# Patient Record
Sex: FEMALE | Race: BLACK OR AFRICAN AMERICAN | NOT HISPANIC OR LATINO | Employment: OTHER | URBAN - METROPOLITAN AREA
[De-identification: names, ages, dates, MRNs, and addresses within clinical notes are randomized per-mention and may not be internally consistent; named-entity substitution may affect disease eponyms.]

---

## 2017-01-03 ENCOUNTER — OFFICE VISIT (OUTPATIENT)
Dept: INTERNAL MEDICINE | Facility: CLINIC | Age: 82
End: 2017-01-03
Payer: MEDICARE

## 2017-01-03 VITALS
WEIGHT: 147.94 LBS | BODY MASS INDEX: 27.22 KG/M2 | TEMPERATURE: 98 F | HEIGHT: 62 IN | HEART RATE: 69 BPM | DIASTOLIC BLOOD PRESSURE: 78 MMHG | RESPIRATION RATE: 15 BRPM | SYSTOLIC BLOOD PRESSURE: 140 MMHG

## 2017-01-03 DIAGNOSIS — I50.43 ACUTE ON CHRONIC COMBINED SYSTOLIC AND DIASTOLIC HF (HEART FAILURE): Primary | ICD-10-CM

## 2017-01-03 DIAGNOSIS — Z95.0 PACEMAKER: ICD-10-CM

## 2017-01-03 DIAGNOSIS — Z79.01 LONG TERM (CURRENT) USE OF ANTICOAGULANTS: ICD-10-CM

## 2017-01-03 DIAGNOSIS — R80.9 PROTEINURIA: ICD-10-CM

## 2017-01-03 DIAGNOSIS — N18.30 BENIGN HYPERTENSIVE HEART AND CKD, STAGE 3 (GFR 30-59), W CHF: ICD-10-CM

## 2017-01-03 DIAGNOSIS — N18.30 CKD (CHRONIC KIDNEY DISEASE) STAGE 3, GFR 30-59 ML/MIN: ICD-10-CM

## 2017-01-03 DIAGNOSIS — Z90.49 S/P PARTIAL COLECTOMY: ICD-10-CM

## 2017-01-03 DIAGNOSIS — R53.1 WEAKNESS: ICD-10-CM

## 2017-01-03 DIAGNOSIS — I13.0 BENIGN HYPERTENSIVE HEART AND CKD, STAGE 3 (GFR 30-59), W CHF: ICD-10-CM

## 2017-01-03 DIAGNOSIS — E11.9 CONTROLLED TYPE 2 DIABETES MELLITUS WITHOUT COMPLICATION, WITHOUT LONG-TERM CURRENT USE OF INSULIN: ICD-10-CM

## 2017-01-03 DIAGNOSIS — E03.4 HYPOTHYROIDISM DUE TO ACQUIRED ATROPHY OF THYROID: ICD-10-CM

## 2017-01-03 DIAGNOSIS — F41.9 ANXIETY: ICD-10-CM

## 2017-01-03 DIAGNOSIS — I10 ESSENTIAL HYPERTENSION: ICD-10-CM

## 2017-01-03 DIAGNOSIS — R53.1 GENERALIZED WEAKNESS: ICD-10-CM

## 2017-01-03 DIAGNOSIS — E55.9 VITAMIN D DEFICIENCY DISEASE: ICD-10-CM

## 2017-01-03 DIAGNOSIS — H26.9 CORTICAL CATARACT OF LEFT EYE: ICD-10-CM

## 2017-01-03 DIAGNOSIS — Z23 NEED FOR 23-POLYVALENT PNEUMOCOCCAL POLYSACCHARIDE VACCINE: ICD-10-CM

## 2017-01-03 DIAGNOSIS — E11.9 TYPE 2 DIABETES MELLITUS WITHOUT RETINOPATHY: ICD-10-CM

## 2017-01-03 DIAGNOSIS — I48.19 PERSISTENT ATRIAL FIBRILLATION: ICD-10-CM

## 2017-01-03 PROCEDURE — 99213 OFFICE O/P EST LOW 20 MIN: CPT | Mod: PBBFAC,PO | Performed by: FAMILY MEDICINE

## 2017-01-03 PROCEDURE — 99215 OFFICE O/P EST HI 40 MIN: CPT | Mod: S$PBB,,, | Performed by: FAMILY MEDICINE

## 2017-01-03 PROCEDURE — 99999 PR PBB SHADOW E&M-EST. PATIENT-LVL III: CPT | Mod: PBBFAC,,, | Performed by: FAMILY MEDICINE

## 2017-01-03 PROCEDURE — 90732 PPSV23 VACC 2 YRS+ SUBQ/IM: CPT | Mod: PBBFAC,PO | Performed by: FAMILY MEDICINE

## 2017-01-03 NOTE — PROGRESS NOTES
Subjective:       Patient ID: Deepali Stuart is a 84 y.o. female.    Chief Complaint: Annual Exam    HPI 84-year-old -American female recessed to clinic today accompanied by her son for annual physical exam.  She continues [facility].  She continues to be treated for persistent atrial fibrillation, congestive heart failure, and hypertension which is now stable under cardiology with treatment with amlodipine, carvedilol, lisinopril, and Lasix.  She also continues to be under Coumadin clinic for anticoagulation secondary to her atrial fibrillation.  She walks with assistance of a walker.  When she presents to clinic she is wheelchair-bound.  She continues to have well-controlled type 2 diabetes on metformin 500 mg twice a day.  She also continues to have stable stage III chronic kidney disease which is mildly improved since her last visit.  Kidney function is probably depressed secondary to Lasix and secondary to being on Lasix the patient complains of persistent dizziness.  I have strongly encouraged increased hydration.  Hypothyroidism continues to be stable on levothyroxine 50 µg daily.  She has a past surgical history of pacemaker placement, laser ablation secondary to atrial fibrillation, cataract extraction, hysterectomy, and partial colectomy secondary to a suspicious colon polyp.  She reports no significant family medical history.  She declines flu vaccine.  Pneumovax has been discussed and will be given.  Review of Systems   Constitutional: Negative for appetite change, chills, fatigue and fever.   HENT: Negative for congestion, ear pain, hearing loss, postnasal drip, rhinorrhea, sinus pressure, sore throat and tinnitus.    Eyes: Positive for visual disturbance (blurry vision). Negative for redness and itching.   Respiratory: Negative for cough, chest tightness and shortness of breath.    Cardiovascular: Negative for chest pain and palpitations.    Gastrointestinal: Negative for abdominal pain, constipation, diarrhea, nausea and vomiting.   Genitourinary: Negative for decreased urine volume, difficulty urinating, dysuria, frequency, hematuria and urgency.   Musculoskeletal: Negative for back pain, myalgias, neck pain and neck stiffness.   Skin: Negative for rash.   Neurological: Positive for dizziness. Negative for light-headedness and headaches.   Psychiatric/Behavioral: Negative.        Objective:      Physical Exam   Constitutional: She is oriented to person, place, and time. She appears well-developed and well-nourished. No distress.   HENT:   Head: Normocephalic and atraumatic.   Right Ear: External ear normal.   Left Ear: External ear normal.   Nose: Nose normal.   Mouth/Throat: Oropharynx is clear and moist. Mucous membranes are dry. No oropharyngeal exudate.   Eyes: Conjunctivae and EOM are normal. Pupils are equal, round, and reactive to light. Right eye exhibits no discharge. Left eye exhibits no discharge. No scleral icterus.   Neck: Normal range of motion. Neck supple. No JVD present. No tracheal deviation present. No thyromegaly present.   Cardiovascular: Normal rate, regular rhythm, normal heart sounds and intact distal pulses.  Exam reveals no gallop and no friction rub.    No murmur heard.  Pulses:       Dorsalis pedis pulses are 2+ on the right side, and 2+ on the left side.        Posterior tibial pulses are 2+ on the right side, and 2+ on the left side.   Pulmonary/Chest: Effort normal and breath sounds normal. No stridor. No respiratory distress. She has no wheezes. She has no rales.   Abdominal: Soft. Bowel sounds are normal. She exhibits no distension and no mass. There is no tenderness. There is no rebound and no guarding.   Musculoskeletal: Normal range of motion. She exhibits no edema or tenderness.        Right foot: There is normal range of motion and no deformity.        Left foot: There is normal range of motion and no deformity.    Wheelchair-bound     Feet:   Right Foot:   Protective Sensation: 10 sites tested. 10 sites sensed.   Skin Integrity: Negative for ulcer, blister, skin breakdown, erythema, warmth, callus or dry skin.   Left Foot:   Protective Sensation: 10 sites tested. 10 sites sensed.   Skin Integrity: Negative for ulcer, blister, skin breakdown, erythema, warmth, callus or dry skin.   Lymphadenopathy:     She has no cervical adenopathy.   Neurological: She is alert and oriented to person, place, and time.   Skin: Skin is warm and dry. No rash noted. She is not diaphoretic. No erythema. No pallor.   Psychiatric: She has a normal mood and affect. Her behavior is normal. Judgment and thought content normal.   Nursing note and vitals reviewed.      Assessment:       1. Acute on chronic combined systolic and diastolic HF (heart failure)    2. Persistent atrial fibrillation    3. Pacemaker    4. 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 CKD (chronic kidney disease) stage 3, GFR 30-59 ml/min    6. Controlled type 2 diabetes mellitus without complication, without long-term current use of insulin    7. Hypothyroidism due to acquired atrophy of thyroid    8. Proteinuria    9. Anxiety    10. Generalized weakness    11. Long term (current) use of anticoagulants    12. Vitamin D deficiency disease    13. Weakness    14. Type 2 diabetes mellitus without retinopathy - Both Eyes    15. Cortical cataract of left eye    16. Essential hypertension    17. S/P partial colectomy    18. Need for 23-polyvalent pneumococcal polysaccharide vaccine        Plan:       1.  Labs have been reviewed and are overall within normal limits except for mildly decreased kidney function.  I have strongly encouraged increased hydration.  2.  Continue amlodipine 5 mg daily, carvedilol 25 mg twice a day, lisinopril 40 mg daily, and Lasix 20 mg daily.  Atrial fibrillation and congestive heart failure are stable.  3.  Continue follow-up with  cardiology as scheduled.  4.  Encourage increased hydration.  5.  Continue metformin 500 mg twice a day.  Type 2 diabetes is well-controlled.  6.  Continue levothyroxine 50 µg daily.  Hypothyroidism is well controlled.  7.  Continue trazodone as needed.  8.  Continue follow-up with ophthalmology as scheduled.  9.  Continue Coumadin as prescribed and continue follow-up with Coumadin clinic.  10.  Pneumococcal vaccine given.  11.  Return to clinic as needed or in 6 months for general exam.    40 minutes has been spent with the patient with more than 50% of the clinic visit spent reviewing medical diagnoses, medications, medical treatment, and counseling the patient on her medical conditions.

## 2017-01-03 NOTE — MR AVS SNAPSHOT
[facility]   [facility]  [place] [address]  Phone: [phone]  Fax: [phone]                  Deepali Stuart   1/3/2017 9:00 AM   Office Visit    Description:  Female : 1932   Provider:  Vishal Salazar MD   Department:  [facility]           Reason for Visit     Annual Exam           Diagnoses this Visit        Comments    Acute on chronic combined systolic and diastolic HF (heart failure)    -  Primary     Persistent atrial fibrillation         Pacemaker         Benign hypertensive heart and CKD, 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), w CHF         CKD (chronic kidney disease) stage 3, GFR 30-59 ml/min         Controlled type 2 diabetes mellitus without complication, without long-term current use of insulin         Hypothyroidism due to acquired atrophy of thyroid         Proteinuria         Anxiety         Generalized weakness         Long term (current) use of anticoagulants         Vitamin D deficiency disease         Weakness         Type 2 diabetes mellitus without retinopathy         Need for 23-polyvalent pneumococcal polysaccharide vaccine                To Do List           Future Appointments        Provider Department Dept Phone    1/3/2017 9:00 AM Vishal Salazar MD [facility] Internal Medicine [phone]    2017 10:00 AM TELEPHONE CHECK, [facility] [address] - Arrhythmia [phone]      Goals (5 Years of Data)     None      Follow-Up and Disposition     Return in about 6 months (around 7/3/2017), or if symptoms worsen or fail to improve.      [facility] On [facility]     [facility] On Call Nurse Care Line -  Assistance  Registered nurses in the [facility] On Call Center provide clinical advisement, health education, appointment booking, and other advisory services.  Call for this free service at [phone].             Medications           Message regarding Medications     Verify the changes and/or additions to your medication regime  "listed below are the same as discussed with your clinician today.  If any of these changes or additions are incorrect, please notify your healthcare provider.             Verify that the below list of medications is an accurate representation of the medications you are currently taking.  If none reported, the list may be blank. If incorrect, please contact your healthcare provider. Carry this list with you in case of emergency.           Current Medications     amlodipine (NORVASC) 5 MG tablet Take 1 tablet (5 mg total) by mouth once daily.    atorvastatin (LIPITOR) 10 MG tablet TAKE 1 TABLET BY MOUTH EVERY DAY    blood sugar diagnostic (TRUETRACK TEST) Strp FOLLOW PACKAGE DIRECTIONS    carvedilol (COREG) 25 MG tablet TAKE 1 TABLET BY MOUTH TWICE DAILY    furosemide (LASIX) 20 MG tablet Take 1 tablet (20 mg total) by mouth daily as needed.    lancets Misc 1 Device by Misc.(Non-Drug; Combo Route) route once daily.    levothyroxine (SYNTHROID) 50 MCG tablet TAKE 1 TABLET BY MOUTH EVERY DAY    lisinopril (PRINIVIL,ZESTRIL) 40 MG tablet TAKE 1 TABLET BY MOUTH EVERY DAY    metformin (GLUCOPHAGE) 500 MG tablet TAKE 1 TABLET BY MOUTH TWICE DAILY WITH MEALS    ondansetron (ZOFRAN-ODT) 4 MG TbDL DISSOLVE ONE TABLET BY MOUTH EVERY 6 HOURS AS NEEDED FOR NAUSEA  AND VOMITING    trazodone (DESYREL) 50 MG tablet TAKE 1/2 TO 1 TABLET BY MOUTH EVERY EVENING AS NEEDED FOR INSOMNIA    warfarin (COUMADIN) 5 MG tablet TAKE 1 TABLET BY MOUTH EVERY DAY OR AS DIRECTED BY COUMADIN CLINIC           Clinical Reference Information           Vital Signs - Last Recorded  Most recent update: 1/3/2017  8:41 AM by Melanie Kenney LPN    BP Pulse Temp Resp Ht Wt    (!) 140/78 (BP Location: Right arm, Patient Position: Sitting, BP Method: Manual) 69 98.1 °F (36.7 °C) (Oral) 15 5' 2" (1.575 m) 67.1 kg (147 lb 14.9 oz)    LMP BMI             (LMP Unknown) 27.06 kg/m2         Blood Pressure          Most Recent Value    BP  (!)  140/78    "   Allergies as of 1/3/2017     Plavix [Clopidogrel]      Immunizations Administered on Date of Encounter - 1/3/2017     Name Date Dose VIS Date Route    Pneumococcal Polysaccharide - 23 Valent  Incomplete 0.5 mL 4/24/2015 Intramuscular      Orders Placed During Today's Visit      Normal Orders This Visit    Pneumococcal Polysaccharide Vaccine (23 Valent) (SQ/IM)       [facility] Sign-Up     Activating your [facility] account is as easy as 1-2-3!     1) Visit [email or web address], select Sign Up Now, enter this activation code and your date of birth, then select Next.  [email or web address]  Expires: 1/14/2017  9:42 AM      2) Create a username and password to use when you visit [facility] in the future and select a security question in case you lose your password and select Next.    3) Enter your e-mail address and click Sign Up!    Additional Information  If you have questions, please e-mail [email or web address] or call [phone] to talk to our [facility] staff. Remember, [facility] is NOT to be used for urgent needs. For medical emergencies, dial 911.

## 2017-01-04 ENCOUNTER — ANTI-COAG VISIT (OUTPATIENT)
Dept: CARDIOLOGY | Facility: CLINIC | Age: 82
End: 2017-01-04

## 2017-01-04 ENCOUNTER — TELEPHONE (OUTPATIENT)
Dept: OPTOMETRY | Facility: CLINIC | Age: 82
End: 2017-01-04

## 2017-01-04 DIAGNOSIS — Z79.01 LONG TERM (CURRENT) USE OF ANTICOAGULANTS: ICD-10-CM

## 2017-01-04 LAB — INR PPP: 2.5

## 2017-01-04 NOTE — TELEPHONE ENCOUNTER
----- Message from Lissett Main sent at 1/4/2017 11:13 AM CST -----  Contact: Deepali Stuart   Pt returned the called back ,can will call the pt back please pt can be reached at [phone] please thanks.

## 2017-01-09 ENCOUNTER — OFFICE VISIT (OUTPATIENT)
Dept: OPTOMETRY | Facility: CLINIC | Age: 82
End: 2017-01-09
Payer: MEDICARE

## 2017-01-09 DIAGNOSIS — E11.9 TYPE 2 DIABETES MELLITUS WITHOUT RETINOPATHY: ICD-10-CM

## 2017-01-09 DIAGNOSIS — H04.123 DRY EYES, BILATERAL: Primary | ICD-10-CM

## 2017-01-09 PROCEDURE — 99212 OFFICE O/P EST SF 10 MIN: CPT | Mod: PBBFAC,PO | Performed by: OPTOMETRIST

## 2017-01-09 PROCEDURE — 92014 COMPRE OPH EXAM EST PT 1/>: CPT | Mod: S$PBB,,, | Performed by: OPTOMETRIST

## 2017-01-09 PROCEDURE — 99999 PR PBB SHADOW E&M-EST. PATIENT-LVL II: CPT | Mod: PBBFAC,,, | Performed by: OPTOMETRIST

## 2017-01-09 NOTE — PROGRESS NOTES
HPI     DLS:   Pt states harder to see anything with glasses. Pt states blurry va has   been gradual and states glasses are not helping to improve vision. +Mucous   discharge  Denies f/f    Systane ou qhs     Check BS once a week. Does not remember last BS reading   Hemoglobin A1C       Date                     Value               Ref Range             Status                12/28/2016               5.6                 4.5 - 6.2 %           Final                  11/17/2016               5.7                 4.5 - 6.2 %           Final                  08/27/2015               6.3 (H)             4.5 - 6.2 %           Final            ----------         Last edited by Duyen Pina on 1/9/2017  8:46 AM.     ROS     Positive for: Cardiovascular, Eyes    Negative for: Constitutional, Gastrointestinal, Neurological, Skin,   Genitourinary, Musculoskeletal, HENT, Endocrine, Respiratory, Psychiatric,   Allergic/Imm, Heme/Lymph    Last edited by Heriberto Brooks, OD on 1/9/2017 10:03 AM. (History)        Assessment /Plan     For exam results, see Encounter Report.    Dry eyes, bilateral    Type 2 diabetes mellitus without retinopathy      See previous notes from 3 months ago:  1. Mild pco sp pciol OU (hx iritis/iop spike OS--see Dr zepeda notes).  iop high normal OU today  2. Possible hx vasc occlusion OD per prev notes from Dr zepeda/naldo  3. DM- WITHOUT RETINOPATHY.  Advised yearly DFE  4. LUNA--advised SYSTANE ATs TID  5. Pt reports difficulty reading w PAL, but in the office w correct light can easily read a magazine.  Discussed w son pt should get a good lamp for her reading chair.  If still problems may wish to switch to lined bifocals    TODAY pt reports transient blur and mucus in eyes. Dilated eyes again and no change noted.  Is NOT using ATs as directed!!  Pt reports she thought because she had slight blur after instillation that the drops were making her eyes worse.  Discussed w pt and  that she  needs those ATs QID DAILY to stop blur/mucus.  ALSO rewrote last spex Rx and discussed lined bifocals might make things better for reading    PLAN:    rrtc as sched for full exam

## 2017-01-10 ENCOUNTER — CLINICAL SUPPORT (OUTPATIENT)
Dept: ELECTROPHYSIOLOGY | Facility: CLINIC | Age: 82
End: 2017-01-10
Payer: MEDICARE

## 2017-01-10 DIAGNOSIS — Z95.0 CARDIAC PACEMAKER IN SITU: ICD-10-CM

## 2017-01-10 DIAGNOSIS — I48.91 ATRIAL FIBRILLATION: ICD-10-CM

## 2017-01-10 PROCEDURE — 93293 PM PHONE R-STRIP DEVICE EVAL: CPT | Mod: PBBFAC | Performed by: INTERNAL MEDICINE

## 2017-01-18 ENCOUNTER — ANTI-COAG VISIT (OUTPATIENT)
Dept: CARDIOLOGY | Facility: CLINIC | Age: 82
End: 2017-01-18

## 2017-01-18 DIAGNOSIS — Z79.01 LONG TERM (CURRENT) USE OF ANTICOAGULANTS: ICD-10-CM

## 2017-01-18 LAB — INR PPP: 2.4

## 2017-01-18 NOTE — PROGRESS NOTES
Verbal result taken from Kristine/[facility]_________. PT/INR _2.4______ Date drawn_1/18/17_______ Hardcopy to be faxed.

## 2017-02-01 ENCOUNTER — ANTI-COAG VISIT (OUTPATIENT)
Dept: CARDIOLOGY | Facility: CLINIC | Age: 82
End: 2017-02-01

## 2017-02-01 DIAGNOSIS — Z79.01 LONG TERM (CURRENT) USE OF ANTICOAGULANTS: ICD-10-CM

## 2017-02-01 LAB — INR PPP: 2.4

## 2017-02-01 NOTE — PROGRESS NOTES
Verbal result taken from Christie/[facility]_________. PT/INR _2.4______ Date drawn_2/1/17_______ Hardcopy to be faxed.

## 2017-02-06 RX ORDER — ONDANSETRON 4 MG/1
TABLET, ORALLY DISINTEGRATING ORAL
Qty: 21 TABLET | Refills: 0 | Status: SHIPPED | OUTPATIENT
Start: 2017-02-06 | End: 2017-04-07 | Stop reason: SDUPTHER

## 2017-02-15 ENCOUNTER — ANTI-COAG VISIT (OUTPATIENT)
Dept: CARDIOLOGY | Facility: CLINIC | Age: 82
End: 2017-02-15

## 2017-02-15 DIAGNOSIS — Z79.01 LONG TERM (CURRENT) USE OF ANTICOAGULANTS: ICD-10-CM

## 2017-02-15 LAB — INR PPP: 3.4

## 2017-02-15 NOTE — PROGRESS NOTES
Mahesh gonzales/[facility] called in a verbal result dated 2/15/17 as: INR -3.4 / PT -[id number], hard copy to be faxed

## 2017-02-17 NOTE — PROGRESS NOTES
Dose 2/17.  INR likely high due to diet change of no protein shakes.  Patient does not intend to resume protein shakes.  Hold then decrease warfarin dose.

## 2017-02-20 ENCOUNTER — HOSPITAL ENCOUNTER (EMERGENCY)
Facility: OTHER | Age: 82
Discharge: HOME | End: 2017-02-20
Attending: EMERGENCY MEDICINE
Payer: MEDICARE

## 2017-02-20 VITALS
TEMPERATURE: 99 F | BODY MASS INDEX: 26.68 KG/M2 | SYSTOLIC BLOOD PRESSURE: 170 MMHG | HEART RATE: 92 BPM | OXYGEN SATURATION: 98 % | WEIGHT: 145 LBS | RESPIRATION RATE: 16 BRPM | DIASTOLIC BLOOD PRESSURE: 94 MMHG | HEIGHT: 62 IN

## 2017-02-20 DIAGNOSIS — R31.9 HEMATURIA: ICD-10-CM

## 2017-02-20 DIAGNOSIS — R53.81 PHYSICAL DECONDITIONING: ICD-10-CM

## 2017-02-20 DIAGNOSIS — R10.13 EPIGASTRIC ABDOMINAL PAIN: Primary | ICD-10-CM

## 2017-02-20 LAB
ALBUMIN SERPL BCP-MCNC: 2.5 G/DL
ALP SERPL-CCNC: 50 U/L
ALT SERPL W/O P-5'-P-CCNC: 27 U/L
AMORPH CRY URNS QL MICRO: ABNORMAL
ANION GAP SERPL CALC-SCNC: 9 MMOL/L
AST SERPL-CCNC: 51 U/L
BACTERIA #/AREA URNS HPF: ABNORMAL /HPF
BASOPHILS # BLD AUTO: 0.04 K/UL
BASOPHILS NFR BLD: 0.7 %
BILIRUB SERPL-MCNC: 0.6 MG/DL
BILIRUB UR QL STRIP: NEGATIVE
BUN SERPL-MCNC: 18 MG/DL
CALCIUM SERPL-MCNC: 8.2 MG/DL
CHLORIDE SERPL-SCNC: 106 MMOL/L
CLARITY UR: CLEAR
CO2 SERPL-SCNC: 21 MMOL/L
COLOR UR: YELLOW
CREAT SERPL-MCNC: 1.3 MG/DL
DIFFERENTIAL METHOD: ABNORMAL
EOSINOPHIL # BLD AUTO: 0.1 K/UL
EOSINOPHIL NFR BLD: 1.9 %
ERYTHROCYTE [DISTWIDTH] IN BLOOD BY AUTOMATED COUNT: 13.8 %
EST. GFR  (AFRICAN AMERICAN): 44 ML/MIN/1.73 M^2
EST. GFR  (NON AFRICAN AMERICAN): 38 ML/MIN/1.73 M^2
GLUCOSE SERPL-MCNC: 160 MG/DL
GLUCOSE UR QL STRIP: NEGATIVE
GRAN CASTS #/AREA URNS LPF: 1 /LPF
HCT VFR BLD AUTO: 38.2 %
HGB BLD-MCNC: 12.8 G/DL
HGB UR QL STRIP: ABNORMAL
HYALINE CASTS #/AREA URNS LPF: 4 /LPF
KETONES UR QL STRIP: NEGATIVE
LEUKOCYTE ESTERASE UR QL STRIP: NEGATIVE
LIPASE SERPL-CCNC: 32 U/L
LYMPHOCYTES # BLD AUTO: 1.4 K/UL
LYMPHOCYTES NFR BLD: 24.5 %
MCH RBC QN AUTO: 31.1 PG
MCHC RBC AUTO-ENTMCNC: 33.5 %
MCV RBC AUTO: 93 FL
MICROSCOPIC COMMENT: ABNORMAL
MONOCYTES # BLD AUTO: 0.4 K/UL
MONOCYTES NFR BLD: 7.4 %
NEUTROPHILS # BLD AUTO: 3.8 K/UL
NEUTROPHILS NFR BLD: 65.3 %
NITRITE UR QL STRIP: NEGATIVE
PH UR STRIP: 6 [PH] (ref 5–8)
PLATELET # BLD AUTO: 205 K/UL
PMV BLD AUTO: 10.2 FL
POCT GLUCOSE: 191 MG/DL (ref 70–110)
POTASSIUM SERPL-SCNC: 4.6 MMOL/L
PROT SERPL-MCNC: 5.9 G/DL
PROT UR QL STRIP: ABNORMAL
RBC # BLD AUTO: 4.11 M/UL
RBC #/AREA URNS HPF: >100 /HPF (ref 0–4)
SODIUM SERPL-SCNC: 136 MMOL/L
SP GR UR STRIP: 1.01 (ref 1–1.03)
SQUAMOUS #/AREA URNS HPF: 2 /HPF
TROPONIN I SERPL DL<=0.01 NG/ML-MCNC: 0.03 NG/ML
TSH SERPL DL<=0.005 MIU/L-ACNC: 2.75 UIU/ML
URN SPEC COLLECT METH UR: ABNORMAL
UROBILINOGEN UR STRIP-ACNC: NEGATIVE EU/DL
WBC # BLD AUTO: 5.83 K/UL
WBC #/AREA URNS HPF: 3 /HPF (ref 0–5)
YEAST URNS QL MICRO: ABNORMAL

## 2017-02-20 PROCEDURE — 80053 COMPREHEN METABOLIC PANEL: CPT

## 2017-02-20 PROCEDURE — 84443 ASSAY THYROID STIM HORMONE: CPT

## 2017-02-20 PROCEDURE — 85025 COMPLETE CBC W/AUTO DIFF WBC: CPT

## 2017-02-20 PROCEDURE — 84484 ASSAY OF TROPONIN QUANT: CPT

## 2017-02-20 PROCEDURE — 81000 URINALYSIS NONAUTO W/SCOPE: CPT

## 2017-02-20 PROCEDURE — 96375 TX/PRO/DX INJ NEW DRUG ADDON: CPT

## 2017-02-20 PROCEDURE — 63600175 PHARM REV CODE 636 W HCPCS: Performed by: EMERGENCY MEDICINE

## 2017-02-20 PROCEDURE — 93010 ELECTROCARDIOGRAM REPORT: CPT | Mod: ,,, | Performed by: INTERNAL MEDICINE

## 2017-02-20 PROCEDURE — 93005 ELECTROCARDIOGRAM TRACING: CPT

## 2017-02-20 PROCEDURE — 83690 ASSAY OF LIPASE: CPT

## 2017-02-20 PROCEDURE — 96374 THER/PROPH/DIAG INJ IV PUSH: CPT

## 2017-02-20 PROCEDURE — 25000003 PHARM REV CODE 250: Performed by: EMERGENCY MEDICINE

## 2017-02-20 PROCEDURE — 82962 GLUCOSE BLOOD TEST: CPT

## 2017-02-20 PROCEDURE — 99284 EMERGENCY DEPT VISIT MOD MDM: CPT | Mod: 25

## 2017-02-20 RX ORDER — PANTOPRAZOLE SODIUM 20 MG/1
20 TABLET, DELAYED RELEASE ORAL DAILY
Qty: 30 TABLET | Refills: 0 | Status: SHIPPED | OUTPATIENT
Start: 2017-02-20 | End: 2017-04-13 | Stop reason: SDUPTHER

## 2017-02-20 RX ORDER — ONDANSETRON 2 MG/ML
4 INJECTION INTRAMUSCULAR; INTRAVENOUS
Status: COMPLETED | OUTPATIENT
Start: 2017-02-20 | End: 2017-02-20

## 2017-02-20 RX ORDER — FAMOTIDINE 10 MG/ML
20 INJECTION INTRAVENOUS
Status: COMPLETED | OUTPATIENT
Start: 2017-02-20 | End: 2017-02-20

## 2017-02-20 RX ORDER — BISMUTH SUBSALICYLATE 525 MG/30ML
15 LIQUID ORAL EVERY 6 HOURS PRN
COMMUNITY
End: 2017-04-18

## 2017-02-20 RX ADMIN — FAMOTIDINE 20 MG: 10 INJECTION INTRAVENOUS at 11:02

## 2017-02-20 RX ADMIN — ONDANSETRON 4 MG: 2 INJECTION, SOLUTION INTRAMUSCULAR; INTRAVENOUS at 11:02

## 2017-02-20 NOTE — ED PROVIDER NOTES
Encounter Date: 2/20/2017    SCRIBE #1 NOTE: I, Maryam Sifuentes, am scribing for, and in the presence of, Dr. Naranjo.       History     Chief Complaint   Patient presents with    Fatigue     Pt reports generalized weakness with upper abdominal pain and nausea x 1 week.      Review of patient's allergies indicates:   Allergen Reactions    Plavix [clopidogrel]      HPI Comments: Time seen by provider: 10:59 AM    This is a 84 y.o. female who presents with complaint of upper abdominal pain. The pain began one week ago and has been intermittent since onset. She describes the pain as burning.  Exhibits that the pain is not present currently.  She reports taking Mylanta with little relief.  She complains of associated nausea.     She also reports bilateral thigh pain that began last week. The pain is worse with walking across her house and is better with rest. She walks with a walker, and she has been walking more lately because her therapists told her to do so. She denies shortness of breath and chest pain.     The history is provided by the patient and a relative (son).     Past Medical History:   Diagnosis Date    Atrial fibrillation     chronic AF    CHF (congestive heart failure)     Diabetes insipidus     Diabetes mellitus type II     GERD (gastroesophageal reflux disease)     Hypertension     Iritis 12/10/2014    NPDR (nonproliferative diabetic retinopathy) 10/27/2014    PAF (paroxysmal atrial fibrillation)     Palpitations      Past Medical History Pertinent Negatives:   Diagnosis Date Noted    Amblyopia 08/06/2014    Arthritis 08/06/2014    Glaucoma 08/06/2014    Macular degeneration 08/06/2014    Retinal detachment 08/06/2014    Sickle cell anemia 06/05/2015    Sickle cell trait 06/05/2015    Strabismus 08/06/2014     Past Surgical History:   Procedure Laterality Date    CATARACT EXTRACTION W/  INTRAOCULAR LENS IMPLANT Right n/a    OD over 10 years ago     CATARACT EXTRACTION W/  INTRAOCULAR  LENS IMPLANT Left 11/11/14    OS ()    COLON SURGERY      Partial colectomy secondary to suspicious polyp    HYSTERECTOMY      INSERT / REPLACE / REMOVE PACEMAKER      [email or web address]    LASER ABLATION      3 years ago OS     Family History   Problem Relation Age of Onset    No Known Problems Mother     No Known Problems Father     No Known Problems Sister     No Known Problems Brother     No Known Problems Maternal Aunt     No Known Problems Maternal Uncle     No Known Problems Paternal Aunt     No Known Problems Paternal Uncle     No Known Problems Maternal Grandmother     No Known Problems Maternal Grandfather     No Known Problems Paternal Grandmother     No Known Problems Paternal Grandfather     Amblyopia Neg Hx     Blindness Neg Hx     Cancer Neg Hx     Cataracts Neg Hx     Diabetes Neg Hx     Glaucoma Neg Hx     Hypertension Neg Hx     Macular degeneration Neg Hx     Retinal detachment Neg Hx     Strabismus Neg Hx     Stroke Neg Hx     Thyroid disease Neg Hx      Social History   Substance Use Topics    Smoking status: Never Smoker    Smokeless tobacco: Never Used    Alcohol use No     Review of Systems   Constitutional: Negative for chills and fever.   HENT: Negative for facial swelling.    Eyes: Negative for visual disturbance.   Respiratory: Negative for cough and shortness of breath.    Cardiovascular: Negative for chest pain and palpitations.   Gastrointestinal: Positive for abdominal pain and nausea. Negative for diarrhea and vomiting.   Endocrine: Negative for polyuria.   Genitourinary: Negative for dysuria and vaginal discharge.   Musculoskeletal: Negative for joint swelling, myalgias, neck pain and neck stiffness.        Positive for bilateral thigh pain.    Skin: Negative for rash.   Neurological: Negative for weakness, numbness and headaches.   Psychiatric/Behavioral: Negative for confusion.       Physical Exam   Initial Vitals   BP Pulse Resp Temp  SpO2   02/20/17 0928 02/20/17 0928 02/20/17 0928 02/20/17 0928 02/20/17 0928   165/86 86 18 98.8 °F (37.1 °C) 100 %     Physical Exam    Nursing note and vitals reviewed.  Constitutional: She appears well-developed and well-nourished. She is not diaphoretic. No distress.   HENT:   Head: Normocephalic and atraumatic.   Right Ear: External ear normal.   Left Ear: External ear normal.   Eyes: EOM are normal. Right eye exhibits no discharge. Left eye exhibits no discharge.   Neck: Normal range of motion.   Cardiovascular: Normal rate and normal heart sounds. An irregularly irregular rhythm present. Exam reveals no gallop and no friction rub.    No murmur heard.  Pulmonary/Chest: Breath sounds normal. No respiratory distress. She has no wheezes. She has no rhonchi. She has no rales.   Abdominal: Soft. There is no tenderness. There is no rebound and no guarding.   Musculoskeletal: Normal range of motion. She exhibits no edema or tenderness.   Neurological: She is alert and oriented to person, place, and time. She has normal strength. No cranial nerve deficit.   Skin: Skin is warm and dry. No rash and no abscess noted. No erythema. No pallor.   Psychiatric: She has a normal mood and affect. Her behavior is normal. Judgment and thought content normal.         ED Course   Procedures  Labs Reviewed   CBC W/ AUTO DIFFERENTIAL - Abnormal; Notable for the following:        Result Value    MCH 31.1 (*)     All other components within normal limits   COMPREHENSIVE METABOLIC PANEL - Abnormal; Notable for the following:     CO2 21 (*)     Glucose 160 (*)     Calcium 8.2 (*)     Total Protein 5.9 (*)     Albumin 2.5 (*)     Alkaline Phosphatase 50 (*)     AST 51 (*)     eGFR if  44 (*)     eGFR if non  38 (*)     All other components within normal limits   URINALYSIS - Abnormal; Notable for the following:     Protein, UA 3+ (*)     Occult Blood UA 2+ (*)     All other components within normal limits    TROPONIN I - Abnormal; Notable for the following:     Troponin I 0.030 (*)     All other components within normal limits   URINALYSIS MICROSCOPIC - Abnormal; Notable for the following:     RBC, UA >100 (*)     Bacteria, UA Few (*)     Yeast, UA Rare (*)     Hyaline Casts, UA 4 (*)     Granular Casts, UA 1 (*)     All other components within normal limits   POCT GLUCOSE - Abnormal; Notable for the following:     POCT Glucose 191 (*)     All other components within normal limits   TSH   LIPASE       Imaging Results         X-Ray Chest PA And Lateral (Final result) Result time:  02/20/17 12:01:58    Final result by Hua Oseguera MD (02/20/17 12:01:58)    Narrative:    History: Chest pain.    Procedure: Chest 2 views    Findings:    Examination is compared to study of 11/16/16.    Enlargement of the cardiac silhouette remains without significant change.  Unipolar transvenous pacemaker also remains in unchanged position.  There is atherosclerosis of the aortic arch.  There is minimal blunting of the right costophrenic angle suspicious for small right pleural effusion.  Pulmonary vasculature within normal limits.  No pulmonary consolidations or pneumothorax.    Impression    1.  Question small right pleural effusion.  2.  Cardiomegaly.        Electronically signed by: HUA OSEGUERA MD  Date:     02/20/17  Time:    12:01              EKG Readings: (Independently Interpreted)   Initial Reading: No STEMI.   Atrial fibrillation at rate of 86 with no STEMI.      ECG Results                X-Rays:   Independently Interpreted Readings:   Chest X-Ray: Probable bilateral small pleural effusions. No obvious infiltrate or pneumothorax.      Medical Decision Making:   Clinical Tests:   Lab Tests: Ordered and Reviewed  Radiological Study: Ordered and Reviewed  Medical Tests: Ordered and Reviewed  ED Management:  Emergent evaluation of 84-year-old female with complaint of epigastric abdominal pain.  Pain is intermittent and is not  currently present.  I suspect GI etiology.  She was treated with Zofran and Pepcid and did not have any pain during ER course.  Lab workup shows baseline values, I do not suspect acute MI.  She also reports thigh pain which I suspect is due to deconditioning, I do not think this is a DVT.  She was monitored in the ER for several hours and was discharged in good condition.    Additional MDM:   EKG: I have independently interpreted EKG(s) - see notes.   X-Rays: I have independently interpreted X-Ray(s) - see notes.          Scribe Attestation:   Scribe #1: I performed the above scribed service and the documentation accurately describes the services I performed. I attest to the accuracy of the note.    Attending Attestation:           Physician Attestation for Scribe:  Physician Attestation Statement for Scribe #1: I, Dr. Naranjo, reviewed documentation, as scribed by Maryam Sifuentes in my presence, and it is both accurate and complete.                 ED Course     Clinical Impression:     1. Epigastric abdominal pain    2. Physical deconditioning    3. Hematuria             Fabiana Naranjo MD  03/02/17 [id number]

## 2017-02-20 NOTE — ED AVS SNAPSHOT
[facility]  [address] [facility] [address]               Deepali Stuart   2017  9:30 AM   ED    Description:  Female : 1932   Department:  [facility]           Your Care was Coordinated By:     Provider Role From To    Fabiana Naranjo MD Attending Provider 17 0930 --      Reason for Visit     Fatigue           Diagnoses this Visit        Comments    Epigastric abdominal pain    -  Primary     Physical deconditioning         Hematuria           ED Disposition     None           To Do List           Follow-up Information     Schedule an appointment as soon as possible for a visit with Vishal Salazar MD.    Specialty:  Family Medicine    Contact information:     [facility] [address]  [phone]          Follow up with [facility].    Specialty:  Emergency Medicine    Why:  As needed, If symptoms worsen    Contact information:    [address] [facility] [address] [phone]       These Medications        Disp Refills Start End    pantoprazole (PROTONIX) 20 MG tablet 30 tablet 0 2017    Take 1 tablet (20 mg total) by mouth once daily. - Oral    Pharmacy: [facility] [address] [place] [address] AT [facility] [address] Ph #: [phone]         [facility] On [facility]     [facility] -  Assistance  Registered nurses in the [facility] provide clinical advisement, health education, appointment booking, and other advisory services.  Call for this free service at [phone].             Medications           Message regarding Medications     Verify the changes and/or additions to your medication regime listed below are the same as discussed with your clinician today.  If any of these changes or additions are incorrect, please notify your healthcare provider.        START taking these NEW  medications        Refills    pantoprazole (PROTONIX) 20 MG tablet 0    Sig: Take 1 tablet (20 mg total) by mouth once daily.    Class: Print    Route: Oral      These medications were administered today        Dose Freq    famotidine (PF) 20 mg/2 mL injection 20 mg 20 mg ED 1 Time    Sig: Inject 2 mLs (20 mg total) into the vein ED 1 Time.    Class: Normal    Route: Intravenous    ondansetron injection 4 mg 4 mg ED 1 Time    Sig: Inject 4 mg into the vein ED 1 Time.    Class: Normal    Route: Intravenous           Verify that the below list of medications is an accurate representation of the medications you are currently taking.  If none reported, the list may be blank. If incorrect, please contact your healthcare provider. Carry this list with you in case of emergency.           Current Medications     amlodipine (NORVASC) 5 MG tablet Take 1 tablet (5 mg total) by mouth once daily.    atorvastatin (LIPITOR) 10 MG tablet TAKE 1 TABLET BY MOUTH EVERY DAY    bismuth subsalicylate (PEPTO BISMOL) 262 mg/15 mL suspension Take 15 mLs by mouth every 6 (six) hours as needed.    blood sugar diagnostic (TRUETRACK TEST) Strp FOLLOW PACKAGE DIRECTIONS    carvedilol (COREG) 25 MG tablet TAKE 1 TABLET BY MOUTH TWICE DAILY    furosemide (LASIX) 20 MG tablet Take 1 tablet (20 mg total) by mouth daily as needed.    lancets Misc 1 Device by Misc.(Non-Drug; Combo Route) route once daily.    levothyroxine (SYNTHROID) 50 MCG tablet TAKE 1 TABLET BY MOUTH EVERY DAY    lisinopril (PRINIVIL,ZESTRIL) 40 MG tablet TAKE 1 TABLET BY MOUTH EVERY DAY    metformin (GLUCOPHAGE) 500 MG tablet TAKE 1 TABLET BY MOUTH TWICE DAILY WITH MEALS    ondansetron (ZOFRAN-ODT) 4 MG TbDL DISSOLVE ONE TABLET BY MOUTH EVERY 6 HOURS AS NEEDED FOR NAUSEA AND VOMITING    trazodone (DESYREL) 50 MG tablet TAKE 1/2 TO 1 TABLET BY MOUTH EVERY EVENING AS NEEDED FOR INSOMNIA    warfarin (COUMADIN) 5 MG tablet TAKE 1 TABLET BY MOUTH EVERY DAY OR AS DIRECTED BY COUMADIN  "CLINIC    pantoprazole (PROTONIX) 20 MG tablet Take 1 tablet (20 mg total) by mouth once daily.           Clinical Reference Information           Your Vitals Were     BP Pulse Temp Resp Height Weight    172/104 92 98.8 °F (37.1 °C) (Oral) 16 5' 2" (1.575 m) 65.8 kg (145 lb)    Last Period SpO2 BMI          (LMP Unknown) 98% 26.52 kg/m2        Allergies as of 2/20/2017        Reactions    Plavix [Clopidogrel]       Immunizations Administered on Date of Encounter - 2/20/2017     None      ED Micro, Lab, POCT     Start Ordered       Status Ordering Provider    02/20/17 1126 02/20/17 1125  Lipase  STAT      Final result     02/20/17 1125 02/20/17 1125  CBC auto differential  STAT      Final result     02/20/17 1125 02/20/17 1125  Comprehensive metabolic panel  STAT      Final result     02/20/17 1125 02/20/17 1125  Urinalysis - Clean Catch  STAT      Final result     02/20/17 1125 02/20/17 1125  Troponin I  STAT      Final result     02/20/17 1125 02/20/17 1125  TSH  STAT      Final result     02/20/17 1125 02/20/17 1125  Urinalysis Microscopic  Once      Final result     02/20/17 1006 02/20/17 1006  POCT glucose  Once      Final result       ED Imaging Orders     Start Ordered       Status Ordering Provider    02/20/17 1125 02/20/17 1125  X-Ray Chest PA And Lateral  1 time imaging      Final result       Discharge References/Attachments     GASTRITIS (ADULT) (ENGLISH)    HEMATURIA (ENGLISH)      Your Scheduled Appointments     Mar 06, 2017 10:15 AM CST   Non-Fasting Lab with LAB, METAPEDRO   [place] [facility] [place]    [address] [facility]  [place] [address]   [phone]            Mar 06, 2017 10:30 AM CST   Urine with SPECIMEN, METALINDAE   [place] [facility] ([place])    [address] [facility]  [place] [address]   [phone]            Mar 14, 2017  2:00 PM CDT   Established Patient Visit with DO [facility] Nephrology ([facility] )    [address]  [facility] " [address]   [phone]            Apr 11, 2017 10:40 AM CDT   Telephonic Pacemaker Check with TELEPHONE CHECK, PACEMAKER   [facility] Arrhythmia ([facility] )    [address]  [facility] [address]   [phone]              [facility] Sign-Up     Activating your [facility] account is as easy as 1-2-3!     1) Visit [email or web address], select Sign Up Now, enter this activation code and your date of birth, then select Next.  [email or web address]  Expires: 4/6/2017  2:23 PM      2) Create a username and password to use when you visit [facility] in the future and select a security question in case you lose your password and select Next.    3) Enter your e-mail address and click Sign Up!    Additional Information  If you have questions, please e-mail [email or web address] or call [phone] to talk to our [email or web address] staff. Remember, [facility] is NOT to be used for urgent needs. For medical emergencies, dial 911.          [facility] complies with applicable Federal civil rights laws and does not discriminate on the basis of race, color, national origin, age, disability, or sex.        Language Assistance Services     ATTENTION: Language assistance services are available, free of charge. Please call [phone].      ATENCIÓN: Si habla español, tiene a lee disposición servicios gratuitos de asistencia lingüística. Llame al [phone].     CHÚ Ý: N?u b?n nói Ti?ng Vi?t, có các d?ch v? h? tr? ngôn ng? mi?n phí dành cho b?n. G?i s? [phone].

## 2017-02-21 ENCOUNTER — HOSPITAL ENCOUNTER (EMERGENCY)
Facility: OTHER | Age: 82
Discharge: HOME | End: 2017-02-21
Attending: EMERGENCY MEDICINE
Payer: MEDICARE

## 2017-02-21 VITALS
HEART RATE: 74 BPM | RESPIRATION RATE: 14 BRPM | BODY MASS INDEX: 26.68 KG/M2 | WEIGHT: 145 LBS | HEIGHT: 62 IN | SYSTOLIC BLOOD PRESSURE: 159 MMHG | TEMPERATURE: 98 F | DIASTOLIC BLOOD PRESSURE: 81 MMHG | OXYGEN SATURATION: 100 %

## 2017-02-21 DIAGNOSIS — R10.13 EPIGASTRIC ABDOMINAL PAIN: Primary | ICD-10-CM

## 2017-02-21 DIAGNOSIS — I95.9 TRANSIENT HYPOTENSION: ICD-10-CM

## 2017-02-21 LAB
ALBUMIN SERPL BCP-MCNC: 2.2 G/DL
ALP SERPL-CCNC: 50 U/L
ALT SERPL W/O P-5'-P-CCNC: 22 U/L
ANION GAP SERPL CALC-SCNC: 9 MMOL/L
AST SERPL-CCNC: 34 U/L
BASOPHILS # BLD AUTO: 0.04 K/UL
BASOPHILS NFR BLD: 0.5 %
BILIRUB SERPL-MCNC: 0.5 MG/DL
BUN SERPL-MCNC: 16 MG/DL
CALCIUM SERPL-MCNC: 8 MG/DL
CHLORIDE SERPL-SCNC: 106 MMOL/L
CO2 SERPL-SCNC: 20 MMOL/L
CREAT SERPL-MCNC: 1.7 MG/DL
DIFFERENTIAL METHOD: ABNORMAL
EOSINOPHIL # BLD AUTO: 0.1 K/UL
EOSINOPHIL NFR BLD: 1.1 %
ERYTHROCYTE [DISTWIDTH] IN BLOOD BY AUTOMATED COUNT: 13.7 %
EST. GFR  (AFRICAN AMERICAN): 31 ML/MIN/1.73 M^2
EST. GFR  (NON AFRICAN AMERICAN): 27 ML/MIN/1.73 M^2
GLUCOSE SERPL-MCNC: 169 MG/DL
HCT VFR BLD AUTO: 34.2 %
HGB BLD-MCNC: 11.6 G/DL
LIPASE SERPL-CCNC: 32 U/L
LYMPHOCYTES # BLD AUTO: 2.1 K/UL
LYMPHOCYTES NFR BLD: 28.1 %
MCH RBC QN AUTO: 31.6 PG
MCHC RBC AUTO-ENTMCNC: 33.9 %
MCV RBC AUTO: 93 FL
MONOCYTES # BLD AUTO: 0.4 K/UL
MONOCYTES NFR BLD: 6 %
NEUTROPHILS # BLD AUTO: 4.7 K/UL
NEUTROPHILS NFR BLD: 63.9 %
PLATELET # BLD AUTO: 174 K/UL
PMV BLD AUTO: 9.1 FL
POTASSIUM SERPL-SCNC: 4 MMOL/L
PROT SERPL-MCNC: 4.9 G/DL
RBC # BLD AUTO: 3.67 M/UL
SODIUM SERPL-SCNC: 135 MMOL/L
WBC # BLD AUTO: 7.3 K/UL

## 2017-02-21 PROCEDURE — 25000003 PHARM REV CODE 250: Performed by: EMERGENCY MEDICINE

## 2017-02-21 PROCEDURE — 96374 THER/PROPH/DIAG INJ IV PUSH: CPT

## 2017-02-21 PROCEDURE — 93010 ELECTROCARDIOGRAM REPORT: CPT | Mod: ,,, | Performed by: INTERNAL MEDICINE

## 2017-02-21 PROCEDURE — 85025 COMPLETE CBC W/AUTO DIFF WBC: CPT

## 2017-02-21 PROCEDURE — 99284 EMERGENCY DEPT VISIT MOD MDM: CPT | Mod: 25

## 2017-02-21 PROCEDURE — 63600175 PHARM REV CODE 636 W HCPCS: Performed by: EMERGENCY MEDICINE

## 2017-02-21 PROCEDURE — 83690 ASSAY OF LIPASE: CPT

## 2017-02-21 PROCEDURE — 93005 ELECTROCARDIOGRAM TRACING: CPT

## 2017-02-21 PROCEDURE — 96361 HYDRATE IV INFUSION ADD-ON: CPT

## 2017-02-21 PROCEDURE — 80053 COMPREHEN METABOLIC PANEL: CPT

## 2017-02-21 RX ORDER — ONDANSETRON 2 MG/ML
4 INJECTION INTRAMUSCULAR; INTRAVENOUS
Status: COMPLETED | OUTPATIENT
Start: 2017-02-21 | End: 2017-02-21

## 2017-02-21 RX ADMIN — ONDANSETRON 4 MG: 2 INJECTION, SOLUTION INTRAMUSCULAR; INTRAVENOUS at 06:02

## 2017-02-21 RX ADMIN — SODIUM CHLORIDE 1000 ML: 0.9 INJECTION, SOLUTION INTRAVENOUS at 06:02

## 2017-02-21 RX ADMIN — LIDOCAINE HYDROCHLORIDE: 20 SOLUTION ORAL; TOPICAL at 04:02

## 2017-02-21 NOTE — ED PROVIDER NOTES
"Encounter Date: 2/21/2017    SCRIBE #1 NOTE: I, Sanjuanita Duggan, am scribing for, and in the presence of,  Dr. Leone. I have scribed the entire note.       History     Chief Complaint   Patient presents with    Abdominal Pain     + intermittnet abdominal cramping 10/10 on pain scale. Pt states,"I was just here yesterday for the saem thing adn it is back". Pt denie N/V/D, fever or chills.      Review of patient's allergies indicates:   Allergen Reactions    Plavix [clopidogrel]      HPI Comments: Time seen by provider: 4:05 PM    This is a 84 y.o. female who presents with complaint of upper abdominal pain. She repots onset of symptoms was about 1 week ago. The patient states the pain is intermittent and she is unable to describe the pain. She notes associated excessive burping and nausea but denies any vomiting, urinary symptoms, fever or chills. The patient reports there is no change in pain with movement or rest. She states she has been using Pepto Bismol and Tylenol with no change in pain. Of note the patient was evaluated for the same pain yesterday. She had blood work performed that was negative. The patient was also seen for similar symptoms on 3 separate days in November 2016. With one previous evaluation the patient was admitted to the hospital. She had imaging studies and labs which were unremarkable. It was recommended the patient follow up with a GI specialist. She reports she has not seen the specialist in years.     The history is provided by the patient.     Past Medical History   Diagnosis Date    Atrial fibrillation      chronic AF    CHF (congestive heart failure)     Diabetes insipidus     Diabetes mellitus type II     GERD (gastroesophageal reflux disease)     Hypertension     Iritis 12/10/2014    NPDR (nonproliferative diabetic retinopathy) 10/27/2014    PAF (paroxysmal atrial fibrillation)     Palpitations      Past Medical History Pertinent Negatives   Diagnosis Date Noted    " Amblyopia 8/6/2014    Arthritis 8/6/2014    Glaucoma 8/6/2014    Macular degeneration 8/6/2014    Retinal detachment 8/6/2014    Sickle cell anemia 6/5/2015    Sickle cell trait 6/5/2015    Strabismus 8/6/2014     Past Surgical History   Procedure Laterality Date    Hysterectomy      Insert / replace / remove pacemaker       [facility] PM 2012 [email or web address]    Laser ablation       3 years ago OS    Colon surgery       Partial colectomy secondary to suspicious polyp    Cataract extraction w/  intraocular lens implant Right n/a     OD over 10 years ago     Cataract extraction w/  intraocular lens implant Left 11/11/14     OS ()     Family History   Problem Relation Age of Onset    No Known Problems Mother     No Known Problems Father     No Known Problems Sister     No Known Problems Brother     No Known Problems Maternal Aunt     No Known Problems Maternal Uncle     No Known Problems Paternal Aunt     No Known Problems Paternal Uncle     No Known Problems Maternal Grandmother     No Known Problems Maternal Grandfather     No Known Problems Paternal Grandmother     No Known Problems Paternal Grandfather     Amblyopia Neg Hx     Blindness Neg Hx     Cancer Neg Hx     Cataracts Neg Hx     Diabetes Neg Hx     Glaucoma Neg Hx     Hypertension Neg Hx     Macular degeneration Neg Hx     Retinal detachment Neg Hx     Strabismus Neg Hx     Stroke Neg Hx     Thyroid disease Neg Hx      Social History   Substance Use Topics    Smoking status: Never Smoker    Smokeless tobacco: Never Used    Alcohol use No     Review of Systems   Constitutional: Negative for chills and fever.   HENT: Negative for congestion and sore throat.    Eyes: Negative for redness and visual disturbance.   Respiratory: Negative for cough and shortness of breath.    Cardiovascular: Negative for chest pain and palpitations.   Gastrointestinal: Positive for abdominal pain and nausea. Negative for diarrhea and vomiting.    Genitourinary: Negative for dysuria.   Musculoskeletal: Negative for back pain.   Skin: Negative for rash.   Neurological: Negative for weakness and headaches.   Psychiatric/Behavioral: Negative for confusion.       Physical Exam   Initial Vitals   BP Pulse Resp Temp SpO2   02/21/17 1506 02/21/17 1506 02/21/17 1506 02/21/17 1506 02/21/17 1506   146/69 74 18 98 °F (36.7 °C) 100 %     Physical Exam    Nursing note and vitals reviewed.  Constitutional: She appears well-developed and well-nourished. She is not diaphoretic. She appears distressed.   Uncomfortable appearing, mild distress   HENT:   Head: Normocephalic and atraumatic.   Right Ear: External ear normal.   Left Ear: External ear normal.   Oropharynx is clear and intact. Moist mucus membranes   Eyes: EOM are normal.   No pallor or icterus   Neck: Normal range of motion. Neck supple.   Cardiovascular: Normal rate, regular rhythm and normal heart sounds. Exam reveals no gallop and no friction rub.    No murmur heard.  Pulmonary/Chest: Breath sounds normal. She has no wheezes. She has no rhonchi. She has no rales.   Abdominal: Soft. Bowel sounds are normal. There is tenderness. There is no rebound and no guarding.   Epigastric tenderness   Musculoskeletal: Normal range of motion. She exhibits no edema or tenderness.   Lymphadenopathy:     She has no cervical adenopathy.   Neurological: She is alert and oriented to person, place, and time. She has normal strength.   Skin: Skin is warm and dry. No rash noted.         ED Course   Procedures  Labs Reviewed   CBC W/ AUTO DIFFERENTIAL - Abnormal; Notable for the following:        Result Value    RBC 3.67 (*)     Hemoglobin 11.6 (*)     Hematocrit 34.2 (*)     MCH 31.6 (*)     MPV 9.1 (*)     All other components within normal limits   COMPREHENSIVE METABOLIC PANEL - Abnormal; Notable for the following:     Sodium 135 (*)     CO2 20 (*)     Glucose 169 (*)     Creatinine 1.7 (*)     Calcium 8.0 (*)     Total Protein  4.9 (*)     Albumin 2.2 (*)     Alkaline Phosphatase 50 (*)     eGFR if  31 (*)     eGFR if non  27 (*)     All other components within normal limits   LIPASE     EKG Readings: (Independently Interpreted)   EKG Reading (6:25 PM): A.Fib with ventricular rate of 65. Occasional PVC. No acute changes. Similar in appearance to tracing 2/20/17. No STEMI          Medical Decision Making:   Independently Interpreted Test(s):   I have ordered and independently interpreted EKG Reading(s) - see prior notes  Clinical Tests:   Lab Tests: Ordered and Reviewed  Medical Tests: Reviewed and Ordered  ED Management:  5:29 PM Patient now complaining of nausea and vomiting. Repeat vital signs showed hypotension, patient will be transferred to ED stretcher and IV fluid bolus will be started.     5:43 PM is just spitting up a bit. The patient reports great relieve of pain with GI cocktail. Currently asymptomatic and disappointed that she has not been discharged yet.     Additional MDM:   EKG: I have independently interpreted EKG(s) - see notes.          Scribe Attestation:   Scribe #1: I performed the above scribed service and the documentation accurately describes the services I performed. I attest to the accuracy of the note.    Attending Attestation:           Physician Attestation for Scribe:  Physician Attestation Statement for Scribe #1: I, Dr. Leone, reviewed documentation, as scribed by Sanjuanita Duggan in my presence, and it is both accurate and complete.           Elderly female returns emergency department because of recurrence of epigastric pain.  She has frequent belching during exam and complains of nausea but is not having any emesis.  No relation to eating.  No change in her stool.  Tenderness is localized to the epigastric region.  No rebound or guarding.  Given a GI cocktail and during observation an episode of nausea trivial emesis that appeared uncomfortable during this and was noted to  have hypotension.  Therefore IV access was obtained fluids were started over the hypotension was transient and returned to a mildly hypertensive statement for the remainder of her stay.  Other than that episode she is now asymptomatic, her symptoms have completely resolved with GI cocktail.  Suspect gastritis versus peptic ulcer disease versus similar.  Laboratory studies yesterday and today were unremarkable.  Encouraged to continue the Protonix and when necessary Maalox.  Encouraged follow-up with primary care, especially if symptoms persist.          ED Course     Clinical Impression:     1. Epigastric abdominal pain    2. Transient hypotension                Roderick Leone II, MD  02/21/17 2050

## 2017-02-21 NOTE — ED AVS SNAPSHOT
[facility]  [address]  [facility] [address]               Deepali Stuart   2017  3:28 PM   ED    Description:  Female : 1932   Department:  [facility]           Your Care was Coordinated By:     Provider Role From To    Roderick Leone II, MD Attending Provider 17 1537 17 [id number]      Reason for Visit     Abdominal Pain           Diagnoses this Visit        Comments    Epigastric abdominal pain    -  Primary     Transient hypotension           ED Disposition     None           To Do List           Follow-up Information     Follow up with Vishal Salazar MD.    Specialty:  Family Medicine    Contact information:     [facility] [address]  [phone]        [facility]     [facility] Care [facility] -  Assistance  Registered nurses in the [facility] provide clinical advisement, health education, appointment booking, and other advisory services.  Call for this free service at [phone].             Medications           Message regarding Medications     Verify the changes and/or additions to your medication regime listed below are the same as discussed with your clinician today.  If any of these changes or additions are incorrect, please notify your healthcare provider.        These medications were administered today        Dose Freq    (pyxis) gi cocktail (mylanta 30 mL, lidocaine 2 % viscous 10 mL, dicyclomine 10 mL) 50 mL  ED 1 Time    Sig: Take by mouth ED 1 Time.    Class: Normal    Route: Oral    sodium chloride 0.9% bolus 1,000 mL 1,000 mL Once    Sig: Inject 1,000 mLs into the vein once.    Class: Normal    Route: Intravenous    ondansetron injection 4 mg 4 mg ED 1 Time    Sig: Inject 4 mg into the vein ED 1 Time.    Class: Normal    Route: Intravenous           Verify that the below list of medications is an accurate representation of the medications you are currently  "taking.  If none reported, the list may be blank. If incorrect, please contact your healthcare provider. Carry this list with you in case of emergency.           Current Medications     amlodipine (NORVASC) 5 MG tablet Take 1 tablet (5 mg total) by mouth once daily.    atorvastatin (LIPITOR) 10 MG tablet TAKE 1 TABLET BY MOUTH EVERY DAY    bismuth subsalicylate (PEPTO BISMOL) 262 mg/15 mL suspension Take 15 mLs by mouth every 6 (six) hours as needed.    carvedilol (COREG) 25 MG tablet TAKE 1 TABLET BY MOUTH TWICE DAILY    furosemide (LASIX) 20 MG tablet Take 1 tablet (20 mg total) by mouth daily as needed.    levothyroxine (SYNTHROID) 50 MCG tablet TAKE 1 TABLET BY MOUTH EVERY DAY    lisinopril (PRINIVIL,ZESTRIL) 40 MG tablet TAKE 1 TABLET BY MOUTH EVERY DAY    metformin (GLUCOPHAGE) 500 MG tablet TAKE 1 TABLET BY MOUTH TWICE DAILY WITH MEALS    ondansetron (ZOFRAN-ODT) 4 MG TbDL DISSOLVE ONE TABLET BY MOUTH EVERY 6 HOURS AS NEEDED FOR NAUSEA AND VOMITING    pantoprazole (PROTONIX) 20 MG tablet Take 1 tablet (20 mg total) by mouth once daily.    trazodone (DESYREL) 50 MG tablet TAKE 1/2 TO 1 TABLET BY MOUTH EVERY EVENING AS NEEDED FOR INSOMNIA    warfarin (COUMADIN) 5 MG tablet TAKE 1 TABLET BY MOUTH EVERY DAY OR AS DIRECTED BY COUMADIN CLINIC    blood sugar diagnostic (TRUETRACK TEST) Strp FOLLOW PACKAGE DIRECTIONS    lancets Misc 1 Device by Misc.(Non-Drug; Combo Route) route once daily.           Clinical Reference Information           Your Vitals Were     BP Pulse Temp Resp Height Weight    151/80 74 97.5 °F (36.4 °C) (Oral) 20 5' 2" (1.575 m) 65.8 kg (145 lb)    Last Period SpO2 BMI          (LMP Unknown) 100% 26.52 kg/m2        Allergies as of 2/21/2017        Reactions    Plavix [Clopidogrel]       Immunizations Administered on Date of Encounter - 2/21/2017     None      ED Micro, Lab, POCT     Start Ordered       Status Ordering Provider    02/21/17 1729 02/21/17 1728    STAT,   Status:  Canceled      " Canceled     02/21/17 1729 02/21/17 1728  Lipase  STAT      Final result     02/21/17 1728 02/21/17 1728  CBC auto differential  STAT      Final result     02/21/17 1728 02/21/17 1728  Comprehensive metabolic panel  STAT      Final result       ED Imaging Orders     None        Discharge Instructions         Epigastric Pain (Uncertain Cause)    Epigastric pain can be a sign of disease in the upper abdomen. Common causes include:  · Acid reflux (stomach acid flowing up into the esophagus)  · Gastritis (irritation of the stomach lining)  · Peptic Ulcer Disease  · Inflammation of the pancreas  · Gallstone  · Infection in the gallbladder  Pain may be dull or burning. It may spread upward to the chest or to the back. There may be other symptoms such as belching, bloating, cramps or hunger pains. There may be weight loss or poor appetite, nausea or vomiting.  Since the diagnosis of your pain is not certain yet, further tests may sometimes be needed. Sometimes the doctor will treat you for the most likely condition to see if there is improvement before doing further tests.  Home care  Medicines  · Antacids help neutralize the normal acids in your stomach. Examples are Maalox, Mylanta, Rolaids, and Tums. If you dont like the liquid, you can also try a chewable one. You may find one works better than another for you. Overuse can cause diarrhea or constipation.  · Acid blockers (H2 blockers) decrease acid production. Examples are cimetidine (Tagamet), famotidine (Pepcid) and ranitidine (Zantac).  · Acid inhibitors (PPIs) decrease acid production in a different way than the blockers. You may find they work better, but can take a little longer to take effect.  Examples are omeprazole (Prilosec), lansoprazole (Prevacid), pantoprazole (Protonix), rabeprazole (Aciphex), and esomeprazole (Nexium).  · Take an antacid 30-60 minutes after eating and at bedtime, but not at the same time as an acid blocker.  · Try not to take NSAIDs.  Aspirin may also cause problems, but if taking it for your heart or other medical reasons, talk to your doctor before stopping it; you do not want to cause a worse problem, like a heart attack or stroke.  Diet  · If certain foods seem to cause your spasm, try to avoid them.   · Eat slowly and chew food well before swallowing. Symptoms of gastritis can be worsened by certain foods. Limit or avoid fatty, fried, and spicy foods, as well as coffee, chocolate, mint, and foods with high acid content such as tomatoes and citrus fruit and juices (orange, grapefruit, lemon).  · Avoid alcohol, caffeine, and tobacco, which can delay healing and worsen your problem.  · Try eating smaller meals with snacks in between  Follow-up care  Follow up with your healthcare provider or as advised.  When to seek medical advice  Call your healthcare provider right away if any of the following occur:  · Stomach pain worsens or moves to the right lower part of the abdomen  · Chest pain appears, or if it worsens or spreads to the chest, back, neck, shoulder, or arm  · Frequent vomiting (cant keep down liquids)  · Blood in the stool or vomit (red or black color)  · Feeling weak or dizzy, fainting, or having trouble breathing  · Fever of 100.4ºF (38ºC) or higher, or as directed by your healthcare provider  · Abdominal swelling  Date Last Reviewed: 9/25/2015  © 0156-8360 [employer]. [address] [place] [address]. All rights reserved. This information is not intended as a substitute for professional medical care. Always follow your healthcare professional's instructions.          Discharge References/Attachments     GASTRITIS (ADULT) (ENGLISH)      Your Scheduled Appointments     Feb 23, 2017 10:40 AM CST   Established Patient Visit with Vishal Salazar MD   [place] - Internal Medicine ([place])    [address] [facility]  [place] [address]   [phone]            Mar 06, 2017 10:15 AM CST   Non-Fasting  Lab with LAB, RASHMI   [place]    [address] [facility]  [place] [id number] [phone]            Mar 06, 2017 10:30 AM CST   Urine with SPECIMEN, RASHMI [place] - Specimen Lab ([place])    [address] [facility]  [place] [address]   [phone]            Mar 14, 2017  2:00 PM CDT   Established Patient Visit with DO [facility] - Nephrology ([facility] )    [address]  [facility] [address]   [phone]            Apr 11, 2017 10:40 AM CDT   Telephonic Pacemaker Check with TELEPHONE CHECK, PACEMAKER   [facility] - Arrhythmia ([place] )    [address]  [facility] [address]   [phone]              [facility] Sign-Up     Activating your [facility] account is as easy as 1-2-3!     1) Visit [email or web address], select Sign Up Now, enter this activation code and your date of birth, then select Next.  [email or web address]  Expires: 4/6/2017  2:23 PM      2) Create a username and password to use when you visit [facility] in the future and select a security question in case you lose your password and select Next.    3) Enter your e-mail address and click Sign Up!    Additional Information  If you have questions, please e-mail [email or web address] or call [phone] to talk to our [facility] staff. Remember, [facility] is NOT to be used for urgent needs. For medical emergencies, dial 911.          [facility] complies with applicable Federal civil rights laws and does not discriminate on the basis of race, color, national origin, age, disability, or sex.        Language Assistance Services     ATTENTION: Language assistance services are available, free of charge. Please call [phone].      ATENCIÓN: Si habla español, tiene a lee disposición servicios gratuitos de asistencia lingüística. Llame al [phone].     CHÚ Ý: N?u b?n nói Ti?ng Vi?t, có các d?ch v? h? tr? ngôn ng? mi?n phí dành cho b?n. G?i s?  [phone].

## 2017-02-21 NOTE — ED TRIAGE NOTES
Pt was seen at ED for abd pain yesterday, was rx'd protonix.  Pt states she took Protonix with no relief.  Denies N/V/D.  Denies fever.

## 2017-02-22 NOTE — ED NOTES
Pt unable to obtain urine specimen at this time, pt removed from bed pain and will monitor further

## 2017-02-22 NOTE — DISCHARGE INSTRUCTIONS
Epigastric Pain (Uncertain Cause)    Epigastric pain can be a sign of disease in the upper abdomen. Common causes include:  · Acid reflux (stomach acid flowing up into the esophagus)  · Gastritis (irritation of the stomach lining)  · Peptic Ulcer Disease  · Inflammation of the pancreas  · Gallstone  · Infection in the gallbladder  Pain may be dull or burning. It may spread upward to the chest or to the back. There may be other symptoms such as belching, bloating, cramps or hunger pains. There may be weight loss or poor appetite, nausea or vomiting.  Since the diagnosis of your pain is not certain yet, further tests may sometimes be needed. Sometimes the doctor will treat you for the most likely condition to see if there is improvement before doing further tests.  Home care  Medicines  · Antacids help neutralize the normal acids in your stomach. Examples are Maalox, Mylanta, Rolaids, and Tums. If you dont like the liquid, you can also try a chewable one. You may find one works better than another for you. Overuse can cause diarrhea or constipation.  · Acid blockers (H2 blockers) decrease acid production. Examples are cimetidine (Tagamet), famotidine (Pepcid) and ranitidine (Zantac).  · Acid inhibitors (PPIs) decrease acid production in a different way than the blockers. You may find they work better, but can take a little longer to take effect.  Examples are omeprazole (Prilosec), lansoprazole (Prevacid), pantoprazole (Protonix), rabeprazole (Aciphex), and esomeprazole (Nexium).  · Take an antacid 30-60 minutes after eating and at bedtime, but not at the same time as an acid blocker.  · Try not to take NSAIDs. Aspirin may also cause problems, but if taking it for your heart or other medical reasons, talk to your doctor before stopping it; you do not want to cause a worse problem, like a heart attack or stroke.  Diet  · If certain foods seem to cause your spasm, try to avoid them.   · Eat slowly and chew food well  before swallowing. Symptoms of gastritis can be worsened by certain foods. Limit or avoid fatty, fried, and spicy foods, as well as coffee, chocolate, mint, and foods with high acid content such as tomatoes and citrus fruit and juices (orange, grapefruit, lemon).  · Avoid alcohol, caffeine, and tobacco, which can delay healing and worsen your problem.  · Try eating smaller meals with snacks in between  Follow-up care  Follow up with your healthcare provider or as advised.  When to seek medical advice  Call your healthcare provider right away if any of the following occur:  · Stomach pain worsens or moves to the right lower part of the abdomen  · Chest pain appears, or if it worsens or spreads to the chest, back, neck, shoulder, or arm  · Frequent vomiting (cant keep down liquids)  · Blood in the stool or vomit (red or black color)  · Feeling weak or dizzy, fainting, or having trouble breathing  · Fever of 100.4ºF (38ºC) or higher, or as directed by your healthcare provider  · Abdominal swelling  Date Last Reviewed: 9/25/2015  © 4906-3010 [employer]. [address] [place] [address]. All rights reserved. This information is not intended as a substitute for professional medical care. Always follow your healthcare professional's instructions.

## 2017-03-02 ENCOUNTER — ANTI-COAG VISIT (OUTPATIENT)
Dept: CARDIOLOGY | Facility: CLINIC | Age: 82
End: 2017-03-02

## 2017-03-02 DIAGNOSIS — Z79.01 LONG TERM (CURRENT) USE OF ANTICOAGULANTS: ICD-10-CM

## 2017-03-02 LAB — INR PPP: 4.8

## 2017-03-02 NOTE — PROGRESS NOTES
Verbal result taken from ____Juan_____. PT/INR __57.3 / 4.8_____ Date drawn____3/2/2017____ Hardcopy to be faxed.

## 2017-03-07 ENCOUNTER — HOSPITAL ENCOUNTER (INPATIENT)
Facility: OTHER | Age: 82
LOS: 2 days | Discharge: HOME HEALTH | DRG: 291 | End: 2017-03-09
Attending: EMERGENCY MEDICINE | Admitting: EMERGENCY MEDICINE
Payer: MEDICARE

## 2017-03-07 ENCOUNTER — TELEPHONE (OUTPATIENT)
Dept: INTERNAL MEDICINE | Facility: CLINIC | Age: 82
End: 2017-03-07

## 2017-03-07 DIAGNOSIS — I50.9 HEART FAILURE: ICD-10-CM

## 2017-03-07 DIAGNOSIS — R06.02 SHORTNESS OF BREATH: Primary | ICD-10-CM

## 2017-03-07 DIAGNOSIS — I48.19 PERSISTENT ATRIAL FIBRILLATION: ICD-10-CM

## 2017-03-07 PROBLEM — I50.33 ACUTE ON CHRONIC DIASTOLIC HEART FAILURE: Status: ACTIVE | Noted: 2017-03-07

## 2017-03-07 PROBLEM — N17.0 ACUTE RENAL FAILURE WITH TUBULAR NECROSIS: Status: ACTIVE | Noted: 2017-03-07

## 2017-03-07 PROBLEM — E83.42 HYPOMAGNESEMIA: Status: ACTIVE | Noted: 2017-03-07

## 2017-03-07 LAB
ALBUMIN SERPL BCP-MCNC: 2.3 G/DL
ALP SERPL-CCNC: 61 U/L
ALT SERPL W/O P-5'-P-CCNC: 22 U/L
ANION GAP SERPL CALC-SCNC: 9 MMOL/L
APTT BLDCRRT: 34.7 SEC
AST SERPL-CCNC: 34 U/L
BACTERIA #/AREA URNS HPF: ABNORMAL /HPF
BASOPHILS # BLD AUTO: 0.03 K/UL
BASOPHILS NFR BLD: 0.4 %
BILIRUB SERPL-MCNC: 0.5 MG/DL
BILIRUB UR QL STRIP: NEGATIVE
BNP SERPL-MCNC: 376 PG/ML
BUN SERPL-MCNC: 11 MG/DL
CALCIUM SERPL-MCNC: 7.9 MG/DL
CHLORIDE SERPL-SCNC: 102 MMOL/L
CLARITY UR: CLEAR
CO2 SERPL-SCNC: 23 MMOL/L
COLOR UR: YELLOW
CREAT SERPL-MCNC: 1.5 MG/DL
DIFFERENTIAL METHOD: ABNORMAL
EOSINOPHIL # BLD AUTO: 0.1 K/UL
EOSINOPHIL NFR BLD: 1.8 %
ERYTHROCYTE [DISTWIDTH] IN BLOOD BY AUTOMATED COUNT: 13.6 %
EST. GFR  (AFRICAN AMERICAN): 36 ML/MIN/1.73 M^2
EST. GFR  (NON AFRICAN AMERICAN): 32 ML/MIN/1.73 M^2
GLUCOSE SERPL-MCNC: 137 MG/DL
GLUCOSE UR QL STRIP: NEGATIVE
HCT VFR BLD AUTO: 35.9 %
HGB BLD-MCNC: 12.2 G/DL
HGB UR QL STRIP: ABNORMAL
HYALINE CASTS #/AREA URNS LPF: 30 /LPF
INR PPP: 1.9
KETONES UR QL STRIP: NEGATIVE
LEUKOCYTE ESTERASE UR QL STRIP: NEGATIVE
LYMPHOCYTES # BLD AUTO: 1.7 K/UL
LYMPHOCYTES NFR BLD: 23.9 %
MAGNESIUM SERPL-MCNC: 1 MG/DL
MCH RBC QN AUTO: 31.6 PG
MCHC RBC AUTO-ENTMCNC: 34 %
MCV RBC AUTO: 93 FL
MICROSCOPIC COMMENT: ABNORMAL
MONOCYTES # BLD AUTO: 0.6 K/UL
MONOCYTES NFR BLD: 7.8 %
NEUTROPHILS # BLD AUTO: 4.7 K/UL
NEUTROPHILS NFR BLD: 66 %
NITRITE UR QL STRIP: NEGATIVE
PH UR STRIP: 6 [PH] (ref 5–8)
PLATELET # BLD AUTO: 186 K/UL
PMV BLD AUTO: 9.7 FL
POCT GLUCOSE: 110 MG/DL (ref 70–110)
POTASSIUM SERPL-SCNC: 3.5 MMOL/L
PROT SERPL-MCNC: 5.6 G/DL
PROT UR QL STRIP: ABNORMAL
PROTHROMBIN TIME: 21 SEC
RBC # BLD AUTO: 3.86 M/UL
RBC #/AREA URNS HPF: 2 /HPF (ref 0–4)
SODIUM SERPL-SCNC: 134 MMOL/L
SP GR UR STRIP: 1.01 (ref 1–1.03)
TROPONIN I SERPL DL<=0.01 NG/ML-MCNC: 0.03 NG/ML
URN SPEC COLLECT METH UR: ABNORMAL
UROBILINOGEN UR STRIP-ACNC: NEGATIVE EU/DL
WBC # BLD AUTO: 7.07 K/UL
WBC #/AREA URNS HPF: 4 /HPF (ref 0–5)

## 2017-03-07 PROCEDURE — 93010 ELECTROCARDIOGRAM REPORT: CPT | Mod: ,,, | Performed by: INTERNAL MEDICINE

## 2017-03-07 PROCEDURE — 11000001 HC ACUTE MED/SURG PRIVATE ROOM

## 2017-03-07 PROCEDURE — 96365 THER/PROPH/DIAG IV INF INIT: CPT

## 2017-03-07 PROCEDURE — 99223 1ST HOSP IP/OBS HIGH 75: CPT | Mod: ,,, | Performed by: INTERNAL MEDICINE

## 2017-03-07 PROCEDURE — 99284 EMERGENCY DEPT VISIT MOD MDM: CPT | Mod: 25

## 2017-03-07 PROCEDURE — 85025 COMPLETE CBC W/AUTO DIFF WBC: CPT

## 2017-03-07 PROCEDURE — 93005 ELECTROCARDIOGRAM TRACING: CPT

## 2017-03-07 PROCEDURE — 85610 PROTHROMBIN TIME: CPT

## 2017-03-07 PROCEDURE — 83735 ASSAY OF MAGNESIUM: CPT

## 2017-03-07 PROCEDURE — 25000003 PHARM REV CODE 250: Performed by: PHYSICIAN ASSISTANT

## 2017-03-07 PROCEDURE — 85730 THROMBOPLASTIN TIME PARTIAL: CPT

## 2017-03-07 PROCEDURE — 83880 ASSAY OF NATRIURETIC PEPTIDE: CPT

## 2017-03-07 PROCEDURE — 63600175 PHARM REV CODE 636 W HCPCS: Performed by: EMERGENCY MEDICINE

## 2017-03-07 PROCEDURE — 96375 TX/PRO/DX INJ NEW DRUG ADDON: CPT

## 2017-03-07 PROCEDURE — 96361 HYDRATE IV INFUSION ADD-ON: CPT

## 2017-03-07 PROCEDURE — 25000003 PHARM REV CODE 250: Performed by: EMERGENCY MEDICINE

## 2017-03-07 PROCEDURE — 36415 COLL VENOUS BLD VENIPUNCTURE: CPT

## 2017-03-07 PROCEDURE — 84484 ASSAY OF TROPONIN QUANT: CPT

## 2017-03-07 PROCEDURE — 81000 URINALYSIS NONAUTO W/SCOPE: CPT

## 2017-03-07 PROCEDURE — 80053 COMPREHEN METABOLIC PANEL: CPT

## 2017-03-07 RX ORDER — GLUCAGON 1 MG
1 KIT INJECTION
Status: DISCONTINUED | OUTPATIENT
Start: 2017-03-07 | End: 2017-03-09 | Stop reason: HOSPADM

## 2017-03-07 RX ORDER — ACETAMINOPHEN 325 MG/1
650 TABLET ORAL EVERY 8 HOURS PRN
Status: DISCONTINUED | OUTPATIENT
Start: 2017-03-07 | End: 2017-03-09 | Stop reason: HOSPADM

## 2017-03-07 RX ORDER — POTASSIUM CHLORIDE 20 MEQ/1
20 TABLET, EXTENDED RELEASE ORAL ONCE
Status: COMPLETED | OUTPATIENT
Start: 2017-03-07 | End: 2017-03-07

## 2017-03-07 RX ORDER — FUROSEMIDE 10 MG/ML
20 INJECTION INTRAMUSCULAR; INTRAVENOUS 2 TIMES DAILY
Status: DISCONTINUED | OUTPATIENT
Start: 2017-03-08 | End: 2017-03-08

## 2017-03-07 RX ORDER — LISINOPRIL 20 MG/1
40 TABLET ORAL DAILY
Status: DISCONTINUED | OUTPATIENT
Start: 2017-03-08 | End: 2017-03-09 | Stop reason: HOSPADM

## 2017-03-07 RX ORDER — INSULIN ASPART 100 [IU]/ML
1-10 INJECTION, SOLUTION INTRAVENOUS; SUBCUTANEOUS
Status: DISCONTINUED | OUTPATIENT
Start: 2017-03-07 | End: 2017-03-09 | Stop reason: HOSPADM

## 2017-03-07 RX ORDER — CARVEDILOL 12.5 MG/1
25 TABLET ORAL 2 TIMES DAILY
Status: DISCONTINUED | OUTPATIENT
Start: 2017-03-07 | End: 2017-03-09 | Stop reason: HOSPADM

## 2017-03-07 RX ORDER — IBUPROFEN 200 MG
24 TABLET ORAL
Status: DISCONTINUED | OUTPATIENT
Start: 2017-03-07 | End: 2017-03-09 | Stop reason: HOSPADM

## 2017-03-07 RX ORDER — SODIUM CHLORIDE 9 MG/ML
500 INJECTION, SOLUTION INTRAVENOUS
Status: COMPLETED | OUTPATIENT
Start: 2017-03-07 | End: 2017-03-07

## 2017-03-07 RX ORDER — IBUPROFEN 200 MG
16 TABLET ORAL
Status: DISCONTINUED | OUTPATIENT
Start: 2017-03-07 | End: 2017-03-09 | Stop reason: HOSPADM

## 2017-03-07 RX ORDER — FUROSEMIDE 10 MG/ML
20 INJECTION INTRAMUSCULAR; INTRAVENOUS
Status: COMPLETED | OUTPATIENT
Start: 2017-03-07 | End: 2017-03-07

## 2017-03-07 RX ORDER — LEVOTHYROXINE SODIUM 50 UG/1
50 TABLET ORAL DAILY
Status: DISCONTINUED | OUTPATIENT
Start: 2017-03-08 | End: 2017-03-09 | Stop reason: HOSPADM

## 2017-03-07 RX ORDER — ATORVASTATIN CALCIUM 10 MG/1
10 TABLET, FILM COATED ORAL DAILY
Status: DISCONTINUED | OUTPATIENT
Start: 2017-03-08 | End: 2017-03-08

## 2017-03-07 RX ORDER — PANTOPRAZOLE SODIUM 40 MG/1
40 TABLET, DELAYED RELEASE ORAL DAILY
Status: DISCONTINUED | OUTPATIENT
Start: 2017-03-08 | End: 2017-03-09 | Stop reason: HOSPADM

## 2017-03-07 RX ORDER — AMLODIPINE BESYLATE 5 MG/1
5 TABLET ORAL DAILY
Status: DISCONTINUED | OUTPATIENT
Start: 2017-03-07 | End: 2017-03-09 | Stop reason: HOSPADM

## 2017-03-07 RX ORDER — WARFARIN 2.5 MG/1
2.5 TABLET ORAL DAILY
Status: DISCONTINUED | OUTPATIENT
Start: 2017-03-08 | End: 2017-03-09

## 2017-03-07 RX ADMIN — CARVEDILOL 25 MG: 12.5 TABLET, FILM COATED ORAL at 08:03

## 2017-03-07 RX ADMIN — MAGNESIUM SULFATE HEPTAHYDRATE 1 G: 500 INJECTION, SOLUTION INTRAMUSCULAR; INTRAVENOUS at 05:03

## 2017-03-07 RX ADMIN — FUROSEMIDE 20 MG: 10 INJECTION, SOLUTION INTRAMUSCULAR; INTRAVENOUS at 05:03

## 2017-03-07 RX ADMIN — POTASSIUM CHLORIDE 20 MEQ: 1500 TABLET, EXTENDED RELEASE ORAL at 08:03

## 2017-03-07 RX ADMIN — AMLODIPINE BESYLATE 5 MG: 5 TABLET ORAL at 08:03

## 2017-03-07 RX ADMIN — SODIUM CHLORIDE 500 ML: 0.9 INJECTION, SOLUTION INTRAVENOUS at 03:03

## 2017-03-07 NOTE — TELEPHONE ENCOUNTER
Home Health nurse stated that the patient was complaining of weakness and shortness of breath. Nurse stated that patient vital signs were normal. Nurse stated that the patient wasn't in distress. Nurse stated that the patient often complains about being weak. Nurse wanted to inform the office.

## 2017-03-07 NOTE — ED PROVIDER NOTES
Encounter Date: 3/7/2017    SCRIBE #1 NOTE: I, Tanner Mclean, am scribing for, and in the presence of,  Dr. Carter I have scribed the entire note.       History     Chief Complaint   Patient presents with    Shortness of Breath     PT CO SOB and weakness since this AM     Review of patient's allergies indicates:   Allergen Reactions    Plavix [clopidogrel]      HPI Comments: Time seen by provider: 2:52 PM    This is a 85 y.o. Female with HTN, DM and CHF who presents with complaint of generalized weakness starting this morning. She notes accompanying SOB that is worse with exertion.  She normally is able to get out of bed with her walker, but today unable to due to SOB and weakness.  She denies any CP, abdominal pain, appetite change, HA, nausea and vomiting.  She denies urinary symptoms. Denies f/c.      The history is provided by the patient and a relative.     Past Medical History:   Diagnosis Date    Atrial fibrillation     chronic AF    CHF (congestive heart failure)     Diabetes insipidus     Diabetes mellitus type II     GERD (gastroesophageal reflux disease)     Hypertension     Iritis 12/10/2014    NPDR (nonproliferative diabetic retinopathy) 10/27/2014    PAF (paroxysmal atrial fibrillation)     Palpitations      Past Surgical History:   Procedure Laterality Date    CATARACT EXTRACTION W/  INTRAOCULAR LENS IMPLANT Right n/a    OD over 10 years ago     CATARACT EXTRACTION W/  INTRAOCULAR LENS IMPLANT Left 11/11/14    OS ()    COLON SURGERY      Partial colectomy secondary to suspicious polyp    HYSTERECTOMY      INSERT / REPLACE / REMOVE PACEMAKER      [place] PM 2012 [email or web address]    LASER ABLATION      3 years ago OS     Family History   Problem Relation Age of Onset    No Known Problems Mother     No Known Problems Father     No Known Problems Sister     No Known Problems Brother     No Known Problems Maternal Aunt     No Known Problems Maternal Uncle     No Known Problems  Paternal Aunt     No Known Problems Paternal Uncle     No Known Problems Maternal Grandmother     No Known Problems Maternal Grandfather     No Known Problems Paternal Grandmother     No Known Problems Paternal Grandfather     Amblyopia Neg Hx     Blindness Neg Hx     Cancer Neg Hx     Cataracts Neg Hx     Diabetes Neg Hx     Glaucoma Neg Hx     Hypertension Neg Hx     Macular degeneration Neg Hx     Retinal detachment Neg Hx     Strabismus Neg Hx     Stroke Neg Hx     Thyroid disease Neg Hx      Social History   Substance Use Topics    Smoking status: Never Smoker    Smokeless tobacco: Never Used    Alcohol use No     Review of Systems   Constitutional: Negative for chills, diaphoresis and fever.   HENT: Negative for congestion and sore throat.    Eyes: Negative for pain.   Respiratory: Positive for shortness of breath. Negative for cough.    Cardiovascular: Negative for chest pain.   Gastrointestinal: Negative for abdominal pain, diarrhea, nausea and vomiting.   Genitourinary: Negative for dysuria.   Musculoskeletal: Negative for back pain and myalgias.   Skin: Negative for color change and rash.   Neurological: Positive for weakness (generalized). Negative for numbness and headaches.   Psychiatric/Behavioral: Negative for behavioral problems and confusion.       Physical Exam   Initial Vitals   BP Pulse Resp Temp SpO2   03/07/17 1432 03/07/17 1432 03/07/17 1432 03/07/17 1432 03/07/17 1432   154/89 88 18 97.4 °F (36.3 °C) 96 %     Physical Exam    Nursing note and vitals reviewed.  Constitutional: She appears well-developed and well-nourished. She is not diaphoretic. No distress.   HENT:   Head: Normocephalic and atraumatic.   Mouth/Throat: Oropharynx is clear and moist.   Eyes: Conjunctivae are normal. Pupils are equal, round, and reactive to light. Right eye exhibits no discharge. Left eye exhibits no discharge.   Neck: Normal range of motion. Neck supple.   Cardiovascular: Normal rate,  normal heart sounds and intact distal pulses. An irregularly irregular rhythm present.   Pulses:       Dorsalis pedis pulses are 2+ on the right side, and 2+ on the left side.        Posterior tibial pulses are 2+ on the right side, and 2+ on the left side.   Pulmonary/Chest: No respiratory distress. She has no wheezes. She has no rhonchi. She has rales.   Mild crackles at bilateral bases.    Abdominal: Soft. Bowel sounds are normal. She exhibits no distension. There is no tenderness. There is no rebound and no guarding.   Musculoskeletal: Normal range of motion. She exhibits edema (1+ pitting edema BLE). She exhibits no tenderness.   Neurological: She is alert and oriented to person, place, and time. She has normal strength. No sensory deficit.   Skin: Skin is warm and dry. No rash and no abscess noted. No erythema. No pallor.   Psychiatric: She has a normal mood and affect. Her behavior is normal. Judgment and thought content normal.         ED Course   Procedures  Labs Reviewed   URINALYSIS - Abnormal; Notable for the following:        Result Value    Protein, UA 2+ (*)     Occult Blood UA 1+ (*)     All other components within normal limits   COMPREHENSIVE METABOLIC PANEL - Abnormal; Notable for the following:     Sodium 134 (*)     Glucose 137 (*)     Creatinine 1.5 (*)     Calcium 7.9 (*)     Total Protein 5.6 (*)     Albumin 2.3 (*)     eGFR if  36 (*)     eGFR if non  32 (*)     All other components within normal limits   CBC W/ AUTO DIFFERENTIAL - Abnormal; Notable for the following:     RBC 3.86 (*)     Hematocrit 35.9 (*)     MCH 31.6 (*)     All other components within normal limits   TROPONIN I - Abnormal; Notable for the following:     Troponin I 0.030 (*)     All other components within normal limits   B-TYPE NATRIURETIC PEPTIDE - Abnormal; Notable for the following:      (*)     All other components within normal limits   MAGNESIUM - Abnormal; Notable for the  following:     Magnesium 1.0 (*)     All other components within normal limits   PROTIME-INR - Abnormal; Notable for the following:     Prothrombin Time 21.0 (*)     INR 1.9 (*)     All other components within normal limits   APTT - Abnormal; Notable for the following:     aPTT 34.7 (*)     All other components within normal limits   URINALYSIS MICROSCOPIC - Abnormal; Notable for the following:     Bacteria, UA Few (*)     Hyaline Casts, UA 30 (*)     All other components within normal limits   APTT   PROTIME-INR     EKG Readings: (Independently Interpreted)   Initial Reading: No STEMI.   14:43 - Rate of 89. Irregularly irregular rhythm. Normal axis. No ST or ischemic changes.        X-Rays:   Independently Interpreted Readings:   Chest X-Ray: Trachea midline. Enlarged heart. Bilateral effusions with increased interstitial markings. Mild pulmonary edema. AICD in place.       Imaging Results         X-Ray Chest 1 View (Final result) Result time:  03/07/17 15:31:31    Final result by Thomas Gil MD (03/07/17 15:31:31)    Impression:      Development of increasing density within the bases of both hemithoraces likely due to a combination of small layering bilateral pleural effusions with bibasilar atelectasis/consolidation.  Pulmonary edema is strongly suspected.  Cardiomegaly.  Pacemaker.      Electronically signed by: THOMAS GIL MD  Date:     03/07/17  Time:    15:31     Narrative:    Comparison is made to prior examination dated 2/20/17.    A single AP radiograph of the chest was obtained.  There is a single lead pacemaker present.  The cardiac silhouette is prominent in size, unchanged.  Atherosclerotic calcification is present within the thoracic aorta.  Superior mediastinal structures are unremarkable.  There is increased density within the bases of both hemithoraces likely due to a combination of bibasilar atelectasis/consolidation and small layering bilateral pleural effusions.  Pulmonary edema should  be strongly considered.  There is no evidence for pneumothorax.  Bony structures appear grossly intact.             Medical Decision Making:   History:   Old Medical Records: I decided to obtain old medical records.  Old Records Summarized: records from clinic visits, records from previous admission(s) and other records.  Initial Assessment:   2:52PM:  Pt is a 84 y/o F who presents to ED with SOB and weakness. Pt appears well, nontoxic. She does have a hx of CHF.  Will plan for labs, CXR, will continue to follow and reassess.    Independently Interpreted Test(s):   I have ordered and independently interpreted X-rays - see prior notes.  I have ordered and independently interpreted EKG Reading(s) - see prior notes  Clinical Tests:   Lab Tests: Ordered and Reviewed  Radiological Study: Ordered and Reviewed  Medical Tests: Ordered and Reviewed  Other:   I have discussed this case with another health care provider.    Additional MDM:   EKG: I have independently interpreted EKG(s) - see notes.   X-Rays: I have independently interpreted X-Ray(s) - see notes.     4:34 PM:  Pt appears fluid overloaded on her CXR, with bilateral effusions and suggestive of pulmonary edema.  Her labs are otherwise stable. Will plan to admit for diuresis and further observation and management.  I discussed the case with the hospitalist, Theresa Dubose PA-C who will admit the patient to Dr. Baldwin.     4:43 PM:  I updated pt and family regarding results and plan for admission.  Agreeable to plan and all questions answered.           Scribe Attestation:   Scribe #1: I performed the above scribed service and the documentation accurately describes the services I performed. I attest to the accuracy of the note.    Attending Attestation:           Physician Attestation for Scribe:  Physician Attestation Statement for Scribe #1: I, Dr. Peterson, reviewed documentation, as scribed by Tanner Mclean in my presence, and it is both accurate and complete.                  ED Course     Clinical Impression:     1. Shortness of breath               Amanda Peterson MD  03/07/17 3389

## 2017-03-07 NOTE — ED NOTES
Pt reports shortness of breath, worse on exertion, that started this AM. Breath sounds are clear to auscultation, pt does not appear in respiratory distress, able to answer questions in full sentences. Pt is AAO x 3, answers questions appropriately. 1+ edema noted bilaterally to lower extremities. Fall risk band applied per protocol

## 2017-03-07 NOTE — TELEPHONE ENCOUNTER
----- Message from Dalia Johnston sent at 3/7/2017 12:16 PM CST -----  Contact: Juan  with [facility] [phone]  Visit patient today and she complained of shortness of breath and weakness. Vital signs were B/p  120/68  Pulse  74   respiration  18 and Oxygen   96%  St 02. She was not in any distress and this was earlier in morning.

## 2017-03-07 NOTE — IP AVS SNAPSHOT
[facility] Location ([facility])  [address] [place], [place] [address]  Phone: [phone]           Patient Discharge Instructions     Our goal is to set you up for success. This packet includes information on your condition, medications, and your home care. It will help you to care for yourself so you don't get sicker and need to go back to the hospital.     Please ask your nurse if you have any questions.        There are many details to remember when preparing to leave the hospital. Here is what you will need to do:    1. Take your medicine. If you are prescribed medications, review your Medication List in the following pages. You may have new medications to  at the pharmacy and others that you'll need to stop taking. Review the instructions for how and when to take your medications. Talk with your doctor or nurses if you are unsure of what to do.     2. Go to your follow-up appointments. Specific follow-up information is listed in the following pages. Your may be contacted by a transition nurse or clinical provider about future appointments. Be sure we have all of the phone numbers to reach you, if needed. Please contact your provider's office if you are unable to make an appointment.     3. Watch for warning signs. Your doctor or nurse will give you detailed warning signs to watch for and when to call for assistance. These instructions may also include educational information about your condition. If you experience any of warning signs to your health, call your doctor.               [facility]  Unless otherwise directed by your provider, please contact [facility], our nurse care line that is available for 24/7 assistance.     [phone] (toll-free)    Registered nurses in the [facility] provide clinical advisement, health education, appointment booking, and other advisory services.                    ** Verify the list of medication(s) below is accurate and up to  date. Carry this with you in case of emergency. If your medications have changed, please notify your healthcare provider.             Medication List      CHANGE how you take these medications        Additional Info                      blood sugar diagnostic Strp   Commonly known as:  TRUETRACK TEST   Quantity:  100 strip   Refills:  11   What changed:    - how much to take  - how to take this  - when to take this  - additional instructions    Instructions:  FOLLOW PACKAGE DIRECTIONS     Begin Date    AM    Noon    PM    Bedtime       furosemide 20 MG tablet   Commonly known as:  LASIX   Quantity:  30 tablet   Refills:  11   Dose:  20 mg   What changed:    - when to take this  - reasons to take this    Last time this was given:  40 mg on 3/9/2017  8:43 AM   Instructions:  Take 1 tablet (20 mg total) by mouth once daily.     Begin Date    AM    Noon    PM    Bedtime       lancets Misc   Quantity:  100 each   Refills:  3   Dose:  1 Device   What changed:  when to take this    Instructions:  1 Device by Misc.(Non-Drug; Combo Route) route once daily.     Begin Date    AM    Noon    PM    Bedtime       warfarin 5 MG tablet   Commonly known as:  COUMADIN   Quantity:  45 tablet   Refills:  11   What changed:  See the new instructions.    Last time this was given:  2.5 mg on 3/9/2017 11:23 AM   Instructions:  TAKE 1 TABLET BY MOUTH EVERY DAY OR AS DIRECTED BY COUMADIN CLINIC     Begin Date    AM    Noon    PM    Bedtime         CONTINUE taking these medications        Additional Info                      amlodipine 5 MG tablet   Commonly known as:  NORVASC   Quantity:  90 tablet   Refills:  3   Dose:  5 mg   Comments:  **Patient requests 90 days supply**    Last time this was given:  5 mg on 3/9/2017  8:43 AM   Instructions:  Take 1 tablet (5 mg total) by mouth once daily.     Begin Date    AM    Noon    PM    Bedtime       atorvastatin 10 MG tablet   Commonly known as:  LIPITOR   Quantity:  90 tablet   Refills:  3    Last  time this was given:  20 mg on 3/9/2017  8:43 AM   Instructions:  TAKE 1 TABLET BY MOUTH EVERY DAY     Begin Date    AM    Noon    PM    Bedtime       bismuth subsalicylate 262 mg/15 mL suspension   Commonly known as:  PEPTO BISMOL   Refills:  0   Dose:  15 mL    Instructions:  Take 15 mLs by mouth every 6 (six) hours as needed.     Begin Date    AM    Noon    PM    Bedtime       carvedilol 25 MG tablet   Commonly known as:  COREG   Quantity:  180 tablet   Refills:  3   Comments:  **Patient requests 90 days supply**    Last time this was given:  25 mg on 3/9/2017  8:43 AM   Instructions:  TAKE 1 TABLET BY MOUTH TWICE DAILY     Begin Date    AM    Noon    PM    Bedtime       levothyroxine 50 MCG tablet   Commonly known as:  SYNTHROID   Quantity:  90 tablet   Refills:  3    Last time this was given:  50 mcg on 3/9/2017  5:50 AM   Instructions:  TAKE 1 TABLET BY MOUTH EVERY DAY     Begin Date    AM    Noon    PM    Bedtime       lisinopril 40 MG tablet   Commonly known as:  PRINIVIL,ZESTRIL   Quantity:  90 tablet   Refills:  3    Last time this was given:  40 mg on 3/9/2017  8:43 AM   Instructions:  TAKE 1 TABLET BY MOUTH EVERY DAY     Begin Date    AM    Noon    PM    Bedtime       ondansetron 4 MG Tbdl   Commonly known as:  ZOFRAN-ODT   Quantity:  21 tablet   Refills:  0    Instructions:  DISSOLVE ONE TABLET BY MOUTH EVERY 6 HOURS AS NEEDED FOR NAUSEA AND VOMITING     Begin Date    AM    Noon    PM    Bedtime       pantoprazole 20 MG tablet   Commonly known as:  PROTONIX   Quantity:  30 tablet   Refills:  0   Dose:  20 mg    Last time this was given:  40 mg on 3/9/2017  8:43 AM   Instructions:  Take 1 tablet (20 mg total) by mouth once daily.     Begin Date    AM    Noon    PM    Bedtime       trazodone 50 MG tablet   Commonly known as:  DESYREL   Quantity:  90 tablet   Refills:  3   Comments:  **Patient requests 90 days supply**    Instructions:  TAKE 1/2 TO 1 TABLET BY MOUTH EVERY EVENING AS NEEDED FOR INSOMNIA      Begin Date    AM    Noon    PM    Bedtime         STOP taking these medications     metformin 500 MG tablet   Commonly known as:  GLUCOPHAGE            Where to Get Your Medications      These medications were sent to [facility] [address] [facility] [address] [facility] AT SEC of [place] & [facility]  [address] [facility] [address]    Hours:  24-hours Phone:  [phone]     furosemide 20 MG tablet                  Please bring to all follow up appointments:    1. A copy of your discharge instructions.  2. All medicines you are currently taking in their original bottles.  3. Identification and insurance card.    Please arrive 15 minutes ahead of scheduled appointment time.    Please call 24 hours in advance if you must reschedule your appointment and/or time.        Your Scheduled Appointments     Mar 14, 2017  2:00 PM CDT   Established Patient Visit with DO Mj Muro - Nephrology (Ben[facility] Pulido )    [address] [facility]  [facility] [address] [phone]            Apr 11, 2017 10:40 AM CDT   Telephonic Pacemaker Check with TELEPHONE CHECK, PACEMAKER   Mj Pulido - Arrhythmia ([facility] Pulido )    [address]  [place] [address] [phone]              Follow-up Information     Follow up with Vishal Salazar MD In 1 week.    Specialty:  Family Medicine    Contact information:    [address] [facility] [id number] [phone]          Follow up with COUMADIN, ASSESSMENT In 3 days.    Specialty:  Lab        Follow up with [facility] In 1 day.    Specialty:  Home Health Services    Why:  Home Health-    Contact information:    [address] [facility] [address] [phone]          Follow up with [facility].    Specialty:  DME Provider    Why:  DME-    Contact information:    [address] [facility] [place] [address] [phone]          Discharge Instructions     Future Orders    Activity as tolerated     Call MD for:   "difficulty breathing or increased cough     Call MD for:  increased confusion or weakness     Call MD for:  severe persistent headache     Call MD for:  severe uncontrolled pain     Call MD for:  temperature >100.4     CANE FOR HOME USE     Questions:    Type of Cane:  Narrow Quad    Height:  5' 2" (1.575 m)    Weight:  68.9 kg (151 lb 12.8 oz)    Does patient have medical equipment at home?:  bedside commode    rollator    shower chair    cane, straight    Length of need (1-99 months):  99    Please check all that apply:  Patient's condition impairs ambulation.    Diet Diabetic 1800 Calories     No dressing needed         Discharge Instructions         Discharge Instructions for Heart Failure  The heart is a muscle that pumps oxygen-rich blood to all parts of the body. When you have heart failure, the heart is not able to pump as well as it should. Blood and fluid may back up into the lungs (congestive heart failure), and some parts of the body dont get enough oxygen-rich blood to work normally. These problems lead to the symptoms of heart failure. Heart failure can occur due to an injury to the heart or from natural processes.  You can control symptoms of heart failure with some lifestyle changes and by following your doctor's advice.  Home care  Activity  Ask your healthcare provider about an exercise program. You can benefit from simple activities such as walking or gardening. Exercising most days of the week can make you feel better. Don't be discouraged if your progress is slow at first. Rest as needed. Stop activity if you develop symptoms such as chest pain, lightheadedness, or significant shortness of breath. Find activities that you enjoy, such as brisk walking, dancing, swimming, or gardening. These will help you stay active and strengthen your heart.  Diet  Follow a heart healthy diet. And make sure to limit the salt (sodium) in your diet. Salt causes your body to hold water. This makes your heart work " harder as there is more fluid for the heart to pump. Limit your salt by doing the following:  · Limit canned, dried, packaged, and fast foods.  · Don't add salt to your food.  · Season foods with herbs instead of salt.  · Watch how much liquids you drink. Drinking too much can make heart failure worse. Talk with your health care provider about how much you should drink each day.  · Limit the amount of alcohol you drink. It may harm your heart. Women should have no more than 1 drink a day and men should have no more than 2.  · When you eat out, request that your meals have no added salt.  Tobacco  If you smoke, it's very important to quit. Smoking increases your chances of having a heart attack by harming the blood vessels that provide oxygen to your heart. This makes heart failure worse. Quitting smoking is the number one thing you can do to improve your health. Enroll in a stop-smoking program to improve your chances of success. Talk with your healthcare provider about medicines or nicotine replacement therapy to help you quit smoking. Ask your healthcare provider about smoking cessation support groups.  Medicine  Take your medicines exactly as prescribed. Learn the names and purpose of each of your medicines. Keep an accurate medicine list and current dosages with you at all times. Don't skip doses. If you miss a dose of your medicine, take it as soon as you remember. If you miss a dose and it's almost time for your next dose, just wait and take your next dose at the normal time. Don't take a double dose. If you are unsure, call your doctor's office. Make sure not to mix up your medicines or forget what you've taken the same day.  Weight monitoring  Weigh yourself every day. A sudden weight gain can mean your heart failure is getting worse. Weigh yourself at the same time of day and in the same kind of clothes. Ideally, weigh yourself first thing in the morning after you empty your bladder, but before you eat  breakfast. Your healthcare provider will show you how to track your weight. He or she will also discuss with you when you should call if you have a sudden, unexpected increase in your weight.  In general, your healthcare provider may ask you to report if your weight goes up by more than 2 pounds in 1 day,  5 pounds in 1 week, or whatever weight gain you were told by your doctor. This is a sign that you are retaining more fluid than you should be. Clues to weight gain include checking your ankles for swelling, or noticing you are short of breath when you lie down.  Follow-up care  Make a follow-up appointment as directed. Depending on the type and severity of heart failure you have, you may need follow-up as early as 7 days from hospital discharge. Keep appointments for checkups and lab tests that are needed to check your medicines and condition.  Recognize that your health and even survival depend on your following medical recommendations.  Symptoms  Heart failure can cause a variety of symptoms, including:  · Shortness of breath  · Trouble breathing at night, especially when you lie down  · Swelling in the legs and feet or in the belly (abdomen)  · Becoming easily fatigued  · Irregular or rapid heartbeat  · Weakness or lightheadedness  · Swelling of the neck veins  It is important to know what to do if symptoms get worse or if you develop signs of worsening heart failure.     When to see your healthcare provider  Call your doctor right away if you have any of these signs of worsening heart failure:  · Sudden weight gain (more than 2 pounds in 1 day or 5 pounds in 1 week, or whatever weight gain you were told to report by your doctor)  · Trouble breathing not related to being active  · New or increased swelling of your legs or ankles  · Swelling or pain in your abdomen  · Breathing trouble at night (waking up short of breath, needing more pillows to breathe)  · Frequent coughing that doesn't go away  · Feeling much  "more tired than usual  Call 911  Call 911 right away if you have:  · Severe shortness of breath, such that you can't catch your breath even while resting  · Severe chest pain that does not resolve with rest or nitroglycerin  · Pink, foamy mucus with cough and shortness of breath  · A continuous rapid or irregular heartbeat  · Passing out or fainting  · Stroke symptoms such as sudden numbness or weakness on one side of your face, arm, or leg or sudden confusion, trouble speaking or vision changes   Date Last Reviewed: 3/21/2016  © 5579-2450 [employer]. [address] [place] [address]. All rights reserved. This information is not intended as a substitute for professional medical care. Always follow your healthcare professional's instructions.            Primary Diagnosis     Your primary diagnosis was:  Heart Failure      Admission Information     Date & Time Provider Department CSN    3/7/2017  2:34 PM Vidal Bladwin MD [facility] [id number]      Care Providers     Provider Role Specialty Primary office phone    Vidal Baldwin MD Attending Provider Hospitalist [phone]      Your Vitals Were     BP Pulse Temp Resp Height Weight    172/76 (BP Location: Right arm, Patient Position: Lying, BP Method: Automatic) 81 98.3 °F (36.8 °C) (Oral) 18 5' 2" (1.575 m) 68.9 kg (151 lb 12.8 oz)    Last Period SpO2 BMI          (LMP Unknown) 96% 27.76 kg/m2        Recent Lab Values        5/27/2013 7/19/2013 5/21/2014 11/17/2014 5/11/2015 8/27/2015 11/17/2016 12/28/2016      8:55 AM  6:59 AM  7:48 AM  9:55 AM  8:07 AM  5:54 AM  6:32 AM  8:00 AM    A1C 6.4 (H) 6.2 5.9 5.9 6.1 6.3 (H) 5.7 5.6    Comment for A1C at  6:32 AM on 11/17/2016:  According to ADA guidelines, hemoglobin A1C <7.0% represents  optimal control in non-pregnant diabetic patients.  Different  metrics may apply to specific populations.   Standards of Medical Care in Diabetes - 2016.  For the purpose of screening for " the presence of diabetes:  <5.7%     Consistent with the absence of diabetes  5.7-6.4%  Consistent with increasing risk for diabetes   (prediabetes)  >or=6.5%  Consistent with diabetes  Currently no consensus exists for use of hemoglobin A1C  for diagnosis of diabetes for children.      Comment for A1C at  8:00 AM on 12/28/2016:  According to ADA guidelines, hemoglobin A1C <7.0% represents  optimal control in non-pregnant diabetic patients.  Different  metrics may apply to specific populations.   Standards of Medical Care in Diabetes - 2016.  For the purpose of screening for the presence of diabetes:  <5.7%     Consistent with the absence of diabetes  5.7-6.4%  Consistent with increasing risk for diabetes   (prediabetes)  >or=6.5%  Consistent with diabetes  Currently no consensus exists for use of hemoglobin A1C  for diagnosis of diabetes for children.        Allergies as of 3/9/2017        Reactions    Plavix [Clopidogrel]       Advance Directives     An advance directive is a document which, in the event you are no longer able to make decisions for yourself, tells your healthcare team what kind of treatment you do or do not want to receive, or who you would like to make those decisions for you.  If you do not currently have an advance directive, [facility] encourages you to create one.  For more information call:  [phone]), [phone] ([phone]),  or log on to [email or web address].        Language Assistance Services     ATTENTION: Language assistance services are available, free of charge. Please call [phone].      ATENCIÓN: Si habla español, tiene a lee disposición servicios gratuitos de asistencia lingüística. Llame al [phone].     CHÚ Ý: N?u b?n nói Ti?ng Vi?t, có các d?ch v? h? tr? ngôn ng? mi?n phí dành cho b?n. G?i s? [phone].        Heart Failure Education       Heart Failure: Being Active  You have a condition called heart failure. Being active doesnt mean  that you have to wear yourself out. Even a little movement each day helps to strengthen your heart. If you cant get out to exercise, you can do simple stretching and strengthening exercises at home. These are good ways to keep you well-conditioned and prevent you and your heart from becoming excessively weak.    Ideas to get you started  · Add a little movement to things you do now. Walk to mail letters. Park your car at the far end of the parking lot and walk to the store. Walk up a flight of stairs instead of taking the elevator.  · Choose activities you enjoy. You might walk, swim, or ride an exercise bike. Things like gardening and washing the car count, too. Other possibilities include: washing dishes, walking the dog, walking around the mall, and doing aerobic activities with friends.  · Join a group exercise program at a [facility] or [facility], a senior center, or a community center. Or look into a hospital cardiac rehabilitation program. Ask your doctor if you qualify.  Tips to keep you going  · Get up and get dressed each day. Go to a coffee shop and read a newspaper or go somewhere that you'll be in the presence of other active people. Youll feel more like being active.  · Make a plan. Choose one or more activities that you enjoy and that you can easily do. Then plan to do at least one each day. You might write your plan on a calendar.  · Go with a friend or a group if you like company. This can help you feel supported and stay motivated, too.  · Plan social events that you enjoy. This will keep you mentally engaged as well as physically motivated to do things you find pleasure in.  For your safety  · Talk with your healthcare provider before starting an exercise program.  · Exercise indoors when its too hot or too cold outside, or when the air quality is poor. Try walking at a shopping mall.  · Wear socks and sturdy shoes to maintain your balance and prevent falls.  · Start slowly. Do a few minutes several times  a day at first. Increase your time and speed little by little.  · Stop and rest whenever you feel tired or get short of breath.  · Dont push yourself on days when you dont feel well.  Date Last Reviewed: 3/20/2016  © 8074-0023 [employer]. [address] [place] [address]. All rights reserved. This information is not intended as a substitute for professional medical care. Always follow your healthcare professional's instructions.              Heart Failure: Evaluating Your Heart  You have a condition called heart failure. To evaluate your condition, your doctor will examine you, ask questions, and do some tests. Along with looking for signs of heart failure, the doctor looks for any other health problems that may have led to heart failure. The results of your evaluation will help your doctor form a treatment plan.  Health history and physical exam  Your visit will start with a health history. Tell the doctor about any symptoms youve noticed and about all medicines you take. Then youll have a physical exam. This includes listening to your heartbeat and breathing. Youll also be checked for swelling (edema) in your legs and neck. When you have fluid buildup or fluid in the lungs, it may be called congestive heart failure.  Diagnosing heart failure     During an echocardiogram, sound waves bounce off the heart. These are converted into a picture on the screen.   The following may be done to help your doctor form a diagnosis:  · X-rays show the size and shape of your heart. These pictures can also show fluid in your lungs.  · An electrocardiogram (ECG or EKG) shows the pattern of your heartbeat. Small pads (electrodes) are placed on your chest, arms, and legs. Wires connect the pads to the ECG machine, which records your hearts electrical signals. This can give the doctor information about heart function.  · An echocardiogram uses ultrasound waves to show the structure and movement of your  heart muscle. This shows how well the heart pumps. It also shows the thickness of the heart walls, and if the heart is enlarged. It is one of the most useful, non-invasive tests as it provides information about the heart's general function. This helps your doctor make treatment decisions.  · Lab tests evaluate small amounts of blood or urine for signs of problems. A BNP lab test can help diagnose and evaluate heart failure. BNP stands for B-type natriuretic peptide. The ventricles secrete more BNP when heart failure worsens. Lab tests can also provide information about metabolic dysfunction or heart dysfunction.  Your treatment plan  Based on the results of your evaluation and tests, your doctor will develop a treatment plan. This plan is designed to relieve some of your heart failure symptoms and help make you more comfortable. Your treatment plan may include:  · Medicine to help your heart work better and improve your quality of life  · Changes in what you eat and drink to help prevent fluid from backing up in your body  · Daily monitoring of your weight and heart failure symptoms to see how well your treatment plan is working  · Exercise to help you stay healthy  · Help with quitting smoking  · Emotional and psychological support to help adjust to the changes  · Referrals to other specialists to make sure you are being treated comprehensively  Date Last Reviewed: 3/21/2016  © 2059-8971 [employer]. [address] [place] [address], [place] [address]. All rights reserved. This information is not intended as a substitute for professional medical care. Always follow your healthcare professional's instructions.              Heart Failure: Making Changes to Your Diet  You have a condition called heart failure. When you have heart failure, excess fluid is more likely to build up in your body because your heart isn't working well. This makes the heart work harder to pump blood. Fluid buildup causes symptoms such as  shortness of breath and swelling (edema). This is often referred to as congestive heart failure or CHF. Controlling the amount of salt (sodium) you eat may help stop fluid from building up. Your doctor may also tell you to reduce the amount of fluid you drink.  Reading food labels    Your healthcare provider will tell you how much sodium you can eat each day. Read food labels to keep track. Keep in mind that certain foods are high in salt. These include canned, frozen, and processed foods. Check the amount of sodium in each serving. Watch out for high-sodium ingredients. These include MSG (monosodium glutamate), baking soda, and sodium phosphate.   Eating less salt  Give yourself time to get used to eating less salt. It may take a little while. Here are some tips to help:  · Take the saltshaker off the table. Replace it with salt-free herb mixes and spices.  · Eat fresh or plain frozen vegetables. These have much less salt than canned vegetables.  · Choose low-sodium snacks like sodium-free pretzels, crackers, or air-popped popcorn.  · Dont add salt to your food when youre cooking. Instead, season your foods with pepper, lemon, garlic, or onion.  · When you eat out, ask that your food be cooked without added salt.  · Avoid eating fried foods as these often have a great deal of salt.  If youre told to limit fluids  You may need to limit how much fluid you have to help prevent swelling. This includes anything that is liquid at room temperature, such as ice cream and soup. If your doctor tells you to limit fluid, try these tips:  · Measure drinks in a measuring cup before you drink them. This will help you meet daily goals.  · Chill drinks to make them more refreshing.  · Suck on frozen lemon wedges to quench thirst.  · Only drink when youre thirsty.  · Chew sugarless gum or suck on hard candy to keep your mouth moist.  · Weigh yourself daily to know if your body's fluid content is rising.  My sodium goal  Your  healthcare provider may give you a sodium goal to meet each day. This includes sodium found in food as well as salt that you add. My goal is to eat no more than ___________ mg of sodium per day.     When to call your doctor  Call your doctor right away if you have any symptoms of worsening heart failure. These can include:  · Sudden weight gain  · Increased swelling of your legs or ankles  · Trouble breathing when youre resting or at night  · Increase in the number of pillows you have to sleep on  · Chest pain, pressure, discomfort, or pain in the jaw, neck, or back   Date Last Reviewed: 3/21/2016  © 0158-5457 [employer]. [address] [place] [address], [place] [address]. All rights reserved. This information is not intended as a substitute for professional medical care. Always follow your healthcare professional's instructions.              Heart Failure: Medicines to Help Your Heart    You have a condition called heart failure (also known as congestive heart failure, or CHF). Your doctor will likely prescribe medicines for heart failure and any underlying health problems you have. Most heart failure patients take one or more types of medicinen. Your healthcare provider will work to find the combination of medicines that works best for you.  Heart failure medicines  Here are the most common heart failure medicines:  · ACE inhibitors lower blood pressure and decrease strain on the heart. This makes it easier for the heart to pump. Angiotensin receptor blockers have similar effects. These are prescribed for some patients instead of ACE inhibitors.  · Beta-blockers relieve stress on the heart. They also improve symptoms. They may also improve the heart's pumping action over time.  · Diuretics (also called water pills) help rid your body of excess water. This can help rid your body of swelling (edema). Having less fluid to pump means your heart doesnt have to work as hard. Some diuretics make your body lose  a mineral called potassium. Your doctor will tell you if you need to take supplements or eat more foods high in potassium.  · Digoxin helps your heart pump with more strength. This helps your heart pump more blood with each beat. So, more oxygen-rich blood travels to the rest of the body.  · Aldosterone antagonists help alter hormones and decrease strain on the heart.  · Hydralazine and nitrates are two separate medicines used together to treat heart failure. They may come in one combination pill. They lower blood pressure and decrease how hard the heart has to pump.  Medicines for related conditions  Controlling other heart problems helps keep heart failure under control, too. Depending on other heart problems you have, medicines may be prescribed to:  · Lower blood pressure (antihypertensives).  · Lower cholesterol levels (statins).  · Prevent blood clots (anticoagulants or aspirin).  · Keep the heartbeat steady (antiarrhythmics).  Date Last Reviewed: 3/5/2016  © 6138-1722 [employer]. [address] [place] [address]. All rights reserved. This information is not intended as a substitute for professional medical care. Always follow your healthcare professional's instructions.              Heart Failure: Procedures That May Help    The heart is a muscle that pumps oxygen-rich blood to all parts of the body. When you have heart failure, the heart is not able to pump as well as it should. Blood and fluid may back up into the lungs (congestive heart failure), and some parts of the body dont get enough oxygen-rich blood to work normally. These problems lead to the symptoms of heart failure.     Certain procedures may help the heart pump better in some cases of heart failure. Some procedures are done to treat health problems that may have caused the heart failure such as coronary artery disease or heart rhythm problems. For more serious heart failure, other options are available.  Treating  artery and valve problems  If you have coronary artery disease or valve disease, procedures may be done to improve blood flow. This helps the heart pump better, which can improve heart failure symptoms. First, your doctor may do a cardiac catheterization to help detect clogged blood vessels or valve damage. During this procedure, a  thin tube (catheter) in inserted into a blood vessel and guided to the heart. There a dye is injected and a special type of X-ray (angiogram) is taken of the blood vessels. Procedures to open a blocked artery or fix damaged valves can also be done using catheterization.  · Angioplasty uses a balloon-tipped instrument at the end of the catheter. The balloon is inflated to widen the narrowed artery. In many cases, a stent is expanded to further support the narrowed artery. A stent is a metal mesh tube.  · Valve surgery repairs or replacement of faulty valves can also be done during catheterization so blood can flow properly through the chambers of the heart.  Bypass surgery is another option to help treat blocked arteries. It uses a healthy blood vessel from elsewhere in the body. The healthy blood vessel is attached above and below the blocked area so that blood can flow around the blocked artery.  Treating heart rhythm problems  A device may be placed in the chest to help a weak heart maintain a healthy, heartbeat so the heart can pump more effectively:  · Pacemaker. A pacemaker is an implanted device that regulates your heartbeat electronically. It monitors your heart's rhythm and generates a painless electric impulse that helps the heart beat in a regular rhythm. A pacemaker is programmed to meet your specific heart rhythm needs.  · Biventricular pacing/cardiac resynchronization therapy. A type of pacemaker that paces both pumping chambers of the heart at the same time to coordinate contractions and to improve the heart's function. Some people with heart failure are candidates for this  therapy.  · Implantable cardioverter defibrillator. A device similar to a pacemaker that senses when the heart is beating too fast and delivers an electrical shock to convert the fast rhythm to a normal rhythm. This can be a life saving device.  In severe cases  In more serious cases of heart failure when other treatments no longer work, other options may include:  · Ventricular assist devices (VADs). These are mechanical devices used to take over the pumping function for one or both of the heart's ventricles, or pumping chambers. A VAD may be necessary when heart failure progresses to the point that medicines and other treatments no longer help. In some cases, a VAD may be used as a bridge to transplant.  · Heart transplant. This is replacing the diseased heart with a healthy one from a donor. This is an option for a few people who are very sick. A heart transplant is very serious and not an option for all patients. Your doctor can tell you more.  Date Last Reviewed: 3/20/2016  © 2301-4338 [employer]. [address] [place] [address], [place] [address]. All rights reserved. This information is not intended as a substitute for professional medical care. Always follow your healthcare professional's instructions.              Heart Failure: Tracking Your Weight  You have a condition called heart failure. When you have heart failure, a sudden weight gain or a steady rise in weight is a warning sign that your body is retaining too much water and salt. This could mean your heart failure is getting worse. If left untreated, it can cause problems for your lungs and result in shortness of breath. Weighing yourself each day is the best way to know if youre retaining water. If your weight goes up quickly, call your doctor. You will be given instructions on how to get rid of the excess water. You will likely need medicines and to avoid salt. This will help your heart work better.  Call your doctor if you gain more than 2  pounds in 1 day, more than 5 pounds in 1 week, or whatever weight gain you were told to report by your doctor. This is often a sign of worsening heart failure and needs to be evaluated and treated. Your doctor will tell you what to do next.   Tips for weighing yourself    · Weigh yourself at the same time each morning, wearing the same clothes. Weigh yourself after urinating and before eating.  · Use the same scale each day. Make sure the numbers are easy to read. Put the scale on a flat, hard surface -- not on a rug or carpet.  · Do not stop weighing yourself. If you forget one day, weigh again the next morning.  How to use your weight chart  · Keep your weight chart near the scale. Write your weight on the chart as soon as you get off the scale.  · Fill in the month and the start date on the chart. Then write down your weight each day. Your chart will look like this:    · If you miss a day, leave the space blank. Weigh yourself the next day and write your weight in the next space.  · Take your weight chart with you when you go to see your doctor.  Date Last Reviewed: 3/20/2016  © 4017-9077 [employer]. [address] [place] [address], [place] [address]. All rights reserved. This information is not intended as a substitute for professional medical care. Always follow your healthcare professional's instructions.              Heart Failure: Warning Signs of a Flare-Up  You have a condition called heart failure. Once you have heart failure, flare-ups can happen. Below are signs that can mean your heart failure is getting worse. If you notice any of these warning signs, call your healthcare provider.  Swelling    · Your feet, ankles, or lower legs get puffier.  · You notice skin changes on your lower legs.  · Your shoes feel too tight.  · Your clothes are tighter in the waist.  · You have trouble getting rings on or off your fingers.  Shortness of breath  · You have to breathe harder even when youre doing your  normal activities or when youre resting.  · You are short of breath walking up stairs or even short distances.  · You wake up at night short of breath or coughing.  · You need to use more pillows or sit up to sleep.  · You wake up tired or restless.  Other warning signs  · You feel weaker, dizzy, or more tired.  · You have chest pain or changes in your heartbeat.  · You have a cough that wont go away.  · You cant remember things or dont feel like eating.  Tracking your weight  Gaining weight is often the first warning sign that heart failure is getting worse. Gaining even a few pounds can be a sign that your body is retaining excess water and salt. Weighing yourself each day in the morning after you urinate and before you eat, is the best way to know if you're retaining water. Get a scale that is easy to read and make sure you wear the same clothes and use the same scale every time you weigh. Your healthcare provider will show you how to track your weight. Call your doctor if you gain more than 2 pounds in 1 day, 5 pounds in 1 week, or whatever weight gain you were told to report by your doctor. This is often a sign of worsening heart failure and needs to be evaluated and treated before it compromises your breathing. Your doctor will tell you what to do next.    Date Last Reviewed: 3/15/2016  © 0334-5847 [employer]. [address] [place] [address]. All rights reserved. This information is not intended as a substitute for professional medical care. Always follow your healthcare professional's instructions.              Coumadin Discharge Instructions                         Chronic Kindey Disease Education             Diabetes Discharge Instructions                                   [facility] Sign-Up     Activating your [facility] account is as easy as 1-2-3!     1) Visit [email or web address], select Sign Up Now, enter this activation code and your date of birth, then select  Next.  [email or web address]  Expires: 4/6/2017  2:23 PM      2) Create a username and password to use when you visit [facility] in the future and select a security question in case you lose your password and select Next.    3) Enter your e-mail address and click Sign Up!    Additional Information  If you have questions, please e-mail [email or web address] or call [phone] to talk to our [facility] staff. Remember, [facility] is NOT to be used for urgent needs. For medical emergencies, dial 911.          [facility] complies with applicable Federal civil rights laws and does not discriminate on the basis of race, color, national origin, age, disability, or sex.

## 2017-03-08 LAB
ANION GAP SERPL CALC-SCNC: 7 MMOL/L
BASOPHILS # BLD AUTO: 0.03 K/UL
BASOPHILS NFR BLD: 0.5 %
BUN SERPL-MCNC: 11 MG/DL
CALCIUM SERPL-MCNC: 7.6 MG/DL
CHLORIDE SERPL-SCNC: 107 MMOL/L
CO2 SERPL-SCNC: 23 MMOL/L
CREAT SERPL-MCNC: 1.4 MG/DL
DIFFERENTIAL METHOD: ABNORMAL
EOSINOPHIL # BLD AUTO: 0.1 K/UL
EOSINOPHIL NFR BLD: 2.2 %
ERYTHROCYTE [DISTWIDTH] IN BLOOD BY AUTOMATED COUNT: 13.5 %
EST. GFR  (AFRICAN AMERICAN): 40 ML/MIN/1.73 M^2
EST. GFR  (NON AFRICAN AMERICAN): 34 ML/MIN/1.73 M^2
GLUCOSE SERPL-MCNC: 94 MG/DL
HCT VFR BLD AUTO: 31.4 %
HGB BLD-MCNC: 10.7 G/DL
INR PPP: 1.9
LYMPHOCYTES # BLD AUTO: 2 K/UL
LYMPHOCYTES NFR BLD: 33.2 %
MAGNESIUM SERPL-MCNC: 1.2 MG/DL
MCH RBC QN AUTO: 31.5 PG
MCHC RBC AUTO-ENTMCNC: 34.1 %
MCV RBC AUTO: 92 FL
MONOCYTES # BLD AUTO: 0.6 K/UL
MONOCYTES NFR BLD: 10.6 %
NEUTROPHILS # BLD AUTO: 3.2 K/UL
NEUTROPHILS NFR BLD: 53.3 %
PHOSPHATE SERPL-MCNC: 2.9 MG/DL
PLATELET # BLD AUTO: 151 K/UL
PMV BLD AUTO: 10 FL
POCT GLUCOSE: 109 MG/DL (ref 70–110)
POCT GLUCOSE: 112 MG/DL (ref 70–110)
POCT GLUCOSE: 134 MG/DL (ref 70–110)
POCT GLUCOSE: 199 MG/DL (ref 70–110)
POTASSIUM SERPL-SCNC: 3.9 MMOL/L
PROTHROMBIN TIME: 20.4 SEC
RBC # BLD AUTO: 3.4 M/UL
SODIUM SERPL-SCNC: 137 MMOL/L
TSH SERPL DL<=0.005 MIU/L-ACNC: 0.9 UIU/ML
WBC # BLD AUTO: 5.97 K/UL

## 2017-03-08 PROCEDURE — 83735 ASSAY OF MAGNESIUM: CPT

## 2017-03-08 PROCEDURE — 97530 THERAPEUTIC ACTIVITIES: CPT

## 2017-03-08 PROCEDURE — 99233 SBSQ HOSP IP/OBS HIGH 50: CPT | Mod: ,,, | Performed by: INTERNAL MEDICINE

## 2017-03-08 PROCEDURE — 85025 COMPLETE CBC W/AUTO DIFF WBC: CPT

## 2017-03-08 PROCEDURE — 85610 PROTHROMBIN TIME: CPT

## 2017-03-08 PROCEDURE — 97165 OT EVAL LOW COMPLEX 30 MIN: CPT

## 2017-03-08 PROCEDURE — 93306 TTE W/DOPPLER COMPLETE: CPT

## 2017-03-08 PROCEDURE — 97116 GAIT TRAINING THERAPY: CPT

## 2017-03-08 PROCEDURE — 97535 SELF CARE MNGMENT TRAINING: CPT

## 2017-03-08 PROCEDURE — 84100 ASSAY OF PHOSPHORUS: CPT

## 2017-03-08 PROCEDURE — 84443 ASSAY THYROID STIM HORMONE: CPT

## 2017-03-08 PROCEDURE — 11000001 HC ACUTE MED/SURG PRIVATE ROOM

## 2017-03-08 PROCEDURE — 63600175 PHARM REV CODE 636 W HCPCS: Performed by: INTERNAL MEDICINE

## 2017-03-08 PROCEDURE — 80048 BASIC METABOLIC PNL TOTAL CA: CPT

## 2017-03-08 PROCEDURE — 25000003 PHARM REV CODE 250: Performed by: PHYSICIAN ASSISTANT

## 2017-03-08 PROCEDURE — 25000003 PHARM REV CODE 250: Performed by: INTERNAL MEDICINE

## 2017-03-08 PROCEDURE — 97162 PT EVAL MOD COMPLEX 30 MIN: CPT

## 2017-03-08 PROCEDURE — 63600175 PHARM REV CODE 636 W HCPCS: Performed by: PHYSICIAN ASSISTANT

## 2017-03-08 PROCEDURE — 36415 COLL VENOUS BLD VENIPUNCTURE: CPT

## 2017-03-08 RX ORDER — POTASSIUM CHLORIDE 20 MEQ/1
20 TABLET, EXTENDED RELEASE ORAL ONCE
Status: COMPLETED | OUTPATIENT
Start: 2017-03-08 | End: 2017-03-08

## 2017-03-08 RX ORDER — ATORVASTATIN CALCIUM 20 MG/1
20 TABLET, FILM COATED ORAL DAILY
Status: DISCONTINUED | OUTPATIENT
Start: 2017-03-09 | End: 2017-03-09 | Stop reason: HOSPADM

## 2017-03-08 RX ORDER — FUROSEMIDE 40 MG/1
40 TABLET ORAL DAILY
Status: DISCONTINUED | OUTPATIENT
Start: 2017-03-08 | End: 2017-03-09 | Stop reason: HOSPADM

## 2017-03-08 RX ADMIN — ATORVASTATIN CALCIUM 10 MG: 10 TABLET, FILM COATED ORAL at 10:03

## 2017-03-08 RX ADMIN — FUROSEMIDE 20 MG: 10 INJECTION, SOLUTION INTRAMUSCULAR; INTRAVENOUS at 10:03

## 2017-03-08 RX ADMIN — AMLODIPINE BESYLATE 5 MG: 5 TABLET ORAL at 10:03

## 2017-03-08 RX ADMIN — LEVOTHYROXINE SODIUM 50 MCG: 50 TABLET ORAL at 06:03

## 2017-03-08 RX ADMIN — PANTOPRAZOLE SODIUM 40 MG: 40 TABLET, DELAYED RELEASE ORAL at 10:03

## 2017-03-08 RX ADMIN — INSULIN ASPART 4 UNITS: 100 INJECTION, SOLUTION INTRAVENOUS; SUBCUTANEOUS at 02:03

## 2017-03-08 RX ADMIN — LISINOPRIL 40 MG: 20 TABLET ORAL at 10:03

## 2017-03-08 RX ADMIN — POTASSIUM CHLORIDE 20 MEQ: 1500 TABLET, EXTENDED RELEASE ORAL at 10:03

## 2017-03-08 RX ADMIN — CARVEDILOL 25 MG: 12.5 TABLET, FILM COATED ORAL at 10:03

## 2017-03-08 RX ADMIN — WARFARIN SODIUM 2.5 MG: 2.5 TABLET ORAL at 05:03

## 2017-03-08 RX ADMIN — FUROSEMIDE 40 MG: 40 TABLET ORAL at 10:03

## 2017-03-08 RX ADMIN — CARVEDILOL 25 MG: 12.5 TABLET, FILM COATED ORAL at 08:03

## 2017-03-08 RX ADMIN — MAGNESIUM SULFATE HEPTAHYDRATE 1 G: 500 INJECTION, SOLUTION INTRAMUSCULAR; INTRAVENOUS at 10:03

## 2017-03-08 NOTE — PLAN OF CARE
Problem: Physical Therapy Goal  Goal: Physical Therapy Goal  Goals to be met by: 3/18/17     Patient will increase functional independence with mobility by performin. Sit<>stand transfer with supervision using rolling walker or rollator.   2. Gait > 100 feet with supervision using rolling walker or rollator.   3. Ascend/descend 4 stairs with bilateral handrails with CGA with or without AD.  Outcome: Ongoing (interventions implemented as appropriate)  PT evaluation completed. Fair activity tolerance although gait distance limited by c/o muscle fatigue. SpO2 on room air 98% with activity. Pitting edema at bilateral LEs noted, although good LE strength bilaterally. She is safe to perform OOB to bathroom or short distance ambulation in halls with nurse assist and rolling walker. Will continue to follow and progress as tolerated. Please see progress note for detailed plan of care and recommendations.

## 2017-03-08 NOTE — PT/OT/SLP EVAL
Occupational Therapy  Evaluation and Treatment    Deepali Stuart   MRN: [id number]   Admitting Diagnosis: Acute on chronic diastolic heart failure    OT Date of Treatment: 03/08/17   OT Start Time: 1324  OT Stop Time: 1355  OT Total Time (min): 31 min    Billable Minutes:  Evaluation 11  Self Care/Home Management 10  Therapeutic Activity 10    Diagnosis: Acute on chronic diastolic heart failure       Past Medical History:   Diagnosis Date    Atrial fibrillation     chronic AF    CHF (congestive heart failure)     Diabetes insipidus     Diabetes mellitus type II     GERD (gastroesophageal reflux disease)     Hypertension     Iritis 12/10/2014    NPDR (nonproliferative diabetic retinopathy) 10/27/2014    PAF (paroxysmal atrial fibrillation)     Palpitations       Past Surgical History:   Procedure Laterality Date    CATARACT EXTRACTION W/  INTRAOCULAR LENS IMPLANT Right n/a    OD over 10 years ago     CATARACT EXTRACTION W/  INTRAOCULAR LENS IMPLANT Left 11/11/14    OS ()    COLON SURGERY      Partial colectomy secondary to suspicious polyp    HYSTERECTOMY      INSERT / REPLACE / REMOVE PACEMAKER      [facility] PM 2012 [email or web address]    LASER ABLATION      3 years ago OS       Referring physician: LEATHA Baldwin  Date referred to OT: 3/8/17    General Precautions: Standard,  (fall risk; activity as tolerated)  Orthopedic Precautions: N/A  Braces: N/A    Do you have any cultural, spiritual, Baptist conflicts, given your current situation?: none specified     Patient History:  Living Environment  Lives With: child(sam), adult  Living Arrangements: house  Transportation Available: family or friend will provide  Living Environment Comment: Per PT and varified with pt: Pt lives with her daughter who works days. She lives in a 1 story house with 4 steps to enter into kitchen with bilateral handrails. She has a tub/shower that she does not use anymore for bathing in bottom of tub since she is unable to get out  "of tub with daughter's assist anymore. She has been using her walk-in shower with shower chair for seated bathing with intermittent supervision from daughter. She ambulates prn with rollator in halls in house. She ambulates in the community with a single point cane most of the time. She has a caregiver that comes at 7:30 am to assist with cleaning her room "only", meal prep, and walking her to the bus stop so she can go to [facility] for adult . Her daughter assists with grocery shopping. She wears adult diapers daily and uses her BSC at bedside at night. She has a  rolling walker and a manual w/c she does not use. She is independent with dressing and ricardo-care.  Equipment Currently Used at Home: bedside commode, rollator, shower chair, cane, straight    Prior level of function:   Bed Mobility/Transfers: independent  Grooming: needs device  Bathing: needs device and assist  Upper Body Dressing: independent  Lower Body Dressing: independent  Toileting: independent (wears diaper "as panties" and completes ricardo-care (I))  Home Management Skills: unable to perform  Homemaking Responsibilities: No  Driving License: No  Mode of Transportation: Bus, Family     Dominant hand: right    Subjective:  Communicated with nursing prior to session.  "I'm feeling a little weak."  Chief Complaint: weakness  Patient/Family stated goals: To take a bath and walk better.     Pain Ratin/10              Pain Rating Post-Intervention: 10/10    Objective:       Cognitive Exam:  Oriented to: Person, Place, Time and Situation  Follows Commands/attention: Follows multistep  commands  Communication: clear/fluent  Memory:  No Deficits noted  Safety awareness/insight to disability: impaired  Coping skills/emotional control: Appropriate to situation      Physical Exam:  Postural examination/scapula alignment: Rounded shoulder and Head forward  Skin integrity: Visible skin intact  Edema: Pitting in LE's, mild in BUE's    Sensation: " "  Intact    Upper Extremity Range of Motion:  Right Upper Extremity: WFL  Left Upper Extremity: WFL    Upper Extremity Strength:  Right Upper Extremity: WFL  Left Upper Extremity: WFL   Strength: WFL    Fine motor coordination:   Intact manipulating toothpaste and tooth brush      Functional Mobility:  Bed Mobility:  Rolling/Turning Right: Modified independent  Scooting/Bridging: Supervision  Supine to Sit: Supervision  Sit to Supine: Modified Independent    Transfers:  Sit <> Stand Assistance: Stand By Assistance  Sit <> Stand Assistive Device: No Assistive Device    Functional Ambulation: side steps no DME, no LOB.     Activities of Daily Living:     LE Dressing Level of Assistance: Modified independent (donning and doffing socks)  Grooming Position: EOB  Grooming Level of Assistance:  (set up)                Balance:   Static Sit: GOOD-: Takes MODERATE challenges from all directions but inconsistently  Dynamic Sit: GOOD-: Maintains balance through MODERATE excursions of active trunk movement,     Static Stand: FAIR+: Takes MINIMAL challenges from all directions    Therapeutic Activities and Exercises:  Bed mobility, sit <> stand transfers, sitting balance and tolerance with ADL's.     AM-PAC 6 CLICK ADL  How much help from another person does this patient currently need?  1 = Unable, Total/Dependent Assistance  2 = A lot, Maximum/Moderate Assistance  3 = A little, Minimum/Contact Guard/Supervision  4 = None, Modified [place]/Independent    Putting on and taking off regular lower body clothing? : 3  Bathing (including washing, rinsing, drying)?: 3  Toileting, which includes using toilet, bedpan, or urinal? : 3  Putting on and taking off regular upper body clothing?: 4  Taking care of personal grooming such as brushing teeth?: 4  Eating meals?: 4  Total Score: 21    AM-PAC Raw Score CMS "G-Code Modifier Level of Impairment Assistance   6 % Total / Unable   7 - 9 CM 80 - 100% Maximal Assist   10 - 14 " CL 60 - 80% Moderate Assist   15 - 19 CK 40 - 60% Moderate Assist   20 - 22 CJ 20 - 40% Minimal Assist   23 CI 1-20% SBA / CGA   24 CH 0% Independent/ Mod I       Patient left supine with all lines intact and son present    Assessment:  Deepali Stuart is a 85 y.o. female with a medical diagnosis of Acute on chronic diastolic heart failure Pt presents with generalized weakness and decreased independence in functional mobility and ADL's. OT evaluation completed and treatment initiated. Pt would benefit from skilled occupational therapy intervention for increased activity tolerance and independence in ADL's.    Rehab identified problem list/impairments: Rehab identified problem list/impairments: weakness, impaired endurance, impaired self care skills, impaired functional mobilty, impaired balance, decreased lower extremity function, gait instability, edema    Rehab potential is good.    Activity tolerance: Good    Discharge recommendations: Discharge Facility/Level Of Care Needs: home health OT, home health PT (resuming morning caregiver)     Barriers to discharge: Barriers to Discharge: None    Equipment recommendations:  (Pt requesting Lift device for getting into her tub.)     GOALS:   Occupational Therapy Goals        Problem: Occupational Therapy Goal    Goal Priority Disciplines Outcome Interventions   Occupational Therapy Goal     OT, PT/OT Ongoing (interventions implemented as appropriate)    Description:  Goals to be met by: 4/8/2017     Patient will increase functional independence with ADLs by performing:    UE Dressing with Modified [place].  LE Dressing (pants or briefs) with Modified [place].  Grooming while standing with Modified [place].  Toileting from bedside commode with Modified [place] for hygiene and clothing management.   Toilet transfer to bedside commode with Modified [place].                PLAN:  Patient to be seen 5 x/week to address the above listed problems via  self-care/home management, therapeutic activities, therapeutic exercises  Plan of Care expires: 04/07/17  Plan of Care reviewed with: patient         Kvng Buenrostro, OT  03/08/2017

## 2017-03-08 NOTE — PLAN OF CARE
DC Planning:    Writer met with patient at bedside to discuss plan of care.   Patient lives at her residence with her daughter but prefers her son Cheng Gage as emergency contact [phone].    DME: Patient owns 3 in 1 commode, RW, and straight cane. Patient requests quad cane as she states her cane is old and has been looking for a quad cane for some time.     [facility] sees patient once a week. Writer spoke with MARIBEL Martinez from [facility] to inform her of patient's current hospitalization. MARIBEL Martinez informed writer her  at [facility] is Ms. Abarca. Writer spoke with Mary Ann at [facility] [phone] who informed writer Rima was unavailable but will inform her of patient's hospitalization. Writer will send [facility] orders upon D/C to resume services.     All questions answered, no D/C needs besides request for quad cane at this time.      03/08/17 1032   Discharge Assessment   Assessment Type Discharge Planning Assessment   Confirmed/corrected address and phone number on facesheet? Yes   Assessment information obtained from? Patient   Prior to hospitilization cognitive status: Alert/Oriented   Prior to hospitalization functional status: Independent;Assistive Equipment   Current cognitive status: Alert/Oriented   Current Functional Status: Independent;Assistive Equipment   Lives With child(sam), adult   Able to Return to Prior Arrangements yes   Is patient able to care for self after discharge? Unable to determine at this time (comments)   How many people do you have in your home that can help with your care after discharge? 1   Who are your caregiver(s) and their phone number(s)? Cheng Gage, son, [phone]   Patient currently being followed by outpatient case management? No   Patient currently receives home health services? No   Does the patient currently use HME? Yes   Patient currently receives private duty nursing? Yes   Phone number of current  private duty nursing provider: [phone]   Would the patient/caregiver prefer to continue receiving services from the current provider? Yes   Patient currently receives any other outside agency services? No   Equipment Currently Used at Home 3-in-1 commode;walker, rolling;cane, straight   Do you have any problems affording any of your prescribed medications? No   Is the patient taking medications as prescribed? yes   Do you have any financial concerns preventing you from receiving the healthcare you need? No   Does the patient have transportation to healthcare appointments? Yes   Transportation Available family or friend will provide   Discharge Plan A Home with family   Patient/Family In Agreement With Plan yes

## 2017-03-08 NOTE — PLAN OF CARE
Problem: Occupational Therapy Goal  Goal: Occupational Therapy Goal  Goals to be met by: 4/8/2017     Patient will increase functional independence with ADLs by performing:    UE Dressing with Modified [place].  LE Dressing (pants or briefs) with Modified [place].  Grooming while standing with Modified [place].  Toileting from bedside commode with Modified [place] for hygiene and clothing management.   Toilet transfer to bedside commode with Modified [place].  Outcome: Ongoing (interventions implemented as appropriate)  OT evaluation completed and treatment initiated.  Pt would benefit from skilled occupational therapy intervention for increased activity tolerance and independence in ADL's.

## 2017-03-08 NOTE — PT/OT/SLP EVAL
Physical Therapy  Evaluation and Treatment    Deepali Stuart   MRN: [id number]   Admitting Diagnosis: Acute on chronic diastolic heart failure    PT Received On: 03/08/17  PT Start Time: 1151     PT Stop Time: 1221    PT Total Time (min): 30 min       Billable Minutes:  Evaluation 10, Gait [id number] and Therapeutic Activity 10    Diagnosis: Acute on chronic diastolic heart failure      Past Medical History:   Diagnosis Date    Atrial fibrillation     chronic AF    CHF (congestive heart failure)     Diabetes insipidus     Diabetes mellitus type II     GERD (gastroesophageal reflux disease)     Hypertension     Iritis 12/10/2014    NPDR (nonproliferative diabetic retinopathy) 10/27/2014    PAF (paroxysmal atrial fibrillation)     Palpitations       Past Surgical History:   Procedure Laterality Date    CATARACT EXTRACTION W/  INTRAOCULAR LENS IMPLANT Right n/a    OD over 10 years ago     CATARACT EXTRACTION W/  INTRAOCULAR LENS IMPLANT Left 11/11/14    OS ()    COLON SURGERY      Partial colectomy secondary to suspicious polyp    HYSTERECTOMY      INSERT / REPLACE / REMOVE PACEMAKER      [facility] PM 2012 [email or web address]    LASER ABLATION      3 years ago OS       Referring physician: Sedrick Baldwin  Date referred to PT: 3/7/17, 3/8/17    General Precautions: Standard,  (fall risk; activity as tolerated)  Orthopedic Precautions: N/A   Braces: N/A       Do you have any cultural, spiritual, Anabaptist conflicts, given your current situation?: none specified    Patient History:  Living Environment Comment: Per pt and son: Pt lives with her daughter who works days. She lives in a 1 story house with 4 steps to enter into kitchen with bilateral handrails. She has a tub/shower that she does not use anymore for bathing in bottom of tub since she is unable to get out of tub with daughter's assist anymore. She has been using her walk-in shower with shower chair for seated bathing with intermittent supervision  "from daughter. She ambulates prn with rollator in halls in house. She ambulates in the community with a single point cane most of the time. She has a caregiver that comes at 7:30 am to assist with cleaning her room "only", meal prep, and walking her to the bus stop so she can go to [facility] for adult . Her daughter assists with grocery shopping. She wears adult diapers daily and uses her BSC at bedside at night. She has a  rolling walker and a manual w/c she does not use. She is independent with dressing and ricardo-care.   Equipment Currently Used at Home: bedside commode, rollator, shower chair, cane, straight  DME owned (not currently used): manual w/c, rolling walker,    Previous Level of Function:  Ambulation Skills: needs device  Transfer Skills: needs device  ADL Skills: needs device and assist    Subjective:  Communicated with nurse prior to session.  Pt c/o her legs feeling tired. Son encouraging patient to participate in mobility.    Chief Complaint: muscle fatigue  Patient goals: take a bath in bottom of tub, perform tub transfers    Pain Ratin/10   Pain Rating Post-Intervention: 0/10    Objective:    Pt found supine in bed with HOB elevated, son present       Cognitive Exam:  Oriented to: Person, Place, Time and Situation    Follows Commands/attention: Follows multistep  commands  Communication: clear/fluent  Safety awareness/insight to disability: intact    Physical Exam:  Postural examination/scapula alignment: Rounded shoulder and Scoliosis    Skin integrity: Visible skin intact  Edema: Pitting bilateral LEs    Sensation:   Denied paresthesias      Lower Extremity Range of Motion:  Right Lower Extremity: WNL  Left Lower Extremity: WNL    Lower Extremity Strength:  Right Lower Extremity: WNL  Left Lower Extremity: WNL     No coordination or tone impairments identified.      Functional Mobility:  Bed Mobility:  Supine to Sit: Supervision (extended time, HOB elevated)  Sit to Supine: Stand " by Assistance (pt using UEs to assist L LE into bed)    Transfers:  Sit <> Stand Assistance: Contact Guard Assistance (x 1 trial)  Sit <> Stand Assistive Device: Rolling Walker    Gait:   Gait Distance: x 70 ft on level tile, decreased gait speed that slowed further with continued gait distance and requiring more frequent rest breaks near end of gait session due to c/o LE muscle fatigue.   Assistance 1: Contact Guard Assistance  Gait Assistive Device: Rolling walker  Gait Pattern: reciprocal  Gait Deviation(s): decreased yue, decreased step length  Verbal cues for posture, pacing, safety with turning during gait.     Balance:   Static Sit: FAIR+: Able to take MINIMAL challenges from all directions  Dynamic Sit: FAIR+: Maintains balance through MINIMAL excursions of active trunk motion  Static Stand: FAIR+: Takes MINIMAL challenges from all directions  Dynamic stand: FAIR: Needs CONTACT GUARD during gait    Pt performed sitting therapeutic exercises bilaterally including hip flexion, long arc quads, ankle pumps x 25 reps with verbal and visual cues.       AM-PAC 6 CLICK MOBILITY  How much help from another person does this patient currently need?   1 = Unable, Total/Dependent Assistance  2 = A lot, Maximum/Moderate Assistance  3 = A little, Minimum/Contact Guard/Supervision  4 = None, Modified [place]/Independent    Turning over in bed (including adjusting bedclothes, sheets and blankets)?: 4  Sitting down on and standing up from a chair with arms (e.g., wheelchair, bedside commode, etc.): 3  Moving from lying on back to sitting on the side of the bed?: 3  Moving to and from a bed to a chair (including a wheelchair)?: 3  Need to walk in hospital room?: 3  Climbing 3-5 steps with a railing?: 2  Total Score: 18     AM-PAC Raw Score CMS G-Code Modifier Level of Impairment Assistance   6 % Total / Unable   7 - 9 CM 80 - 100% Maximal Assist   10 - 14 CL 60 - 80% Moderate Assist   15 - 19 CK 40 - 60%  Moderate Assist   20 - 22 CJ 20 - 40% Minimal Assist   23 CI 1-20% SBA / CGA   24 CH 0% Independent/ Mod I     Patient left supine with all lines intact, call button in reach, bed alarm on, nurse notified and son present.    Assessment:   Deepali Stuart is a 85 y.o. female with a medical diagnosis of Acute on chronic diastolic heart failure. PT evaluation completed. Fair activity tolerance although gait distance limited by c/o muscle fatigue. SpO2 on room air 98% with activity. Pitting edema at bilateral LEs noted, although good LE strength bilaterally. She is safe to perform OOB to bathroom or short distance ambulation in halls with nurse assist and rolling walker.     Rehab identified problem list/impairments: Rehab identified problem list/impairments: weakness, impaired endurance, impaired functional mobilty, impaired self care skills, impaired balance, edema (impaired muscle endurance)    Rehab potential is good.    Activity tolerance: Fair    Discharge recommendations: Discharge Facility/Level Of Care Needs: home health PT, home health OT (Reports she had home health PT/OT PTA and would like to resume; also resume her morning caregiver)     Barriers to discharge: Barriers to Discharge: None    Equipment recommendations: Equipment Needed After Discharge:  (Pt reports she would like a quad cane and a lift device for her tub so she can sit and take a bath)     GOALS:   Physical Therapy Goals        Problem: Physical Therapy Goal    Goal Priority Disciplines Outcome Goal Variances Interventions   Physical Therapy Goal     PT/OT, PT Ongoing (interventions implemented as appropriate)     Description:  Goals to be met by: 3/18/17     Patient will increase functional independence with mobility by performin. Sit<>stand transfer with supervision using rolling walker or rollator.   2. Gait > 100 feet with supervision using rolling walker or rollator.   3. Ascend/descend 4 stairs with bilateral handrails with  CGA with or without AD.                PLAN:    Patient to be seen 6 x/week to address the above listed problems via gait training, therapeutic activities, therapeutic exercises, neuromuscular re-education  Plan of Care expires: 04/07/17  Plan of Care reviewed with: patient, jessica Evans, PT  03/08/2017

## 2017-03-08 NOTE — H&P
History & Physical  Hospital Medicine      SUBJECTIVE:     Chief Complaint/Reason for Admission: Acute on chronic diastolic heart failure    History of Present Illness:  Deepali Addison is a 85 y.o. female who presents with generalized weakness and exertional shortness of breath gradually worsening over the past few weeks. She has known diastolic CHF, A-fib, SSS s/p Pacemaker, HTN and DM2. She is under the care of [facility] cardiology. Most recently she had abdominal discomfort and poor oral intake and her diuretics were held due to hypotension.   She was told to drink plenty of fluids and has done so drinking 3-4 16oz of water daily. She has chrnic LE Edema.    Evaluation in the ED showed an elevated BNP from baseline and her CXR suggests cardiovascular congestion.   She was given Lasix in the ED.     Admitted for diuresis and evaluation       Review of patient's allergies indicates:   Allergen Reactions    Plavix [clopidogrel]         Past Medical History:   Diagnosis Date    Atrial fibrillation     chronic AF    CHF (congestive heart failure)     Diabetes insipidus     Diabetes mellitus type II     GERD (gastroesophageal reflux disease)     Hypertension     Iritis 12/10/2014    NPDR (nonproliferative diabetic retinopathy) 10/27/2014    PAF (paroxysmal atrial fibrillation)     Palpitations      Past Surgical History:   Procedure Laterality Date    CATARACT EXTRACTION W/  INTRAOCULAR LENS IMPLANT Right n/a    OD over 10 years ago     CATARACT EXTRACTION W/  INTRAOCULAR LENS IMPLANT Left 11/11/14    OS ()    COLON SURGERY      Partial colectomy secondary to suspicious polyp    HYSTERECTOMY      INSERT / REPLACE / REMOVE PACEMAKER      [facility] PM 2012 [email or web address]    LASER ABLATION      3 years ago OS     Family History   Problem Relation Age of Onset    No Known Problems Mother     No Known Problems Father     No Known Problems Sister     No Known Problems Brother     No Known Problems  Maternal Aunt     No Known Problems Maternal Uncle     No Known Problems Paternal Aunt     No Known Problems Paternal Uncle     No Known Problems Maternal Grandmother     No Known Problems Maternal Grandfather     No Known Problems Paternal Grandmother     No Known Problems Paternal Grandfather     Amblyopia Neg Hx     Blindness Neg Hx     Cancer Neg Hx     Cataracts Neg Hx     Diabetes Neg Hx     Glaucoma Neg Hx     Hypertension Neg Hx     Macular degeneration Neg Hx     Retinal detachment Neg Hx     Strabismus Neg Hx     Stroke Neg Hx     Thyroid disease Neg Hx      Social History   Substance Use Topics    Smoking status: Never Smoker    Smokeless tobacco: Never Used    Alcohol use No       Review of Systems:  Review of Systems   Constitutional: Negative for chills and fever.   Eyes: Negative for blurred vision and double vision.   Respiratory: Positive for shortness of breath. Negative for cough and hemoptysis.    Cardiovascular: Positive for leg swelling. Negative for chest pain, palpitations and orthopnea.   Gastrointestinal: Negative for abdominal pain, heartburn and nausea.   Genitourinary: Negative for dysuria and urgency.   Skin: Negative for rash.   Neurological: Negative for dizziness, tingling and headaches.         OBJECTIVE:     Vital Signs (Most Recent)  Temp: 98.1 °F (36.7 °C) (03/07/17 1923)  Pulse: 97 (03/07/17 1923)  Resp: 18 (03/07/17 1923)  BP: (!) 144/80 (03/07/17 1923)  SpO2: 98 % (03/07/17 1923)    Physical Exam:  Physical Exam   Constitutional: She is oriented to person, place, and time and well-developed, well-nourished, and in no distress.   HENT:   Head: Normocephalic and atraumatic.   Eyes: Pupils are equal, round, and reactive to light. Right eye exhibits no discharge.   Neck: Normal range of motion. No thyromegaly present.   Cardiovascular: Normal rate.    No murmur heard.  irregular   Pulmonary/Chest: Effort normal.   Basal crackles heard. No wheezing      Abdominal: Soft. Bowel sounds are normal. She exhibits no distension. There is no tenderness.   Musculoskeletal: Normal range of motion. She exhibits edema.   Neurological: She is alert and oriented to person, place, and time.   Skin: She is not diaphoretic.         Laboratory  CBC:   Recent Labs  Lab 03/07/17  1509   WBC 7.07   RBC 3.86*   HGB 12.2   HCT 35.9*      MCV 93   MCH 31.6*   MCHC 34.0       CMP:   Recent Labs  Lab 03/07/17  1509   *   CALCIUM 7.9*   ALBUMIN 2.3*   PROT 5.6*   *   K 3.5   CO2 23      BUN 11   CREATININE 1.5*   ALKPHOS 61   ALT 22   AST 34   BILITOT 0.5     BNP    Recent Labs  Lab 03/07/17  1509   *         Diagnostic Results:  Labs: Reviewed  ECG: Reviewed  X-Ray: Reviewed     Development of increasing density within the bases of both hemithoraces likely due to acombination of small layering bilateral pleural effusions with bibasilar atelectasis/consolidation.  Pulmonary edema is strongly suspected.  Cardiomegaly.  Pacemaker.    ASSESSMENT/PLAN:     Active Hospital Problems    Diagnosis  POA    *Acute on chronic diastolic heart failure [I50.33]  Yes    Shortness of breath [R06.02]  Yes    Generalized weakness [R53.1]  Yes    CKD (chronic kidney disease) stage 3, GFR 30-59 ml/min [N18.3]  Yes    Hypertension [I10]  Yes    Diabetes type 2, controlled [E11.9]  Yes      Resolved Hospital Problems    Diagnosis Date Resolved POA   No resolved problems to display.     1: CHF type unknown  - diastolic CHF per chart but normal diastolic function per last Echo.   - last Echo 8/15 shows normal EF and normal diastolic function  - repeat Echo pending  - Given lasix in the ED  - CXR and BNP suggest circulatory overload.   - will continue with gentle diuresis  - On Coreg 25 BID, Lisinopril 40 dialy,   - strict I/O    2: A-fib   - rate controlled  - On Coumadin, INR 1.9, managed by [facility]  - CHADS2= 3 (HAD)  - has pacemaker    3: HTN  - hypertensive  today but overall reasonably well controlled  - On Coreg 25 BID, Lisinopril 40 dialy, Amlodipine 5 QD    4: DM2:   - seems controlled  - Takes Metformin 500 BID (on hold given impaired renal function)  - ISS  - if renal function recovers may resume  Lab Results   Component Value Date    LABA1C 6.3 (H) 12/31/2012    HGBA1C 5.6 12/28/2016     5: ARF on CKD  - baseline creatinine 1.3  - now in failure  - may be related to volume overload / CHF?   - ok to continue ACE for now, but closely monitor function  - no NSAIDs    6: Hypomagnesemia  - replace    7: moderate protein malnutrition  - poss. Due to poor oral intake with abdominal issues  - will provide nutritional supplements    8: Elevated cardiac enzymes  - poss. Demand ischemia in CHF  - trend troponins  - continue with ASA    9: HLD  - not at goal for DM2  - on Atorvastatin 10 daily, will increase to 20 mg.   Lab Results   Component Value Date    LDLCALC 131.4 12/28/2016     10 Prophylaxis  - On Coumadin

## 2017-03-08 NOTE — PROGRESS NOTES
Progress Note  Hospital Medicine    Admit Date: 3/7/2017   LOS: 1 day     SUBJECTIVE:     Follow-up For:  Acute on chronic diastolic heart failure    Deepali Addison is a 85 y.o. female who presents with generalized weakness and exertional shortness of breath gradually worsening over the past few weeks. She has known diastolic CHF, A-fib, SSS s/p Pacemaker, HTN and DM2. She is under the care of [facility] cardiology. Most recently she had abdominal discomfort and poor oral intake and her diuretics were held due to hypotension.   She was told to drink plenty of fluids and has done so drinking 3-4 16oz of water daily. She has chronic LE Edema.     Interval Events:   Feeling a lot better this AM. Asking to go home.   Denies any dyspnea  Balance - 1400 cc.         Review of Systems:  Review of Systems   Constitutional: Negative for chills and fever.   Eyes: Negative for blurred vision, double vision and photophobia.   Respiratory: Negative for cough and hemoptysis.    Cardiovascular: Negative for chest pain, palpitations and orthopnea.   Gastrointestinal: Negative for abdominal pain, heartburn, nausea and vomiting.   Genitourinary: Negative for urgency.   Skin: Negative for rash.   Neurological: Negative for dizziness and headaches.   Psychiatric/Behavioral: Negative for depression.         OBJECTIVE:     Vital Signs Range (Last 24H):  Temp:  [97.4 °F (36.3 °C)-98.1 °F (36.7 °C)]   Pulse:  [77-97]   Resp:  [18]   BP: (135-189)/()   SpO2:  [95 %-99 %]     I & O (Last 24H):  Intake/Output Summary (Last 24 hours) at 03/08/17 0940  Last data filed at 03/08/17 0625   Gross per 24 hour   Intake              480 ml   Output             1900 ml   Net            -1420 ml       Physical Exam:  Physical Exam   Constitutional: She is oriented to person, place, and time and well-developed, well-nourished, and in no distress. No distress.   Eyes: Pupils are equal, round, and reactive to light. Right eye exhibits no discharge.    Neck: Normal range of motion. No JVD present. No thyromegaly present.   Cardiovascular: Normal rate and normal heart sounds.    No murmur heard.  Irregular     Pulmonary/Chest: Effort normal. No respiratory distress. She has no wheezes.   Abdominal: Soft. There is no tenderness.   Musculoskeletal: Normal range of motion.   LE edema improved   Neurological: She is alert and oriented to person, place, and time.   Skin: She is not diaphoretic.         Medications:   amlodipine  5 mg Oral Daily    atorvastatin  10 mg Oral Daily    carvedilol  25 mg Oral BID    furosemide  20 mg Intravenous BID    levothyroxine  50 mcg Oral Daily    lisinopril  40 mg Oral Daily    magnesium sulfate IVPB  1 g Intravenous Once    pantoprazole  40 mg Oral Daily    warfarin  2.5 mg Oral Daily       Laboratory:   CBC:   Recent Labs  Lab 03/08/17  0418   WBC 5.97   RBC 3.40*   HGB 10.7*   HCT 31.4*      MCV 92   MCH 31.5*   MCHC 34.1       CMP:   Recent Labs  Lab 03/07/17  1509 03/08/17  0418   * 94   CALCIUM 7.9* 7.6*   ALBUMIN 2.3*  --    PROT 5.6*  --    * 137   K 3.5 3.9   CO2 23 23    107   BUN 11 11   CREATININE 1.5* 1.4   ALKPHOS 61  --    ALT 22  --    AST 34  --    BILITOT 0.5  --        POCT Glucose   Date Value Ref Range Status   03/08/2017 112 (H) 70 - 110 mg/dL Final   03/07/2017 110 70 - 110 mg/dL Final     Lab Results   Component Value Date    INR 1.9 (H) 03/08/2017    INR 1.9 (H) 03/07/2017    INR 4.8 03/02/2017         Diagnostic Results:  Labs: Reviewed    ASSESSMENT/PLAN:     Active Hospital Problems    Diagnosis  POA    *Acute on chronic diastolic heart failure [I50.33]  Yes    Shortness of breath [R06.02]  Yes    Hypomagnesemia [E83.42]  Yes    Acute renal failure with tubular necrosis [N17.0]  Yes    Generalized weakness [R53.1]  Yes    CKD (chronic kidney disease) stage 3, GFR 30-59 ml/min [N18.3]  Yes    Hypertension [I10]  Yes    Diabetes type 2, controlled [E11.9]  Yes       Resolved Hospital Problems    Diagnosis Date Resolved POA   No resolved problems to display.     1: CHF type unknown  - diastolic CHF per chart  - Echo 8/15 shows normal EF and normal diastolic function  - repeat Echo pending  - diuresed with improvement of sx.   - On Coreg 25 BID, Lisinopril 40 dialy, Lasix 40 daily   - limit oral fluid intake     2: A-fib   - rate controlled  - On Coumadin, INR 1.9, managed by [facility]  - CHADS2= 3 (HAD)  - has pacemaker     3: HTN  - hypertensive today but overall reasonably well controlled  - On Coreg 25 BID, Lisinopril 40 dialy, Amlodipine 5 QD     4: DM2:   - seems controlled  - Takes Metformin 500 BID (on hold given impaired renal function)  - ISS  - will likely D/C metformin given good overall control.        Lab Results   Component Value Date     LABA1C 6.3 (H) 12/31/2012     HGBA1C 5.6 12/28/2016      5: ARF on CKD  - baseline creatinine 1.3  - back to baseline  - avoid nephrotoxins.      6: Hypomagnesemia  - replace     7: moderate protein malnutrition  - poss. Due to poor oral intake with abdominal issues  - will provide nutritional supplements     8: Elevated cardiac enzymes  - poss. Demand ischemia in CHF  - trend troponins  - continue with ASA     9: HLD  - not at goal for DM2  - on Atorvastatin 10 daily, will increase to 20 mg.         Lab Results   Component Value Date     LDLCALC 131.4 12/28/2016      10 Prophylaxis  - On Coumadin    PT/Ot and out of bed.   Plan for discharge in AM with home health.

## 2017-03-08 NOTE — PROGRESS NOTES
Rima w/[facility] called to report that Pt was admitted to the hosp at [facility] yesterday-3/07/17, next INR is due 3/09, will check status 3/09/17

## 2017-03-08 NOTE — PLAN OF CARE
Problem: Patient Care Overview  Goal: Plan of Care Review  Outcome: Ongoing (interventions implemented as appropriate)  Denies SOB at this time. Up to commode with standby assist. Denies pain. Adequate po intake. Free from injuries/falls. Will continue to monitor.

## 2017-03-09 VITALS
RESPIRATION RATE: 18 BRPM | WEIGHT: 151.81 LBS | TEMPERATURE: 98 F | DIASTOLIC BLOOD PRESSURE: 81 MMHG | HEIGHT: 62 IN | HEART RATE: 84 BPM | OXYGEN SATURATION: 99 % | SYSTOLIC BLOOD PRESSURE: 151 MMHG | BODY MASS INDEX: 27.94 KG/M2

## 2017-03-09 LAB
ESTIMATED PA SYSTOLIC PRESSURE: 46.24
INR PPP: 1.6
MITRAL VALVE REGURGITATION: ABNORMAL
POCT GLUCOSE: 114 MG/DL (ref 70–110)
PROTHROMBIN TIME: 17.2 SEC
RETIRED EF AND QEF - SEE NOTES: 66 (ref 55–65)
TRICUSPID VALVE REGURGITATION: ABNORMAL

## 2017-03-09 PROCEDURE — 99239 HOSP IP/OBS DSCHRG MGMT >30: CPT | Mod: ,,, | Performed by: INTERNAL MEDICINE

## 2017-03-09 PROCEDURE — 97116 GAIT TRAINING THERAPY: CPT

## 2017-03-09 PROCEDURE — 25000003 PHARM REV CODE 250: Performed by: INTERNAL MEDICINE

## 2017-03-09 PROCEDURE — 25000003 PHARM REV CODE 250: Performed by: PHYSICIAN ASSISTANT

## 2017-03-09 PROCEDURE — 36415 COLL VENOUS BLD VENIPUNCTURE: CPT

## 2017-03-09 PROCEDURE — 85610 PROTHROMBIN TIME: CPT

## 2017-03-09 PROCEDURE — 97110 THERAPEUTIC EXERCISES: CPT

## 2017-03-09 RX ORDER — FUROSEMIDE 20 MG/1
20 TABLET ORAL DAILY
Qty: 30 TABLET | Refills: 11 | Status: SHIPPED | OUTPATIENT
Start: 2017-03-09 | End: 2017-11-07 | Stop reason: SDUPTHER

## 2017-03-09 RX ORDER — WARFARIN 2.5 MG/1
2.5 TABLET ORAL ONCE
Status: COMPLETED | OUTPATIENT
Start: 2017-03-09 | End: 2017-03-09

## 2017-03-09 RX ADMIN — LEVOTHYROXINE SODIUM 50 MCG: 50 TABLET ORAL at 05:03

## 2017-03-09 RX ADMIN — FUROSEMIDE 40 MG: 40 TABLET ORAL at 08:03

## 2017-03-09 RX ADMIN — WARFARIN SODIUM 2.5 MG: 2.5 TABLET ORAL at 11:03

## 2017-03-09 RX ADMIN — CARVEDILOL 25 MG: 12.5 TABLET, FILM COATED ORAL at 08:03

## 2017-03-09 RX ADMIN — AMLODIPINE BESYLATE 5 MG: 5 TABLET ORAL at 08:03

## 2017-03-09 RX ADMIN — PANTOPRAZOLE SODIUM 40 MG: 40 TABLET, DELAYED RELEASE ORAL at 08:03

## 2017-03-09 RX ADMIN — ATORVASTATIN CALCIUM 20 MG: 20 TABLET, FILM COATED ORAL at 08:03

## 2017-03-09 RX ADMIN — LISINOPRIL 40 MG: 20 TABLET ORAL at 08:03

## 2017-03-09 NOTE — PROGRESS NOTES
Patient up in the chair after working with PT. Patient has no complaints at this time and is patiently waiting for breakfast. Patient free from falls with chair in low position, wheels locked, and call light in reach. Will continue to monitor.

## 2017-03-09 NOTE — PLAN OF CARE
Problem: Physical Therapy Goal  Goal: Physical Therapy Goal  Goals to be met by: 3/18/17     Patient will increase functional independence with mobility by performin. Sit<>stand transfer with supervision using rolling walker or rollator.   2. Gait > 100 feet with supervision using rolling walker or rollator.   3. Ascend/descend 4 stairs with bilateral handrails with CGA with or without AD.   Outcome: Ongoing (interventions implemented as appropriate)     Patient improved with mobility with RW required CGA

## 2017-03-09 NOTE — PT/OT/SLP PROGRESS
"Physical Therapy  Treatment    Deepali Stuart   MRN: [id number]   Admitting Diagnosis: Acute on chronic diastolic heart failure    PT Received On: 17  PT Start Time: 815     PT Stop Time: 838    PT Total Time (min): 23 min       Billable Minutes:  Gait [id number] and Therapeutic Exercise 10    Treatment Type: Treatment  PT/PTA: PTA     PTA Visit Number: 1       General Precautions: Standard, fall  Orthopedic Precautions: N/A   Braces: N/A    Do you have any cultural, spiritual, Pentec[employer]al conflicts, given your current situation?: none specified    Subjective:  Communicated with nurse prior to session. Pt. Stated " I'm so glad that everyone is so good here"     Pain Ratin/10   Pain Rating Post-Intervention: 0/10    Objective:     Pt. Stated " I feel pretty good" reported I think I'm going home today.    Functional Mobility:  Bed Mobility:   Scooting/Bridging: Independent  Supine to Sit: Independent    Transfers: sit to stand from bed  Sit <> Stand Assistance: Supervision  Sit <> Stand Assistive Device: Rolling Walker    Gait: very slow pace   Gait Distance: 120 feet   Assistance 1: Stand by Assistance, Contact Guard Assistance  Gait Assistive Device: Rolling walker  Gait Pattern: reciprocal  Gait Deviation(s): decreased yue, decreased step length, decreased weight-shifting ability    Therapeutic Activities and Exercises:  Pt. Performed AP, LAQ, Hip Flexion, Hip Abd/Add X 15 reps AROM B LE's     AM-PAC 6 CLICK MOBILITY  How much help from another person does this patient currently need?   1 = Unable, Total/Dependent Assistance  2 = A lot, Maximum/Moderate Assistance  3 = A little, Minimum/Contact Guard/Supervision  4 = None, Modified [place]/Independent    Turning over in bed (including adjusting bedclothes, sheets and blankets)?: 4  Sitting down on and standing up from a chair with arms (e.g., wheelchair, bedside commode, etc.): 3  Moving from lying on back to sitting on the side of the bed?: " 3  Moving to and from a bed to a chair (including a wheelchair)?: 3  Need to walk in hospital room?: 3  Climbing 3-5 steps with a railing?: 3  Total Score: 19    AM-PAC Raw Score CMS G-Code Modifier Level of Impairment Assistance   6 % Total / Unable   7 - 9 CM 80 - 100% Maximal Assist   10 - 14 CL 60 - 80% Moderate Assist   15 - 19 CK 40 - 60% Moderate Assist   20 - 22 CJ 20 - 40% Minimal Assist   23 CI 1-20% SBA / CGA   24 CH 0% Independent/ Mod I     Patient left up in chair with all lines intact, call button in reach and nurse notified.    Assessment:  Deepali Stuart is a 85 y.o. female with a medical diagnosis of Acute on chronic diastolic heart failure patient tolerated treatment session fairly well and increase with gait distance. Patient will cont. To benefit from skilled PT services to increase strength, endurance and functional mobility.     Rehab identified problem list/impairments: Rehab identified problem list/impairments: weakness, gait instability, impaired functional mobilty, impaired endurance    Rehab potential is good.    Activity tolerance: Good    Discharge recommendations: Discharge Facility/Level Of Care Needs: home health PT     Barriers to discharge: Barriers to Discharge: None    Equipment recommendations: Equipment Needed After Discharge: none     GOALS:   Physical Therapy Goals        Problem: Physical Therapy Goal    Goal Priority Disciplines Outcome Goal Variances Interventions   Physical Therapy Goal     PT/OT, PT Ongoing (interventions implemented as appropriate)     Description:  Goals to be met by: 3/18/17     Patient will increase functional independence with mobility by performin. Sit<>stand transfer with supervision using rolling walker or rollator.   2. Gait > 100 feet with supervision using rolling walker or rollator.   3. Ascend/descend 4 stairs with bilateral handrails with CGA with or without AD.                PLAN:    Patient to be seen 6 x/week  to  address the above listed problems via gait training, therapeutic activities, therapeutic exercises, neuromuscular re-education  Plan of Care expires: 04/07/17  Plan of Care reviewed with: patient, jessica GARCIA Capri, PTA  03/09/2017

## 2017-03-09 NOTE — PLAN OF CARE
03/09/17 1724   Final Note   Assessment Type Final Discharge Note   Discharge Disposition Home-Health   Discharge planning education complete? Yes   Hospital Follow Up  Appt(s) scheduled? Yes   Discharge plans and expectations educations in teach back method with documentation complete? Yes   Offered [facility] -- Bedside Delivery? n/a   Discharge/Hospital Encounter Summary to (non-[facility]) PCP Yes   Referral to Outpatient Case Management complete? n/a   Referral to / orders for Home Health Complete? Yes   30 day supply of medicines given at discharge, if documented non-compliance / non-adherence? n/a   Any social issues identified prior to discharge? n/a   Did you assess the readiness or willingness of the family or caregiver to support self management of care? Yes

## 2017-03-09 NOTE — PROGRESS NOTES
[facility] ([phone]) called 3/9/17 to inform us that pt was dc'd from ([facility]) today. Gamaliel has not gotten any info yet on how much warfarin pt was released on. Nonetheless HH will admit pt on 3/10/17. When do you need an inr, please advise.

## 2017-03-09 NOTE — NURSING
Discharge reviewed with patient and grandson, understanding verbalized.  Grandson states DME to be delivered to the house.  IV removed and catheter tip intact.

## 2017-03-09 NOTE — DISCHARGE INSTRUCTIONS

## 2017-03-09 NOTE — DISCHARGE SUMMARY
Discharge Summary  Hospital Medicine      Admit Date: 3/7/2017    Discharge Date and Time: 3/9/2017 10:16 AM    Discharge Attending Physician: Vidal Baldwin MD     Diagnoses:  Active Hospital Problems    Diagnosis  POA    *Acute on chronic diastolic heart failure [I50.33]  Yes    Shortness of breath [R06.02]  Yes    Hypomagnesemia [E83.42]  Yes    Acute renal failure with tubular necrosis [N17.0]  Yes    Generalized weakness [R53.1]  Yes    CKD (chronic kidney disease) stage 3, GFR 30-59 ml/min [N18.3]  Yes    Hypertension [I10]  Yes    Diabetes type 2, controlled [E11.9]  Yes      Resolved Hospital Problems    Diagnosis Date Resolved POA   No resolved problems to display.       Discharged Condition: stable    Hospital Course: Deepali Addison is a 85 y.o. female who presents with generalized weakness and exertional shortness of breath gradually worsening over the past few weeks. She has known diastolic CHF, A-fib, SSS s/p Pacemaker, HTN and DM2. She is under the care of [facility] cardiology. Most recently she had abdominal discomfort and poor oral intake and her diuretics were held due to hypotension.   She was told to drink plenty of fluids and has done so drinking 3-4 16oz of water daily. She has chronic LE Edema.    1: acute on chronic diastolic CHF  - diastolic CHF per chart  - Echo 8/15 shows normal EF and normal diastolic function  - Echo 3/9/17 EF 66%, Moderate MR, PAP 46mmHg, concentric remodeling.   - diuresed with improvement of sx.   - On Coreg 25 BID, Lisinopril 40 dialy, Lasix 20 daily       2: A-fib   - rate controlled  - On Coumadin, INR 1.6, managed by [facility] Coumadin clinic  - needs close follow up  - CHADS2= 3 (HAD)  - has pacemaker      3: HTN  - hypertensive today but overall reasonably well controlled  - On Coreg 25 BID, Lisinopril 40 dialy, Amlodipine 5 QD  - managed by Dr. Salazar      4: DM2:   - seems controlled  - was taking metformin  - Metformin DC given impaired renal function and  normal BG while admitted.             Lab Results   Component Value Date      LABA1C 6.3 (H) 12/31/2012      HGBA1C 5.6 12/28/2016       5: ARF on CKD  - baseline creatinine 1.3  - avoid nephrotoxins.   - will D/C Metformin      6: Hypomagnesemia  - replaced      7: moderate protein malnutrition  - poss. Due to poor oral intake with abdominal issues  - suggested supplements      8: Elevated cardiac enzymes  - poss. Demand ischemia in CHF  - continue with ASA      9: HLD  - on statin            Lab Results   Component Value Date      LDLCALC 131.4 12/28/2016             Consults: None    Significant Diagnostic Studies:   CBC:   Recent Labs  Lab 03/08/17  0418   WBC 5.97   RBC 3.40*   HGB 10.7*   HCT 31.4*      MCV 92   MCH 31.5*   MCHC 34.1       CMP:   Recent Labs  Lab 03/07/17  1509 03/08/17  0418   * 94   CALCIUM 7.9* 7.6*   ALBUMIN 2.3*  --    PROT 5.6*  --    * 137   K 3.5 3.9   CO2 23 23    107   BUN 11 11   CREATININE 1.5* 1.4   ALKPHOS 61  --    ALT 22  --    AST 34  --    BILITOT 0.5  --        Special Treatments/Procedures: none    Disposition: Home-Health Care Svc    Diet: 1800 huey ADA diet    Activity: as tolerated    Patient Instructions:   Reconciled Home Medications:   Current Discharge Medication List      CONTINUE these medications which have CHANGED    Details   furosemide (LASIX) 20 MG tablet Take 1 tablet (20 mg total) by mouth once daily.  Qty: 30 tablet, Refills: 11    Associated Diagnoses: Persistent atrial fibrillation         CONTINUE these medications which have NOT CHANGED    Details   amlodipine (NORVASC) 5 MG tablet Take 1 tablet (5 mg total) by mouth once daily.  Qty: 90 tablet, Refills: 3    Comments: **Patient requests 90 days supply**      atorvastatin (LIPITOR) 10 MG tablet TAKE 1 TABLET BY MOUTH EVERY DAY  Qty: 90 tablet, Refills: 3      bismuth subsalicylate (PEPTO BISMOL) 262 mg/15 mL suspension Take 15 mLs by mouth every 6 (six) hours as needed.       blood sugar diagnostic (TRUETRACK TEST) Strp FOLLOW PACKAGE DIRECTIONS  Qty: 100 strip, Refills: 11    Associated Diagnoses: Persistent atrial fibrillation; Weakness; SOB (shortness of breath); Controlled type 2 diabetes mellitus without complication, without long-term current use of insulin; Hypothyroidism due to acquired atrophy of thyroid; Essential hypertension; Pacemaker; Vitamin D deficiency disease; Thrombocytopenia; Malnutrition; Acute on chronic combined systolic and diastolic HF (heart failure); Chronic pulmonary edema; Type 2 diabetes mellitus without retinopathy; CKD (chronic kidney disease) stage 3, GFR 30-59 ml/min; Anxiety; Proteinuria; Benign hypertensive heart and CKD, 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), w CHF      carvedilol (COREG) 25 MG tablet TAKE 1 TABLET BY MOUTH TWICE DAILY  Qty: 180 tablet, Refills: 3    Comments: **Patient requests 90 days supply**      lancets Misc 1 Device by Misc.(Non-Drug; Combo Route) route once daily.  Qty: 100 each, Refills: 3    Associated Diagnoses: Diabetes type 2, controlled      levothyroxine (SYNTHROID) 50 MCG tablet TAKE 1 TABLET BY MOUTH EVERY DAY  Qty: 90 tablet, Refills: 3      lisinopril (PRINIVIL,ZESTRIL) 40 MG tablet TAKE 1 TABLET BY MOUTH EVERY DAY  Qty: 90 tablet, Refills: 3      ondansetron (ZOFRAN-ODT) 4 MG TbDL DISSOLVE ONE TABLET BY MOUTH EVERY 6 HOURS AS NEEDED FOR NAUSEA AND VOMITING  Qty: 21 tablet, Refills: 0      pantoprazole (PROTONIX) 20 MG tablet Take 1 tablet (20 mg total) by mouth once daily.  Qty: 30 tablet, Refills: 0      warfarin (COUMADIN) 5 MG tablet TAKE 1 TABLET BY MOUTH EVERY DAY OR AS DIRECTED BY COUMADIN CLINIC  Qty: 45 tablet, Refills: 11      trazodone (DESYREL) 50 MG tablet TAKE 1/2 TO 1 TABLET BY MOUTH EVERY EVENING AS NEEDED FOR INSOMNIA  Qty: 90 tablet, Refills: 3    Comments: **Patient requests 90 days supply**         STOP taking these medications       metformin (GLUCOPHAGE) 500 MG tablet Comments:   Reason for Stopping:                  Discharge Procedure Orders  Diet Diabetic 1800 Calories     Activity as tolerated     Call MD for:  temperature >100.4     Call MD for:  severe uncontrolled pain     Call MD for:  difficulty breathing or increased cough     Call MD for:  severe persistent headache     Call MD for:  increased confusion or weakness     No dressing needed         Follow-up Information     Follow up with Vishal Salazar MD In 1 week.    Specialty:  Family Medicine    Contact information:    [address] [facility] [place] [address]  [phone]          Follow up with COUMADIN, ASSESSMENT In 3 days.    Specialty:  Lab

## 2017-03-09 NOTE — PLAN OF CARE
Problem: Patient Care Overview  Goal: Plan of Care Review  Outcome: Ongoing (interventions implemented as appropriate)  No significant events overnight. Remains free from fall, injury, and skin breakdown. Voiding per BSC. VSS on RA throughout the night. Denies pain. Tele maintained; all alarms active and audible. Plan of care reviewed with patient and all questions answered. Bed low, locked w/ bed alarm on. Call light within reach. Purposeful rounding performed. Resting comfortably in bed, no other complaints at this time.

## 2017-03-09 NOTE — PLAN OF CARE
Discharge Planning:  Patient admitted on 3-7-17  LOS-day 2  Chart reviewed  Care plan discussed  Discussed care plan with treatment team  Discussed care plan with the attending Dr Baldwin  Current dispo - home today.  Resume hh, advanced DME to deliver quad cane (spoke with [facility] hme also)  Case management  to follow as needed  Consults following are: case mgt  [facility] to [facility] HH, spoke with Maryam ALBRIGHT

## 2017-03-09 NOTE — PROGRESS NOTES
The pt was recently admitted from 3/7 through 3/9 for CHF exacerbation.  See calendar for recent INRs and coumadin doses while admitted.  It appears that the pt was discharged with a new dose of lasix.  LMFCB.

## 2017-03-09 NOTE — PROGRESS NOTES
Physical Therapy Discharge Summary    Deepali Stuart  MRN: [id number]   Acute on chronic diastolic heart failure   Patient Discharged from acute Physical Therapy on 3/9/17.  Please refer to prior PT noted date on 3/9/17 for functional status.     Assessment:   Goals partially met.  GOALS:   Physical Therapy Goals        Problem: Physical Therapy Goal    Goal Priority Disciplines Outcome Goal Variances Interventions   Physical Therapy Goal     PT/OT, PT Ongoing (interventions implemented as appropriate)     Description:  Goals to be met by: 3/18/17     Patient will increase functional independence with mobility by performin. Sit<>stand transfer with supervision using rolling walker or rollator.   2. Gait > 100 feet with supervision using rolling walker or rollator.   3. Ascend/descend 4 stairs with bilateral handrails with CGA with or without AD.              Reasons for Discontinuation of Therapy Services  Transfer to alternate level of care.      Plan:  Patient Discharged to: Home with Home Health Service.

## 2017-03-09 NOTE — PLAN OF CARE
[facility]  [address] [place]. [address]           HOME  HEALTH ORDERS        Admit to Home Health    Diagnoses:  Active Hospital Problems    Diagnosis  POA    *Acute on chronic diastolic heart failure [I50.33]  Yes    Shortness of breath [R06.02]  Yes    Hypomagnesemia [E83.42]  Yes    Acute renal failure with tubular necrosis [N17.0]  Yes    Generalized weakness [R53.1]  Yes    CKD (chronic kidney disease) stage 3, GFR 30-59 ml/min [N18.3]  Yes    Hypertension [I10]  Yes    Diabetes type 2, controlled [E11.9]  Yes      Resolved Hospital Problems    Diagnosis Date Resolved POA   No resolved problems to display.       Patient is homebound due to: Pt requires home health services due to taxing effort to leave the home as a result of weakness/debility from Acute on chronic diastolic heart failure    Face to face services were provided on 3/9/2017    Allergies:  Review of patient's allergies indicates:   Allergen Reactions    Plavix [clopidogrel]        Diet: 1800 huey ADA diet    Activity: as tolerated    Nursing:   SN to complete comprehensive assessment including routine vital signs. Instruct on disease process and s/s of complications to report to MD. Review/verify medication list sent home with the patient at time of discharge  and instruct patient/caregiver as needed. Frequency may be adjusted depending on start of care date.    Notify MD if SBP > 160 or < 90; DBP > 90 or < 50; HR > 120 or < 50; Temp > 101;     CONSULTS:     Physical Therapy to evaluate and treat. Evaluate for home safety and equipment needs; Establish/upgrade home exercise program. Perform / instruct on therapeutic exercises, gait training, transfer training, and Range of Motion.    Occupational Therapy to evaluate and treat. Evaluate home environment for safety and equipment needs. Perform/Instruct on transfers, ADL training, ROM, and therapeutic exercises.    LABS:  SN to perform labs: PT/INR in  3 days. Fax report to Vishal Salazar MD         DIABETES CARE:    SN to perform and educate Diabetic management with blood glucose monitoring:          Fingerstick blood sugar AC and HS      Report CBG < 60 or > 350 to physician.                                          Insulin Sliding Scale          Glucose  Novolog Insulin Subcutaneous        0 - 60   Orange juice or glucose tablet, hold insulin      No insulin   201-250  2 units   251-300  4 units   301-350  6 units   351-400  8 units   >400   10 units then call physician      Medications: Review discharge medications with patient and family and provide education.         Medication List      CHANGE how you take these medications          blood sugar diagnostic Strp   Commonly known as:  TRUETRACK TEST   FOLLOW PACKAGE DIRECTIONS   What changed:    - how much to take  - how to take this  - when to take this  - additional instructions       furosemide 20 MG tablet   Commonly known as:  LASIX   Take 1 tablet (20 mg total) by mouth once daily.   What changed:    - when to take this  - reasons to take this       lancets Misc   1 Device by Misc.(Non-Drug; Combo Route) route once daily.   What changed:  when to take this       warfarin 5 MG tablet   Commonly known as:  COUMADIN   TAKE 1 TABLET BY MOUTH EVERY DAY OR AS DIRECTED BY COUMADIN CLINIC   What changed:  See the new instructions.         CONTINUE taking these medications          amlodipine 5 MG tablet   Commonly known as:  NORVASC   Take 1 tablet (5 mg total) by mouth once daily.       atorvastatin 10 MG tablet   Commonly known as:  LIPITOR   TAKE 1 TABLET BY MOUTH EVERY DAY       bismuth subsalicylate 262 mg/15 mL suspension   Commonly known as:  PEPTO BISMOL       carvedilol 25 MG tablet   Commonly known as:  COREG   TAKE 1 TABLET BY MOUTH TWICE DAILY       levothyroxine 50 MCG tablet   Commonly known as:  SYNTHROID   TAKE 1 TABLET BY MOUTH EVERY DAY       lisinopril 40 MG tablet   Commonly known  as:  PRINIVIL,ZESTRIL   TAKE 1 TABLET BY MOUTH EVERY DAY       ondansetron 4 MG Tbdl   Commonly known as:  ZOFRAN-ODT   DISSOLVE ONE TABLET BY MOUTH EVERY 6 HOURS AS NEEDED FOR NAUSEA AND VOMITING       pantoprazole 20 MG tablet   Commonly known as:  PROTONIX   Take 1 tablet (20 mg total) by mouth once daily.       trazodone 50 MG tablet   Commonly known as:  DESYREL   TAKE 1/2 TO 1 TABLET BY MOUTH EVERY EVENING AS NEEDED FOR INSOMNIA         STOP taking these medications          metformin 500 MG tablet   Commonly known as:  GLUCOPHAGE            Where to Get Your Medications      [facility] [address] [place] [address] AT SEC of [address] [address]    Hours:  24-hours Phone:  [phone]     furosemide 20 MG tablet               _________________________________  Vidal Baldwin MD      3/9/2017

## 2017-03-10 NOTE — PROGRESS NOTES
Pt was called to verify if any new meds were given upon D/c from the hosp., Pt stated no new meds., Pt was then advised of coumadin instructions and redraw date, order was faxed to HH

## 2017-03-10 NOTE — PROGRESS NOTES
I spoke with Rima at [facility] this morning.  She let me know that the orders received upon discharge asked for an INR on 3/13.  Her nurse has already visited the pt this morning and an INR was not drawn.  We will confirm that the pt should resume her past weekly dose and get an INR on 3/13.

## 2017-03-10 NOTE — PT/OT/SLP DISCHARGE
Occupational Therapy Discharge Summary    Deepali Stuart  MRN: [id number]   Acute on chronic diastolic heart failure   Patient Discharged from acute Occupational Therapy on 3/9/2017  Please refer to prior OT note dated on 3/8/2017 for functional status.     Assessment:   Patient has not met goals.  GOALS:   Occupational Therapy Goals        Problem: Occupational Therapy Goal    Goal Priority Disciplines Outcome Interventions   Occupational Therapy Goal     OT, PT/OT Ongoing (interventions implemented as appropriate)    Description:  Goals to be met by: 4/8/2017     Patient will increase functional independence with ADLs by performing:    UE Dressing with Modified [place].  LE Dressing (pants or briefs) with Modified [place].  Grooming while standing with Modified [place].  Toileting from bedside commode with Modified [place] for hygiene and clothing management.   Toilet transfer to bedside commode with Modified [place].              Reasons for Discontinuation of Therapy Services  Transfer to alternate level of care.      Plan:  Patient Discharged to: Home with Home Health Service.

## 2017-03-13 ENCOUNTER — ANTI-COAG VISIT (OUTPATIENT)
Dept: CARDIOLOGY | Facility: CLINIC | Age: 82
End: 2017-03-13

## 2017-03-13 DIAGNOSIS — Z79.01 LONG TERM (CURRENT) USE OF ANTICOAGULANTS: ICD-10-CM

## 2017-03-13 LAB — INR PPP: 2.2

## 2017-03-13 NOTE — PROGRESS NOTES
Verbal result taken from ___HH nurse______. PT/INR ___2.2____ Date drawn__3/13/17______ Hardcopy to be faxed.

## 2017-03-14 ENCOUNTER — OFFICE VISIT (OUTPATIENT)
Dept: NEPHROLOGY | Facility: CLINIC | Age: 82
End: 2017-03-14
Payer: MEDICARE

## 2017-03-14 VITALS
OXYGEN SATURATION: 100 % | WEIGHT: 145 LBS | BODY MASS INDEX: 26.68 KG/M2 | HEIGHT: 62 IN | SYSTOLIC BLOOD PRESSURE: 142 MMHG | HEART RATE: 64 BPM | DIASTOLIC BLOOD PRESSURE: 70 MMHG

## 2017-03-14 DIAGNOSIS — D64.9 ANEMIA, UNSPECIFIED TYPE: ICD-10-CM

## 2017-03-14 DIAGNOSIS — N18.30 CKD (CHRONIC KIDNEY DISEASE) STAGE 3, GFR 30-59 ML/MIN: Primary | ICD-10-CM

## 2017-03-14 DIAGNOSIS — I10 ESSENTIAL HYPERTENSION: ICD-10-CM

## 2017-03-14 PROCEDURE — 99213 OFFICE O/P EST LOW 20 MIN: CPT | Mod: PBBFAC | Performed by: INTERNAL MEDICINE

## 2017-03-14 PROCEDURE — 99999 PR PBB SHADOW E&M-EST. PATIENT-LVL III: CPT | Mod: PBBFAC,,, | Performed by: INTERNAL MEDICINE

## 2017-03-14 PROCEDURE — 99214 OFFICE O/P EST MOD 30 MIN: CPT | Mod: S$PBB,,, | Performed by: INTERNAL MEDICINE

## 2017-03-14 NOTE — PROGRESS NOTES
Subjective:       Patient ID: Deepali Stuart is a 85 y.o. Black or  female who presents for follow-up evaluation of No chief complaint on file.    HPI This is an 85-year-old -American female with  hypertension, diabetes, atrial fibrillation, CAD, and CHF who is coming in for f/u of chronic kidney disease. The patient states that she has been doing better since her hospitalization last wee for CHF, weakness. SOB better and getting PT.  BP and diabetes are good but does not recall numbers. Her apetite is poor. She has proteinuria.     PAST MEDICAL HISTORY: Significant for diabetes for 10 years, on medications and  had diet-controlled diabetes prior to that, she has hypertension for 20 years,   hard to control in the past, atrial fibrillation with pacemaker placement and   congestive heart failure.    SOCIAL HISTORY: Does not smoke. Does not drink.    FAMILY HISTORY: Noncontributory.  Review of Systems   Constitutional: Positive for appetite change and fatigue.   Eyes: Negative for discharge.   Respiratory: Negative for cough, shortness of breath and wheezing.    Cardiovascular: Negative for chest pain and palpitations.   Gastrointestinal: Negative for abdominal pain, diarrhea, nausea and vomiting.   Genitourinary: Negative for dysuria, frequency, hematuria and urgency.   Skin: Negative for color change and rash.   Psychiatric/Behavioral: Negative for confusion.   All other systems reviewed and are negative.      Objective:      Physical Exam   Constitutional: She is oriented to person, place, and time. She appears well-developed and well-nourished.   HENT:   Right Ear: External ear normal.   Left Ear: External ear normal.   Nose: Nose normal.   Mouth/Throat: Normal dentition.   Eyes: Conjunctivae, EOM and lids are normal. Pupils are equal, round, and reactive to light.   Neck: Trachea normal. No thyroid mass present.   Cardiovascular: Normal rate and regular rhythm.  Exam reveals no friction  rub.    No murmur heard.  No lower extremity edema   Pulmonary/Chest: Effort normal and breath sounds normal. No respiratory distress. She has no decreased breath sounds. She has no rales.   Abdominal: Soft. Bowel sounds are normal. She exhibits no mass. There is no tenderness. There is no rebound. No hernia.   Musculoskeletal: She exhibits edema.   Trace edema   Neurological: She is alert and oriented to person, place, and time.   Skin: Skin is warm and dry. No rash noted. No erythema.   Psychiatric: She has a normal mood and affect. Judgment normal. Her mood appears not anxious. She does not exhibit a depressed mood.   Oriented to time, place and person.   Vitals reviewed.      Assessment:       No diagnosis found.    Plan:     Labs scanned from 3/9/17     This is an 85-year-old -American female with a   longstanding history of hypertension and diabetes coming in for f/u of   chronic kidney disease.  1. Renal. Her baseline creatinine appears to be around 1.2 to 1.4 with the   most recent creatinine of 1.4  with an MDRD GFR of 35 mL per minute. Her chronic  kidney disease is likely secondary to longstanding diabetes and hypertension.   It is currently stable. She denies any NSAIDs. Baseline renal   Ultrasound stable with simple cyst and R kidney slightly smaller.   2. Hypertension. Blood pressures are stable. Continue the current regimen.   She is aware of the target blood pressures.  3. Diabetes. Stable.  4. Proteinuria: She had 1.4 gms of protein on a protein-creatinine ratio. Last one much better at 200mg. Repeat pending. This is  likely secondary to her longstanding diabetes and hypertension. I emphasized   the importance of controlling both of these to decrease the progression of   kidney disease and to decrease proteinuria.Serlogies negative with anca, sixto, complements. SPEP shows possible M protein-evaluated by hematology.  Pt has neg UA with no blood or rbc's.  She is on lisinopril for proteinuria  and nephro-protection and spironolactone. Her albumin is slightly low and she does noteat much protein in her diet. Discussed biopsy in the past.  Rec trying nepro last time but has not done so.  Held  spironolactone with high potassium in may.    4. Renal osteodystrophy. Calcium is stable. Vitamin D levels are stable. pth stable at 56. Ca: 7.4 but correc ca 9.0 with albumin 2.1. rec nepro again.   5. Anemia. H and H are stable.     Return in 4 months.

## 2017-03-17 ENCOUNTER — TELEPHONE (OUTPATIENT)
Dept: NEPHROLOGY | Facility: CLINIC | Age: 82
End: 2017-03-17

## 2017-03-17 DIAGNOSIS — R80.9 PROTEINURIA, UNSPECIFIED TYPE: Primary | ICD-10-CM

## 2017-03-17 NOTE — TELEPHONE ENCOUNTER
Her urine protein/creat ratio  from [facility] came back at 4.6 gms which is higher than alst time.  Please repeat urine protein.

## 2017-03-20 ENCOUNTER — TELEPHONE (OUTPATIENT)
Dept: NEPHROLOGY | Facility: CLINIC | Age: 82
End: 2017-03-20

## 2017-03-20 ENCOUNTER — ANTI-COAG VISIT (OUTPATIENT)
Dept: CARDIOLOGY | Facility: CLINIC | Age: 82
End: 2017-03-20

## 2017-03-20 DIAGNOSIS — Z79.01 LONG TERM (CURRENT) USE OF ANTICOAGULANTS: ICD-10-CM

## 2017-03-20 LAB — INR PPP: 2

## 2017-03-20 NOTE — PROGRESS NOTES
Verbal result taken from [facility]_________. PT/INR _2.0______ Date drawn_3/20/17_______ Hardcopy to be faxed.

## 2017-03-27 ENCOUNTER — TELEPHONE (OUTPATIENT)
Dept: NEPHROLOGY | Facility: CLINIC | Age: 82
End: 2017-03-27

## 2017-03-27 ENCOUNTER — ANTI-COAG VISIT (OUTPATIENT)
Dept: CARDIOLOGY | Facility: CLINIC | Age: 82
End: 2017-03-27

## 2017-03-27 DIAGNOSIS — R80.9 PROTEINURIA, UNSPECIFIED TYPE: Primary | ICD-10-CM

## 2017-03-27 DIAGNOSIS — Z79.01 LONG TERM (CURRENT) USE OF ANTICOAGULANTS: ICD-10-CM

## 2017-03-27 LAB — INR PPP: 2.2

## 2017-03-27 RX ORDER — SPIRONOLACTONE 25 MG/1
25 TABLET ORAL DAILY
Qty: 30 TABLET | Refills: 4 | Status: SHIPPED | OUTPATIENT
Start: 2017-03-27 | End: 2017-08-26 | Stop reason: SDUPTHER

## 2017-03-27 NOTE — TELEPHONE ENCOUNTER
Discussed proteinuria and biopsy options.  Sig increase in proteinuria-3.3 gms.  Will add spironolactone and please set up for rfp and serologies in about 10 days-April 4 with home health.  Pt notified and wants to hold off on the biopsy.  Please  set up urine protein for about a month.

## 2017-04-07 RX ORDER — ONDANSETRON 4 MG/1
TABLET, ORALLY DISINTEGRATING ORAL
Qty: 21 TABLET | Refills: 0 | Status: SHIPPED | OUTPATIENT
Start: 2017-04-07 | End: 2017-04-18 | Stop reason: SDUPTHER

## 2017-04-09 ENCOUNTER — HOSPITAL ENCOUNTER (INPATIENT)
Facility: OTHER | Age: 82
LOS: 2 days | Discharge: HOME | DRG: 291 | End: 2017-04-11
Attending: EMERGENCY MEDICINE | Admitting: PHYSICIAN ASSISTANT
Payer: MEDICARE

## 2017-04-09 DIAGNOSIS — I10 ESSENTIAL HYPERTENSION: ICD-10-CM

## 2017-04-09 DIAGNOSIS — R53.1 WEAKNESS: ICD-10-CM

## 2017-04-09 DIAGNOSIS — R10.13 EPIGASTRIC PAIN: ICD-10-CM

## 2017-04-09 DIAGNOSIS — I50.31 ACUTE DIASTOLIC CHF (CONGESTIVE HEART FAILURE): Primary | ICD-10-CM

## 2017-04-09 DIAGNOSIS — E11.319 CONTROLLED TYPE 2 DIABETES MELLITUS WITH RETINOPATHY OF BOTH EYES, WITHOUT LONG-TERM CURRENT USE OF INSULIN, MACULAR EDEMA PRESENCE UNSPECIFIED, UNSPECIFIED RETINOPATHY SEVERITY: ICD-10-CM

## 2017-04-09 DIAGNOSIS — N18.30 CKD (CHRONIC KIDNEY DISEASE) STAGE 3, GFR 30-59 ML/MIN: ICD-10-CM

## 2017-04-09 DIAGNOSIS — I48.20 CHRONIC ATRIAL FIBRILLATION: ICD-10-CM

## 2017-04-09 DIAGNOSIS — R06.02 SHORTNESS OF BREATH: ICD-10-CM

## 2017-04-09 DIAGNOSIS — E83.42 HYPOMAGNESEMIA: ICD-10-CM

## 2017-04-09 DIAGNOSIS — E87.6 HYPOKALEMIA: ICD-10-CM

## 2017-04-09 DIAGNOSIS — I50.33 ACUTE ON CHRONIC DIASTOLIC HEART FAILURE: ICD-10-CM

## 2017-04-09 LAB
ALBUMIN SERPL BCP-MCNC: 2.2 G/DL
ALP SERPL-CCNC: 66 U/L
ALT SERPL W/O P-5'-P-CCNC: 23 U/L
ANION GAP SERPL CALC-SCNC: 8 MMOL/L
AST SERPL-CCNC: 42 U/L
BACTERIA #/AREA URNS HPF: ABNORMAL /HPF
BASOPHILS # BLD AUTO: 0.02 K/UL
BASOPHILS NFR BLD: 0.4 %
BILIRUB SERPL-MCNC: 0.3 MG/DL
BILIRUB UR QL STRIP: NEGATIVE
BNP SERPL-MCNC: 625 PG/ML
BUN SERPL-MCNC: 15 MG/DL
CALCIUM SERPL-MCNC: 7.7 MG/DL
CHLORIDE SERPL-SCNC: 99 MMOL/L
CLARITY UR: CLEAR
CO2 SERPL-SCNC: 31 MMOL/L
COLOR UR: YELLOW
CREAT SERPL-MCNC: 1.7 MG/DL
DIFFERENTIAL METHOD: ABNORMAL
EOSINOPHIL # BLD AUTO: 0.1 K/UL
EOSINOPHIL NFR BLD: 1.5 %
ERYTHROCYTE [DISTWIDTH] IN BLOOD BY AUTOMATED COUNT: 13.3 %
EST. GFR  (AFRICAN AMERICAN): 31 ML/MIN/1.73 M^2
EST. GFR  (NON AFRICAN AMERICAN): 27 ML/MIN/1.73 M^2
GLUCOSE SERPL-MCNC: 201 MG/DL
GLUCOSE UR QL STRIP: NEGATIVE
GRAN CASTS #/AREA URNS LPF: 1 /LPF
HCT VFR BLD AUTO: 33.9 %
HGB BLD-MCNC: 11.3 G/DL
HGB UR QL STRIP: ABNORMAL
HYALINE CASTS #/AREA URNS LPF: 20 /LPF
KETONES UR QL STRIP: NEGATIVE
LEUKOCYTE ESTERASE UR QL STRIP: NEGATIVE
LIPASE SERPL-CCNC: 39 U/L
LYMPHOCYTES # BLD AUTO: 1.2 K/UL
LYMPHOCYTES NFR BLD: 26.1 %
MCH RBC QN AUTO: 31.5 PG
MCHC RBC AUTO-ENTMCNC: 33.3 %
MCV RBC AUTO: 94 FL
MICROSCOPIC COMMENT: ABNORMAL
MONOCYTES # BLD AUTO: 0.4 K/UL
MONOCYTES NFR BLD: 8.4 %
NEUTROPHILS # BLD AUTO: 3 K/UL
NEUTROPHILS NFR BLD: 63.4 %
NITRITE UR QL STRIP: NEGATIVE
PH UR STRIP: 6 [PH] (ref 5–8)
PLATELET # BLD AUTO: 166 K/UL
PMV BLD AUTO: 9.2 FL
POCT GLUCOSE: 170 MG/DL (ref 70–110)
POTASSIUM SERPL-SCNC: 2.9 MMOL/L
PROT SERPL-MCNC: 5.1 G/DL
PROT UR QL STRIP: ABNORMAL
RBC # BLD AUTO: 3.59 M/UL
RBC #/AREA URNS HPF: 15 /HPF (ref 0–4)
SODIUM SERPL-SCNC: 138 MMOL/L
SP GR UR STRIP: 1.02 (ref 1–1.03)
SQUAMOUS #/AREA URNS HPF: 7 /HPF
URN SPEC COLLECT METH UR: ABNORMAL
UROBILINOGEN UR STRIP-ACNC: NEGATIVE EU/DL
WBC # BLD AUTO: 4.67 K/UL
WBC #/AREA URNS HPF: 4 /HPF (ref 0–5)

## 2017-04-09 PROCEDURE — 96374 THER/PROPH/DIAG INJ IV PUSH: CPT

## 2017-04-09 PROCEDURE — 85025 COMPLETE CBC W/AUTO DIFF WBC: CPT

## 2017-04-09 PROCEDURE — 25000003 PHARM REV CODE 250: Performed by: PHYSICIAN ASSISTANT

## 2017-04-09 PROCEDURE — 81000 URINALYSIS NONAUTO W/SCOPE: CPT

## 2017-04-09 PROCEDURE — 93010 ELECTROCARDIOGRAM REPORT: CPT | Mod: ,,, | Performed by: INTERNAL MEDICINE

## 2017-04-09 PROCEDURE — 25000003 PHARM REV CODE 250: Performed by: EMERGENCY MEDICINE

## 2017-04-09 PROCEDURE — 83880 ASSAY OF NATRIURETIC PEPTIDE: CPT

## 2017-04-09 PROCEDURE — 63600175 PHARM REV CODE 636 W HCPCS: Performed by: EMERGENCY MEDICINE

## 2017-04-09 PROCEDURE — 80053 COMPREHEN METABOLIC PANEL: CPT

## 2017-04-09 PROCEDURE — 11000001 HC ACUTE MED/SURG PRIVATE ROOM

## 2017-04-09 PROCEDURE — 36415 COLL VENOUS BLD VENIPUNCTURE: CPT

## 2017-04-09 PROCEDURE — 27000221 HC OXYGEN, UP TO 24 HOURS

## 2017-04-09 PROCEDURE — 25000003 PHARM REV CODE 250: Performed by: HOSPITALIST

## 2017-04-09 PROCEDURE — 83690 ASSAY OF LIPASE: CPT

## 2017-04-09 PROCEDURE — 99285 EMERGENCY DEPT VISIT HI MDM: CPT | Mod: 25

## 2017-04-09 PROCEDURE — 25000003 PHARM REV CODE 250: Performed by: INTERNAL MEDICINE

## 2017-04-09 RX ORDER — INSULIN ASPART 100 [IU]/ML
0-5 INJECTION, SOLUTION INTRAVENOUS; SUBCUTANEOUS
Status: DISCONTINUED | OUTPATIENT
Start: 2017-04-09 | End: 2017-04-11 | Stop reason: HOSPADM

## 2017-04-09 RX ORDER — ACETAMINOPHEN 325 MG/1
650 TABLET ORAL EVERY 6 HOURS PRN
Status: DISCONTINUED | OUTPATIENT
Start: 2017-04-09 | End: 2017-04-11 | Stop reason: HOSPADM

## 2017-04-09 RX ORDER — IBUPROFEN 200 MG
24 TABLET ORAL
Status: DISCONTINUED | OUTPATIENT
Start: 2017-04-09 | End: 2017-04-11 | Stop reason: HOSPADM

## 2017-04-09 RX ORDER — IBUPROFEN 200 MG
16 TABLET ORAL
Status: DISCONTINUED | OUTPATIENT
Start: 2017-04-09 | End: 2017-04-11 | Stop reason: HOSPADM

## 2017-04-09 RX ORDER — WARFARIN SODIUM 5 MG/1
5 TABLET ORAL DAILY
Status: DISCONTINUED | OUTPATIENT
Start: 2017-04-10 | End: 2017-04-11 | Stop reason: HOSPADM

## 2017-04-09 RX ORDER — FUROSEMIDE 10 MG/ML
40 INJECTION INTRAMUSCULAR; INTRAVENOUS
Status: COMPLETED | OUTPATIENT
Start: 2017-04-09 | End: 2017-04-09

## 2017-04-09 RX ORDER — LANOLIN ALCOHOL/MO/W.PET/CERES
400 CREAM (GRAM) TOPICAL
Status: COMPLETED | OUTPATIENT
Start: 2017-04-09 | End: 2017-04-09

## 2017-04-09 RX ORDER — SPIRONOLACTONE 25 MG/1
25 TABLET ORAL DAILY
Status: DISCONTINUED | OUTPATIENT
Start: 2017-04-10 | End: 2017-04-11 | Stop reason: HOSPADM

## 2017-04-09 RX ORDER — LEVOTHYROXINE SODIUM 50 UG/1
50 TABLET ORAL DAILY
Status: DISCONTINUED | OUTPATIENT
Start: 2017-04-10 | End: 2017-04-11 | Stop reason: HOSPADM

## 2017-04-09 RX ORDER — AMLODIPINE BESYLATE 5 MG/1
5 TABLET ORAL DAILY
Status: DISCONTINUED | OUTPATIENT
Start: 2017-04-10 | End: 2017-04-11 | Stop reason: HOSPADM

## 2017-04-09 RX ORDER — FUROSEMIDE 10 MG/ML
80 INJECTION INTRAMUSCULAR; INTRAVENOUS 2 TIMES DAILY
Status: DISCONTINUED | OUTPATIENT
Start: 2017-04-10 | End: 2017-04-10

## 2017-04-09 RX ORDER — POTASSIUM CHLORIDE 20 MEQ/1
40 TABLET, EXTENDED RELEASE ORAL
Status: COMPLETED | OUTPATIENT
Start: 2017-04-09 | End: 2017-04-09

## 2017-04-09 RX ORDER — PANTOPRAZOLE SODIUM 20 MG/1
20 TABLET, DELAYED RELEASE ORAL DAILY
Status: DISCONTINUED | OUTPATIENT
Start: 2017-04-10 | End: 2017-04-10

## 2017-04-09 RX ORDER — CARVEDILOL 12.5 MG/1
25 TABLET ORAL 2 TIMES DAILY
Status: DISCONTINUED | OUTPATIENT
Start: 2017-04-09 | End: 2017-04-11 | Stop reason: HOSPADM

## 2017-04-09 RX ORDER — LISINOPRIL 20 MG/1
40 TABLET ORAL DAILY
Status: DISCONTINUED | OUTPATIENT
Start: 2017-04-10 | End: 2017-04-11 | Stop reason: HOSPADM

## 2017-04-09 RX ORDER — ATORVASTATIN CALCIUM 10 MG/1
10 TABLET, FILM COATED ORAL DAILY
Status: DISCONTINUED | OUTPATIENT
Start: 2017-04-10 | End: 2017-04-11 | Stop reason: HOSPADM

## 2017-04-09 RX ORDER — ONDANSETRON 4 MG/1
4 TABLET, ORALLY DISINTEGRATING ORAL EVERY 8 HOURS PRN
Status: DISCONTINUED | OUTPATIENT
Start: 2017-04-09 | End: 2017-04-11 | Stop reason: HOSPADM

## 2017-04-09 RX ORDER — ONDANSETRON 4 MG/1
4 TABLET, ORALLY DISINTEGRATING ORAL
Status: COMPLETED | OUTPATIENT
Start: 2017-04-09 | End: 2017-04-09

## 2017-04-09 RX ORDER — SIMETHICONE 80 MG
1 TABLET,CHEWABLE ORAL 4 TIMES DAILY PRN
Status: DISCONTINUED | OUTPATIENT
Start: 2017-04-09 | End: 2017-04-11 | Stop reason: HOSPADM

## 2017-04-09 RX ORDER — GLUCAGON 1 MG
1 KIT INJECTION
Status: DISCONTINUED | OUTPATIENT
Start: 2017-04-09 | End: 2017-04-11 | Stop reason: HOSPADM

## 2017-04-09 RX ADMIN — TRAZODONE HYDROCHLORIDE 25 MG: 50 TABLET ORAL at 10:04

## 2017-04-09 RX ADMIN — CARVEDILOL 25 MG: 12.5 TABLET, FILM COATED ORAL at 10:04

## 2017-04-09 RX ADMIN — POTASSIUM CHLORIDE 40 MEQ: 1500 TABLET, EXTENDED RELEASE ORAL at 05:04

## 2017-04-09 RX ADMIN — FUROSEMIDE 40 MG: 10 INJECTION, SOLUTION INTRAMUSCULAR; INTRAVENOUS at 05:04

## 2017-04-09 RX ADMIN — Medication 400 MG: at 05:04

## 2017-04-09 RX ADMIN — ONDANSETRON 4 MG: 4 TABLET, ORALLY DISINTEGRATING ORAL at 03:04

## 2017-04-09 NOTE — IP AVS SNAPSHOT
[facility] Location ([facility])  [address] [place], [place] [id number]  Phone: [phone]           Patient Discharge Instructions   Our goal is to set you up for success. This packet includes information on your condition, medications, and your home care.  It will help you care for yourself to prevent having to return to the hospital.     Please ask your nurse if you have any questions.      There are many details to remember when preparing to leave the hospital. Here is what you will need to do:    1. Take your medicine. If you are prescribed medications, review your Medication List on the following pages. You may have new medications to  at the pharmacy and others that you'll need to stop taking. Review the instructions for how and when to take your medications. Talk with your doctor or nurses if you are unsure of what to do.     2. Go to your follow-up appointments. Specific follow-up information is listed in the following pages. Your may be contacted by a nurse or clinical provider about future appointments. Be sure we have all of the phone numbers to reach you. Please contact your provider's office if you are unable to make an appointment.     3. Watch for warning signs. Your doctor or nurse will give you detailed warning signs to watch for and when to call for assistance. These instructions may also include educational information about your condition. If you experience any of warning signs to your health, call your doctor.           [facility]  Unless otherwise directed by your provider, please   contact [facility], our nurse care line   that is available for 24/7 assistance.     [phone] (toll-free)     Registered nurses in the [facility]   provide: appointment scheduling, clinical advisement, health education, and other advisory services.                  ** Verify the list of medication(s) below is accurate and up to date. Carry this with you in case of  emergency. If your medications have changed, please notify your healthcare provider.             Medication List      CHANGE how you take these medications        Additional Info                      blood sugar diagnostic Strp   Commonly known as:  TRUETRACK TEST   Quantity:  100 strip   Refills:  11   What changed:    - how much to take  - how to take this  - when to take this  - additional instructions    Instructions:  FOLLOW PACKAGE DIRECTIONS     Begin Date    AM    Noon    PM    Bedtime       lancets Misc   Quantity:  100 each   Refills:  3   Dose:  1 Device   What changed:  when to take this    Instructions:  1 Device by Misc.(Non-Drug; Combo Route) route once daily.     Begin Date    AM    Noon    PM    Bedtime       lisinopril 20 MG tablet   Commonly known as:  PRINIVIL,ZESTRIL   Quantity:  90 tablet   Refills:  0   Dose:  20 mg   What changed:  See the new instructions.    Last time this was given:  40 mg on 4/11/2017  9:41 AM   Instructions:  Take 1 tablet (20 mg total) by mouth once daily.     Begin Date    AM    Noon    PM    Bedtime       warfarin 5 MG tablet   Commonly known as:  COUMADIN   Quantity:  45 tablet   Refills:  11   What changed:  See the new instructions.    Last time this was given:  5 mg on 4/10/2017  4:37 PM   Instructions:  TAKE 1 TABLET BY MOUTH EVERY DAY OR AS DIRECTED BY COUMADIN CLINIC     Begin Date    AM    Noon    PM    Bedtime         CONTINUE taking these medications        Additional Info                      amlodipine 5 MG tablet   Commonly known as:  NORVASC   Quantity:  90 tablet   Refills:  3   Dose:  5 mg   Comments:  **Patient requests 90 days supply**    Last time this was given:  5 mg on 4/11/2017  9:41 AM   Instructions:  Take 1 tablet (5 mg total) by mouth once daily.     Begin Date    AM    Noon    PM    Bedtime       atorvastatin 10 MG tablet   Commonly known as:  LIPITOR   Quantity:  90 tablet   Refills:  3    Last time this was given:  10 mg on 4/11/2017   9:41 AM   Instructions:  TAKE 1 TABLET BY MOUTH EVERY DAY     Begin Date    AM    Noon    PM    Bedtime       bismuth subsalicylate 262 mg/15 mL suspension   Commonly known as:  PEPTO BISMOL   Refills:  0   Dose:  15 mL    Instructions:  Take 15 mLs by mouth every 6 (six) hours as needed.     Begin Date    AM    Noon    PM    Bedtime       carvedilol 25 MG tablet   Commonly known as:  COREG   Quantity:  180 tablet   Refills:  3   Comments:  **Patient requests 90 days supply**    Last time this was given:  25 mg on 4/11/2017  9:41 AM   Instructions:  TAKE 1 TABLET BY MOUTH TWICE DAILY     Begin Date    AM    Noon    PM    Bedtime       furosemide 20 MG tablet   Commonly known as:  LASIX   Quantity:  30 tablet   Refills:  11   Dose:  20 mg    Last time this was given:  40 mg on 4/11/2017  9:41 AM   Instructions:  Take 1 tablet (20 mg total) by mouth once daily.     Begin Date    AM    Noon    PM    Bedtime       levothyroxine 50 MCG tablet   Commonly known as:  SYNTHROID   Quantity:  90 tablet   Refills:  3    Last time this was given:  50 mcg on 4/11/2017  5:58 AM   Instructions:  TAKE 1 TABLET BY MOUTH EVERY DAY     Begin Date    AM    Noon    PM    Bedtime       ondansetron 4 MG Tbdl   Commonly known as:  ZOFRAN-ODT   Quantity:  21 tablet   Refills:  0    Last time this was given:  4 mg on 4/10/2017  4:37 PM   Instructions:  DISSOLVE ONE TABLET BY MOUTH EVERY 6 HOURS AS NEEDED FOR NAUSEA AND VOMITING     Begin Date    AM    Noon    PM    Bedtime       pantoprazole 20 MG tablet   Commonly known as:  PROTONIX   Quantity:  30 tablet   Refills:  0   Dose:  20 mg    Last time this was given:  40 mg on 4/11/2017  9:41 AM   Instructions:  Take 1 tablet (20 mg total) by mouth once daily.     Begin Date    AM    Noon    PM    Bedtime       spironolactone 25 MG tablet   Commonly known as:  ALDACTONE   Quantity:  30 tablet   Refills:  4   Dose:  25 mg    Last time this was given:  25 mg on 4/11/2017  9:41 AM   Instructions:   Take 1 tablet (25 mg total) by mouth once daily.     Begin Date    AM    Noon    PM    Bedtime       trazodone 50 MG tablet   Commonly known as:  DESYREL   Quantity:  90 tablet   Refills:  3   Comments:  **Patient requests 90 days supply**    Last time this was given:  25 mg on 4/10/2017 10:10 PM   Instructions:  TAKE 1/2 TO 1 TABLET BY MOUTH EVERY EVENING AS NEEDED FOR INSOMNIA     Begin Date    AM    Noon    PM    Bedtime            Where to Get Your Medications      These medications were sent to [facility] [address] [place] [address] AT SEC of [address]  [address] [facility] [address]    Hours:  24-hours Phone:  [phone]     lisinopril 20 MG tablet                  Please bring to all follow up appointments:    1. A copy of your discharge instructions.  2. All medicines you are currently taking in their original bottles.  3. Identification and insurance card.    Please arrive 15 minutes ahead of scheduled appointment time.    Please call 24 hours in advance if you must reschedule your appointment and/or time.        Your Scheduled Appointments     Apr 28, 2017  1:00 PM CDT   Telephonic Pacemaker Check with TELEPHONE CHECK, PACEMAKER   [facility] ([facility] )    [address]  [facility] [address] [phone]              Follow-up Information     Follow up with Vishal Salazar MD. Schedule an appointment as soon as possible for a visit in 3 days.    Specialty:  Family Medicine    Contact information:    [address] [facility] [id number] [phone]          Discharge Instructions     Future Orders    Activity as tolerated     Diet general     Questions:    Total calories:      Fat restriction, if any:      Protein restriction, if any:      Na restriction, if any:  2gNa    Fluid restriction:      Additional restrictions:          Primary Diagnosis     Your primary diagnosis was:  Heart Failure      Admission Information   "   Date & Time Provider Department CSN    4/9/2017  2:42 PM Mesfin Lowery MD [facility] [id number]      Care Providers     Provider Role Specialty Primary office phone    Mesfin Lowery MD Attending Provider Hospitalist [phone]      Important Medicare Message          Most Recent Value    Important Message from Medicare Regarding Discharge Appeal Rights  Given to patient/caregiver, Explained to patient/caregiver, Signed/date by patient/caregiver yes 04/11/2017 1046      Your Vitals Were     BP Pulse Temp Resp Height Weight    178/83 (BP Location: Right arm, Patient Position: Lying, BP Method: Automatic) 72 98 °F (36.7 °C) (Oral) 16 5' 2" (1.575 m) 64.2 kg (141 lb 8.6 oz)    Last Period SpO2 BMI          (LMP Unknown) 97% 25.89 kg/m2        Recent Lab Values        5/27/2013 7/19/2013 5/21/2014 11/17/2014 5/11/2015 8/27/2015 11/17/2016 12/28/2016      8:55 AM  6:59 AM  7:48 AM  9:55 AM  8:07 AM  5:54 AM  6:32 AM  8:00 AM    A1C 6.4 (H) 6.2 5.9 5.9 6.1 6.3 (H) 5.7 5.6    Comment for A1C at  6:32 AM on 11/17/2016:  According to ADA guidelines, hemoglobin A1C <7.0% represents  optimal control in non-pregnant diabetic patients.  Different  metrics may apply to specific populations.   Standards of Medical Care in Diabetes - 2016.  For the purpose of screening for the presence of diabetes:  <5.7%     Consistent with the absence of diabetes  5.7-6.4%  Consistent with increasing risk for diabetes   (prediabetes)  >or=6.5%  Consistent with diabetes  Currently no consensus exists for use of hemoglobin A1C  for diagnosis of diabetes for children.      Comment for A1C at  8:00 AM on 12/28/2016:  According to ADA guidelines, hemoglobin A1C <7.0% represents  optimal control in non-pregnant diabetic patients.  Different  metrics may apply to specific populations.   Standards of Medical Care in Diabetes - 2016.  For the purpose of screening for the presence of diabetes:  <5.7%     Consistent with the " absence of diabetes  5.7-6.4%  Consistent with increasing risk for diabetes   (prediabetes)  >or=6.5%  Consistent with diabetes  Currently no consensus exists for use of hemoglobin A1C  for diagnosis of diabetes for children.        Allergies as of 4/11/2017        Reactions    Plavix [Clopidogrel]       Advance Directives     An advance directive is a document which, in the event you are no longer able to make decisions for yourself, tells your healthcare team what kind of treatment you do or do not want to receive, or who you would like to make those decisions for you.  If you do not currently have an advance directive, [facility] encourages you to create one.  For more information call:  [phone]), [phone] ([phone]),  or log on to [email or web address].        Language Assistance Services     ATTENTION: Language assistance services are available, free of charge. Please call [phone].      ATENCIÓN: Si habla español, tiene a lee disposición servicios gratuitos de asistencia lingüística. Llame al [phone].     CHÚ Ý: N?u b?n nói Ti?ng Vi?t, có các d?ch v? h? tr? ngôn ng? mi?n phí dành cho b?n. G?i s? [phone].        Heart Failure Education       Heart Failure: Being Active  You have a condition called heart failure. Being active doesnt mean that you have to wear yourself out. Even a little movement each day helps to strengthen your heart. If you cant get out to exercise, you can do simple stretching and strengthening exercises at home. These are good ways to keep you well-conditioned and prevent you and your heart from becoming excessively weak.    Ideas to get you started  · Add a little movement to things you do now. Walk to mail letters. Park your car at the far end of the parking lot and walk to the store. Walk up a flight of stairs instead of taking the elevator.  · Choose activities you enjoy. You might walk, swim, or ride an exercise bike. Things like gardening  and washing the car count, too. Other possibilities include: washing dishes, walking the dog, walking around the mall, and doing aerobic activities with friends.  · Join a group exercise program at a [facility], a senior center, or a community center. Or look into a hospital cardiac rehabilitation program. Ask your doctor if you qualify.  Tips to keep you going  · Get up and get dressed each day. Go to a coffee shop and read a newspaper or go somewhere that you'll be in the presence of other active people. Youll feel more like being active.  · Make a plan. Choose one or more activities that you enjoy and that you can easily do. Then plan to do at least one each day. You might write your plan on a calendar.  · Go with a friend or a group if you like company. This can help you feel supported and stay motivated, too.  · Plan social events that you enjoy. This will keep you mentally engaged as well as physically motivated to do things you find pleasure in.  For your safety  · Talk with your healthcare provider before starting an exercise program.  · Exercise indoors when its too hot or too cold outside, or when the air quality is poor. Try walking at a shopping mall.  · Wear socks and sturdy shoes to maintain your balance and prevent falls.  · Start slowly. Do a few minutes several times a day at first. Increase your time and speed little by little.  · Stop and rest whenever you feel tired or get short of breath.  · Dont push yourself on days when you dont feel well.  Date Last Reviewed: 3/20/2016  © 0763-5885 [employer] [address] [place] [address], [place] [address]. All rights reserved. This information is not intended as a substitute for professional medical care. Always follow your healthcare professional's instructions.              Heart Failure: Evaluating Your Heart  You have a condition called heart failure. To evaluate your condition, your doctor will examine you, ask questions, and do some  tests. Along with looking for signs of heart failure, the doctor looks for any other health problems that may have led to heart failure. The results of your evaluation will help your doctor form a treatment plan.  Health history and physical exam  Your visit will start with a health history. Tell the doctor about any symptoms youve noticed and about all medicines you take. Then youll have a physical exam. This includes listening to your heartbeat and breathing. Youll also be checked for swelling (edema) in your legs and neck. When you have fluid buildup or fluid in the lungs, it may be called congestive heart failure.  Diagnosing heart failure     During an echocardiogram, sound waves bounce off the heart. These are converted into a picture on the screen.   The following may be done to help your doctor form a diagnosis:  · X-rays show the size and shape of your heart. These pictures can also show fluid in your lungs.  · An electrocardiogram (ECG or EKG) shows the pattern of your heartbeat. Small pads (electrodes) are placed on your chest, arms, and legs. Wires connect the pads to the ECG machine, which records your hearts electrical signals. This can give the doctor information about heart function.  · An echocardiogram uses ultrasound waves to show the structure and movement of your heart muscle. This shows how well the heart pumps. It also shows the thickness of the heart walls, and if the heart is enlarged. It is one of the most useful, non-invasive tests as it provides information about the heart's general function. This helps your doctor make treatment decisions.  · Lab tests evaluate small amounts of blood or urine for signs of problems. A BNP lab test can help diagnose and evaluate heart failure. BNP stands for B-type natriuretic peptide. The ventricles secrete more BNP when heart failure worsens. Lab tests can also provide information about metabolic dysfunction or heart dysfunction.  Your treatment  plan  Based on the results of your evaluation and tests, your doctor will develop a treatment plan. This plan is designed to relieve some of your heart failure symptoms and help make you more comfortable. Your treatment plan may include:  · Medicine to help your heart work better and improve your quality of life  · Changes in what you eat and drink to help prevent fluid from backing up in your body  · Daily monitoring of your weight and heart failure symptoms to see how well your treatment plan is working  · Exercise to help you stay healthy  · Help with quitting smoking  · Emotional and psychological support to help adjust to the changes  · Referrals to other specialists to make sure you are being treated comprehensively  Date Last Reviewed: 3/21/2016  © 9361-5863 [employer]. [address] [place] [address], [place] [address]. All rights reserved. This information is not intended as a substitute for professional medical care. Always follow your healthcare professional's instructions.              Heart Failure: Making Changes to Your Diet  You have a condition called heart failure. When you have heart failure, excess fluid is more likely to build up in your body because your heart isn't working well. This makes the heart work harder to pump blood. Fluid buildup causes symptoms such as shortness of breath and swelling (edema). This is often referred to as congestive heart failure or CHF. Controlling the amount of salt (sodium) you eat may help stop fluid from building up. Your doctor may also tell you to reduce the amount of fluid you drink.  Reading food labels    Your healthcare provider will tell you how much sodium you can eat each day. Read food labels to keep track. Keep in mind that certain foods are high in salt. These include canned, frozen, and processed foods. Check the amount of sodium in each serving. Watch out for high-sodium ingredients. These include MSG (monosodium glutamate), baking soda, and  sodium phosphate.   Eating less salt  Give yourself time to get used to eating less salt. It may take a little while. Here are some tips to help:  · Take the saltshaker off the table. Replace it with salt-free herb mixes and spices.  · Eat fresh or plain frozen vegetables. These have much less salt than canned vegetables.  · Choose low-sodium snacks like sodium-free pretzels, crackers, or air-popped popcorn.  · Dont add salt to your food when youre cooking. Instead, season your foods with pepper, lemon, garlic, or onion.  · When you eat out, ask that your food be cooked without added salt.  · Avoid eating fried foods as these often have a great deal of salt.  If youre told to limit fluids  You may need to limit how much fluid you have to help prevent swelling. This includes anything that is liquid at room temperature, such as ice cream and soup. If your doctor tells you to limit fluid, try these tips:  · Measure drinks in a measuring cup before you drink them. This will help you meet daily goals.  · Chill drinks to make them more refreshing.  · Suck on frozen lemon wedges to quench thirst.  · Only drink when youre thirsty.  · Chew sugarless gum or suck on hard candy to keep your mouth moist.  · Weigh yourself daily to know if your body's fluid content is rising.  My sodium goal  Your healthcare provider may give you a sodium goal to meet each day. This includes sodium found in food as well as salt that you add. My goal is to eat no more than ___________ mg of sodium per day.     When to call your doctor  Call your doctor right away if you have any symptoms of worsening heart failure. These can include:  · Sudden weight gain  · Increased swelling of your legs or ankles  · Trouble breathing when youre resting or at night  · Increase in the number of pillows you have to sleep on  · Chest pain, pressure, discomfort, or pain in the jaw, neck, or back   Date Last Reviewed: 3/21/2016  © 0591-0760 [employer] [address] [place] [address]. All rights reserved. This information is not intended as a substitute for professional medical care. Always follow your healthcare professional's instructions.              Heart Failure: Medicines to Help Your Heart    You have a condition called heart failure (also known as congestive heart failure, or CHF). Your doctor will likely prescribe medicines for heart failure and any underlying health problems you have. Most heart failure patients take one or more types of medicinen. Your healthcare provider will work to find the combination of medicines that works best for you.  Heart failure medicines  Here are the most common heart failure medicines:  · ACE inhibitors lower blood pressure and decrease strain on the heart. This makes it easier for the heart to pump. Angiotensin receptor blockers have similar effects. These are prescribed for some patients instead of ACE inhibitors.  · Beta-blockers relieve stress on the heart. They also improve symptoms. They may also improve the heart's pumping action over time.  · Diuretics (also called water pills) help rid your body of excess water. This can help rid your body of swelling (edema). Having less fluid to pump means your heart doesnt have to work as hard. Some diuretics make your body lose a mineral called potassium. Your doctor will tell you if you need to take supplements or eat more foods high in potassium.  · Digoxin helps your heart pump with more strength. This helps your heart pump more blood with each beat. So, more oxygen-rich blood travels to the rest of the body.  · Aldosterone antagonists help alter hormones and decrease strain on the heart.  · Hydralazine and nitrates are two separate medicines used together to treat heart failure. They may come in one combination pill. They lower blood pressure and decrease how hard the heart has to pump.  Medicines for related conditions  Controlling other heart  problems helps keep heart failure under control, too. Depending on other heart problems you have, medicines may be prescribed to:  · Lower blood pressure (antihypertensives).  · Lower cholesterol levels (statins).  · Prevent blood clots (anticoagulants or aspirin).  · Keep the heartbeat steady (antiarrhythmics).  Date Last Reviewed: 3/5/2016  © 1932-9140 [employer]. [address] [place] [address], [place] [address]. All rights reserved. This information is not intended as a substitute for professional medical care. Always follow your healthcare professional's instructions.              Heart Failure: Procedures That May Help    The heart is a muscle that pumps oxygen-rich blood to all parts of the body. When you have heart failure, the heart is not able to pump as well as it should. Blood and fluid may back up into the lungs (congestive heart failure), and some parts of the body dont get enough oxygen-rich blood to work normally. These problems lead to the symptoms of heart failure.     Certain procedures may help the heart pump better in some cases of heart failure. Some procedures are done to treat health problems that may have caused the heart failure such as coronary artery disease or heart rhythm problems. For more serious heart failure, other options are available.  Treating artery and valve problems  If you have coronary artery disease or valve disease, procedures may be done to improve blood flow. This helps the heart pump better, which can improve heart failure symptoms. First, your doctor may do a cardiac catheterization to help detect clogged blood vessels or valve damage. During this procedure, a  thin tube (catheter) in inserted into a blood vessel and guided to the heart. There a dye is injected and a special type of X-ray (angiogram) is taken of the blood vessels. Procedures to open a blocked artery or fix damaged valves can also be done using catheterization.  · Angioplasty uses a  balloon-tipped instrument at the end of the catheter. The balloon is inflated to widen the narrowed artery. In many cases, a stent is expanded to further support the narrowed artery. A stent is a metal mesh tube.  · Valve surgery repairs or replacement of faulty valves can also be done during catheterization so blood can flow properly through the chambers of the heart.  Bypass surgery is another option to help treat blocked arteries. It uses a healthy blood vessel from elsewhere in the body. The healthy blood vessel is attached above and below the blocked area so that blood can flow around the blocked artery.  Treating heart rhythm problems  A device may be placed in the chest to help a weak heart maintain a healthy, heartbeat so the heart can pump more effectively:  · Pacemaker. A pacemaker is an implanted device that regulates your heartbeat electronically. It monitors your heart's rhythm and generates a painless electric impulse that helps the heart beat in a regular rhythm. A pacemaker is programmed to meet your specific heart rhythm needs.  · Biventricular pacing/cardiac resynchronization therapy. A type of pacemaker that paces both pumping chambers of the heart at the same time to coordinate contractions and to improve the heart's function. Some people with heart failure are candidates for this therapy.  · Implantable cardioverter defibrillator. A device similar to a pacemaker that senses when the heart is beating too fast and delivers an electrical shock to convert the fast rhythm to a normal rhythm. This can be a life saving device.  In severe cases  In more serious cases of heart failure when other treatments no longer work, other options may include:  · Ventricular assist devices (VADs). These are mechanical devices used to take over the pumping function for one or both of the heart's ventricles, or pumping chambers. A VAD may be necessary when heart failure progresses to the point that medicines and other  treatments no longer help. In some cases, a VAD may be used as a bridge to transplant.  · Heart transplant. This is replacing the diseased heart with a healthy one from a donor. This is an option for a few people who are very sick. A heart transplant is very serious and not an option for all patients. Your doctor can tell you more.  Date Last Reviewed: 3/20/2016  © 0917-8966 [employer]. [address] [place] [address], [place] [address]. All rights reserved. This information is not intended as a substitute for professional medical care. Always follow your healthcare professional's instructions.              Heart Failure: Tracking Your Weight  You have a condition called heart failure. When you have heart failure, a sudden weight gain or a steady rise in weight is a warning sign that your body is retaining too much water and salt. This could mean your heart failure is getting worse. If left untreated, it can cause problems for your lungs and result in shortness of breath. Weighing yourself each day is the best way to know if youre retaining water. If your weight goes up quickly, call your doctor. You will be given instructions on how to get rid of the excess water. You will likely need medicines and to avoid salt. This will help your heart work better.  Call your doctor if you gain more than 2 pounds in 1 day, more than 5 pounds in 1 week, or whatever weight gain you were told to report by your doctor. This is often a sign of worsening heart failure and needs to be evaluated and treated. Your doctor will tell you what to do next.   Tips for weighing yourself    · Weigh yourself at the same time each morning, wearing the same clothes. Weigh yourself after urinating and before eating.  · Use the same scale each day. Make sure the numbers are easy to read. Put the scale on a flat, hard surface -- not on a rug or carpet.  · Do not stop weighing yourself. If you forget one day, weigh again the next morning.  How  to use your weight chart  · Keep your weight chart near the scale. Write your weight on the chart as soon as you get off the scale.  · Fill in the month and the start date on the chart. Then write down your weight each day. Your chart will look like this:    · If you miss a day, leave the space blank. Weigh yourself the next day and write your weight in the next space.  · Take your weight chart with you when you go to see your doctor.  Date Last Reviewed: 3/20/2016  © 3822-1847 [employer]. [address] [place] [address]. All rights reserved. This information is not intended as a substitute for professional medical care. Always follow your healthcare professional's instructions.              Heart Failure: Warning Signs of a Flare-Up  You have a condition called heart failure. Once you have heart failure, flare-ups can happen. Below are signs that can mean your heart failure is getting worse. If you notice any of these warning signs, call your healthcare provider.  Swelling    · Your feet, ankles, or lower legs get puffier.  · You notice skin changes on your lower legs.  · Your shoes feel too tight.  · Your clothes are tighter in the waist.  · You have trouble getting rings on or off your fingers.  Shortness of breath  · You have to breathe harder even when youre doing your normal activities or when youre resting.  · You are short of breath walking up stairs or even short distances.  · You wake up at night short of breath or coughing.  · You need to use more pillows or sit up to sleep.  · You wake up tired or restless.  Other warning signs  · You feel weaker, dizzy, or more tired.  · You have chest pain or changes in your heartbeat.  · You have a cough that wont go away.  · You cant remember things or dont feel like eating.  Tracking your weight  Gaining weight is often the first warning sign that heart failure is getting worse. Gaining even a few pounds can be a sign that your body is  retaining excess water and salt. Weighing yourself each day in the morning after you urinate and before you eat, is the best way to know if you're retaining water. Get a scale that is easy to read and make sure you wear the same clothes and use the same scale every time you weigh. Your healthcare provider will show you how to track your weight. Call your doctor if you gain more than 2 pounds in 1 day, 5 pounds in 1 week, or whatever weight gain you were told to report by your doctor. This is often a sign of worsening heart failure and needs to be evaluated and treated before it compromises your breathing. Your doctor will tell you what to do next.    Date Last Reviewed: 3/15/2016  © 3624-0946 [employer]. [address] [place] [address], [place] [address]. All rights reserved. This information is not intended as a substitute for professional medical care. Always follow your healthcare professional's instructions.              Coumadin Discharge Instructions                         Chronic [facility] Disease Education             Diabetes Discharge Instructions                                   [facility] Sign-Up     Activating your [facility] account is as easy as 1-2-3!     1) Visit [email or web address], select Sign Up Now, enter this activation code and your date of birth, then select Next.  [email or web address]  Expires: 5/24/2017  3:14 PM      2) Create a username and password to use when you visit [facility] in the future and select a security question in case you lose your password and select Next.    3) Enter your e-mail address and click Sign Up!    Additional Information  If you have questions, please e-mail [email or web address] or call [phone] to talk to our [facility] staff. Remember, [facility] is NOT to be used for urgent needs. For medical emergencies, dial 911.          [facility] complies with applicable Federal civil rights laws and does not discriminate on the basis of race, color,  national origin, age, disability, or sex.

## 2017-04-09 NOTE — ED TRIAGE NOTES
Pt reports 8/10 gas pain in epigastric region for 2 weeks. Pt reports pain moves around and is partially relieved when passing gas. Reports normal, soft BM with last BM yesterday. Reports that food isn't changed with oral intake. Reports nausea that comes and goes. Denies any fever/chills

## 2017-04-09 NOTE — ED NOTES
Pt given water to facilitate providing urine sample. Pt told to call nurses station for assistance to commode.

## 2017-04-09 NOTE — ED NOTES
Pt reporting SOB with RA O2 sats between 90-93%. PA notified. Will place on NC.  Pt denies pain, comfort or bathroom needs.

## 2017-04-09 NOTE — ED NOTES
Pt resting on stretcher with tv on. Pt denies pain at this time. Respirations even and unlabored, no distress noted. Commode at bedside for urine specimen collection. Pt reports she cannot provide one right now. Will check back in 10 minutes.

## 2017-04-09 NOTE — ED NOTES
Pt resting on stretcher with tv on. Pt denies pain. Comfort, position, and bathroom needs addressed. Respirations even and unlabored, no distress noted, pt on 2L NC. Will continue to monitor.

## 2017-04-09 NOTE — ED NOTES
"Pt sitting on side of bed with O2 sat 95% and HR 72 on RA. Pt ambulated down [address]callister to bathroom with cane- O2 sats dropped to 91% with HR 80. Pt states "I don't think I can make it back to my room. It's not my breathing, I'm just really weak". Pt brought back to room in wheelchair. PA notified.  "

## 2017-04-09 NOTE — ED PROVIDER NOTES
Encounter Date: 4/9/2017       History     Chief Complaint   Patient presents with    Abdominal Pain     pt with gas x 2 weeks. pt with  normal bowel movements.      Review of patient's allergies indicates:   Allergen Reactions    Plavix [clopidogrel]      HPI Comments: 85-year-old female with hypertension, diabetes insipidus, paroxysmal atrial fibrillation, diabetes, GERD, CHF presents to the emergency department with complaints of abdominal pain and fullness.  She reports associated gas.  She states the symptoms and present for the last 2 weeks.  She denies fever, chills, vomiting or diarrhea.  She denies constipation.  She reports normal bowel movements.  She does report some nausea.  She denies fever or chills.  She does admit to a history of partial colectomy as well as hysterectomy.  She denies urinary symptoms.  She is treated at home with Pepto-Bismol and Tums with no relief    The history is provided by the patient and a relative.     Past Medical History:   Diagnosis Date    Atrial fibrillation     chronic AF    CHF (congestive heart failure)     Diabetes insipidus     Diabetes mellitus type II     GERD (gastroesophageal reflux disease)     Hypertension     Iritis 12/10/2014    NPDR (nonproliferative diabetic retinopathy) 10/27/2014    PAF (paroxysmal atrial fibrillation)     Palpitations      Past Surgical History:   Procedure Laterality Date    CATARACT EXTRACTION W/  INTRAOCULAR LENS IMPLANT Right n/a    OD over 10 years ago     CATARACT EXTRACTION W/  INTRAOCULAR LENS IMPLANT Left 11/11/14    OS ()    COLON SURGERY      Partial colectomy secondary to suspicious polyp    HYSTERECTOMY      INSERT / REPLACE / REMOVE PACEMAKER      [place] [facility] 2012 [email or web address]    LASER ABLATION      3 years ago OS     Family History   Problem Relation Age of Onset    No Known Problems Mother     No Known Problems Father     No Known Problems Sister     No Known Problems Brother     No Known  Problems Maternal Aunt     No Known Problems Maternal Uncle     No Known Problems Paternal Aunt     No Known Problems Paternal Uncle     No Known Problems Maternal Grandmother     No Known Problems Maternal Grandfather     No Known Problems Paternal Grandmother     No Known Problems Paternal Grandfather     Amblyopia Neg Hx     Blindness Neg Hx     Cancer Neg Hx     Cataracts Neg Hx     Diabetes Neg Hx     Glaucoma Neg Hx     Hypertension Neg Hx     Macular degeneration Neg Hx     Retinal detachment Neg Hx     Strabismus Neg Hx     Stroke Neg Hx     Thyroid disease Neg Hx      Social History   Substance Use Topics    Smoking status: Never Smoker    Smokeless tobacco: Never Used    Alcohol use No     Review of Systems   Constitutional: Negative for chills and fever.   HENT: Negative for sore throat.    Respiratory: Negative for shortness of breath.    Cardiovascular: Negative for chest pain.   Gastrointestinal: Positive for abdominal distention, abdominal pain and nausea. Negative for constipation, diarrhea and vomiting.   Genitourinary: Negative for difficulty urinating, dysuria, flank pain, frequency, hematuria and urgency.   Musculoskeletal: Negative for back pain.   Skin: Negative for rash.   Neurological: Negative for weakness.   Hematological: Does not bruise/bleed easily.       Physical Exam   Initial Vitals   BP Pulse Resp Temp SpO2   04/09/17 1437 04/09/17 1437 04/09/17 1437 04/09/17 1437 04/09/17 1437   133/73 76 18 97.8 °F (36.6 °C) 100 %     Physical Exam    Nursing note and vitals reviewed.  Constitutional: Vital signs are normal. She appears well-developed and well-nourished. She is not diaphoretic.  Non-toxic appearance. No distress.   HENT:   Head: Normocephalic and atraumatic.   Right Ear: External ear normal.   Left Ear: External ear normal.   Nose: Nose normal.   Mouth/Throat: Uvula is midline and oropharynx is clear and moist. No trismus in the jaw. Abnormal dentition. No  uvula swelling. No oropharyngeal exudate.   edentulous   Eyes: Conjunctivae, EOM and lids are normal. Pupils are equal, round, and reactive to light. No scleral icterus.   Neck: Normal range of motion and phonation normal. Neck supple.   Cardiovascular: Normal rate, regular rhythm and normal heart sounds. Exam reveals no gallop and no friction rub.    No murmur heard.  Pulmonary/Chest: Effort normal and breath sounds normal. No respiratory distress. She has no decreased breath sounds. She has no wheezes. She has no rhonchi. She has no rales.   Abdominal: Soft. Normal appearance and bowel sounds are normal. She exhibits distension. She exhibits no mass. There is no tenderness. There is no rigidity, no rebound, no guarding, no CVA tenderness, no tenderness at McBurney's point and negative Wang's sign.   Generalized pain with no significant TTP   Musculoskeletal: Normal range of motion.   No obvious deformities, moving all extremities, normal gait   Neurological: She is alert and oriented to person, place, and time. She has normal strength and normal reflexes. No sensory deficit.   Skin: Skin is warm, dry and intact. No lesion and no rash noted. No erythema.   Psychiatric: She has a normal mood and affect. Her speech is normal and behavior is normal. Judgment normal. Cognition and memory are normal.         ED Course   Critical Care  Date/Time: 5/8/2017 7:20 AM  Performed by: DAHLIA ESTRADA  Authorized by: DAHLIA ESTRADA   Direct patient critical care time: 14 minutes  Additional history critical care time: 4 minutes  Ordering / reviewing critical care time: 5 minutes  Documentation critical care time: 6 minutes  Consulting other physicians critical care time: 5 minutes  Total critical care time (exclusive of procedural time) : 34 minutes  Critical care was necessary to treat or prevent imminent or life-threatening deterioration of the following conditions: respiratory failure and cardiac failure.  Critical care  was time spent personally by me on the following activities: blood draw for specimens, evaluation of patient's response to treatment, ordering and performing treatments and interventions, pulse oximetry, re-evaluation of patient's condition, ordering and review of laboratory studies, examination of patient, development of treatment plan with patient or surrogate, discussions with consultants, interpretation of cardiac output measurements, obtaining history from patient or surrogate, ordering and review of radiographic studies and review of old charts.        Labs Reviewed   URINALYSIS - Abnormal; Notable for the following:        Result Value    Protein, UA 3+ (*)     Occult Blood UA 2+ (*)     All other components within normal limits   CBC W/ AUTO DIFFERENTIAL - Abnormal; Notable for the following:     RBC 3.59 (*)     Hemoglobin 11.3 (*)     Hematocrit 33.9 (*)     MCH 31.5 (*)     All other components within normal limits   COMPREHENSIVE METABOLIC PANEL - Abnormal; Notable for the following:     Potassium 2.9 (*)     CO2 31 (*)     Glucose 201 (*)     Creatinine 1.7 (*)     Calcium 7.7 (*)     Total Protein 5.1 (*)     Albumin 2.2 (*)     AST 42 (*)     eGFR if  31 (*)     eGFR if non  27 (*)     All other components within normal limits   URINALYSIS MICROSCOPIC - Abnormal; Notable for the following:     RBC, UA 15 (*)     Bacteria, UA Moderate (*)     Hyaline Casts, UA 20 (*)     Granular Casts, UA 1 (*)     All other components within normal limits   B-TYPE NATRIURETIC PEPTIDE - Abnormal; Notable for the following:      (*)     All other components within normal limits   LIPASE   B-TYPE NATRIURETIC PEPTIDE     EKG Readings: (Independently Interpreted)   Initial Reading: No STEMI. Previous EKG: Compared with most recent EKG Previous EKG Date: 2/17. Rhythm: Normal Sinus Rhythm. Heart Rate: 66. Ectopy: No Ectopy. Conduction: Normal. ST Segments: Normal ST Segments. T Waves:  Normal. Clinical Impression: Normal Sinus Rhythm   Paced rhythm. No significant change from previous     Imaging Results         X-Ray Chest 1 View (Final result) Result time:  04/09/17 16:42:30    Final result by Trini Estrada MD (04/09/17 16:42:30)    Impression:     As above.      Electronically signed by: Trini Estrada MD  Date:     04/09/17  Time:    16:42     Narrative:    Chest, 1 view: There are small bilateral pleural effusions with adjacent passive atelectasis.  The heart is mildly enlarged.  Calcified atheromatous disease affects the aorta.  Left-sided pacemaker device is in place.  Age-appropriate degenerative changes affect the skeleton.            X-Ray Abdomen Flat And Erect (Final result) Result time:  04/09/17 16:00:39    Final result by Trini Estrada MD (04/09/17 16:00:39)    Impression:     As above.      Electronically signed by: Trini Estrada MD  Date:     04/09/17  Time:    16:00     Narrative:    Abdomen, flat and erect: Suture line is seen in the right midabdomen.  The bowel gas pattern is nonobstructive.  No free air is identified.  No abnormal masses are seen.  Calcified atheromatous disease affects the aorta and its branch vessels.  Left-sided pacemaker devices in place.  The heart is enlarged.  Age-appropriate degenerative changes affect the skeleton.              X-Rays:   Independently Interpreted Readings:   Chest X-Ray: Bilateral pleural effusions.  Heart enlarged.  Left-sided pacemaker in place.   Other Readings:  Flat and erect abdominal x-ray-no free air under the diaphragm.  Nonspecific loops of bowel gas.  No signs of obstruction    Medical Decision Making:   History:   I obtained history from: someone other than patient.       <> Summary of History: daughter  Old Medical Records: I decided to obtain old medical records.  Initial Assessment:   85-year-old female with complaints consistent with acute on chronic CHF.  Afebrile neurovascularly intact.  She is alert, healthy  and nontoxic appearing.  Oxygen saturation initially 100% on room air upon arrival.  Exam documented above.  Independently Interpreted Test(s):   I have ordered and independently interpreted X-rays - see prior notes.  I have ordered and independently interpreted EKG Reading(s) - see prior notes  Clinical Tests:   Lab Tests: Ordered and Reviewed       <> Summary of Lab: , K 2.9  Radiological Study: Ordered and Reviewed  Medical Tests: Ordered and Reviewed  ED Management:  EKG, labs and flat and erect abdominal x-ray were obtained.  EKG consistent with paced rhythm.  No signs of acute ischemia or STEMI.  Patient's potassium is 2.9, her glucose is 201, her creatinine is 1.7.  This does appear to be patient's baseline for her creatinine.  Lipase is not elevated.  X-ray of the abdomen consistent with nonspecific bowel gas pattern.  No evidence of obstruction or perforation.  Patient was brought to the room in the ED.  She became short of breath and oxygen saturations were in the low 90s.  She was placed on 2 L of oxygen the nasal cannula.  At this time patient's lungs now with diminished breath sounds at the bases.  I did decide to obtain one view chest x-ray.  Incidentally interpreted by myself in concerning for bilateral pleural effusions.  At this time I did decide to add on a BNP which was elevated in the 600s.  She was administered potassium, magnesium and Lasix in the emergency department.  6:09 PM patient attempted to ambulate to bathroom.  Patient unable to tolerate.  Became significantly short of breath.  Oxygen saturation in the low 90s.  Discussed with the attending physician who also evaluated patient.  I do not feel complete discharge patient home at this time.  Plan to admit for further evaluation and treatment. 6:24 PM hospital medicine paged. 6:32 PM discussed with Dr. Lowery, [facility] medicine. Will admit for further evaluation and treatment.   Other:   I have discussed this case with another  "health care provider.       <> Summary of the Discussion: Jose A  This note was created using Dragon Medical dictation.  There may be typographical errors secondary to dictation.                Attending Attestation:     Physician Attestation Statement for NP/PA:   I have conducted a face to face encounter with this patient in addition to the NP/PA, due to    Other NP/PA Attestation Additions:    History of Present Illness: Discussed this case with my physician assistant at length and agree with treatment plan based on her H&P.  I was available to see the patient if she requested it - I did have face-to-face time per her request.  85-year-old female presents with complaint of abdominal pain which she describes as "gas pains."  While in the emergency room, she developed some shortness of breath and general weakness.  She required oxygen supplementation by nasal cannula, as she desaturated both at rest and with ambulation.  She was treated with Lasix and potassium repleted hypokalemia.  Labs and x-ray consistent with volume overload.  Abdominal exam is benign.  I'm more concerned for CHF exacerbation.  She is admitted in stable condition for further care and diuresis.                   ED Course     Clinical Impression:     1. Acute diastolic CHF (congestive heart failure)    2. Epigastric pain    3. Shortness of breath    4. Weakness    5. Hypokalemia          Disposition:   Disposition: Admitted  Condition: Fair       Leticia Santana PA-C  04/09/17 1833       Fabiana Naranjo MD  05/08/17 0720    "

## 2017-04-10 PROBLEM — R10.13 EPIGASTRIC PAIN: Status: ACTIVE | Noted: 2017-04-10

## 2017-04-10 PROBLEM — E87.6 HYPOKALEMIA: Status: ACTIVE | Noted: 2017-04-10

## 2017-04-10 LAB
ANION GAP SERPL CALC-SCNC: 6 MMOL/L
BASOPHILS # BLD AUTO: 0.03 K/UL
BASOPHILS NFR BLD: 0.6 %
BUN SERPL-MCNC: 15 MG/DL
CALCIUM SERPL-MCNC: 7.5 MG/DL
CHLORIDE SERPL-SCNC: 103 MMOL/L
CO2 SERPL-SCNC: 31 MMOL/L
CREAT SERPL-MCNC: 1.5 MG/DL
DIFFERENTIAL METHOD: ABNORMAL
EOSINOPHIL # BLD AUTO: 0.1 K/UL
EOSINOPHIL NFR BLD: 2.4 %
ERYTHROCYTE [DISTWIDTH] IN BLOOD BY AUTOMATED COUNT: 13.3 %
EST. GFR  (AFRICAN AMERICAN): 36 ML/MIN/1.73 M^2
EST. GFR  (NON AFRICAN AMERICAN): 32 ML/MIN/1.73 M^2
GLUCOSE SERPL-MCNC: 85 MG/DL
HCT VFR BLD AUTO: 30.8 %
HGB BLD-MCNC: 10.3 G/DL
INR PPP: 2.4
LYMPHOCYTES # BLD AUTO: 1.8 K/UL
LYMPHOCYTES NFR BLD: 33.8 %
MAGNESIUM SERPL-MCNC: 1.3 MG/DL
MCH RBC QN AUTO: 31.3 PG
MCHC RBC AUTO-ENTMCNC: 33.4 %
MCV RBC AUTO: 94 FL
MONOCYTES # BLD AUTO: 0.6 K/UL
MONOCYTES NFR BLD: 11 %
NEUTROPHILS # BLD AUTO: 2.8 K/UL
NEUTROPHILS NFR BLD: 52 %
PHOSPHATE SERPL-MCNC: 3.1 MG/DL
PLATELET # BLD AUTO: 151 K/UL
PMV BLD AUTO: 9.8 FL
POCT GLUCOSE: 118 MG/DL (ref 70–110)
POCT GLUCOSE: 132 MG/DL (ref 70–110)
POCT GLUCOSE: 156 MG/DL (ref 70–110)
POCT GLUCOSE: 194 MG/DL (ref 70–110)
POTASSIUM SERPL-SCNC: 3.3 MMOL/L
PROTHROMBIN TIME: 26.1 SEC
RBC # BLD AUTO: 3.29 M/UL
SODIUM SERPL-SCNC: 140 MMOL/L
WBC # BLD AUTO: 5.38 K/UL

## 2017-04-10 PROCEDURE — 85610 PROTHROMBIN TIME: CPT

## 2017-04-10 PROCEDURE — 99223 1ST HOSP IP/OBS HIGH 75: CPT | Mod: AI,,, | Performed by: PHYSICIAN ASSISTANT

## 2017-04-10 PROCEDURE — 25000003 PHARM REV CODE 250: Performed by: PHYSICIAN ASSISTANT

## 2017-04-10 PROCEDURE — 84100 ASSAY OF PHOSPHORUS: CPT

## 2017-04-10 PROCEDURE — 83735 ASSAY OF MAGNESIUM: CPT

## 2017-04-10 PROCEDURE — 36415 COLL VENOUS BLD VENIPUNCTURE: CPT

## 2017-04-10 PROCEDURE — 97161 PT EVAL LOW COMPLEX 20 MIN: CPT

## 2017-04-10 PROCEDURE — 97530 THERAPEUTIC ACTIVITIES: CPT

## 2017-04-10 PROCEDURE — 27000221 HC OXYGEN, UP TO 24 HOURS

## 2017-04-10 PROCEDURE — 85025 COMPLETE CBC W/AUTO DIFF WBC: CPT

## 2017-04-10 PROCEDURE — 11000001 HC ACUTE MED/SURG PRIVATE ROOM

## 2017-04-10 PROCEDURE — 80048 BASIC METABOLIC PNL TOTAL CA: CPT

## 2017-04-10 PROCEDURE — 63600175 PHARM REV CODE 636 W HCPCS: Performed by: HOSPITALIST

## 2017-04-10 PROCEDURE — 25000003 PHARM REV CODE 250: Performed by: INTERNAL MEDICINE

## 2017-04-10 PROCEDURE — 63600175 PHARM REV CODE 636 W HCPCS: Performed by: PHYSICIAN ASSISTANT

## 2017-04-10 PROCEDURE — 25000003 PHARM REV CODE 250: Performed by: HOSPITALIST

## 2017-04-10 RX ORDER — MAGNESIUM SULFATE HEPTAHYDRATE 40 MG/ML
2 INJECTION, SOLUTION INTRAVENOUS ONCE
Status: COMPLETED | OUTPATIENT
Start: 2017-04-10 | End: 2017-04-10

## 2017-04-10 RX ORDER — FUROSEMIDE 40 MG/1
40 TABLET ORAL 2 TIMES DAILY
Status: DISCONTINUED | OUTPATIENT
Start: 2017-04-10 | End: 2017-04-11

## 2017-04-10 RX ORDER — PANTOPRAZOLE SODIUM 40 MG/1
40 TABLET, DELAYED RELEASE ORAL DAILY
Status: DISCONTINUED | OUTPATIENT
Start: 2017-04-10 | End: 2017-04-11 | Stop reason: HOSPADM

## 2017-04-10 RX ORDER — POTASSIUM CHLORIDE 20 MEQ/15ML
40 SOLUTION ORAL ONCE
Status: COMPLETED | OUTPATIENT
Start: 2017-04-10 | End: 2017-04-10

## 2017-04-10 RX ADMIN — CARVEDILOL 25 MG: 12.5 TABLET, FILM COATED ORAL at 10:04

## 2017-04-10 RX ADMIN — SPIRONOLACTONE 25 MG: 25 TABLET, FILM COATED ORAL at 08:04

## 2017-04-10 RX ADMIN — FUROSEMIDE 80 MG: 10 INJECTION, SOLUTION INTRAMUSCULAR; INTRAVENOUS at 08:04

## 2017-04-10 RX ADMIN — FUROSEMIDE 40 MG: 40 TABLET ORAL at 05:04

## 2017-04-10 RX ADMIN — ONDANSETRON 4 MG: 4 TABLET, ORALLY DISINTEGRATING ORAL at 04:04

## 2017-04-10 RX ADMIN — PANTOPRAZOLE SODIUM 40 MG: 40 TABLET, DELAYED RELEASE ORAL at 08:04

## 2017-04-10 RX ADMIN — CARVEDILOL 25 MG: 12.5 TABLET, FILM COATED ORAL at 08:04

## 2017-04-10 RX ADMIN — MAGNESIUM SULFATE IN WATER 2 G: 40 INJECTION, SOLUTION INTRAVENOUS at 08:04

## 2017-04-10 RX ADMIN — WARFARIN SODIUM 5 MG: 5 TABLET ORAL at 04:04

## 2017-04-10 RX ADMIN — LISINOPRIL 40 MG: 20 TABLET ORAL at 08:04

## 2017-04-10 RX ADMIN — POTASSIUM CHLORIDE 40 MEQ: 40 SOLUTION ORAL at 08:04

## 2017-04-10 RX ADMIN — TRAZODONE HYDROCHLORIDE 25 MG: 50 TABLET ORAL at 10:04

## 2017-04-10 RX ADMIN — AMLODIPINE BESYLATE 5 MG: 5 TABLET ORAL at 08:04

## 2017-04-10 RX ADMIN — ATORVASTATIN CALCIUM 10 MG: 10 TABLET, FILM COATED ORAL at 08:04

## 2017-04-10 RX ADMIN — LEVOTHYROXINE SODIUM 50 MCG: 50 TABLET ORAL at 05:04

## 2017-04-10 NOTE — SUBJECTIVE & OBJECTIVE
Past Medical History:   Diagnosis Date    Atrial fibrillation     chronic AF    CHF (congestive heart failure)     Diabetes insipidus     Diabetes mellitus type II     GERD (gastroesophageal reflux disease)     Hypertension     Iritis 12/10/2014    NPDR (nonproliferative diabetic retinopathy) 10/27/2014    PAF (paroxysmal atrial fibrillation)     Palpitations        Past Surgical History:   Procedure Laterality Date    CATARACT EXTRACTION W/  INTRAOCULAR LENS IMPLANT Right n/a    OD over 10 years ago     CATARACT EXTRACTION W/  INTRAOCULAR LENS IMPLANT Left 11/11/14    OS ()    COLON SURGERY      Partial colectomy secondary to suspicious polyp    HYSTERECTOMY      INSERT / REPLACE / REMOVE PACEMAKER      SC PM 2012 [facility]    LASER ABLATION      3 years ago OS       Review of patient's allergies indicates:   Allergen Reactions    Plavix [clopidogrel]        No current facility-administered medications on file prior to encounter.      Current Outpatient Prescriptions on File Prior to Encounter   Medication Sig    amlodipine (NORVASC) 5 MG tablet Take 1 tablet (5 mg total) by mouth once daily.    atorvastatin (LIPITOR) 10 MG tablet TAKE 1 TABLET BY MOUTH EVERY DAY    bismuth subsalicylate (PEPTO BISMOL) 262 mg/15 mL suspension Take 15 mLs by mouth every 6 (six) hours as needed.    carvedilol (COREG) 25 MG tablet TAKE 1 TABLET BY MOUTH TWICE DAILY    furosemide (LASIX) 20 MG tablet Take 1 tablet (20 mg total) by mouth once daily.    levothyroxine (SYNTHROID) 50 MCG tablet TAKE 1 TABLET BY MOUTH EVERY DAY    lisinopril (PRINIVIL,ZESTRIL) 40 MG tablet TAKE 1 TABLET BY MOUTH EVERY DAY    ondansetron (ZOFRAN-ODT) 4 MG TbDL DISSOLVE ONE TABLET BY MOUTH EVERY 6 HOURS AS NEEDED FOR NAUSEA AND VOMITING    pantoprazole (PROTONIX) 20 MG tablet Take 1 tablet (20 mg total) by mouth once daily.    spironolactone (ALDACTONE) 25 MG tablet Take 1 tablet (25 mg total) by mouth once daily.     trazodone (DESYREL) 50 MG tablet TAKE 1/2 TO 1 TABLET BY MOUTH EVERY EVENING AS NEEDED FOR INSOMNIA    warfarin (COUMADIN) 5 MG tablet TAKE 1 TABLET BY MOUTH EVERY DAY OR AS DIRECTED BY COUMADIN CLINIC (Patient taking differently: TAKE 1/2 TABLET BY MOUTH EVERY DAY IN THE EVENING OR AS DIRECTED BY COUMADIN CLINIC)    blood sugar diagnostic (TRUETRACK TEST) Strp FOLLOW PACKAGE DIRECTIONS (Patient taking differently: 1 strip by Misc.(Non-Drug; Combo Route) route 2 (two) times daily. FOLLOW PACKAGE DIRECTIONS)    lancets Misc 1 Device by Misc.(Non-Drug; Combo Route) route once daily. (Patient taking differently: 1 Device by Misc.(Non-Drug; Combo Route) route 2 (two) times daily. )     Family History     Problem Relation (Age of Onset)    No Known Problems Mother, Father, Sister, Brother, Maternal Aunt, Maternal Uncle, Paternal Aunt, Paternal Uncle, Maternal Grandmother, Maternal Grandfather, Paternal Grandmother, Paternal Grandfather        Social History Main Topics    Smoking status: Never Smoker    Smokeless tobacco: Never Used    Alcohol use No    Drug use: No    Sexual activity: No     Review of Systems   Constitutional: Negative for activity change, appetite change, chills, fever and unexpected weight change.   HENT: Negative for congestion, rhinorrhea, sore throat and trouble swallowing.    Eyes: Negative for photophobia and visual disturbance.   Respiratory: Negative for cough, choking, shortness of breath and wheezing.    Cardiovascular: Negative for chest pain, palpitations and leg swelling.   Gastrointestinal: Positive for nausea. Negative for abdominal distention, abdominal pain, constipation, diarrhea and vomiting.   Endocrine: Negative for polydipsia and polyuria.   Genitourinary: Negative for dysuria and frequency.   Musculoskeletal: Negative for arthralgias and myalgias.   Neurological: Positive for weakness (generalized). Negative for dizziness, syncope, light-headedness, numbness and  headaches.   Hematological: Negative for adenopathy.   Psychiatric/Behavioral: Negative for agitation.     Objective:     Vital Signs (Most Recent):  Temp: 98.4 °F (36.9 °C) (04/10/17 0755)  Pulse: 69 (04/10/17 0755)  Resp: 18 (04/10/17 0755)  BP: (!) 173/83 (04/10/17 0755)  SpO2: 95 % (04/10/17 0755) Vital Signs (24h Range):  Temp:  [97.8 °F (36.6 °C)-99 °F (37.2 °C)] 98.4 °F (36.9 °C)  Pulse:  [68-80] 69  Resp:  [12-20] 18  SpO2:  [89 %-100 %] 95 %  BP: (133-188)/(73-90) 173/83     Weight: 64.2 kg (141 lb 8.6 oz)  Body mass index is 25.89 kg/(m^2).    Physical Exam   Constitutional: She is oriented to person, place, and time. She appears well-developed and well-nourished. No distress.   HENT:   Head: Normocephalic and atraumatic.   Mouth/Throat: Oropharynx is clear and moist. No oropharyngeal exudate.   Eyes: Conjunctivae are normal. Right eye exhibits no discharge. Left eye exhibits no discharge. No scleral icterus.   Neck: Neck supple. No JVD present. No tracheal deviation present.   Cardiovascular: Normal rate, regular rhythm, normal heart sounds and intact distal pulses.    Pulmonary/Chest: Effort normal and breath sounds normal. No respiratory distress. She has no wheezes. She has no rales.   diminished bases   Abdominal: Soft. Bowel sounds are normal. She exhibits no distension. There is no tenderness. There is no rebound and no guarding.   Musculoskeletal: Normal range of motion. She exhibits no edema, tenderness or deformity.   Neurological: She is alert and oriented to person, place, and time. No cranial nerve deficit.   Skin: Skin is warm and dry. She is not diaphoretic.   Psychiatric: She has a normal mood and affect.   Nursing note and vitals reviewed.       Significant Labs:   CBC:   Recent Labs  Lab 04/09/17  1528 04/10/17  0543   WBC 4.67 5.38   HGB 11.3* 10.3*   HCT 33.9* 30.8*    151     CMP:   Recent Labs  Lab 04/09/17  1528 04/10/17  0543    140   K 2.9* 3.3*   CL 99 103   CO2 31*  31*   * 85   BUN 15 15   CREATININE 1.7* 1.5*   CALCIUM 7.7* 7.5*   PROT 5.1*  --    ALBUMIN 2.2*  --    BILITOT 0.3  --    ALKPHOS 66  --    AST 42*  --    ALT 23  --    ANIONGAP 8 6*   EGFRNONAA 27* 32*     All pertinent labs within the past 24 hours have been reviewed.    Significant Imaging: I have reviewed all pertinent imaging results/findings within the past 24 hours.   Imaging Results         X-Ray Chest 1 View (Final result) Result time:  04/09/17 16:42:30    Final result by Trini Estrada MD (04/09/17 16:42:30)    Impression:     As above.      Electronically signed by: Trini Estrada MD  Date:     04/09/17  Time:    16:42     Narrative:    Chest, 1 view: There are small bilateral pleural effusions with adjacent passive atelectasis.  The heart is mildly enlarged.  Calcified atheromatous disease affects the aorta.  Left-sided pacemaker device is in place.  Age-appropriate degenerative changes affect the skeleton.            X-Ray Abdomen Flat And Erect (Final result) Result time:  04/09/17 16:00:39    Final result by Trini Estrada MD (04/09/17 16:00:39)    Impression:     As above.      Electronically signed by: Trini Estrada MD  Date:     04/09/17  Time:    16:00     Narrative:    Abdomen, flat and erect: Suture line is seen in the right midabdomen.  The bowel gas pattern is nonobstructive.  No free air is identified.  No abnormal masses are seen.  Calcified atheromatous disease affects the aorta and its branch vessels.  Left-sided pacemaker devices in place.  The heart is enlarged.  Age-appropriate degenerative changes affect the skeleton.

## 2017-04-10 NOTE — ASSESSMENT & PLAN NOTE
- Started on lasix 80 mg twice daily (rec'd 40 mg in ED), change to lasix 40 mg po BID  - replace Mg++ and K+  - Echo 3/9/17 EF 66%, Moderate MR, PAP 46mmHg, concentric remodeling.   - On Coreg 25 BID, Lisinopril 40 dialy, Aldactone 25 mg daily (was restarted by nephrology outpt, monitor for hyperkalemia) - at home

## 2017-04-10 NOTE — H&P
"[facility] Hospital Medicine  History & Physical    Patient Name: Deepali Stuart  MRN: [id number]  Admission Date: 4/9/2017  Attending Physician: Mesfin Lowery MD   Primary Care Provider: Vishal Salazar MD         Patient information was obtained from patient, past medical records and ER records.     Subjective:     Principal Problem:Acute on chronic diastolic heart failure    Chief Complaint:   Chief Complaint   Patient presents with    Abdominal Pain     pt with gas x 2 weeks. pt with  normal bowel movements.         HPI: 84 y/o female with diastolic CHF, A-fib, SSS s/p Pacemaker, CKDIII, HTN and DM2 presented to ED with complaint of abdominal pain, she describes as "fullness", "gas". No associated N/V. Last BM yesterday. Reports symptoms have been presented for the last 2 weeks. Took pepto bismol and tums without relief. While in ED, patient c/o of SOB (sats 89%) given dose of lasix and placed on O2. Denies CP, fever, chills, dysuria.     Recent hospitalization last month for CHF exacerbation. Currently abdominal exam is benign, denies SOB, CP or palpitations.       Past Medical History:   Diagnosis Date    Atrial fibrillation     chronic AF    CHF (congestive heart failure)     Diabetes insipidus     Diabetes mellitus type II     GERD (gastroesophageal reflux disease)     Hypertension     Iritis 12/10/2014    NPDR (nonproliferative diabetic retinopathy) 10/27/2014    PAF (paroxysmal atrial fibrillation)     Palpitations        Past Surgical History:   Procedure Laterality Date    CATARACT EXTRACTION W/  INTRAOCULAR LENS IMPLANT Right n/a    OD over 10 years ago     CATARACT EXTRACTION W/  INTRAOCULAR LENS IMPLANT Left 11/11/14    OS ()    COLON SURGERY      Partial colectomy secondary to suspicious polyp    HYSTERECTOMY      INSERT / REPLACE / REMOVE PACEMAKER      SC PM 2012 [facility]    LASER ABLATION      3 years ago OS       Review of patient's " allergies indicates:   Allergen Reactions    Plavix [clopidogrel]        No current facility-administered medications on file prior to encounter.      Current Outpatient Prescriptions on File Prior to Encounter   Medication Sig    amlodipine (NORVASC) 5 MG tablet Take 1 tablet (5 mg total) by mouth once daily.    atorvastatin (LIPITOR) 10 MG tablet TAKE 1 TABLET BY MOUTH EVERY DAY    bismuth subsalicylate (PEPTO BISMOL) 262 mg/15 mL suspension Take 15 mLs by mouth every 6 (six) hours as needed.    carvedilol (COREG) 25 MG tablet TAKE 1 TABLET BY MOUTH TWICE DAILY    furosemide (LASIX) 20 MG tablet Take 1 tablet (20 mg total) by mouth once daily.    levothyroxine (SYNTHROID) 50 MCG tablet TAKE 1 TABLET BY MOUTH EVERY DAY    lisinopril (PRINIVIL,ZESTRIL) 40 MG tablet TAKE 1 TABLET BY MOUTH EVERY DAY    ondansetron (ZOFRAN-ODT) 4 MG TbDL DISSOLVE ONE TABLET BY MOUTH EVERY 6 HOURS AS NEEDED FOR NAUSEA AND VOMITING    pantoprazole (PROTONIX) 20 MG tablet Take 1 tablet (20 mg total) by mouth once daily.    spironolactone (ALDACTONE) 25 MG tablet Take 1 tablet (25 mg total) by mouth once daily.    trazodone (DESYREL) 50 MG tablet TAKE 1/2 TO 1 TABLET BY MOUTH EVERY EVENING AS NEEDED FOR INSOMNIA    warfarin (COUMADIN) 5 MG tablet TAKE 1 TABLET BY MOUTH EVERY DAY OR AS DIRECTED BY COUMADIN CLINIC (Patient taking differently: TAKE 1/2 TABLET BY MOUTH EVERY DAY IN THE EVENING OR AS DIRECTED BY COUMADIN CLINIC)    blood sugar diagnostic (TRUETRACK TEST) Strp FOLLOW PACKAGE DIRECTIONS (Patient taking differently: 1 strip by Misc.(Non-Drug; Combo Route) route 2 (two) times daily. FOLLOW PACKAGE DIRECTIONS)    lancets Misc 1 Device by Misc.(Non-Drug; Combo Route) route once daily. (Patient taking differently: 1 Device by Misc.(Non-Drug; Combo Route) route 2 (two) times daily. )     Family History     Problem Relation (Age of Onset)    No Known Problems Mother, Father, Sister, Brother, Maternal Aunt, Maternal Uncle,  Paternal Aunt, Paternal Uncle, Maternal Grandmother, Maternal Grandfather, Paternal Grandmother, Paternal Grandfather        Social History Main Topics    Smoking status: Never Smoker    Smokeless tobacco: Never Used    Alcohol use No    Drug use: No    Sexual activity: No     Review of Systems   Constitutional: Negative for activity change, appetite change, chills, fever and unexpected weight change.   HENT: Negative for congestion, rhinorrhea, sore throat and trouble swallowing.    Eyes: Negative for photophobia and visual disturbance.   Respiratory: Negative for cough, choking, shortness of breath and wheezing.    Cardiovascular: Negative for chest pain, palpitations and leg swelling.   Gastrointestinal: Positive for nausea. Negative for abdominal distention, abdominal pain, constipation, diarrhea and vomiting.   Endocrine: Negative for polydipsia and polyuria.   Genitourinary: Negative for dysuria and frequency.   Musculoskeletal: Negative for arthralgias and myalgias.   Neurological: Positive for weakness (generalized). Negative for dizziness, syncope, light-headedness, numbness and headaches.   Hematological: Negative for adenopathy.   Psychiatric/Behavioral: Negative for agitation.     Objective:     Vital Signs (Most Recent):  Temp: 98.4 °F (36.9 °C) (04/10/17 0755)  Pulse: 69 (04/10/17 0755)  Resp: 18 (04/10/17 0755)  BP: (!) 173/83 (04/10/17 0755)  SpO2: 95 % (04/10/17 0755) Vital Signs (24h Range):  Temp:  [97.8 °F (36.6 °C)-99 °F (37.2 °C)] 98.4 °F (36.9 °C)  Pulse:  [68-80] 69  Resp:  [12-20] 18  SpO2:  [89 %-100 %] 95 %  BP: (133-188)/(73-90) 173/83     Weight: 64.2 kg (141 lb 8.6 oz)  Body mass index is 25.89 kg/(m^2).    Physical Exam   Constitutional: She is oriented to person, place, and time. She appears well-developed and well-nourished. No distress.   HENT:   Head: Normocephalic and atraumatic.   Mouth/Throat: Oropharynx is clear and moist. No oropharyngeal exudate.   Eyes: Conjunctivae are  normal. Right eye exhibits no discharge. Left eye exhibits no discharge. No scleral icterus.   Neck: Neck supple. No JVD present. No tracheal deviation present.   Cardiovascular: Normal rate, regular rhythm, normal heart sounds and intact distal pulses.    Pulmonary/Chest: Effort normal and breath sounds normal. No respiratory distress. She has no wheezes. She has no rales.   diminished bases   Abdominal: Soft. Bowel sounds are normal. She exhibits no distension. There is no tenderness. There is no rebound and no guarding.   Musculoskeletal: Normal range of motion. She exhibits no edema, tenderness or deformity.   Neurological: She is alert and oriented to person, place, and time. No cranial nerve deficit.   Skin: Skin is warm and dry. She is not diaphoretic.   Psychiatric: She has a normal mood and affect.   Nursing note and vitals reviewed.       Significant Labs:   CBC:   Recent Labs  Lab 04/09/17  1528 04/10/17  0543   WBC 4.67 5.38   HGB 11.3* 10.3*   HCT 33.9* 30.8*    151     CMP:   Recent Labs  Lab 04/09/17  1528 04/10/17  0543    140   K 2.9* 3.3*   CL 99 103   CO2 31* 31*   * 85   BUN 15 15   CREATININE 1.7* 1.5*   CALCIUM 7.7* 7.5*   PROT 5.1*  --    ALBUMIN 2.2*  --    BILITOT 0.3  --    ALKPHOS 66  --    AST 42*  --    ALT 23  --    ANIONGAP 8 6*   EGFRNONAA 27* 32*     All pertinent labs within the past 24 hours have been reviewed.    Significant Imaging: I have reviewed all pertinent imaging results/findings within the past 24 hours.   Imaging Results         X-Ray Chest 1 View (Final result) Result time:  04/09/17 16:42:30    Final result by Trini Estrada MD (04/09/17 16:42:30)    Impression:     As above.      Electronically signed by: Trini Estrada MD  Date:     04/09/17  Time:    16:42     Narrative:    Chest, 1 view: There are small bilateral pleural effusions with adjacent passive atelectasis.  The heart is mildly enlarged.  Calcified atheromatous disease affects the  aorta.  Left-sided pacemaker device is in place.  Age-appropriate degenerative changes affect the skeleton.            X-Ray Abdomen Flat And Erect (Final result) Result time:  04/09/17 16:00:39    Final result by Trini Estrada MD (04/09/17 16:00:39)    Impression:     As above.      Electronically signed by: Trini Estrada MD  Date:     04/09/17  Time:    16:00     Narrative:    Abdomen, flat and erect: Suture line is seen in the right midabdomen.  The bowel gas pattern is nonobstructive.  No free air is identified.  No abnormal masses are seen.  Calcified atheromatous disease affects the aorta and its branch vessels.  Left-sided pacemaker devices in place.  The heart is enlarged.  Age-appropriate degenerative changes affect the skeleton.            Assessment/Plan:     * Acute on chronic diastolic heart failure  - Started on lasix 80 mg twice daily (rec'd 40 mg in ED), change to lasix 40 mg po BID  - replace Mg++ and K+  - Echo 3/9/17 EF 66%, Moderate MR, PAP 46mmHg, concentric remodeling.   - On Coreg 25 BID, Lisinopril 40 dialy, Aldactone 25 mg daily (was restarted by nephrology outpt, monitor for hyperkalemia) - at home      Chronic atrial fibrillation  - rate controlled  - On Coumadin, INR 2.4, managed by [facility]  - CHADS2= 3    - has pacemaker      Diabetes type 2, controlled  - last A1c 5.6 (12/28/2016)  - was taken off metformin d/t CKD  - cont SSI/ Accuchecks  - monitor  - check A1c  POCT Glucose   Date Value Ref Range Status   04/10/2017 118 (H) 70 - 110 mg/dL Final   04/09/2017 170 (H) 70 - 110 mg/dL Final         Hypertension  - On Coreg 25 BID, Lisinopril 40 daily, Amlodipine 5 QD. Aldactone 25 mg daily  - elevated on admission, improving  - monitor      CKD (chronic kidney disease) stage 3, GFR 30-59 ml/min  - likely secondary to longstanding diabetes and HTN  - Her baseline creatinine appears to be around 1.2 to 1.4 with the most recent creatinine of 1.4  with an MDRD GFR of 35 mL   -  Serlogies negative with anca, sixto, complements. 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 Pruitt  - Cr 1.7 on admission, now 1.5  - monitor  - avoid nephrotoxins      Hypomagnesemia  - replace      VTE Risk Mitigation         Ordered     warfarin (COUMADIN) tablet 5 mg  Daily     Route:  Oral        04/09/17 2017     Medium Risk of VTE  Once      04/09/17 1835     Place sequential compression device  Until discontinued      04/09/17 1835        PT/OT      Theresa Dubose PA-C  Department of Hospital Medicine   [facility]

## 2017-04-10 NOTE — PROGRESS NOTES
Rima w/[facility] called to basil that Pt was admitted to the hosp at [facility] 4/09/17 w/abd issues

## 2017-04-10 NOTE — PLAN OF CARE
Problem: Physical Therapy Goal  Goal: Physical Therapy Goal  Goals to be met by: 17     Patient will increase functional independence with mobility by performin. Gait > 150 feet with modified independence using small base quad cane.   2. Ascend/descend 4 stairs with bilateral handrails with CGA with or without small base quad cane.  Outcome: Ongoing (interventions implemented as appropriate)  PT evaluation completed. Pt demonstrated fair activity tolerance including ambulation in room with small base quad cane and standing at sink to brush gums. SpO2 on room air trial: 95% at rest and 91% with activity. Will continue to follow and progress as tolerated. Please see progress note for detailed plan of care and recommendations (no DME needs, continue home health and  care).

## 2017-04-10 NOTE — PLAN OF CARE
04/10/17 1417   Discharge Assessment   Assessment Type Discharge Planning Assessment   Confirmed/corrected address and phone number on facesheet? Yes   Assessment information obtained from? Patient;Medical Record   Prior to hospitilization cognitive status: Alert/Oriented   Prior to hospitalization functional status: Assistive Equipment   Current cognitive status: Alert/Oriented   Current Functional Status: Assistive Equipment   Arrived From home or self-care   Lives With child(sam), adult   Able to Return to Prior Arrangements yes   Is patient able to care for self after discharge? Yes   How many people do you have in your home that can help with your care after discharge? 1   Patient currently being followed by outpatient case management? No   Patient currently receives home health services? Yes   Patient previously received home health services and would like to resume services if necessary? Yes   Does the patient currently use HME? Yes   On Dialysis? No   Discharge Plan A Home Health   Patient/Family In Agreement With Plan yes

## 2017-04-10 NOTE — ASSESSMENT & PLAN NOTE
- rate controlled  - On Coumadin, INR 2.4, managed by [facility]  - CHADS2= 3    - has pacemaker

## 2017-04-10 NOTE — ASSESSMENT & PLAN NOTE
- On Coreg 25 BID, Lisinopril 40 daily, Amlodipine 5 QD. Aldactone 25 mg daily  - elevated on admission, improving  - monitor

## 2017-04-10 NOTE — PLAN OF CARE
Problem: Patient Care Overview  Goal: Plan of Care Review  Outcome: Ongoing (interventions implemented as appropriate)  Patient on 2l/m NC.  SaO2 at 96%.  Will continue to monitor.

## 2017-04-10 NOTE — PROGRESS NOTES
Patient arrived to room 316 via stretcher and PCT transport, able to transfer self with assist. Orientated to room, hospital routine, and plan of care. All orders reviewed and questions/concerns addressed, patient verbalized understanding. VSS on 2 Liters O2, no complaints of pain or discomfort. Bedside commode placed for ease of toileting.   Bed in low position, wheels locked and call light within reach, will continue to monitor.

## 2017-04-10 NOTE — ASSESSMENT & PLAN NOTE
- likely secondary to longstanding diabetes and HTN  - Her baseline creatinine appears to be around 1.2 to 1.4 with the most recent creatinine of 1.4  with an MDRD GFR of 35 mL   - Serlogies negative with anca, sixto, complements. 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 Pruitt  - Cr 1.7 on admission, now 1.5  - monitor  - avoid nephrotoxins

## 2017-04-10 NOTE — PLAN OF CARE
Problem: Patient Care Overview  Goal: Plan of Care Review  Outcome: Ongoing (interventions implemented as appropriate)  Received patient on NC 2 lpm sat's 96% in no distress. Will continue to monitor.

## 2017-04-10 NOTE — PLAN OF CARE
Problem: Patient Care Overview  Goal: Plan of Care Review  Outcome: Ongoing (interventions implemented as appropriate)  Pt remains free from falls and injury. Pt resting in bed with no complaints at this time. Plan of care reviewed with the pt. Pt voids spontaneously without difficulty with standby assist to the BSC. Oxygen weaned to 1 L O2. Purposeful rounding performed. Bed in lowest position. Call light in reach. Will continue to monitor.

## 2017-04-10 NOTE — ED NOTES
Pt resting on stretcher with family at bedside. Pt denies pain. Pt assisted to commode and back to bed. Respirations even and unlabored, no distress noted. Will continue to monitor.

## 2017-04-10 NOTE — ASSESSMENT & PLAN NOTE
- last A1c 5.6 (12/28/2016)  - was taken off metformin d/t CKD  - cont SSI/ Accuchecks  - monitor  - check A1c  POCT Glucose   Date Value Ref Range Status   04/10/2017 118 (H) 70 - 110 mg/dL Final   04/09/2017 170 (H) 70 - 110 mg/dL Final

## 2017-04-10 NOTE — PT/OT/SLP EVAL
"Physical Therapy  Evaluation and Treatment    Deepali Stuart   MRN: [id number]   Admitting Diagnosis: Acute on chronic diastolic heart failure    PT Received On: 04/10/17  PT Start Time: 1353     PT Stop Time: 1420    PT Total Time (min): 27 min       Billable Minutes:  Evaluation 12 and Therapeutic Activity 14    Diagnosis: Acute on chronic diastolic heart failure      Past Medical History:   Diagnosis Date    Atrial fibrillation     chronic AF    CHF (congestive heart failure)     Diabetes insipidus     Diabetes mellitus type II     GERD (gastroesophageal reflux disease)     Hypertension     Iritis 12/10/2014    NPDR (nonproliferative diabetic retinopathy) 10/27/2014    PAF (paroxysmal atrial fibrillation)     Palpitations       Past Surgical History:   Procedure Laterality Date    CATARACT EXTRACTION W/  INTRAOCULAR LENS IMPLANT Right n/a    OD over 10 years ago     CATARACT EXTRACTION W/  INTRAOCULAR LENS IMPLANT Left 11/11/14    OS ()    COLON SURGERY      Partial colectomy secondary to suspicious polyp    HYSTERECTOMY      INSERT / REPLACE / REMOVE PACEMAKER      [facility] PM 2012 [email or web address]    LASER ABLATION      3 years ago OS       Referring physician: DAVID Dubose  Date referred to PT: 4/10/17    General Precautions: Standard, aspiration, fall  Orthopedic Precautions: N/A   Braces: N/A       Do you have any cultural, spiritual, Pentecostalism conflicts, given your current situation?: none specified    Patient History:  Living Environment Comment: Per previous admission PT evaluation 3/8/17: "Pt lives with her daughter who works days. She lives in a 1 story house with 4 steps to enter into kitchen with bilateral handrails. She has a tub/shower that she does not use anymore for bathing in bottom of tub since she is unable to get out of tub with daughter's assist anymore. She has been using her walk-in shower with shower chair for seated bathing with intermittent supervision from daughter. She " "ambulates prn with rollator in halls in house. She ambulates in the community with a single point cane most of the time. She has a caregiver that comes at 7:30 am to assist with cleaning her room "only", meal prep, and walking her to the bus stop so she can go to [facility] for adult . Her daughter assists with grocery shopping. She wears adult diapers daily and uses her BSC at bedside at night. She has a  rolling walker and a manual w/c she does not use. She is independent with dressing and ricardo-care. " Pt and daugther present and report information is accurate, although patient now has a sitter during the day when her daughter is at work. She no longer attends  daily, only days when she is "feeling good." Daughter assists with cooking and cleaning. Pt's cane is a quad cane, which was present in room during session.  Equipment Currently Used at Home: bedside commode, shower chair, cane, quad, rollator  DME owned (not currently used): rolling walker, manual w/c    Previous Level of Function:  Ambulation Skills: needs device  Transfer Skills: needs device and assist  ADL Skills: needs device and assist  Work/Leisure Activity: needs device and assist    Subjective:  Communicated with nurse prior to session.  Pt minimally conversant. "Can we stay in the room?" Requesting to brush gums at sink during session.     Chief Complaint: no complaints during session.   Patient goals: brush gums    Pain Ratin/10   Pain Rating Post-Intervention: 0/10    Objective:   Patient found with: oxygen (1L via nasal cannula) supine in bed with HOB elevated.      Cognitive Exam:  Oriented to: Person, Place, Time and Situation    Follows Commands/attention: Follows multistep  commands  Communication: clear/fluent  Safety awareness/insight to disability: intact    Physical Exam:  Postural examination/scapula alignment: Rounded shoulder and Kyphosis    Skin integrity: Visible skin intact  Edema: Mild bilateral " LEs    Sensation:   Denied paresthesias    Upper Extremity Range of Motion:  Right Upper Extremity: WFL  Left Upper Extremity: WFL    Upper Extremity Strength:  Right Upper Extremity: WFL  Left Upper Extremity: WFL    Lower Extremity Range of Motion:  Right Lower Extremity: WFL  Left Lower Extremity: WFL    Lower Extremity Strength:  Right Lower Extremity: WFL  Left Lower Extremity: WFL     No coordination or tone impairments identified.      Functional Mobility:  Bed Mobility:  Supine to Sit: Modified Independent  Sit to Supine: Modified Independent    Transfers:  Sit <> Stand Assistance: Supervision (x 2 trials)  Sit <> Stand Assistive Device: Quad Cane    Gait:   Gait Distance: x 20 ft in room, pt requesting not to leave room to ambulate in halls, small base quad cane in R UE  Assistance 1: Stand by Assistance  Gait Assistive Device: Small base quad cane  Gait Pattern: modified two point gait      Balance:   Static Sit: NORMAL: No deviations seen in posture held statically  Dynamic Sit: NORMAL: No deviations seen in posture held dynamically  Static Stand: GOOD: Takes MODERATE challenges from all directions  Dynamic stand: FAIR: Needs CONTACT GUARD during gait with no UE support, SBA with UE support. SBA for reaching and grasping while standing at sink to brush gums.     Therapeutic Activities and Exercises:  Education on PT plan of care and activity with room air trial     SpO2 on room air: 95% at rest, 91% with activity, 93% recovery after 2 minutes        AM-PAC 6 CLICK MOBILITY  How much help from another person does this patient currently need?   1 = Unable, Total/Dependent Assistance  2 = A lot, Maximum/Moderate Assistance  3 = A little, Minimum/Contact Guard/Supervision  4 = None, Modified [place]/Independent    Turning over in bed (including adjusting bedclothes, sheets and blankets)?: 4  Sitting down on and standing up from a chair with arms (e.g., wheelchair, bedside commode, etc.): 3  Moving from  lying on back to sitting on the side of the bed?: 4  Moving to and from a bed to a chair (including a wheelchair)?: 3  Need to walk in hospital room?: 3  Climbing 3-5 steps with a railing?: 3  Total Score: 20     AM-PAC Raw Score CMS G-Code Modifier Level of Impairment Assistance   6 % Total / Unable   7 - 9 CM 80 - 100% Maximal Assist   10 - 14 CL 60 - 80% Moderate Assist   15 - 19 CK 40 - 60% Moderate Assist   20 - 22 CJ 20 - 40% Minimal Assist   23 CI 1-20% SBA / CGA   24 CH 0% Independent/ Mod I     Patient left supine with all lines intact, call button in reach, nurse notified and daughter present.    Assessment:   Deepali Stuart is a 85 y.o. female with a medical diagnosis of Acute on chronic diastolic heart failure. PT evaluation completed. Pt demonstrated fair activity tolerance including ambulation in room with small base quad cane and standing at sink to brush gums. SpO2 on room air trial: 95% at rest and 91% with activity. Pt has decreased independence with functional mobility presenting with below impairments. Pt would benefit from continued skilled PT to maximize independence and safety with functional mobility.      Rehab identified problem list/impairments: Rehab identified problem list/impairments: impaired self care skills, impaired functional mobilty, impaired endurance    Rehab potential is good.    Activity tolerance: Fair    Discharge recommendations: Discharge Facility/Level Of Care Needs: home health PT, home health OT (Pt reports she was receiving home health PTA and would like to resume )     Barriers to discharge: Barriers to Discharge: None    Equipment recommendations: Equipment Needed After Discharge: none     GOALS:   Physical Therapy Goals        Problem: Physical Therapy Goal    Goal Priority Disciplines Outcome Goal Variances Interventions   Physical Therapy Goal     PT/OT, PT Ongoing (interventions implemented as appropriate)     Description:  Goals to be met by: 4/20/17      Patient will increase functional independence with mobility by performin. Gait > 150 feet with modified independence using small base quad cane.   2. Ascend/descend 4 stairs with bilateral handrails with CGA with or without small base quad cane.                PLAN:    Patient to be seen 6 x/week to address the above listed problems via therapeutic activities, gait training, therapeutic exercises, neuromuscular re-education  Plan of Care expires: 05/10/17  Plan of Care reviewed with: patient, daughter          Sasha Evans, PT  04/10/2017

## 2017-04-11 VITALS
SYSTOLIC BLOOD PRESSURE: 178 MMHG | OXYGEN SATURATION: 97 % | WEIGHT: 141.56 LBS | HEIGHT: 62 IN | TEMPERATURE: 98 F | BODY MASS INDEX: 26.05 KG/M2 | DIASTOLIC BLOOD PRESSURE: 83 MMHG | HEART RATE: 72 BPM | RESPIRATION RATE: 16 BRPM

## 2017-04-11 LAB
ANION GAP SERPL CALC-SCNC: 6 MMOL/L
BUN SERPL-MCNC: 14 MG/DL
CALCIUM SERPL-MCNC: 7.8 MG/DL
CHLORIDE SERPL-SCNC: 102 MMOL/L
CO2 SERPL-SCNC: 32 MMOL/L
CREAT SERPL-MCNC: 1.7 MG/DL
EST. GFR  (AFRICAN AMERICAN): 31 ML/MIN/1.73 M^2
EST. GFR  (NON AFRICAN AMERICAN): 27 ML/MIN/1.73 M^2
GLUCOSE SERPL-MCNC: 97 MG/DL
INR PPP: 2
MAGNESIUM SERPL-MCNC: 1.5 MG/DL
POCT GLUCOSE: 122 MG/DL (ref 70–110)
POTASSIUM SERPL-SCNC: 3.7 MMOL/L
PROTHROMBIN TIME: 21.3 SEC
SODIUM SERPL-SCNC: 140 MMOL/L

## 2017-04-11 PROCEDURE — 97802 MEDICAL NUTRITION INDIV IN: CPT

## 2017-04-11 PROCEDURE — 99900035 HC TECH TIME PER 15 MIN (STAT)

## 2017-04-11 PROCEDURE — 80048 BASIC METABOLIC PNL TOTAL CA: CPT

## 2017-04-11 PROCEDURE — 99239 HOSP IP/OBS DSCHRG MGMT >30: CPT | Mod: ,,,

## 2017-04-11 PROCEDURE — 25000003 PHARM REV CODE 250: Performed by: HOSPITALIST

## 2017-04-11 PROCEDURE — 85610 PROTHROMBIN TIME: CPT

## 2017-04-11 PROCEDURE — 25000003 PHARM REV CODE 250

## 2017-04-11 PROCEDURE — 25000003 PHARM REV CODE 250: Performed by: INTERNAL MEDICINE

## 2017-04-11 PROCEDURE — 36415 COLL VENOUS BLD VENIPUNCTURE: CPT

## 2017-04-11 PROCEDURE — 27000221 HC OXYGEN, UP TO 24 HOURS

## 2017-04-11 PROCEDURE — 83735 ASSAY OF MAGNESIUM: CPT

## 2017-04-11 RX ORDER — FUROSEMIDE 40 MG/1
40 TABLET ORAL DAILY
Status: DISCONTINUED | OUTPATIENT
Start: 2017-04-11 | End: 2017-04-11 | Stop reason: HOSPADM

## 2017-04-11 RX ORDER — LANOLIN ALCOHOL/MO/W.PET/CERES
1200 CREAM (GRAM) TOPICAL ONCE
Status: COMPLETED | OUTPATIENT
Start: 2017-04-11 | End: 2017-04-11

## 2017-04-11 RX ORDER — LISINOPRIL 20 MG/1
20 TABLET ORAL DAILY
Qty: 90 TABLET | Refills: 0 | Status: SHIPPED | OUTPATIENT
Start: 2017-04-11 | End: 2018-01-08 | Stop reason: SDUPTHER

## 2017-04-11 RX ADMIN — FUROSEMIDE 40 MG: 40 TABLET ORAL at 09:04

## 2017-04-11 RX ADMIN — PANTOPRAZOLE SODIUM 40 MG: 40 TABLET, DELAYED RELEASE ORAL at 09:04

## 2017-04-11 RX ADMIN — SPIRONOLACTONE 25 MG: 25 TABLET, FILM COATED ORAL at 09:04

## 2017-04-11 RX ADMIN — CARVEDILOL 25 MG: 12.5 TABLET, FILM COATED ORAL at 09:04

## 2017-04-11 RX ADMIN — ATORVASTATIN CALCIUM 10 MG: 10 TABLET, FILM COATED ORAL at 09:04

## 2017-04-11 RX ADMIN — AMLODIPINE BESYLATE 5 MG: 5 TABLET ORAL at 09:04

## 2017-04-11 RX ADMIN — LISINOPRIL 40 MG: 20 TABLET ORAL at 09:04

## 2017-04-11 RX ADMIN — LEVOTHYROXINE SODIUM 50 MCG: 50 TABLET ORAL at 05:04

## 2017-04-11 RX ADMIN — Medication 1200 MG: at 11:04

## 2017-04-11 NOTE — PLAN OF CARE
Problem: Patient Care Overview  Goal: Plan of Care Review  Outcome: Ongoing (interventions implemented as appropriate)  Recommendations  1. Continue current diet   2. Pt requested information on low sodium diet (handout provided with RD contact information)  3. RD to monitor  Goals: PO intake >50% meals  Nutrition Goal Status: new  Communication of RD Recs: other (comment) (sticky note)     Continuum of Care Plan      D/C planning: no nutrition discharge needs identified at this time

## 2017-04-11 NOTE — PLAN OF CARE
Problem: Patient Care Overview  Goal: Plan of Care Review  Outcome: Ongoing (interventions implemented as appropriate)              Patient rested comfortably throughout shift, free of falls/trauma or other adverse outcome.  VSS on 1 Liter O2, no complaints of pain or nausea.  Reviewed plan of care and all questions/concerns addressed, patient verbalized understanding.  Patient turns self and transfers to bedside commode, voids freely.  Bed in low position, wheels locked and call light within reach, will continue to monitor.

## 2017-04-11 NOTE — DISCHARGE SUMMARY
"[facility] Hospital Medicine  Discharge Summary      Patient Name: Deepali Stuart  MRN: [id number]  Admission Date: 4/9/2017  Hospital Length of Stay: 2 days  Discharge Date and Time:  04/11/2017 10:46 AM  Attending Physician: Tim Lowery MD   Discharging Provider: Ezekiel Colunga PA-C  Primary Care Provider: Vishal Salazar MD      HPI:   86 y/o female with diastolic CHF, A-fib, SSS s/p Pacemaker, CKDIII, HTN and DM2 presented to ED with complaint of abdominal pain, she describes as "fullness", "gas". No associated N/V. Last BM yesterday. Reports symptoms have been presented for the last 2 weeks. Took pepto bismol and tums without relief. While in ED, patient c/o of SOB (sats 89%) given dose of lasix and placed on O2. Denies CP, fever, chills, dysuria.     Recent hospitalization last month for CHF exacerbation. Currently abdominal exam is benign, denies SOB, CP or palpitations.       * No surgery found *      Indwelling Lines/Drains at time of discharge:   Lines/Drains/Airways          No matching active lines, drains, or airways        Hospital Course:   Admitted with gas pain in abdomen, now resolved.  Exam is benign.    Lungs clear, no hypoxia on room air  Slight bump in creatinine, will lower lisinopril to 20mg at discharge, keep lasix at 20mg, continue aldatone  Has home nursing already  See pcp 3 days       Consults:   Consults         Status Ordering Provider     Inpatient consult to Dietary  Once     Provider:  (Not yet assigned)    Completed TIM LOWERY          Significant Diagnostic Studies: Labs:   BMP:   Recent Labs  Lab 04/09/17  1528 04/10/17  0543 04/11/17  0548   * 85 97    140 140   K 2.9* 3.3* 3.7   CL 99 103 102   CO2 31* 31* 32*   BUN 15 15 14   CREATININE 1.7* 1.5* 1.7*   CALCIUM 7.7* 7.5* 7.8*   MG  --  1.3* 1.5*   , CMP   Recent Labs  Lab 04/09/17  1528 04/10/17  0543 04/11/17  0548    140 140   K 2.9* 3.3* 3.7   CL 99 103 102   CO2 " 31* 31* 32*   * 85 97   BUN 15 15 14   CREATININE 1.7* 1.5* 1.7*   CALCIUM 7.7* 7.5* 7.8*   PROT 5.1*  --   --    ALBUMIN 2.2*  --   --    BILITOT 0.3  --   --    ALKPHOS 66  --   --    AST 42*  --   --    ALT 23  --   --    ANIONGAP 8 6* 6*   ESTGFRAFRICA 31* 36* 31*   EGFRNONAA 27* 32* 27*   , CBC   Recent Labs  Lab 04/09/17  1528 04/10/17  0543   WBC 4.67 5.38   HGB 11.3* 10.3*   HCT 33.9* 30.8*    151   , INR   Lab Results   Component Value Date    INR 2.0 (H) 04/11/2017    INR 2.4 (H) 04/10/2017    INR 2.2 03/27/2017   , Troponin No results for input(s): TROPONINI in the last 168 hours. and A1C:   Recent Labs  Lab 11/17/16  0632 12/28/16  0800   HGBA1C 5.7 5.6       Pending Diagnostic Studies:     None        Final Active Diagnoses:    Diagnosis Date Noted POA    PRINCIPAL PROBLEM:  Acute on chronic diastolic heart failure [I50.33] 03/07/2017 Yes    Epigastric pain [R10.13] 04/10/2017 Yes    Hypokalemia [E87.6] 04/10/2017 Yes    Hypomagnesemia [E83.42] 03/07/2017 Yes    CKD (chronic kidney disease) stage 3, GFR 30-59 ml/min [N18.3] 12/16/2015 Yes    Hypertension [I10]  Yes    Hypothyroidism [E03.9] 01/02/2013 Yes    Diabetes type 2, controlled [E11.9] 11/12/2012 Yes    Chronic atrial fibrillation [I48.2] 11/12/2012 Yes      Problems Resolved During this Admission:    Diagnosis Date Noted Date Resolved POA      * Acute on chronic diastolic heart failure  - Started on lasix 80 mg twice daily (rec'd 40 mg in ED), change to lasix 40 mg po BID  - replace Mg++ and K+  - Echo 3/9/17 EF 66%, Moderate MR, PAP 46mmHg, concentric remodeling.   - On Coreg 25 BID, Lisinopril 40 dialy, Aldactone 25 mg daily (was restarted by nephrology outpt, monitor for hyperkalemia) - at home      Chronic atrial fibrillation  - rate controlled  - On Coumadin, INR 2.4, managed by [facility]  - CHADS2= 3    - has pacemaker      Diabetes type 2, controlled  - last A1c 5.6 (12/28/2016)  - was taken off metformin  d/t CKD  - cont SSI/ Accuchecks  - monitor  - check A1c  POCT Glucose   Date Value Ref Range Status   04/11/2017 122 (H) 70 - 110 mg/dL Final   04/10/2017 132 (H) 70 - 110 mg/dL Final   04/10/2017 194 (H) 70 - 110 mg/dL Final   04/10/2017 156 (H) 70 - 110 mg/dL Final   04/10/2017 118 (H) 70 - 110 mg/dL Final   04/09/2017 170 (H) 70 - 110 mg/dL Final         Hypothyroidism  On synthroid, t4 ok      Hypertension  - On Coreg 25 BID, Lisinopril 40 daily, Will lower to 20mg at DC.   Amlodipine 5 QD. Aldactone 25 mg daily  - elevated on admission, improving  - monitor      CKD (chronic kidney disease) stage 3, GFR 30-59 ml/min  - likely secondary to longstanding diabetes and HTN  - Her baseline creatinine appears to be around 1.2 to 1.4 with the most recent creatinine of 1.4  with an MDRD GFR of 35 mL   - Serlogies negative with anca, sixto, complements. 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 Pruitt  - Cr 1.7 on admission, now 1.5  - monitor  - avoid nephrotoxins      Epigastric pain  Resolved, benign abdominal exam      Hypokalemia  replete        Discharged Condition: good    Disposition: Home or Self Care    Follow Up:  Follow-up Information     Follow up with Vishal Salazar MD. Schedule an appointment as soon as possible for a visit in 3 days.    Specialty:  Family Medicine    Contact information:    [address] [facility] [id number] [phone]          Patient Instructions:     Diet general   Order Specific Question Answer Comments   Na restriction, if any: 2gNa      Activity as tolerated       Medications:  Reconciled Home Medications:   Current Discharge Medication List      CONTINUE these medications which have CHANGED    Details   lisinopril (PRINIVIL,ZESTRIL) 20 MG tablet Take 1 tablet (20 mg total) by mouth once daily.  Qty: 90 tablet, Refills: 0         CONTINUE these medications which have NOT CHANGED    Details   amlodipine (NORVASC) 5 MG tablet Take 1 tablet (5 mg  total) by mouth once daily.  Qty: 90 tablet, Refills: 3    Comments: **Patient requests 90 days supply**      atorvastatin (LIPITOR) 10 MG tablet TAKE 1 TABLET BY MOUTH EVERY DAY  Qty: 90 tablet, Refills: 3      bismuth subsalicylate (PEPTO BISMOL) 262 mg/15 mL suspension Take 15 mLs by mouth every 6 (six) hours as needed.      carvedilol (COREG) 25 MG tablet TAKE 1 TABLET BY MOUTH TWICE DAILY  Qty: 180 tablet, Refills: 3    Comments: **Patient requests 90 days supply**      furosemide (LASIX) 20 MG tablet Take 1 tablet (20 mg total) by mouth once daily.  Qty: 30 tablet, Refills: 11    Associated Diagnoses: Persistent atrial fibrillation      levothyroxine (SYNTHROID) 50 MCG tablet TAKE 1 TABLET BY MOUTH EVERY DAY  Qty: 90 tablet, Refills: 3      ondansetron (ZOFRAN-ODT) 4 MG TbDL DISSOLVE ONE TABLET BY MOUTH EVERY 6 HOURS AS NEEDED FOR NAUSEA AND VOMITING  Qty: 21 tablet, Refills: 0      pantoprazole (PROTONIX) 20 MG tablet Take 1 tablet (20 mg total) by mouth once daily.  Qty: 30 tablet, Refills: 0      spironolactone (ALDACTONE) 25 MG tablet Take 1 tablet (25 mg total) by mouth once daily.  Qty: 30 tablet, Refills: 4      trazodone (DESYREL) 50 MG tablet TAKE 1/2 TO 1 TABLET BY MOUTH EVERY EVENING AS NEEDED FOR INSOMNIA  Qty: 90 tablet, Refills: 3    Comments: **Patient requests 90 days supply**      warfarin (COUMADIN) 5 MG tablet TAKE 1 TABLET BY MOUTH EVERY DAY OR AS DIRECTED BY COUMADIN CLINIC  Qty: 45 tablet, Refills: 11      blood sugar diagnostic (TRUETRACK TEST) Strp FOLLOW PACKAGE DIRECTIONS  Qty: 100 strip, Refills: 11    Associated Diagnoses: Persistent atrial fibrillation; Weakness; SOB (shortness of breath); Controlled type 2 diabetes mellitus without complication, without long-term current use of insulin; Hypothyroidism due to acquired atrophy of thyroid; Essential hypertension; Pacemaker; Vitamin D deficiency disease; Thrombocytopenia; Malnutrition; Acute on chronic combined systolic and diastolic  HF (heart failure); Chronic pulmonary edema; Type 2 diabetes mellitus without retinopathy; CKD (chronic kidney disease) stage 3, GFR 30-59 ml/min; Anxiety; Proteinuria; Benign hypertensive heart and CKD, 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), w CHF      lancets Misc 1 Device by Misc.(Non-Drug; Combo Route) route once daily.  Qty: 100 each, Refills: 3    Associated Diagnoses: Diabetes type 2, controlled           Time spent on the discharge of patient: 45   minutes    Ezekiel Colunga PA-C  Department of Hospital Medicine  [facility]

## 2017-04-11 NOTE — PLAN OF CARE
Problem: Patient Care Overview  Goal: Plan of Care Review  Outcome: Ongoing (interventions implemented as appropriate)  Received patient on NC 0.5 lpm sat's 96% in noted distress. Will continue to monitor.

## 2017-04-11 NOTE — PROGRESS NOTES
Discharge Planning:  Patient admitted on 4-9-17  LOS-day 2  Chart reviewed  Care plan discussed    Discussed care plan with treatment team  Discussed care plan with the attending Dr Lowery  Current dispo , home today, home health to resume (total hh)  Case management to follow  Consults following are: case mgt., PT.  LACE score 81, case mgt will follow per protocol.

## 2017-04-11 NOTE — PROGRESS NOTES
Physical Therapy Discharge Summary    Deepali Stuart  MRN: [id number]   Acute on chronic diastolic heart failure   Patient Discharged from acute Physical Therapy on 17. Attempted to see patient today approximately 1130. She was dressed with daughter present, declining PT in preparation for discharge home.   Please refer to prior PT noted date on 4/10/17 for functional status.     Assessment:   Patient has not met goals.  GOALS:   Physical Therapy Goals     Not on file      Multidisciplinary Problems (Resolved)        Problem: Physical Therapy Goal    Goal Priority Disciplines Outcome Goal Variances Interventions   Physical Therapy Goal   (Resolved)     PT/OT, PT Outcome(s) achieved     Description:  Goals to be met by: 17     Patient will increase functional independence with mobility by performin. Gait > 150 feet with modified independence using small base quad cane.   2. Ascend/descend 4 stairs with bilateral handrails with CGA with or without small base quad cane.              Reasons for Discontinuation of Therapy Services  Transfer to alternate level of care.      Plan:  Patient Discharged to: Home with Home Health Service.

## 2017-04-11 NOTE — CONSULTS
[facility]  Adult Nutrition  Consult Note    SUMMARY     Recommendations  1. Continue current diet   2. Pt requested information on low sodium diet (handout provided with RD contact information)  3. RD to monitor  Goals: PO intake >50% meals  Nutrition Goal Status: new  Communication of RD Recs: other (comment) (sticky note)    Continuum of Care Plan     D/C planning: no nutrition discharge needs identified at this time    Reason for Assessment  Reason for Assessment: nurse/nurse practitioner consult (reduced appetite, oral intake)  Diagnosis:  (acute on chronic diastolic heart failure)  Relevent Medical History: CHF, DM type 2, GERD, HTN, CKD 3   Interdisciplinary Rounds: did not attend    Nutrition Prescription Ordered  Current Diet Order: Cardiac  Oral Nutrition Supplement: none     Evaluation of Received Nutrients/Fluid Intake  Intake not documented. Pt received breakfast at time of visit     Nutrition Risk Screen   Nutrition Risk Screen: no indicators present    Nutrition/Diet History  Patient Reported Diet/Restrictions/Preferences: low salt  Typical Food/Fluid Intake: fair appetite  Food Preferences: no cultural or Baptism food preferences identified   Factors Affecting Nutritional Intake:  (none)   Pt states her dtr cooks for her       Labs/Tests/Procedures/Meds  Pertinent Labs Reviewed: reviewed, pertinent  Pertinent Labs Comments: Mg 1.5  Pertinent Medications Reviewed: reviewed, pertinent  Pertinent Medications Comments: atorvastatin, magnesium sulfate, spironolacton, warfarin    Physical Findings  Overall Physical Appearance:  (advanced age)  Oral/Mouth Cavity: tooth/teeth missing, no dental appliances present  Skin: intact    Anthropometrics  Height (inches): 62.01 in  Weight Method: Bed Scale  Weight (kg): 64.2 kg  Ideal Body Weight (IBW), Female: 110.05 lb  % Ideal Body Weight, Female (lb): 128.61 lb  BMI (kg/m2): 25.88  BMI Grade: 25 - 29.9 - overweight  Usual Body Weight (UBW),  k.45 kg  % Usual Body Weight: 98.09       Estimated/Assessed Needs  Weight Used For Calorie Calculations: 64.2 kg (141 lb 8.6 oz)   Height (cm): 157.5 cm  Energy Need Method: Whittington-St Jeor (1255kcal/day (X 1.2))  RMR (Whittington-St. Jeor Equation): 1046.53  Weight Used For Protein Calculations: 64.2 kg (141 lb 8.6 oz)  Protein Requirements: 52g/day  0.8 gm Protein (gm): 51.47  Fluid Need Method: RDA Method (1 ml per kcal or per MD)         Monitor and Evaluation  Food and Nutrient Intake: food and beverage intake, energy intake  Food and Nutrient Adminstration: diet order  Physical Activity and Function: nutrition-related ADLs and IADLs  Anthropometric Measurements: weight change, weight  Biochemical Data, Medical Tests and Procedures: electrolyte and renal panel, glucose/endocrine profile  Nutrition-Focused Physical Findings: overall appearance    Nutrition Risk    Level of Risk: other (see comments) (follow up once weekly)    Nutrition Follow-Up  yes       Assessment and Plan    No nutrition discharge needs identified at this time

## 2017-04-11 NOTE — ASSESSMENT & PLAN NOTE
- last A1c 5.6 (12/28/2016)  - was taken off metformin d/t CKD  - cont SSI/ Accuchecks  - monitor  - check A1c  POCT Glucose   Date Value Ref Range Status   04/11/2017 122 (H) 70 - 110 mg/dL Final   04/10/2017 132 (H) 70 - 110 mg/dL Final   04/10/2017 194 (H) 70 - 110 mg/dL Final   04/10/2017 156 (H) 70 - 110 mg/dL Final   04/10/2017 118 (H) 70 - 110 mg/dL Final   04/09/2017 170 (H) 70 - 110 mg/dL Final

## 2017-04-11 NOTE — ASSESSMENT & PLAN NOTE
- rate controlled  - On Coumadin, INR 2.4, managed by [facility]  - CHADS2= 3    - has pacemaker

## 2017-04-11 NOTE — PLAN OF CARE
Problem: Patient Care Overview  Goal: Plan of Care Review  Outcome: Ongoing (interventions implemented as appropriate)  Good saturation on minimal O2 flow.

## 2017-04-11 NOTE — PLAN OF CARE
[facility]    HOME HEALTH ORDERS  FACE TO FACE ENCOUNTER    Patient Name: Deepali Stuart  YOB: 1932    PCP: Vishal Salazar MD   PCP Address: [address] [facility] / [place]  PCP Phone Number: [phone]  PCP Fax: [phone]    Encounter Date: 04/11/2017    Admit to Home Health    Diagnoses:  Active Hospital Problems    Diagnosis  POA    *Acute on chronic diastolic heart failure [I50.33]  Yes    Epigastric pain [R10.13]  Yes    Hypokalemia [E87.6]  Yes    Hypomagnesemia [E83.42]  Yes    CKD (chronic kidney disease) stage 3, GFR 30-59 ml/min [N18.3]  Yes    Hypertension [I10]  Yes    Hypothyroidism [E03.9]  Yes    Diabetes type 2, controlled [E11.9]  Yes    Chronic atrial fibrillation [I48.2]  Yes      Resolved Hospital Problems    Diagnosis Date Resolved POA   No resolved problems to display.       Future Appointments  Date Time Provider Department Center   4/28/2017 1:00 PM TELEPHONE CHECK, [facility] [address]     Follow-up Information     Follow up with Vishal Salazar MD. Schedule an appointment as soon as possible for a visit in 3 days.    Specialty:  Family Medicine    Contact information:    [address] [facility]  [place] [address]  [phone]              I have seen and examined this patient face to face today. My clinical findings that support the need for the home health skilled services and home bound status are the following:  Weakness/numbness causing balance and gait disturbance due to Heart Failure making it taxing to leave home.    Allergies:  Review of patient's allergies indicates:   Allergen Reactions    Plavix [clopidogrel]        Diet: cardiac diet    Activities: activity as tolerated    Nursing:   SN to complete comprehensive assessment including routine vital signs. Instruct on disease process and s/s of complications to report to MD. Review/verify medication list sent home with the patient at time of discharge   and instruct patient/caregiver as needed. Frequency may be adjusted depending on start of care date.    Notify MD if SBP > 160 or < 90; DBP > 90 or < 50; HR > 120 or < 50; Temp > 101; Other:         CONSULTS:    Physical Therapy to evaluate and treat. Evaluate for home safety and equipment needs; Establish/upgrade home exercise program. Perform / instruct on therapeutic exercises, gait training, transfer training, and Range of Motion.  Occupational Therapy to evaluate and treat. Evaluate home environment for safety and equipment needs. Perform/Instruct on transfers, ADL training, ROM, and therapeutic exercises.    Labs:  Draw blood for PT/INR on 4/13/2017 and report result to [facility] and repeat INR checks as per warfarin clinic.    Medications: Review discharge medications with patient and family and provide education.      Discharge Medication List as of 4/11/2017 11:59 AM      CONTINUE these medications which have CHANGED    Details   lisinopril (PRINIVIL,ZESTRIL) 20 MG tablet Take 1 tablet (20 mg total) by mouth once daily., Starting 4/11/2017, Until Wed 4/11/18, Normal         CONTINUE these medications which have NOT CHANGED    Details   amlodipine (NORVASC) 5 MG tablet Take 1 tablet (5 mg total) by mouth once daily., Starting 6/30/2016, Until Discontinued, Normal      atorvastatin (LIPITOR) 10 MG tablet TAKE 1 TABLET BY MOUTH EVERY DAY, Normal      bismuth subsalicylate (PEPTO BISMOL) 262 mg/15 mL suspension Take 15 mLs by mouth every 6 (six) hours as needed., Until Discontinued, Historical Med      carvedilol (COREG) 25 MG tablet TAKE 1 TABLET BY MOUTH TWICE DAILY, Normal      furosemide (LASIX) 20 MG tablet Take 1 tablet (20 mg total) by mouth once daily., Starting 3/9/2017, Until Fri 3/9/18, Normal      levothyroxine (SYNTHROID) 50 MCG tablet TAKE 1 TABLET BY MOUTH EVERY DAY, Normal      ondansetron (ZOFRAN-ODT) 4 MG TbDL DISSOLVE ONE TABLET BY MOUTH EVERY 6 HOURS AS NEEDED FOR NAUSEA AND  VOMITING, Normal      pantoprazole (PROTONIX) 20 MG tablet Take 1 tablet (20 mg total) by mouth once daily., Starting 2/20/2017, Until Tue 2/20/18, Print      spironolactone (ALDACTONE) 25 MG tablet Take 1 tablet (25 mg total) by mouth once daily., Starting 3/27/2017, Until Tue 3/27/18, Normal      trazodone (DESYREL) 50 MG tablet TAKE 1/2 TO 1 TABLET BY MOUTH EVERY EVENING AS NEEDED FOR INSOMNIA, Normal      warfarin (COUMADIN) 5 MG tablet TAKE 1 TABLET BY MOUTH EVERY DAY OR AS DIRECTED BY COUMADIN CLINIC, Normal      blood sugar diagnostic (TRUETRACK TEST) Strp FOLLOW PACKAGE DIRECTIONS, Normal      lancets Misc 1 Device by Misc.(Non-Drug; Combo Route) route once daily., Starting 6/5/2013, Until Discontinued, Normal             I certify that this patient is confined to her home and needs intermittent skilled nursing care, physical therapy and occupational therapy.

## 2017-04-11 NOTE — ASSESSMENT & PLAN NOTE
- On Coreg 25 BID, Lisinopril 40 daily, Will lower to 20mg at DC.   Amlodipine 5 QD. Aldactone 25 mg daily  - elevated on admission, improving  - monitor

## 2017-04-12 NOTE — PROGRESS NOTES
Called [facility] and spoke with Tina.  She states patient was already seen for admit appointment today, 4/12.  They will draw INR 4/13.  I requested in the morning so that result can be addressed prior to holiday weekend.  Spoke with Fang who reports lasix dose adjusted, per d/c summary lisinopril also adjusted.  She states patient was discharged on warfarin 5mg daily but did not take dose 4/11.  I advised to resume prior home regimen 4/12.  She was admitted 4/9-4/11 for stomach discomfort and CHF exacerbation.  Warfarin doses updated in calendar.

## 2017-04-12 NOTE — PROGRESS NOTES
[facility] called 4/11/17 to inform us that pt was dc'd from ([facility]) on 4/11/17. HH will admit pt on today 4/12/17. They have orders for 4/13/17. Please advise.

## 2017-04-13 ENCOUNTER — TELEPHONE (OUTPATIENT)
Dept: NEPHROLOGY | Facility: CLINIC | Age: 82
End: 2017-04-13

## 2017-04-13 ENCOUNTER — PATIENT OUTREACH (OUTPATIENT)
Dept: ADMINISTRATIVE | Facility: CLINIC | Age: 82
End: 2017-04-13
Payer: MEDICARE

## 2017-04-13 ENCOUNTER — ANTI-COAG VISIT (OUTPATIENT)
Dept: CARDIOLOGY | Facility: CLINIC | Age: 82
End: 2017-04-13

## 2017-04-13 DIAGNOSIS — Z79.01 LONG TERM (CURRENT) USE OF ANTICOAGULANTS: ICD-10-CM

## 2017-04-13 LAB — INR PPP: 1.9

## 2017-04-13 RX ORDER — MECLIZINE HYDROCHLORIDE 25 MG/1
TABLET ORAL
Qty: 60 TABLET | Refills: 11 | Status: ON HOLD | OUTPATIENT
Start: 2017-04-13 | End: 2018-05-25 | Stop reason: HOSPADM

## 2017-04-13 RX ORDER — PANTOPRAZOLE SODIUM 20 MG/1
20 TABLET, DELAYED RELEASE ORAL DAILY
Qty: 30 TABLET | Refills: 11 | Status: SHIPPED | OUTPATIENT
Start: 2017-04-13 | End: 2017-04-18 | Stop reason: SDUPTHER

## 2017-04-13 NOTE — PATIENT INSTRUCTIONS

## 2017-04-13 NOTE — TELEPHONE ENCOUNTER
----- Message from Saba Alston sent at 4/13/2017  9:34 AM CDT -----  Contact: [facility]  Requesting a call back in regards to scheduling the patient for urine specimen.    Will like to confirm when it's needed.    Please call Juan [phone].

## 2017-04-13 NOTE — TELEPHONE ENCOUNTER
----- Message from Vesta Penaloza sent at 4/13/2017  4:10 PM CDT -----  Contact: Josie/[facility]/[phone]  RX request - refill or new RX.  Is this a refill or new RX:    RX name and strength: pantoprazole (PROTONIX) 20 MG tablet  Directions: Take 1 tablet (20 mg total) by mouth once daily. - Oral  Is this a 30 day or 90 day RX:  30  Pharmacy name and phone #: [facility] [phone] (Phone)  [phone] (Fax)  Comments:      RX request - refill or new RX.  Is this a refill or new RX:  refill  RX name and strength: meclizime  25  Directions: 1 tlb, 3x daily  Is this a 30 day or 90 day RX:  30  Pharmacy name and phone #: [facility] [phone] (Phone)  [phone] (Fax)  Comments:

## 2017-04-14 ENCOUNTER — HOSPITAL ENCOUNTER (EMERGENCY)
Facility: OTHER | Age: 82
Discharge: HOME | End: 2017-04-14
Attending: EMERGENCY MEDICINE
Payer: MEDICARE

## 2017-04-14 VITALS
HEART RATE: 92 BPM | WEIGHT: 143 LBS | OXYGEN SATURATION: 92 % | RESPIRATION RATE: 18 BRPM | DIASTOLIC BLOOD PRESSURE: 82 MMHG | SYSTOLIC BLOOD PRESSURE: 202 MMHG | HEIGHT: 62 IN | TEMPERATURE: 98 F | BODY MASS INDEX: 26.31 KG/M2

## 2017-04-14 DIAGNOSIS — R07.9 CHEST PAIN, UNSPECIFIED TYPE: Primary | ICD-10-CM

## 2017-04-14 DIAGNOSIS — R07.9 CHEST PAIN: ICD-10-CM

## 2017-04-14 LAB
ALBUMIN SERPL BCP-MCNC: 2.6 G/DL
ALP SERPL-CCNC: 63 U/L
ALT SERPL W/O P-5'-P-CCNC: 27 U/L
ANION GAP SERPL CALC-SCNC: 10 MMOL/L
APTT BLDCRRT: 30.4 SEC
AST SERPL-CCNC: 39 U/L
BASOPHILS # BLD AUTO: 0.02 K/UL
BASOPHILS NFR BLD: 0.3 %
BILIRUB SERPL-MCNC: 0.9 MG/DL
BNP SERPL-MCNC: 774 PG/ML
BUN SERPL-MCNC: 14 MG/DL
CALCIUM SERPL-MCNC: 8 MG/DL
CHLORIDE SERPL-SCNC: 102 MMOL/L
CO2 SERPL-SCNC: 26 MMOL/L
CREAT SERPL-MCNC: 2 MG/DL
DIFFERENTIAL METHOD: ABNORMAL
EOSINOPHIL # BLD AUTO: 0.1 K/UL
EOSINOPHIL NFR BLD: 1.6 %
ERYTHROCYTE [DISTWIDTH] IN BLOOD BY AUTOMATED COUNT: 13.5 %
EST. GFR  (AFRICAN AMERICAN): 26 ML/MIN/1.73 M^2
EST. GFR  (NON AFRICAN AMERICAN): 22 ML/MIN/1.73 M^2
GLUCOSE SERPL-MCNC: 134 MG/DL
HCT VFR BLD AUTO: 35.9 %
HGB BLD-MCNC: 12.2 G/DL
INR PPP: 1.5
LYMPHOCYTES # BLD AUTO: 1.9 K/UL
LYMPHOCYTES NFR BLD: 30.6 %
MAGNESIUM SERPL-MCNC: 1.8 MG/DL
MCH RBC QN AUTO: 31.9 PG
MCHC RBC AUTO-ENTMCNC: 34 %
MCV RBC AUTO: 94 FL
MONOCYTES # BLD AUTO: 0.5 K/UL
MONOCYTES NFR BLD: 8.4 %
NEUTROPHILS # BLD AUTO: 3.6 K/UL
NEUTROPHILS NFR BLD: 58.9 %
PLATELET # BLD AUTO: 178 K/UL
PMV BLD AUTO: 9.4 FL
POCT GLUCOSE: 164 MG/DL (ref 70–110)
POTASSIUM SERPL-SCNC: 3.7 MMOL/L
PROT SERPL-MCNC: 5.9 G/DL
PROTHROMBIN TIME: 16.8 SEC
RBC # BLD AUTO: 3.82 M/UL
SODIUM SERPL-SCNC: 138 MMOL/L
TROPONIN I SERPL DL<=0.01 NG/ML-MCNC: 0.05 NG/ML
TROPONIN I SERPL DL<=0.01 NG/ML-MCNC: 0.05 NG/ML
WBC # BLD AUTO: 6.17 K/UL

## 2017-04-14 PROCEDURE — 25500020 PHARM REV CODE 255: Performed by: EMERGENCY MEDICINE

## 2017-04-14 PROCEDURE — 93010 ELECTROCARDIOGRAM REPORT: CPT | Mod: ,,, | Performed by: INTERNAL MEDICINE

## 2017-04-14 PROCEDURE — 85610 PROTHROMBIN TIME: CPT

## 2017-04-14 PROCEDURE — 85025 COMPLETE CBC W/AUTO DIFF WBC: CPT

## 2017-04-14 PROCEDURE — 85730 THROMBOPLASTIN TIME PARTIAL: CPT

## 2017-04-14 PROCEDURE — 25000003 PHARM REV CODE 250: Performed by: EMERGENCY MEDICINE

## 2017-04-14 PROCEDURE — 83735 ASSAY OF MAGNESIUM: CPT

## 2017-04-14 PROCEDURE — 99284 EMERGENCY DEPT VISIT MOD MDM: CPT | Mod: 25

## 2017-04-14 PROCEDURE — 96375 TX/PRO/DX INJ NEW DRUG ADDON: CPT

## 2017-04-14 PROCEDURE — 63600175 PHARM REV CODE 636 W HCPCS: Performed by: EMERGENCY MEDICINE

## 2017-04-14 PROCEDURE — 96374 THER/PROPH/DIAG INJ IV PUSH: CPT

## 2017-04-14 PROCEDURE — 80053 COMPREHEN METABOLIC PANEL: CPT

## 2017-04-14 PROCEDURE — 93005 ELECTROCARDIOGRAM TRACING: CPT

## 2017-04-14 PROCEDURE — 83880 ASSAY OF NATRIURETIC PEPTIDE: CPT

## 2017-04-14 PROCEDURE — 84484 ASSAY OF TROPONIN QUANT: CPT

## 2017-04-14 RX ORDER — ONDANSETRON 8 MG/1
8 TABLET, ORALLY DISINTEGRATING ORAL
Status: COMPLETED | OUTPATIENT
Start: 2017-04-14 | End: 2017-04-14

## 2017-04-14 RX ORDER — SUCRALFATE 1 G/10ML
1 SUSPENSION ORAL
Status: COMPLETED | OUTPATIENT
Start: 2017-04-14 | End: 2017-04-14

## 2017-04-14 RX ORDER — CARVEDILOL 12.5 MG/1
25 TABLET ORAL
Status: COMPLETED | OUTPATIENT
Start: 2017-04-14 | End: 2017-04-14

## 2017-04-14 RX ORDER — ONDANSETRON 2 MG/ML
8 INJECTION INTRAMUSCULAR; INTRAVENOUS
Status: COMPLETED | OUTPATIENT
Start: 2017-04-14 | End: 2017-04-14

## 2017-04-14 RX ORDER — HYDROXYZINE PAMOATE 25 MG/1
25 CAPSULE ORAL
Status: COMPLETED | OUTPATIENT
Start: 2017-04-14 | End: 2017-04-14

## 2017-04-14 RX ORDER — FUROSEMIDE 10 MG/ML
40 INJECTION INTRAMUSCULAR; INTRAVENOUS
Status: COMPLETED | OUTPATIENT
Start: 2017-04-14 | End: 2017-04-14

## 2017-04-14 RX ORDER — WARFARIN 2.5 MG/1
2.5 TABLET ORAL
Status: COMPLETED | OUTPATIENT
Start: 2017-04-14 | End: 2017-04-14

## 2017-04-14 RX ADMIN — CARVEDILOL 25 MG: 12.5 TABLET, FILM COATED ORAL at 01:04

## 2017-04-14 RX ADMIN — SUCRALFATE 1 G: 1 SUSPENSION ORAL at 10:04

## 2017-04-14 RX ADMIN — ONDANSETRON 8 MG: 2 INJECTION, SOLUTION INTRAMUSCULAR; INTRAVENOUS at 10:04

## 2017-04-14 RX ADMIN — WARFARIN SODIUM 2.5 MG: 2.5 TABLET ORAL at 05:04

## 2017-04-14 RX ADMIN — ONDANSETRON 8 MG: 8 TABLET, ORALLY DISINTEGRATING ORAL at 01:04

## 2017-04-14 RX ADMIN — FUROSEMIDE 40 MG: 10 INJECTION, SOLUTION INTRAMUSCULAR; INTRAVENOUS at 08:04

## 2017-04-14 RX ADMIN — HYDROXYZINE PAMOATE 25 MG: 25 CAPSULE ORAL at 11:04

## 2017-04-14 RX ADMIN — IOHEXOL 25 ML: 350 INJECTION, SOLUTION INTRAVENOUS at 04:04

## 2017-04-14 NOTE — ED NOTES
Hourly rounding completed on pt, pt reports improved nausea, denies chest pain. NAD, VSS, even, unlabored respirations, Restroom needs addressed. Bed in lowest, locked position, side rails up x 2. Call light within reach.

## 2017-04-14 NOTE — ED NOTES
Hourly rounding completed on pt, pt reporting nausea, belching, mid abd pain and generalized weakness/fatigue. Pt remains on cardiac monitor, VSS, NAD. MD informed.

## 2017-04-14 NOTE — ED NOTES
Assumed care of pt from MARIBEL Kenyon. Pt requesting to be placed on bedside commode. Tech at bedside to assist transferring pt from stretcher to bedside commode. Pt denies any chest pain or SOB. VSS, bed in lowest, locked position, side rails up x 2.

## 2017-04-14 NOTE — ED PROVIDER NOTES
"Encounter Date: 4/14/2017    SCRIBE #1 NOTE: I, Linette Dubois, am scribing for, and in the presence of, Dr. Montana.       History     Chief Complaint   Patient presents with    Chest Pain     pt complaining of chest pain onset this am     Review of patient's allergies indicates:   Allergen Reactions    Plavix [clopidogrel]      HPI Comments: Time seen by provider: 7:56 AM    This is a 85 y.o. female who presents with complaint of left-sided CP that began while she was sleeping and woke her up PTA.  The patient compares her "sharp," non-radiating pain to a "lightning strike."  She states that the episode lasted for a few seconds, and upon arrival to the ED, the pain has resolved.  She also endorses cough, flatulence, abdominal distention, increased urinary frequency, weakness, and anxiety.  She mentions no jaw pain, rhinorrhea, sore throat, SOB, abnormal leg swelling, abdominal pain, nausea, vomiting, dysuria, or back pain.  The patient claims that her CP is not exacerbated with exertion, and she reports no alleviating or exacerbating factors.  She states that she took 325 mg aspirin this morning.  Although she has had a similar episode in the past, she states that it was a while ago.  On review of medical records, she was seen in the ED last week for abdominal pain. She was later admitted for SOB.  The patient reports history of HTN, NIDDM, PAF, atrial fibrillation, CHF, and GERD.  She mentions that she has not eaten breakfast, nor has she has taken any of her morning medications.  She reports pertinent surgical history of cardiac pacemaker placement and cardiac laser ablation. She has no additional complaints.     Review of her medical records reveals echocardiogram performed March 8, which reveals EF of 66% percent and moderate mitral regurgitation.      Additional past medical, surgical, and social history as outlined in the nursing assessment was reviewed by me.       The history is provided by the patient. "     Past Medical History:   Diagnosis Date    Atrial fibrillation     chronic AF    CHF (congestive heart failure)     Diabetes insipidus     Diabetes mellitus type II     GERD (gastroesophageal reflux disease)     Hypertension     Iritis 12/10/2014    NPDR (nonproliferative diabetic retinopathy) 10/27/2014    PAF (paroxysmal atrial fibrillation)     Palpitations      Past Surgical History:   Procedure Laterality Date    CATARACT EXTRACTION W/  INTRAOCULAR LENS IMPLANT Right n/a    OD over 10 years ago     CATARACT EXTRACTION W/  INTRAOCULAR LENS IMPLANT Left 11/11/14    OS ()    COLON SURGERY      Partial colectomy secondary to suspicious polyp    HYSTERECTOMY      INSERT / REPLACE / REMOVE PACEMAKER      [email or web address]    LASER ABLATION      3 years ago OS     Family History   Problem Relation Age of Onset    No Known Problems Mother     No Known Problems Father     No Known Problems Sister     No Known Problems Brother     No Known Problems Maternal Aunt     No Known Problems Maternal Uncle     No Known Problems Paternal Aunt     No Known Problems Paternal Uncle     No Known Problems Maternal Grandmother     No Known Problems Maternal Grandfather     No Known Problems Paternal Grandmother     No Known Problems Paternal Grandfather     Amblyopia Neg Hx     Blindness Neg Hx     Cancer Neg Hx     Cataracts Neg Hx     Diabetes Neg Hx     Glaucoma Neg Hx     Hypertension Neg Hx     Macular degeneration Neg Hx     Retinal detachment Neg Hx     Strabismus Neg Hx     Stroke Neg Hx     Thyroid disease Neg Hx      Social History   Substance Use Topics    Smoking status: Never Smoker    Smokeless tobacco: Never Used    Alcohol use No     Review of Systems   Constitutional: Negative for chills and fever.   HENT: Negative for congestion, rhinorrhea and sore throat.         Negative for jaw pain.   Respiratory: Positive for cough. Negative for shortness of breath.     Cardiovascular: Positive for chest pain. Negative for leg swelling.   Gastrointestinal: Positive for abdominal distention. Negative for abdominal pain, diarrhea, nausea and vomiting.        Positive for flatulence.   Endocrine: Negative for polyuria.   Genitourinary: Positive for frequency. Negative for dysuria.   Musculoskeletal: Negative for back pain.   Skin: Negative for rash.   Allergic/Immunologic: Negative for immunocompromised state.   Neurological: Negative for dizziness and weakness.   Hematological: Does not bruise/bleed easily.   Psychiatric/Behavioral: Negative for confusion. The patient is nervous/anxious.        Physical Exam   Initial Vitals   BP Pulse Resp Temp SpO2   04/14/17 0729 04/14/17 0729 04/14/17 0729 04/14/17 0729 04/14/17 0729   186/91 97 18 98.3 °F (36.8 °C) 92 %     Physical Exam    Nursing note and vitals reviewed.  Constitutional: She appears well-developed and well-nourished. She is not diaphoretic. No distress.   HENT:   Head: Normocephalic and atraumatic.   Right Ear: External ear normal.   Left Ear: External ear normal.   Eyes: Conjunctivae and EOM are normal. Right eye exhibits no discharge. Left eye exhibits no discharge.   Neck: Normal range of motion. Neck supple. No JVD present.   Cardiovascular: Normal rate, normal heart sounds and intact distal pulses. Exam reveals no gallop and no friction rub.    No murmur heard.  Irregularly irregular   Pulmonary/Chest: No respiratory distress. She has decreased breath sounds. She has no wheezes. She has no rhonchi. She has no rales.   Breath sounds decreased in right lung base.   Abdominal: Soft. Bowel sounds are normal. There is no tenderness. There is no rebound and no guarding.   Musculoskeletal: Normal range of motion. She exhibits edema. She exhibits no tenderness.   Trace pitting edema.   Neurological: She is alert and oriented to person, place, and time. She has normal strength. No cranial nerve deficit.   Skin: Skin is warm and  dry. No rash noted. No erythema. No pallor.   Psychiatric: She has a normal mood and affect. Her behavior is normal. Judgment and thought content normal.         ED Course   Procedures  Labs Reviewed   CBC W/ AUTO DIFFERENTIAL - Abnormal; Notable for the following:        Result Value    RBC 3.82 (*)     Hematocrit 35.9 (*)     MCH 31.9 (*)     All other components within normal limits   COMPREHENSIVE METABOLIC PANEL - Abnormal; Notable for the following:     Glucose 134 (*)     Creatinine 2.0 (*)     Calcium 8.0 (*)     Total Protein 5.9 (*)     Albumin 2.6 (*)     eGFR if  26 (*)     eGFR if non  22 (*)     All other components within normal limits   PROTIME-INR - Abnormal; Notable for the following:     Prothrombin Time 16.8 (*)     INR 1.5 (*)     All other components within normal limits   TROPONIN I - Abnormal; Notable for the following:     Troponin I 0.047 (*)     All other components within normal limits   B-TYPE NATRIURETIC PEPTIDE - Abnormal; Notable for the following:      (*)     All other components within normal limits   TROPONIN I - Abnormal; Notable for the following:     Troponin I 0.047 (*)     All other components within normal limits    Narrative:     Recoll. [id number] by AMBER at 04/14/2017 14:23, reason: notified   Weston Desouza in ED   POCT GLUCOSE - Abnormal; Notable for the following:     POCT Glucose 164 (*)     All other components within normal limits   APTT   MAGNESIUM     Imaging Results         CT Abdomen Pelvis  Without Contrast (Final result)    Abnormal Result time:  04/14/17 17:47:24    Final result by Tonio Valdovinos MD (04/14/17 17:47:24)    Impression:       Difficult examination secondary to patient motion.      Significant wall thickening involving the stomach and proximal duodenum.  Diagnostic considerations would include gastroenteritis.  Hemorrhage into the wall of the stomach or duodenum cannot be excluded.  Suggest clinical correlation  and short-term followup..    New intra-abdominal and pelvic ascites and new moderate bilateral pleural effusions.  The findings may represent fluid overload state.  The ascites in the abdomen may also represent evolving ischemia.  Suggest clinical correlation.    Additional findings as above.    Report has been flagged in the EPIC medical record system.              Electronically signed by: GABY BUSH MD  Date:     04/14/17  Time:    17:47     Narrative:    Exam: [id number]  04/14/17  17:11:00 [id number] ([facility]) : CT ABDOMEN PELVIS WITHOUT CONTRAST    Technique:    This exam was done specifically to evaluate for renal stone disease as per request.  Therefore, no oral and no IV contrast was used. Using helical CT technique, axial images of the abdomen and pelvis were obtained from the top of the liver through the base of the bladder.    Comparison:    11/26/2016.    Findings:      There are moderate bilateral pleural effusions with associated compressive atelectasis.  There is trace pericardial effusion.  Pacemaker leads are present.  There are scattered atherosclerotic changes involving the abdominal aorta and its branch vessels.  There is no evidence of lymphadenopathy in the abdomen or pelvis.    The esophagus is within normal limits.  There is marked wall thickening involving the walls of the stomach.  There is also some wall thickening involving the proximal duodenum.  The small bowel loops are unremarkable.  The appendix is not consistently identified.  There are no secondary findings of acute appendicitis.  There is extensive colonic diverticula predominantly involving the sigmoid colon.  No secondary findings of acute diverticulitis is present.    The liver is small in appearance.  There is new perihepatic ascites.  The gallbladder is poorly visualized and may be contracted.  The biliary tree is within normal limits.  There are calcifications within the spleen.  There is perihepatic ascites.  The pancreas is  grossly unremarkable.  The adrenal glands are within normal limits.    There is a 2 cm cyst in the interpolar region of the right kidney.  A 4 cm cyst is present in the lower pole of the left kidney.  There is no evidence of nephrolithiasis.  The ureters and the urinary bladder are unremarkable.  The patient appears status post hysterectomy.    There is intra-abdominal ascites and also pelvic ascites, new compared to prior examination.  There is no definitive evidence of free air.  No definite evidence of pneumatosis is present, allowing for patient motion.    There are degenerative changes in the osseous structures.  The abdominal wall is unremarkable.            X-Ray Chest AP Portable (Final result) Result time:  04/14/17 08:17:27    Final result by Thomas Gil MD (04/14/17 08:17:27)    Impression:      Increased density within the bases of both hemithoraces likely due to layering bilateral pleural effusions with associated bibasilar atelectasis/consolidation. Findings are more pronounced on the left than on the right. Findings do appear slightly increased when compared to 4/9/17. Mild pulmonary edema should be strongly considered.  Pacemaker.      Electronically signed by: THOMAS GIL MD  Date:     04/14/17  Time:    08:17     Narrative:    Comparison is made to prior examination dated 4/9/17.    A single portable AP radiograph of the chest was obtained. There is a single lead pacemaker present. The cardiac silhouette and mediastinal structures are unchanged. Atherosclerotic calcification is present within the aortic arch. There is increased density within the bases of both hemithoraces greater on the left than on the right. This is likely related to a small layering right-sided pleural effusion and moderate layering left sided pleural effusion with associated bibasilar atelectasis/consolidation. Findings appear slightly increased in prominence when compared to the prior examination. Mild pulmonary edema  is a consideration. There is no evidence for pneumothorax. Bony structures are grossly intact.             EKG Readings: (Independently Interpreted)   Initial Reading: No STEMI.   Rhythm: Atrial fibrillation. Heart Rate: 99. Ectopy: No Ectopy. Axis: Left axis deviation. Conduction: Normal. ST Segments: Nonspecific ST segments. T Waves: Normal. Other Findings: Q waves in V2. Clinical Impression: Atrial fibrillation. EKG unchanged from April 9.   Other EKG Interpretations: 12:55 Afib @ 86. LAD. Q wave in V2.       X-Rays:   Independently Interpreted Readings:   Chest X-Ray: X-Ray Chest AP Portable:  Prominent interstitial markings bilaterally, worse on the left than on the right.  No consolidation.  No pneumothorax.  Tortuous aorta.     Medical Decision Making:   Initial Assessment:   Patient presents with atypical chest pain.  Her EKG shows old injury but no acute ischemia.  I will obtain serial troponins.  She is currently pain free.  I will continue to monitor her.    10:28 AM - patient currently complains of an upset stomach.  She generally takes Pepto-Bismol in the morning.  I will give her Zofran and Carafate.  Her diagnostic workup reveals a subtherapeutic Coumadin level.  I will give an additional dose.  Additionally her troponin is minimally elevated at 0.047.  I will repeat this in 6 hours to ensure that it is not rising.  I suspect it is a reflection of her CHF and CKD and not a reflection of her current presentation.  I will continue to monitor.    12:55 - patient has complained of nausea twice throughout her ED course.  She admits to not eating anything prior to arrival.  I gave her Zofran and Carafate just before her meal which offered relief of her symptoms.  However she now endorses return of her discomfort.  At this time I made the decision to obtain a repeat EKG to ensure no involving MI.  EKG is relatively unchanged from the initial.  I will obtain a second troponin in one hour.  I will also give  her morning dose of her beta blocker her pain may be a reflection of her dysrhythmia.  I will continue to monitor.    15:16 - patient's repeat troponin is unchanged.  I do not believe her presentation today is due to cardiac ischemia.  Because of her ongoing nausea I will obtain a CT of her abdomen.  Her creatinine is elevated at 2.0 which gives her a GFR of 26 at her age.  I will give PO contrast but no IV contrast. I will continue to monitor.     6:22 PM - patient's CT shows wall thickening suggestive of gastroenteritis.  Radiology noted that hemorrhage could also not be excluded.  Patient has had no abdominal pain today.  Additionally, on my serial abdominal exams her abdomen has remained soft and nontender.  Her hemoglobin is 12.2 which is improved from 4 days ago.  I do not believe she has hemorrhage into her abdomen today.  The ascites in her abdomen is likely due to her ongoing CHF.  Patient states that she feels better.  She is currently asymptomatic and wants to eat.  She also wants to go home.  She is scheduled to see her PCP tomorrow which is reassuring. I have discussed with patient the diagnostic results, diagnosis, treatment plan, and need for follow-up. Patient has expressed understanding of my instructions. I am comfortable with her discharge home at this time.            Scribe Attestation:   Scribe #1: I performed the above scribed service and the documentation accurately describes the services I performed. I attest to the accuracy of the note.    Attending Attestation:           Physician Attestation for Scribe:  Physician Attestation Statement for Scribe #1: I, Dr. Montana, reviewed documentation, as scribed by Linette Dubois in my presence, and it is both accurate and complete.                 ED Course     Clinical Impression:     1. Chest pain, unspecified type    2. Chest pain                Cynthia Montana MD  04/14/17 [id number]

## 2017-04-14 NOTE — ED NOTES
Pt updated on plan of care. Provided pt broth per request, MD aware. Bed in lowest, locked position, side rails up x 2. Pt ambulated to bedside commode with assistance. Call light within reach.

## 2017-04-14 NOTE — ED AVS SNAPSHOT
[facility]  [address]  [place] [address]               Deepali Stuart   2017  7:26 AM   ED    Description:  Female : 1932   Department:  [facility]           Your Care was Coordinated By:     Provider Role From To    Cynthia Montana MD Attending Provider 17 0729 --      Reason for Visit     Chest Pain           Diagnoses this Visit        Comments    Chest pain, unspecified type    -  Primary     Chest pain           ED Disposition     ED Disposition Condition Comment    Discharge             To Do List           Follow-up Information     Follow up with Vishla Salazar MD On 4/15/2017.    Specialty:  Family Medicine    Why:  As needed    Contact information:     [facility] [address]  [phone]        [facility]     [facility] Care [facility] -  Assistance  Unless otherwise directed by your provider, please contact [facility], our nurse care line that is available for  assistance.     Registered nurses in the [facility] provide: appointment scheduling, clinical advisement, health education, and other advisory services.  Call: [phone] (toll free)               Medications           Message regarding Medications     Verify the changes and/or additions to your medication regime listed below are the same as discussed with your clinician today.  If any of these changes or additions are incorrect, please notify your healthcare provider.        These medications were administered today        Dose Freq    furosemide injection 40 mg 40 mg ED 1 Time    Sig: Inject 4 mLs (40 mg total) into the vein ED 1 Time.    Class: Normal    Route: Intravenous    ondansetron injection 8 mg 8 mg ED 1 Time    Sig: Inject 8 mg into the vein ED 1 Time.    Class: Normal    Route: Intravenous    sucralfate 100 mg/mL suspension 1 g 1 g ED 1 Time    Sig: Take 10 mLs (1 g total) by mouth ED 1  Time.    Class: Normal    Route: Oral    hydrOXYzine pamoate capsule 25 mg 25 mg ED 1 Time    Sig: Take 1 capsule (25 mg total) by mouth ED 1 Time.    Class: Normal    Route: Oral    ondansetron disintegrating tablet 8 mg 8 mg ED 1 Time    Sig: Take 1 tablet (8 mg total) by mouth ED 1 Time.    Class: Normal    Route: Oral    carvedilol tablet 25 mg 25 mg ED 1 Time    Sig: Take 2 tablets (25 mg total) by mouth ED 1 Time.    Class: Normal    Route: Oral    warfarin (COUMADIN) tablet 2.5 mg 2.5 mg ED 1 Time    Sig: Take 1 tablet (2.5 mg total) by mouth ED 1 Time.    Class: Normal    Route: Oral    omnipaque 350 iohexol 25 mL 25 mL IMG once as needed    Sig: Take 25 mLs by mouth ONCE PRN for contrast.    Class: Normal    Route: Oral           Verify that the below list of medications is an accurate representation of the medications you are currently taking.  If none reported, the list may be blank. If incorrect, please contact your healthcare provider. Carry this list with you in case of emergency.           Current Medications     amlodipine (NORVASC) 5 MG tablet Take 1 tablet (5 mg total) by mouth once daily.    atorvastatin (LIPITOR) 10 MG tablet TAKE 1 TABLET BY MOUTH EVERY DAY    bismuth subsalicylate (PEPTO BISMOL) 262 mg/15 mL suspension Take 15 mLs by mouth every 6 (six) hours as needed.    blood sugar diagnostic (TRUETRACK TEST) Strp FOLLOW PACKAGE DIRECTIONS    carvedilol (COREG) 25 MG tablet TAKE 1 TABLET BY MOUTH TWICE DAILY    furosemide (LASIX) 20 MG tablet Take 1 tablet (20 mg total) by mouth once daily.    lancets Misc 1 Device by Misc.(Non-Drug; Combo Route) route once daily.    levothyroxine (SYNTHROID) 50 MCG tablet TAKE 1 TABLET BY MOUTH EVERY DAY    lisinopril (PRINIVIL,ZESTRIL) 20 MG tablet Take 1 tablet (20 mg total) by mouth once daily.    meclizine (ANTIVERT) 25 mg tablet TAKE 2 TABLETS BY MOUTH THREE TIMES DAILY AS NEEDED FOR DIZZINESS    ondansetron (ZOFRAN-ODT) 4 MG TbDL DISSOLVE ONE TABLET  "BY MOUTH EVERY 6 HOURS AS NEEDED FOR NAUSEA AND VOMITING    pantoprazole (PROTONIX) 20 MG tablet Take 1 tablet (20 mg total) by mouth once daily.    spironolactone (ALDACTONE) 25 MG tablet Take 1 tablet (25 mg total) by mouth once daily.    trazodone (DESYREL) 50 MG tablet TAKE 1/2 TO 1 TABLET BY MOUTH EVERY EVENING AS NEEDED FOR INSOMNIA    warfarin (COUMADIN) 5 MG tablet TAKE 1 TABLET BY MOUTH EVERY DAY OR AS DIRECTED BY COUMADIN CLINIC    furosemide injection 40 mg Inject 4 mLs (40 mg total) into the vein ED 1 Time.           Clinical Reference Information           Your Vitals Were     BP Pulse Temp Resp Height Weight    139/75 92 98.3 °F (36.8 °C) (Oral) 18 5' 2" (1.575 m) 64.9 kg (143 lb)    Last Period SpO2 BMI          (LMP Unknown) 87% 26.16 kg/m2        Allergies as of 4/14/2017        Reactions    Plavix [Clopidogrel]       Immunizations Administered on Date of Encounter - 4/14/2017     None      ED Micro, Lab, POCT     Start Ordered       Status Ordering Provider    04/14/17 1326 04/14/17 1326  Troponin I  STAT      Final result     04/14/17 0955 04/14/17 0955  POCT glucose  Once      Final result     04/14/17 0742 04/14/17 0741  CBC auto differential  STAT      Final result     04/14/17 0742 04/14/17 0741  Comprehensive metabolic panel  STAT      Final result     04/14/17 0742 04/14/17 0741  Protime-INR  STAT      Final result     04/14/17 0742 04/14/17 0741  APTT  STAT      Final result     04/14/17 0742 04/14/17 0741  Troponin I  STAT      Final result     04/14/17 0742 04/14/17 0741  Brain natriuretic peptide  STAT      Final result     04/14/17 0742 04/14/17 0741  Magnesium  STAT      Final result       ED Imaging Orders     Start Ordered       Status Ordering Provider    04/14/17 1516 04/14/17 1516  CT Abdomen Pelvis  Without Contrast  1 time imaging      Final result     04/14/17 0744 04/14/17 0743  X-Ray Chest AP Portable  1 time imaging      Final result         Discharge Instructions       Ask " you doctor whether you should start taking medication to help your nerves and your mood.     Discharge References/Attachments     CHEST PAIN, UNCERTAIN CAUSE (ENGLISH)      Your Scheduled Appointments     Apr 18, 2017 10:00 AM CDT   Hospital Follow Up with MD Naveen Blackwell - Internal Medicine ([facility] [place])    [address] [facility]  [place] [address]   [phone]            Apr 28, 2017  1:00 PM CDT   Telephonic Pacemaker Check with TELEPHONE CHECK, PACEMAKER   [facility] - Arr[facility]ia ([facility] )    [address]  [facility] [id number] [phone]              [facility] Sign-Up     Activating your [facility] account is as easy as 1-2-3!     1) Visit [email or web address], select Sign Up Now, enter this activation code and your date of birth, then select Next.  [email or web address]  Expires: 5/24/2017  3:14 PM      2) Create a username and password to use when you visit [facility] in the future and select a security question in case you lose your password and select Next.    3) Enter your e-mail address and click Sign Up!    Additional Information  If you have questions, please e-mail [email or web address] or call [phone] to talk to our [facility] staff. Remember, [facility] is NOT to be used for urgent needs. For medical emergencies, dial 911.          [facility] complies with applicable Federal civil rights laws and does not discriminate on the basis of race, color, national origin, age, disability, or sex.        Language Assistance Services     ATTENTION: Language assistance services are available, free of charge. Please call [phone].      ATENCIÓN: Si habla da, tiene a lee disposición servicios gratuitos de asistencia lingüística. Llame al [phone].     CHÚ Ý: N?u b?n nói Ti?ng Vi?t, có các d?ch v? h? tr? ngôn ng? mi?n phí dành cho b?n. G?i s? [phone].

## 2017-04-14 NOTE — ED NOTES
Hourly rounding completed on pt, updated on plan of care, reports continued intermittent nausea. Bed in lowest, locked position, side rails up x 2. Call light within reach. Pt remains on cardiac monitor, VSS, NAD.

## 2017-04-14 NOTE — ED NOTES
Hourly rounding completed on pt, pt resting in stretcher, NAD, even, unlabored respirations, bed in lowest, locked position, side rails up x 2. Call light within reach.

## 2017-04-14 NOTE — ED NOTES
Pt's troponin hemolyzed per Savage Babb in lab. Yolande states phlebotomist will be sent to recollect specimen.

## 2017-04-15 NOTE — ED NOTES
"Pt given d/c instructions to include home care and follow up care, discussed in length with family and pt that she needed to talk with her PCP about getting something for her "nerves" pt agrees and states she has an appointment on Tuesday and stressed to keep that appointment, also discussed diet and medications she has at home to help with the nausea and verbalized understanding. Pt taken by wheelchair to families care and pt d/c in stable condition.   "

## 2017-04-18 ENCOUNTER — TELEPHONE (OUTPATIENT)
Dept: INTERNAL MEDICINE | Facility: CLINIC | Age: 82
End: 2017-04-18

## 2017-04-18 ENCOUNTER — OFFICE VISIT (OUTPATIENT)
Dept: INTERNAL MEDICINE | Facility: CLINIC | Age: 82
End: 2017-04-18
Payer: MEDICARE

## 2017-04-18 ENCOUNTER — LAB VISIT (OUTPATIENT)
Dept: LAB | Facility: HOSPITAL | Age: 82
End: 2017-04-18
Attending: FAMILY MEDICINE
Payer: MEDICARE

## 2017-04-18 VITALS
WEIGHT: 147.25 LBS | HEART RATE: 72 BPM | RESPIRATION RATE: 14 BRPM | TEMPERATURE: 98 F | BODY MASS INDEX: 27.1 KG/M2 | DIASTOLIC BLOOD PRESSURE: 74 MMHG | SYSTOLIC BLOOD PRESSURE: 161 MMHG | HEIGHT: 62 IN

## 2017-04-18 DIAGNOSIS — N17.0 ACUTE RENAL FAILURE WITH TUBULAR NECROSIS: ICD-10-CM

## 2017-04-18 DIAGNOSIS — N18.30 CKD (CHRONIC KIDNEY DISEASE) STAGE 3, GFR 30-59 ML/MIN: ICD-10-CM

## 2017-04-18 DIAGNOSIS — I13.0 BENIGN HYPERTENSIVE HEART AND CKD, STAGE 3 (GFR 30-59), W CHF: ICD-10-CM

## 2017-04-18 DIAGNOSIS — F41.9 ANXIETY: ICD-10-CM

## 2017-04-18 DIAGNOSIS — K29.70 GASTRITIS, PRESENCE OF BLEEDING UNSPECIFIED, UNSPECIFIED CHRONICITY, UNSPECIFIED GASTRITIS TYPE: ICD-10-CM

## 2017-04-18 DIAGNOSIS — Z95.0 PACEMAKER: ICD-10-CM

## 2017-04-18 DIAGNOSIS — I50.33 ACUTE ON CHRONIC DIASTOLIC HEART FAILURE: ICD-10-CM

## 2017-04-18 DIAGNOSIS — I48.19 PERSISTENT ATRIAL FIBRILLATION: ICD-10-CM

## 2017-04-18 DIAGNOSIS — I50.31 ACUTE DIASTOLIC CHF (CONGESTIVE HEART FAILURE): ICD-10-CM

## 2017-04-18 DIAGNOSIS — E03.4 HYPOTHYROIDISM DUE TO ACQUIRED ATROPHY OF THYROID: ICD-10-CM

## 2017-04-18 DIAGNOSIS — J81.1 CHRONIC PULMONARY EDEMA: ICD-10-CM

## 2017-04-18 DIAGNOSIS — E55.9 VITAMIN D DEFICIENCY DISEASE: ICD-10-CM

## 2017-04-18 DIAGNOSIS — I10 ESSENTIAL HYPERTENSION: ICD-10-CM

## 2017-04-18 DIAGNOSIS — N18.30 BENIGN HYPERTENSIVE HEART AND CKD, STAGE 3 (GFR 30-59), W CHF: ICD-10-CM

## 2017-04-18 DIAGNOSIS — R06.02 SHORTNESS OF BREATH: ICD-10-CM

## 2017-04-18 DIAGNOSIS — E11.319 CONTROLLED TYPE 2 DIABETES MELLITUS WITH RETINOPATHY OF BOTH EYES, WITHOUT LONG-TERM CURRENT USE OF INSULIN, MACULAR EDEMA PRESENCE UNSPECIFIED, UNSPECIFIED RETINOPATHY SEVERITY: ICD-10-CM

## 2017-04-18 DIAGNOSIS — I48.20 CHRONIC ATRIAL FIBRILLATION: ICD-10-CM

## 2017-04-18 DIAGNOSIS — R53.1 GENERALIZED WEAKNESS: ICD-10-CM

## 2017-04-18 DIAGNOSIS — Z09 HOSPITAL DISCHARGE FOLLOW-UP: Primary | ICD-10-CM

## 2017-04-18 LAB
ALBUMIN SERPL BCP-MCNC: 2.5 G/DL
ALP SERPL-CCNC: 72 U/L
ALT SERPL W/O P-5'-P-CCNC: 30 U/L
ANION GAP SERPL CALC-SCNC: 6 MMOL/L
AST SERPL-CCNC: 48 U/L
BASOPHILS # BLD AUTO: 0.04 K/UL
BASOPHILS NFR BLD: 0.6 %
BILIRUB SERPL-MCNC: 0.6 MG/DL
BNP SERPL-MCNC: 720 PG/ML
BUN SERPL-MCNC: 15 MG/DL
CALCIUM SERPL-MCNC: 8.1 MG/DL
CHLORIDE SERPL-SCNC: 102 MMOL/L
CO2 SERPL-SCNC: 27 MMOL/L
CREAT SERPL-MCNC: 2 MG/DL
DIFFERENTIAL METHOD: ABNORMAL
EOSINOPHIL # BLD AUTO: 0.1 K/UL
EOSINOPHIL NFR BLD: 1.3 %
ERYTHROCYTE [DISTWIDTH] IN BLOOD BY AUTOMATED COUNT: 13.4 %
EST. GFR  (AFRICAN AMERICAN): 25.7 ML/MIN/1.73 M^2
EST. GFR  (NON AFRICAN AMERICAN): 22.3 ML/MIN/1.73 M^2
GLUCOSE SERPL-MCNC: 156 MG/DL
HCT VFR BLD AUTO: 34.6 %
HGB BLD-MCNC: 11.6 G/DL
LYMPHOCYTES # BLD AUTO: 1.3 K/UL
LYMPHOCYTES NFR BLD: 21.2 %
MCH RBC QN AUTO: 31.9 PG
MCHC RBC AUTO-ENTMCNC: 33.5 %
MCV RBC AUTO: 95 FL
MONOCYTES # BLD AUTO: 0.6 K/UL
MONOCYTES NFR BLD: 9.9 %
NEUTROPHILS # BLD AUTO: 4.2 K/UL
NEUTROPHILS NFR BLD: 66.8 %
PLATELET # BLD AUTO: 183 K/UL
PMV BLD AUTO: 10 FL
POTASSIUM SERPL-SCNC: 3.8 MMOL/L
PROT SERPL-MCNC: 5.7 G/DL
RBC # BLD AUTO: 3.64 M/UL
SODIUM SERPL-SCNC: 135 MMOL/L
WBC # BLD AUTO: 6.28 K/UL

## 2017-04-18 PROCEDURE — 99999 PR PBB SHADOW E&M-EST. PATIENT-LVL III: CPT | Mod: PBBFAC,,, | Performed by: FAMILY MEDICINE

## 2017-04-18 PROCEDURE — 36415 COLL VENOUS BLD VENIPUNCTURE: CPT | Mod: PO

## 2017-04-18 PROCEDURE — 85025 COMPLETE CBC W/AUTO DIFF WBC: CPT

## 2017-04-18 PROCEDURE — 80053 COMPREHEN METABOLIC PANEL: CPT

## 2017-04-18 PROCEDURE — 83880 ASSAY OF NATRIURETIC PEPTIDE: CPT

## 2017-04-18 RX ORDER — PANTOPRAZOLE SODIUM 20 MG/1
20 TABLET, DELAYED RELEASE ORAL DAILY
Qty: 30 TABLET | Refills: 11 | Status: SHIPPED | OUTPATIENT
Start: 2017-04-18 | End: 2017-07-24

## 2017-04-18 RX ORDER — ONDANSETRON 4 MG/1
TABLET, ORALLY DISINTEGRATING ORAL
Qty: 30 TABLET | Refills: 3 | Status: SHIPPED | OUTPATIENT
Start: 2017-04-18 | End: 2017-11-07 | Stop reason: SDUPTHER

## 2017-04-18 RX ORDER — LORAZEPAM 0.5 MG/1
0.5 TABLET ORAL EVERY 8 HOURS PRN
Qty: 90 TABLET | Refills: 0 | Status: SHIPPED | OUTPATIENT
Start: 2017-04-18 | End: 2017-10-13

## 2017-04-18 NOTE — MR AVS SNAPSHOT
[facility]   [facility]  [place] [address]  Phone: [phone]  Fax: [phone]                  Deepali Stuart   2017 10:00 AM   Office Visit    Description:  Female : 1932   Provider:  Vishal Salazar MD   Department:  [facility]           Reason for Visit     Follow-up           Diagnoses this Visit        Comments    Hospital discharge follow-up    -  Primary     Gastritis, presence of bleeding unspecified, unspecified chronicity, unspecified gastritis type         Acute diastolic CHF (congestive heart failure)         Acute on chronic diastolic heart failure         Acute renal failure with tubular necrosis         Anxiety         Benign hypertensive heart and CKD, 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), w CHF         Chronic atrial fibrillation         CKD (chronic kidney disease) stage 3, GFR 30-59 ml/min         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, without long-term current use of insulin, macular edema presence unspecified, unspecified retinopathy severity         Generalized weakness         Essential hypertension         Hypothyroidism due to acquired atrophy of thyroid         Persistent atrial fibrillation         Pacemaker         Chronic pulmonary edema         Shortness of breath         Vitamin D deficiency disease                To Do List           Future Appointments        Provider Department Dept Phone    2017 1:00 PM TELEPHONE CHECK, PACEMAKER [facility] - [facility]rhythmia [phone]      Goals (5 Years of Data)     None      Follow-Up and Disposition     Return in about 6 months (around 10/18/2017), or if symptoms worsen or fail to improve.       These Medications        Disp Refills Start End    pantoprazole (PROTONIX) 20 MG tablet 30 tablet 11 2017    Take 1 tablet (20 mg total) by mouth once daily. - Oral    Pharmacy: [facility] [address] - [address]  [address] AT [address] of [facility] Ph #: [phone]       ondansetron (ZOFRAN-ODT) 4 MG TbDL 30 tablet 3 4/18/2017     DISSOLVE ONE TABLET BY MOUTH EVERY 8 HOURS AS NEEDED FOR NAUSEA AND VOMITING    Pharmacy: [place] [address] AT [address] of [place] Ph #: [phone]       ranitidine (ZANTAC) 300 MG tablet 30 tablet 11 4/18/2017 4/18/2018    Take 1 tablet (300 mg total) by mouth every evening. - Oral    Pharmacy: [place] [address] AT [address] [place] Ph #: [phone]       lorazepam (ATIVAN) 0.5 MG tablet 90 tablet 0 4/18/2017 5/18/2017    Take 1 tablet (0.5 mg total) by mouth every 8 (eight) hours as needed for Anxiety. - Oral    Pharmacy: [place] [address] AT [address] of [place] Ph #: [phone]         [facility]     [facility] On Call Nurse Care Line - 24/7 Assistance  Unless otherwise directed by your provider, please contact [facility], our nurse care line that is available for 24/7 assistance.     Registered nurses in the [facility] provide: appointment scheduling, clinical advisement, health education, and other advisory services.  Call: [phone] (toll free)               Medications           Message regarding Medications     Verify the changes and/or additions to your medication regime listed below are the same as discussed with your clinician today.  If any of these changes or additions are incorrect, please notify your healthcare provider.        START taking these NEW medications        Refills    ranitidine (ZANTAC) 300 MG tablet 11    Sig: Take 1 tablet (300 mg total) by mouth every evening.    Class: Normal    Route: Oral    lorazepam (ATIVAN) 0.5 MG tablet 0    Sig: Take 1 tablet (0.5 mg total) by mouth every 8 (eight) hours as needed for Anxiety.    Class: Print    Route: Oral      CHANGE how you are taking these  medications     Start Taking Instead of    ondansetron (ZOFRAN-ODT) 4 MG TbDL ondansetron (ZOFRAN-ODT) 4 MG TbDL    Dosage:  DISSOLVE ONE TABLET BY MOUTH EVERY 8 HOURS AS NEEDED FOR NAUSEA AND VOMITING Dosage:  DISSOLVE ONE TABLET BY MOUTH EVERY 6 HOURS AS NEEDED FOR NAUSEA AND VOMITING    Reason for Change:  Reorder       STOP taking these medications     bismuth subsalicylate (PEPTO BISMOL) 262 mg/15 mL suspension Take 15 mLs by mouth every 6 (six) hours as needed.           Verify that the below list of medications is an accurate representation of the medications you are currently taking.  If none reported, the list may be blank. If incorrect, please contact your healthcare provider. Carry this list with you in case of emergency.           Current Medications     amlodipine (NORVASC) 5 MG tablet Take 1 tablet (5 mg total) by mouth once daily.    atorvastatin (LIPITOR) 10 MG tablet TAKE 1 TABLET BY MOUTH EVERY DAY    blood sugar diagnostic (TRUETRACK TEST) Strp FOLLOW PACKAGE DIRECTIONS    carvedilol (COREG) 25 MG tablet TAKE 1 TABLET BY MOUTH TWICE DAILY    furosemide (LASIX) 20 MG tablet Take 1 tablet (20 mg total) by mouth once daily.    lancets Misc 1 Device by Misc.(Non-Drug; Combo Route) route once daily.    levothyroxine (SYNTHROID) 50 MCG tablet TAKE 1 TABLET BY MOUTH EVERY DAY    lisinopril (PRINIVIL,ZESTRIL) 20 MG tablet Take 1 tablet (20 mg total) by mouth once daily.    meclizine (ANTIVERT) 25 mg tablet TAKE 2 TABLETS BY MOUTH THREE TIMES DAILY AS NEEDED FOR DIZZINESS    ondansetron (ZOFRAN-ODT) 4 MG TbDL DISSOLVE ONE TABLET BY MOUTH EVERY 8 HOURS AS NEEDED FOR NAUSEA AND VOMITING    pantoprazole (PROTONIX) 20 MG tablet Take 1 tablet (20 mg total) by mouth once daily.    spironolactone (ALDACTONE) 25 MG tablet Take 1 tablet (25 mg total) by mouth once daily.    trazodone (DESYREL) 50 MG tablet TAKE 1/2 TO 1 TABLET BY MOUTH EVERY EVENING AS NEEDED FOR INSOMNIA    warfarin (COUMADIN) 5 MG tablet TAKE 1  "TABLET BY MOUTH EVERY DAY OR AS DIRECTED BY COUMADIN CLINIC    lorazepam (ATIVAN) 0.5 MG tablet Take 1 tablet (0.5 mg total) by mouth every 8 (eight) hours as needed for Anxiety.    ranitidine (ZANTAC) 300 MG tablet Take 1 tablet (300 mg total) by mouth every evening.           Clinical Reference Information           Your Vitals Were     BP Pulse Temp Resp Height Weight    161/74 (BP Location: Left arm, Patient Position: Sitting, BP Method: Automatic) 72 98.3 °F (36.8 °C) (Oral) 14 5' 2" (1.575 m) 66.8 kg (147 lb 4.3 oz)    Last Period BMI             (LMP Unknown) 26.94 kg/m2         Blood Pressure          Most Recent Value    BP  (!)  161/74      Allergies as of 4/18/2017     Plavix [Clopidogrel]      Immunizations Administered on Date of Encounter - 4/18/2017     None      Orders Placed During Today's Visit     Future Labs/Procedures Expected by Expires    Brain natriuretic peptide  4/18/2017 6/17/2018    CBC auto differential  4/18/2017 4/18/2018    Comprehensive metabolic panel  4/18/2017 4/18/2018      [facility] Sign-Up     Activating your [facility] account is as easy as 1-2-3!     1) Visit [email or web address], select Sign Up Now, enter this activation code and your date of birth, then select Next.  [email or web address]  Expires: 5/24/2017  3:14 PM      2) Create a username and password to use when you visit [facility] in the future and select a security question in case you lose your password and select Next.    3) Enter your e-mail address and click Sign Up!    Additional Information  If you have questions, please e-mail [email or web address] or call [phone] to talk to our [facility] staff. Remember, [facility] is NOT to be used for urgent needs. For medical emergencies, dial 911.         Language Assistance Services     ATTENTION: Language assistance services are available, free of charge. Please call [phone].      ATENCIÓN: Si habla español, tiene a lee disposición servicios gratuitos de asistencia " lingüística. Talha al [phone].     TAMMY Ý: N?u b?n nói Ti?ng Vi?t, có các d?ch v? h? tr? ngôn ng? mi?n phí dành cho b?n. G?i s? [phone].         [facility] complies with applicable Federal civil rights laws and does not discriminate on the basis of race, color, national origin, age, disability, or sex.

## 2017-04-18 NOTE — TELEPHONE ENCOUNTER
----- Message from Mehran Medina sent at 4/18/2017  2:21 PM CDT -----  Contact: Fang - daughter at [phone]  Pharmacy needs authorization to refill pt's scripts. Daughter doesn't remember names of scripts.

## 2017-04-18 NOTE — TELEPHONE ENCOUNTER
----- Message from Cheri Arias sent at 4/18/2017 11:33 AM CDT -----  Contact: daughter Jolene Tomlinson [phone]  Pt was just seen today and was prescribed Lorazepam (ATIVAN) 0.5 MG tablet and it was not at the pharmacy please advise pt.

## 2017-04-18 NOTE — TELEPHONE ENCOUNTER
Patient daughter, Fang is aware of the prior authorization, patient daughter stated that she will pay for prescription this month, but future medication would like authorization.

## 2017-04-18 NOTE — TELEPHONE ENCOUNTER
----- Message from Allyn Haskins sent at 4/18/2017  2:40 PM CDT -----  Contact: Call Fang, Shaila, [phone]  Patient is returning a phone call.  Who left a message for the patient: Samanta  Does patient know what this is regarding:  Not sure   Comments:

## 2017-04-18 NOTE — PROGRESS NOTES
Transitional Care Note  Subjective:       Patient ID: Deepali Stuart is a 85 y.o. female.  Chief Complaint: Follow-up (hospital)    Family and/or Caretaker present at visit?  Yes.  Diagnostic tests reviewed/disposition: I have reviewed all completed as well as pending diagnostic tests at the time of discharge.  Disease/illness education: Gastritis, heart failure, renal failure, and anxiety.  Home health/community services discussion/referrals: Patient has home health established at [facility].   Establishment or re-establishment of referral orders for community resources: Continue follow-up with cardiology and nephrology.   Discussion with other health care providers: No discussion with other health care providers necessary.   HPI 85-year-old -American female with diastolic heart failure, atrial fibrillation status post pacemaker, chronic kidney disease, hypertension, and type 2 diabetes presents to clinic today accompanied by her family members secondary to a hospital follow-up and emergency room follow-up secondary to complaints of abdominal pain and shortness of breath.  She originally presented to the emergency room on April 9, 2017 with a complaint of abdominal pain that she described as increased gas.  The patient frequently takes Pepto-Bismol, Gas-X, and Tums secondary to nausea and gas.  While in the emergency room she also noted increased shortness of breath and was noted to have an oxygen saturation of 89%.  She was treated with an extra dose of Lasix and placed on oxygen.  She was admitted for observation.  She was noted to have an elevation in her creatinine; therefore, it was recommended that lisinopril the decreased upon discharge to 20 mg daily.  She was continued on Lasix 20 mg daily and Aldactone as prescribed.  Further workup was performed and revealed stability of all other labs.  It is recommended that the patient continue follow-up with nephrology and cardiology as scheduled.   At this time she continues to complain of abdominal pain and nausea with frequent trips of breath and reports that the to the emergency room secondary to another episode of abdominal pain.  Workup in the emergency room including EKG, troponins, and labs returned within normal limits.  Secondary to the patient's complaint of abdominal pain a CT of the abdomen was performed which revealed signs of gastritis and ascites.  Chest x-ray revealed bilateral pleural effusions.  Upon further questioning the family reports that the patient is extremely anxious and is constantly worried.  She complains of abdominal pain secondary to her increased anxiety and has a decreased appetite secondary to her anxiety.  She takes Pepto-Bismol and Gas-X on a daily basis and frequently has loose bowel movements secondary to this.  Review of Systems   Constitutional: Positive for fatigue. Negative for appetite change, chills and fever.   HENT: Negative for congestion, ear pain, hearing loss, postnasal drip, rhinorrhea, sinus pressure, sore throat and tinnitus.    Eyes: Negative for redness, itching and visual disturbance.   Respiratory: Positive for shortness of breath. Negative for cough and chest tightness.    Cardiovascular: Positive for leg swelling. Negative for chest pain and palpitations.   Gastrointestinal: Positive for diarrhea and nausea. Negative for abdominal pain, constipation and vomiting.   Genitourinary: Negative for decreased urine volume, difficulty urinating, dysuria, frequency, hematuria and urgency.   Musculoskeletal: Negative for back pain, myalgias, neck pain and neck stiffness.   Skin: Negative for rash.   Neurological: Negative for dizziness, light-headedness and headaches.   Psychiatric/Behavioral: The patient is nervous/anxious.        Objective:      Physical Exam   Constitutional: She is oriented to person, place, and time. She appears well-developed and well-nourished. No distress.   HENT:   Head: Normocephalic  and atraumatic.   Right Ear: External ear normal.   Left Ear: External ear normal.   Nose: Nose normal.   Mouth/Throat: Oropharynx is clear and moist. No oropharyngeal exudate.   Eyes: Conjunctivae and EOM are normal. Pupils are equal, round, and reactive to light. Right eye exhibits no discharge. Left eye exhibits no discharge. No scleral icterus.   Neck: Normal range of motion. Neck supple. No JVD present. No tracheal deviation present. No thyromegaly present.   Cardiovascular: Normal rate, regular rhythm, normal heart sounds and intact distal pulses.  Exam reveals no gallop and no friction rub.    No murmur heard.  Pulmonary/Chest: Effort normal and breath sounds normal. No stridor. No respiratory distress. She has no wheezes. She has no rales.   Abdominal: Soft. Bowel sounds are normal. She exhibits no distension and no mass. There is no tenderness. There is no rebound and no guarding.   Musculoskeletal: Normal range of motion. She exhibits edema (trace). She exhibits no tenderness.   Wheelchair-bound   Lymphadenopathy:     She has no cervical adenopathy.   Neurological: She is alert and oriented to person, place, and time.   Skin: Skin is warm and dry. No rash noted. She is not diaphoretic. No erythema. No pallor.   Psychiatric: She has a normal mood and affect. Her behavior is normal. Judgment and thought content normal.   Nursing note and vitals reviewed.      Assessment:       1. Hospital discharge follow-up    2. Gastritis, presence of bleeding unspecified, unspecified chronicity, unspecified gastritis type    3. Acute diastolic CHF (congestive heart failure)    4. Acute on chronic diastolic heart failure    5. Acute renal failure with tubular necrosis    6. Anxiety    7. Benign hypertensive heart and CKD, 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), w CHF    8. Chronic atrial fibrillation    9. CKD (chronic kidney disease) stage 3, GFR 30-59 ml/min    10. 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 Generalized weakness    12. Essential hypertension    13. Hypothyroidism due to acquired atrophy of thyroid    14. Persistent atrial fibrillation    15. Pacemaker    16. Chronic pulmonary edema    17. Shortness of breath    18. Vitamin D deficiency disease        Plan:         1.  Hospital records have been reviewed.  2.  Recommend discontinuation of Pepto-Bismol and Gas-X.  Recommend starting pantoprazole 20 mg each morning and Zantac 300 mg each evening.  Zofran 4 mg ODT every 8 hours when necessary nausea or vomiting.  3.  Continue Lasix, Aldactone, amlodipine, carvedilol, and lisinopril as prescribed and continue follow-up with cardiology as scheduled.  4.  Follow-up with nephrology as scheduled.  5.  Start Ativan 0.5 mg by mouth 3 times a day when necessary anxiety.  Recommend initially starting at 1 tablet at bedtime.  6.  Atrial fibrillation is currently rate controlled.  Continue follow-up with cardiology.  7.  Diabetes is well-controlled on diet.  8.  Continue levothyroxine 50 mcg daily.  Hypothyroidism is well controlled.  9.  Continue Lasix and Aldactone as prescribed.  10.  CBC, CMP, and BNP now.  11.  Return to clinic as needed if symptoms persist or worsen or in 6 months for general exam.

## 2017-04-18 NOTE — TELEPHONE ENCOUNTER
Call pharmacy and the patient prescription needs a prior authorization. Pharmacy will fax over pharmacy prior authorization.

## 2017-04-19 ENCOUNTER — TELEPHONE (OUTPATIENT)
Dept: INTERNAL MEDICINE | Facility: CLINIC | Age: 82
End: 2017-04-19

## 2017-04-19 NOTE — TELEPHONE ENCOUNTER
----- Message from Vishal Salazar MD sent at 4/19/2017  7:29 AM CDT -----  Labs are stable from last hospital visit.  She should continue medications as discussed and encourage follow up with all specialist as scheduled.  Please inform the patient.  Thank you.

## 2017-04-20 ENCOUNTER — ANTI-COAG VISIT (OUTPATIENT)
Dept: CARDIOLOGY | Facility: CLINIC | Age: 82
End: 2017-04-20

## 2017-04-20 DIAGNOSIS — Z79.01 LONG TERM (CURRENT) USE OF ANTICOAGULANTS: ICD-10-CM

## 2017-04-20 LAB — INR PPP: 1.6

## 2017-04-20 NOTE — PROGRESS NOTES
Verbal result taken from [facility]_________. PT/INR _1.6______ Date drawn_4/20/17_______ Hardcopy to be faxed.

## 2017-04-20 NOTE — PROGRESS NOTES
INR low and patient intends to start Boost 3x/week.  Increase warfarin dose.  Reported medication changes do not typically interact with warfarin.

## 2017-04-24 ENCOUNTER — TELEPHONE (OUTPATIENT)
Dept: NEPHROLOGY | Facility: CLINIC | Age: 82
End: 2017-04-24

## 2017-04-24 NOTE — TELEPHONE ENCOUNTER
Previous see my previous tel note.  I only received the urine test results from [facility]. She was also suppose to have serologies.  Can you please see if she had those done.    Urine protein slightly better at 2.9 gms for 3.3 gms before.

## 2017-04-25 ENCOUNTER — ANTI-COAG VISIT (OUTPATIENT)
Dept: CARDIOLOGY | Facility: CLINIC | Age: 82
End: 2017-04-25

## 2017-04-25 ENCOUNTER — TELEPHONE (OUTPATIENT)
Dept: NEPHROLOGY | Facility: CLINIC | Age: 82
End: 2017-04-25

## 2017-04-25 DIAGNOSIS — Z79.01 LONG TERM (CURRENT) USE OF ANTICOAGULANTS: ICD-10-CM

## 2017-04-25 LAB — INR PPP: 2.6

## 2017-04-25 NOTE — TELEPHONE ENCOUNTER
She was suppose to have blood work with her last urine so we reordered it with home health.  Dalia ordered it-she may have been trying to get intouch with her.  Please let her know.

## 2017-04-27 ENCOUNTER — OFFICE VISIT (OUTPATIENT)
Dept: INTERNAL MEDICINE | Facility: CLINIC | Age: 82
End: 2017-04-27
Payer: MEDICARE

## 2017-04-27 VITALS
DIASTOLIC BLOOD PRESSURE: 80 MMHG | BODY MASS INDEX: 27.79 KG/M2 | HEIGHT: 62 IN | WEIGHT: 151 LBS | SYSTOLIC BLOOD PRESSURE: 157 MMHG | TEMPERATURE: 98 F | RESPIRATION RATE: 24 BRPM | HEART RATE: 85 BPM

## 2017-04-27 DIAGNOSIS — R10.9 ABDOMINAL PAIN, UNSPECIFIED LOCATION: ICD-10-CM

## 2017-04-27 DIAGNOSIS — K29.70 GASTRITIS, PRESENCE OF BLEEDING UNSPECIFIED, UNSPECIFIED CHRONICITY, UNSPECIFIED GASTRITIS TYPE: Primary | ICD-10-CM

## 2017-04-27 PROCEDURE — 99999 PR PBB SHADOW E&M-EST. PATIENT-LVL III: CPT | Mod: PBBFAC,,, | Performed by: FAMILY MEDICINE

## 2017-04-27 PROCEDURE — 99214 OFFICE O/P EST MOD 30 MIN: CPT | Mod: S$PBB,,, | Performed by: FAMILY MEDICINE

## 2017-04-27 PROCEDURE — 99213 OFFICE O/P EST LOW 20 MIN: CPT | Mod: PBBFAC,PO | Performed by: FAMILY MEDICINE

## 2017-04-27 RX ORDER — SUCRALFATE 1 G/10ML
1 SUSPENSION ORAL 4 TIMES DAILY
Qty: 1200 ML | Refills: 11 | Status: SHIPPED | OUTPATIENT
Start: 2017-04-27 | End: 2017-07-10

## 2017-04-27 NOTE — PROGRESS NOTES
Subjective:       Patient ID: Deepali Stuart is a 85 y.o. female.    Chief Complaint: Abdominal Cramping (a week ago started)    HPI 85-year-old -American female with diastolic heart failure, atrial fibrillation status post pacemaker placement, chronic kidney disease, hypertension, and type 2 diabetes presents to clinic today secondary to complaints of continued abdominal cramping and decreased appetite that has been ongoing for many weeks.  She was seen approximately 1 week ago status post hospital discharge secondary to similar complaints of abdominal pain with associated shortness of breath.  Initially she had complained of a gas-like sensation for which she was taking Pepto-Bismol, Gas-X, and Tums without relief.  When she presented to the emergency room she was also noted to be short of breath and had an oxygen saturation of 89%.  She was admitted for observation and treated for heart failure exacerbation.  After discharge she returned to the emergency room secondary to continued abdominal pain and CT of the abdomen performed at that time revealed signs of gastritis and ascites.  Secondary to this the patient was started on pantoprazole and Zantac which she reports she has been taking without relief.  At this time she reports that she has been having severe abdominal cramps and continues to have loose stools.  She denies any blood or mucous in the stool.  She reports is secondary to the abdominal cramps she has had no appetite.  Review of Systems   Constitutional: Positive for appetite change and fatigue. Negative for chills and fever.   HENT: Negative for congestion, ear pain, hearing loss, postnasal drip, rhinorrhea, sinus pressure, sore throat and tinnitus.    Eyes: Negative for redness, itching and visual disturbance.   Respiratory: Positive for shortness of breath. Negative for cough and chest tightness.    Cardiovascular: Negative for chest pain and palpitations.   Gastrointestinal: Positive  for abdominal pain, diarrhea and nausea. Negative for constipation and vomiting.   Genitourinary: Negative for decreased urine volume, difficulty urinating, dysuria, frequency, hematuria and urgency.   Musculoskeletal: Negative for back pain, myalgias, neck pain and neck stiffness.   Skin: Negative for rash.   Neurological: Negative for dizziness, light-headedness and headaches.   Psychiatric/Behavioral: Positive for sleep disturbance.       Objective:      Physical Exam   Constitutional: She is oriented to person, place, and time. She appears well-developed and well-nourished. No distress.   HENT:   Head: Normocephalic and atraumatic.   Right Ear: External ear normal.   Left Ear: External ear normal.   Nose: Nose normal.   Mouth/Throat: Oropharynx is clear and moist. No oropharyngeal exudate.   Eyes: Conjunctivae and EOM are normal. Pupils are equal, round, and reactive to light. Right eye exhibits no discharge. Left eye exhibits no discharge. No scleral icterus.   Neck: Normal range of motion. Neck supple. No JVD present. No tracheal deviation present. No thyromegaly present.   Cardiovascular: Normal rate, regular rhythm, normal heart sounds and intact distal pulses.  Exam reveals no gallop and no friction rub.    No murmur heard.  Pulmonary/Chest: Effort normal and breath sounds normal. No stridor. No respiratory distress. She has no wheezes. She has no rales.   Abdominal: Soft. Bowel sounds are normal. She exhibits no distension and no mass. There is no tenderness. There is no rebound and no guarding.   Musculoskeletal: Normal range of motion. She exhibits no edema or tenderness.   Wheelchair-bound   Lymphadenopathy:     She has no cervical adenopathy.   Neurological: She is alert and oriented to person, place, and time.   Skin: Skin is warm and dry. No rash noted. She is not diaphoretic. No erythema. No pallor.   Psychiatric: She has a normal mood and affect. Her behavior is normal. Judgment and thought content  normal.   Nursing note and vitals reviewed.      Assessment:       1. Gastritis, presence of bleeding unspecified, unspecified chronicity, unspecified gastritis type    2. Abdominal pain, unspecified location        Plan:       1.  Continue pantoprazole and Zantac as prescribed and start Carafate 100 mg 4 times per day.  2.  CT of the abdomen and pelvis for further evaluation of abdominal pain.  3.  Refer to GI for further evaluation and treatment of abdominal pain.  4.  If symptoms persist or worsen I have encouraged the family to take the patient to the emergency room for further evaluation.

## 2017-04-27 NOTE — MR AVS SNAPSHOT
[facility]   [facility]  [place] [address]  Phone: [phone]  Fax: [phone]                  Deepali Stuart   2017 2:00 PM   Office Visit    Description:  Female : 1932   Provider:  Vishal Salazar MD   Department:  [facility]           Reason for Visit     Abdominal Cramping           Diagnoses this Visit        Comments    Gastritis, presence of bleeding unspecified, unspecified chronicity, unspecified gastritis type    -  Primary     Abdominal pain, unspecified location                To Do List           Future Appointments        Provider Department Dept Phone    2017 1:00 PM TELEPHONE CHECK, [facility] [address] - Arrhythmia [phone]      Goals (5 Years of Data)     None      Follow-Up and Disposition     Return if symptoms worsen or fail to improve.       These Medications        Disp Refills Start End    sucralfate (CARAFATE) 100 mg/mL suspension 1200 mL 11 2017    Take 10 mLs (1 g total) by mouth 4 (four) times daily. - Oral    Pharmacy: [facility] [address] [place] [address] AT [facility] [address] Ph #: [phone]         [facility] On Call     [facility] On Call Nurse Care Line -  Assistance  Unless otherwise directed by your provider, please contact [facility], our nurse care line that is available for  assistance.     Registered nurses in the [facility] On Call Center provide: appointment scheduling, clinical advisement, health education, and other advisory services.  Call: [phone] (toll free)               Medications           Message regarding Medications     Verify the changes and/or additions to your medication regime listed below are the same as discussed with your clinician today.  If any of these changes or additions are incorrect, please notify your healthcare provider.        START taking these NEW medications        Refills    sucralfate  (CARAFATE) 100 mg/mL suspension 11    Sig: Take 10 mLs (1 g total) by mouth 4 (four) times daily.    Class: Normal    Route: Oral           Verify that the below list of medications is an accurate representation of the medications you are currently taking.  If none reported, the list may be blank. If incorrect, please contact your healthcare provider. Carry this list with you in case of emergency.           Current Medications     amlodipine (NORVASC) 5 MG tablet Take 1 tablet (5 mg total) by mouth once daily.    atorvastatin (LIPITOR) 10 MG tablet TAKE 1 TABLET BY MOUTH EVERY DAY    blood sugar diagnostic (TRUETRACK TEST) Strp FOLLOW PACKAGE DIRECTIONS    carvedilol (COREG) 25 MG tablet TAKE 1 TABLET BY MOUTH TWICE DAILY    furosemide (LASIX) 20 MG tablet Take 1 tablet (20 mg total) by mouth once daily.    lancets Misc 1 Device by Misc.(Non-Drug; Combo Route) route once daily.    levothyroxine (SYNTHROID) 50 MCG tablet TAKE 1 TABLET BY MOUTH EVERY DAY    lisinopril (PRINIVIL,ZESTRIL) 20 MG tablet Take 1 tablet (20 mg total) by mouth once daily.    lorazepam (ATIVAN) 0.5 MG tablet Take 1 tablet (0.5 mg total) by mouth every 8 (eight) hours as needed for Anxiety.    meclizine (ANTIVERT) 25 mg tablet TAKE 2 TABLETS BY MOUTH THREE TIMES DAILY AS NEEDED FOR DIZZINESS    ondansetron (ZOFRAN-ODT) 4 MG TbDL DISSOLVE ONE TABLET BY MOUTH EVERY 8 HOURS AS NEEDED FOR NAUSEA AND VOMITING    pantoprazole (PROTONIX) 20 MG tablet Take 1 tablet (20 mg total) by mouth once daily.    ranitidine (ZANTAC) 300 MG tablet Take 1 tablet (300 mg total) by mouth every evening.    spironolactone (ALDACTONE) 25 MG tablet Take 1 tablet (25 mg total) by mouth once daily.    trazodone (DESYREL) 50 MG tablet TAKE 1/2 TO 1 TABLET BY MOUTH EVERY EVENING AS NEEDED FOR INSOMNIA    warfarin (COUMADIN) 5 MG tablet TAKE 1 TABLET BY MOUTH EVERY DAY OR AS DIRECTED BY COUMADIN CLINIC    sucralfate (CARAFATE) 100 mg/mL suspension Take 10 mLs (1 g total) by  "mouth 4 (four) times daily.           Clinical Reference Information           Your Vitals Were     BP Pulse Temp Resp Height Weight    157/80 (BP Location: Left arm, Patient Position: Sitting, BP Method: Automatic) 85 98.1 °F (36.7 °C) (Oral) 24 5' 2" (1.575 m) 68.5 kg (151 lb 0.2 oz)    Last Period BMI             (LMP Unknown) 27.62 kg/m2         Blood Pressure          Most Recent Value    BP  (!)  157/80      Allergies as of 4/27/2017     Plavix [Clopidogrel]      Immunizations Administered on Date of Encounter - 4/27/2017     None      Orders Placed During Today's Visit      Normal Orders This Visit    Ambulatory Referral to Gastroenterology     Future Labs/Procedures Expected by Expires    CT Abdomen Pelvis  Without Contrast  4/27/2017 4/27/2018      [facility] Sign-Up     Activating your [facility] account is as easy as 1-2-3!     1) Visit [email or web address], select Sign Up Now, enter this activation code and your date of birth, then select Next.  [email or web address]  Expires: 5/24/2017  3:14 PM      2) Create a username and password to use when you visit [facility] in the future and select a security question in case you lose your password and select Next.    3) Enter your e-mail address and click Sign Up!    Additional Information  If you have questions, please e-mail [email or web address] or call [phone] to talk to our [facility] staff. Remember, [facility] is NOT to be used for urgent needs. For medical emergencies, dial 911.         Language Assistance Services     ATTENTION: Language assistance services are available, free of charge. Please call [phone].      ATENCIÓN: Si habla español, tiene a lee disposición servicios gratuitos de asistencia lingüística. Llame al [phone].     CHÚ Ý: N?u b?n nói Ti?ng Vi?t, có các d?ch v? h? tr? ngôn ng? mi?n phí dành cho b?n. G?i s? [phone].         [facility] complies with applicable Federal civil rights laws and does not discriminate " on the basis of race, color, national origin, age, disability, or sex.

## 2017-05-01 ENCOUNTER — CLINICAL SUPPORT (OUTPATIENT)
Dept: ELECTROPHYSIOLOGY | Facility: CLINIC | Age: 82
End: 2017-05-01
Payer: MEDICARE

## 2017-05-01 ENCOUNTER — ANTI-COAG VISIT (OUTPATIENT)
Dept: CARDIOLOGY | Facility: CLINIC | Age: 82
End: 2017-05-01

## 2017-05-01 DIAGNOSIS — I48.91 ATRIAL FIBRILLATION: ICD-10-CM

## 2017-05-01 DIAGNOSIS — Z95.0 CARDIAC PACEMAKER IN SITU: ICD-10-CM

## 2017-05-01 DIAGNOSIS — Z79.01 LONG TERM (CURRENT) USE OF ANTICOAGULANTS: ICD-10-CM

## 2017-05-01 LAB — INR PPP: 2.4

## 2017-05-03 ENCOUNTER — LAB VISIT (OUTPATIENT)
Dept: LAB | Facility: HOSPITAL | Age: 82
End: 2017-05-03
Attending: NURSE PRACTITIONER
Payer: MEDICARE

## 2017-05-03 ENCOUNTER — TELEPHONE (OUTPATIENT)
Dept: GASTROENTEROLOGY | Facility: CLINIC | Age: 82
End: 2017-05-03

## 2017-05-03 ENCOUNTER — OFFICE VISIT (OUTPATIENT)
Dept: GASTROENTEROLOGY | Facility: CLINIC | Age: 82
End: 2017-05-03
Payer: MEDICARE

## 2017-05-03 VITALS
DIASTOLIC BLOOD PRESSURE: 88 MMHG | WEIGHT: 142.19 LBS | BODY MASS INDEX: 26.17 KG/M2 | SYSTOLIC BLOOD PRESSURE: 164 MMHG | HEIGHT: 62 IN | HEART RATE: 78 BPM

## 2017-05-03 DIAGNOSIS — R11.0 NAUSEA: ICD-10-CM

## 2017-05-03 DIAGNOSIS — R10.13 ABDOMINAL PAIN, EPIGASTRIC: ICD-10-CM

## 2017-05-03 DIAGNOSIS — R10.13 ABDOMINAL PAIN, EPIGASTRIC: Primary | ICD-10-CM

## 2017-05-03 LAB
ALBUMIN SERPL BCP-MCNC: 2.6 G/DL
ALP SERPL-CCNC: 62 U/L
ALT SERPL W/O P-5'-P-CCNC: 24 U/L
ANION GAP SERPL CALC-SCNC: 8 MMOL/L
AST SERPL-CCNC: 33 U/L
BASOPHILS # BLD AUTO: 0.03 K/UL
BASOPHILS NFR BLD: 0.6 %
BILIRUB SERPL-MCNC: 0.6 MG/DL
BUN SERPL-MCNC: 13 MG/DL
CALCIUM SERPL-MCNC: 8.6 MG/DL
CHLORIDE SERPL-SCNC: 105 MMOL/L
CO2 SERPL-SCNC: 26 MMOL/L
CREAT SERPL-MCNC: 1.9 MG/DL
DIFFERENTIAL METHOD: ABNORMAL
EOSINOPHIL # BLD AUTO: 0.1 K/UL
EOSINOPHIL NFR BLD: 2.5 %
ERYTHROCYTE [DISTWIDTH] IN BLOOD BY AUTOMATED COUNT: 13.4 %
EST. GFR  (AFRICAN AMERICAN): 27 ML/MIN/1.73 M^2
EST. GFR  (NON AFRICAN AMERICAN): 24 ML/MIN/1.73 M^2
GLUCOSE SERPL-MCNC: 132 MG/DL
HCT VFR BLD AUTO: 34.4 %
HGB BLD-MCNC: 11.5 G/DL
LIPASE SERPL-CCNC: 48 U/L
LYMPHOCYTES # BLD AUTO: 1.2 K/UL
LYMPHOCYTES NFR BLD: 22.3 %
MCH RBC QN AUTO: 30.7 PG
MCHC RBC AUTO-ENTMCNC: 33.4 %
MCV RBC AUTO: 92 FL
MONOCYTES # BLD AUTO: 0.5 K/UL
MONOCYTES NFR BLD: 9.3 %
NEUTROPHILS # BLD AUTO: 3.4 K/UL
NEUTROPHILS NFR BLD: 65.1 %
PLATELET # BLD AUTO: 219 K/UL
PMV BLD AUTO: 9.1 FL
POTASSIUM SERPL-SCNC: 3.7 MMOL/L
PROT SERPL-MCNC: 5.9 G/DL
RBC # BLD AUTO: 3.74 M/UL
SODIUM SERPL-SCNC: 139 MMOL/L
WBC # BLD AUTO: 5.16 K/UL

## 2017-05-03 PROCEDURE — 99204 OFFICE O/P NEW MOD 45 MIN: CPT | Mod: S$PBB,,, | Performed by: NURSE PRACTITIONER

## 2017-05-03 PROCEDURE — 99212 OFFICE O/P EST SF 10 MIN: CPT | Mod: PBBFAC,PN | Performed by: NURSE PRACTITIONER

## 2017-05-03 PROCEDURE — 80053 COMPREHEN METABOLIC PANEL: CPT

## 2017-05-03 PROCEDURE — 83690 ASSAY OF LIPASE: CPT

## 2017-05-03 PROCEDURE — 36415 COLL VENOUS BLD VENIPUNCTURE: CPT

## 2017-05-03 PROCEDURE — 85025 COMPLETE CBC W/AUTO DIFF WBC: CPT

## 2017-05-03 PROCEDURE — 99999 PR PBB SHADOW E&M-EST. PATIENT-LVL II: CPT | Mod: PBBFAC,,, | Performed by: NURSE PRACTITIONER

## 2017-05-03 NOTE — TELEPHONE ENCOUNTER
See message from [facility]:    5/3/2017 12:30 PM Deloris Heaton, PharmD [facility] Patient Calls        Comment: Thanks for the info.  I would approve her to hold coumadin x 3 days prior to an EGD without lovenox.  Please let us know a date, once determined, and we will provide her with detailed holding instructions.     Please let pt know.

## 2017-05-03 NOTE — TELEPHONE ENCOUNTER
Can she hold coumadin for EGD with bx for abdominal pain, nausea, decreased appetite,  abnormal CT?

## 2017-05-03 NOTE — TELEPHONE ENCOUNTER
[place] to call office back         ----- Message from GARRE[facility] Moncada sent at 5/3/2017 11:29 AM CDT -----  Let pt know that labs are stable

## 2017-05-03 NOTE — LETTER
May 3, 2017      Vishal Salazar MD  [address] [address]           [facility]  [address]  [address]  [place] [address]  Phone: [phone]          Patient: Deepali Stuart   MR Number: [id number]   YOB: 1932   Date of Visit: 5/3/2017       Dear Dr. Vishal Salazar:    Thank you for referring Depeali Stuart to me for evaluation. Attached you will find relevant portions of my assessment and plan of care.    If you have questions, please do not hesitate to call me. I look forward to following Deepali Stuart along with you.    Sincerely,    Cheryl Shin, [facility]    Enclosure  CC:  No Recipients    If you would like to receive this communication electronically, please contact [email or web address] or [phone] to request more information on [employer] Link access.    For providers and/or their staff who would like to refer a patient to [facility], please contact us through our one-stop-shop provider referral line, [facility], at [phone].    If you feel you have received this communication in error or would no longer like to receive these types of communications, please e-mail [email or web address]

## 2017-05-03 NOTE — PROGRESS NOTES
"Subjective:       Patient ID: Deepali Stuart is a 85 y.o. female.    Chief Complaint: Other (Gastritis) and Abdominal Pain    HPI  Reports epigastric pain, decreased appetite, dyspepsia and nausea but no vomiting occurring for at least several months.  She has been to the ED on multiple occasions for these complaints.  At her last visit, she was also reporting SOB with decreased O2 sat and was admitted.  She had CT without contrast and I have reviewed this report.  She was noted to have thickened walls of the stomach and duodenum.  She has been using PPI, ranitidine, pepto, TUMS and gas-x without relief.  Carafate has recently been added which has "eased" her complaints.  She describes a "rolling" sensation that she associated with gas throughout the upper abdomen.  This is different than pain discussed above.  She reports loose stools.  No blood with bowel movements or black stools.  Repeat CT was ordered but she missed her appointment on Monday.  Reports EGD in the distant past.  Recent labs with slight elevated in LFT.  CBC with mild anemia.  Review of Systems   Constitutional: Positive for activity change and appetite change. Negative for fatigue, fever and unexpected weight change.   Respiratory: Positive for shortness of breath. Negative for cough.    Cardiovascular: Negative for chest pain.   Gastrointestinal: Positive for abdominal pain, diarrhea and nausea. Negative for blood in stool, constipation and vomiting.   Genitourinary: Negative.    Skin: Negative.    Neurological: Positive for weakness.   Psychiatric/Behavioral: Negative.        Objective:      Physical Exam   Constitutional: She is oriented to person, place, and time. She appears well-developed and well-nourished. No distress.   HENT:   Head: Normocephalic.   Eyes: No scleral icterus.   Cardiovascular: Normal rate.    Pulmonary/Chest: Effort normal. No respiratory distress.   Abdominal: Soft. Bowel sounds are normal. She exhibits no " distension and no mass. There is no tenderness (non tender on exam, but points to epigastrum as the location of pain when present.).   Neurological: She is alert and oriented to person, place, and time.   Skin: Skin is warm and dry. She is not diaphoretic.   Psychiatric: She has a normal mood and affect. Her behavior is normal. Judgment and thought content normal.   Vitals reviewed.      Assessment:       1. Abdominal pain, epigastric    2. Nausea        Plan:         Deepali was seen today for other and abdominal pain.    Diagnoses and all orders for this visit:    Abdominal pain, epigastric  -     Comprehensive metabolic panel; Future  -     Lipase; Future  -     CBC auto differential; Future  -     Case request GI: ESOPHAGOGASTRODUODENOSCOPY (EGD)    Nausea  -     Comprehensive metabolic panel; Future  -     Lipase; Future  -     CBC auto differential; Future  -     Case request GI: ESOPHAGOGASTRODUODENOSCOPY (EGD)    Will schedule EGD for evaluation with clearance from coumadin clinic to hold coumadin.  Will reschedule CT previously ordered.  Continue current medications.

## 2017-05-03 NOTE — PROGRESS NOTES
The pt will need to schedule an EGD in the near future and will need to hold coumadin ~3 days prior.  She is CHADs = 4 (Age, HTN, CHF, DM), but is elderly with CKD.  As she will only need to hold x 3 days, I am recommending that she holds without lovenox.  I have sent this recommendation to Dr. Munguia.  We will provide detailed procedure instructions once a date for this procedure is known.

## 2017-05-04 NOTE — TELEPHONE ENCOUNTER
I was able to let the pt know that she is allowed to hold her coumadin 3 days prior to her EGD. Verbal Understanding.

## 2017-05-08 ENCOUNTER — ANTI-COAG VISIT (OUTPATIENT)
Dept: CARDIOLOGY | Facility: CLINIC | Age: 82
End: 2017-05-08

## 2017-05-08 ENCOUNTER — HOSPITAL ENCOUNTER (OUTPATIENT)
Dept: RADIOLOGY | Facility: OTHER | Age: 82
Discharge: HOME | End: 2017-05-08
Attending: FAMILY MEDICINE
Payer: MEDICARE

## 2017-05-08 DIAGNOSIS — R10.9 ABDOMINAL PAIN, UNSPECIFIED LOCATION: ICD-10-CM

## 2017-05-08 DIAGNOSIS — Z79.01 LONG TERM (CURRENT) USE OF ANTICOAGULANTS: ICD-10-CM

## 2017-05-08 DIAGNOSIS — K29.70 GASTRITIS, PRESENCE OF BLEEDING UNSPECIFIED, UNSPECIFIED CHRONICITY, UNSPECIFIED GASTRITIS TYPE: ICD-10-CM

## 2017-05-08 LAB — INR PPP: 1.5

## 2017-05-08 PROCEDURE — 25500020 PHARM REV CODE 255: Performed by: FAMILY MEDICINE

## 2017-05-08 PROCEDURE — 74176 CT ABD & PELVIS W/O CONTRAST: CPT | Mod: 26,,, | Performed by: RADIOLOGY

## 2017-05-08 PROCEDURE — 74176 CT ABD & PELVIS W/O CONTRAST: CPT | Mod: TC

## 2017-05-08 RX ADMIN — IOHEXOL 25 ML: 350 INJECTION, SOLUTION INTRAVENOUS at 11:05

## 2017-05-08 NOTE — PROGRESS NOTES
"From Dr. Munguia: "That is reasonable. Deloris I have not seen the patient since she was hospitalized in 2013. Should we have another physician assigned to her."  Therefore, I changed her enrolling physician to her current PCP.  "

## 2017-05-09 ENCOUNTER — ANESTHESIA EVENT (OUTPATIENT)
Dept: ENDOSCOPY | Facility: HOSPITAL | Age: 82
End: 2017-05-09
Payer: MEDICARE

## 2017-05-09 ENCOUNTER — ANESTHESIA (OUTPATIENT)
Dept: ENDOSCOPY | Facility: HOSPITAL | Age: 82
End: 2017-05-09
Payer: MEDICARE

## 2017-05-09 ENCOUNTER — HOSPITAL ENCOUNTER (OUTPATIENT)
Facility: HOSPITAL | Age: 82
Discharge: HOME | End: 2017-05-09
Attending: INTERNAL MEDICINE | Admitting: INTERNAL MEDICINE
Payer: MEDICARE

## 2017-05-09 ENCOUNTER — TELEPHONE (OUTPATIENT)
Dept: INTERNAL MEDICINE | Facility: CLINIC | Age: 82
End: 2017-05-09

## 2017-05-09 ENCOUNTER — SURGERY (OUTPATIENT)
Age: 82
End: 2017-05-09

## 2017-05-09 VITALS
OXYGEN SATURATION: 100 % | DIASTOLIC BLOOD PRESSURE: 78 MMHG | HEART RATE: 74 BPM | TEMPERATURE: 98 F | SYSTOLIC BLOOD PRESSURE: 154 MMHG | WEIGHT: 145 LBS | RESPIRATION RATE: 18 BRPM | BODY MASS INDEX: 26.68 KG/M2 | HEIGHT: 62 IN

## 2017-05-09 DIAGNOSIS — R10.13 EPIGASTRIC ABDOMINAL PAIN: ICD-10-CM

## 2017-05-09 PROBLEM — Q40.3 ABNORMAL GASTRIC FOLDS: Status: ACTIVE | Noted: 2017-05-09

## 2017-05-09 PROCEDURE — 88305 TISSUE EXAM BY PATHOLOGIST: CPT | Performed by: PATHOLOGY

## 2017-05-09 PROCEDURE — 88305 TISSUE EXAM BY PATHOLOGIST: CPT | Mod: 26,,, | Performed by: PATHOLOGY

## 2017-05-09 PROCEDURE — 25000003 PHARM REV CODE 250: Performed by: NURSE ANESTHETIST, CERTIFIED REGISTERED

## 2017-05-09 PROCEDURE — 63600175 PHARM REV CODE 636 W HCPCS: Performed by: NURSE ANESTHETIST, CERTIFIED REGISTERED

## 2017-05-09 PROCEDURE — 27201012 HC FORCEPS, HOT/COLD, DISP: Performed by: INTERNAL MEDICINE

## 2017-05-09 PROCEDURE — 37000009 HC ANESTHESIA EA ADD 15 MINS: Performed by: INTERNAL MEDICINE

## 2017-05-09 PROCEDURE — 25000003 PHARM REV CODE 250: Performed by: INTERNAL MEDICINE

## 2017-05-09 PROCEDURE — 37000008 HC ANESTHESIA 1ST 15 MINUTES: Performed by: INTERNAL MEDICINE

## 2017-05-09 PROCEDURE — 43239 EGD BIOPSY SINGLE/MULTIPLE: CPT | Mod: ,,, | Performed by: INTERNAL MEDICINE

## 2017-05-09 PROCEDURE — 43239 EGD BIOPSY SINGLE/MULTIPLE: CPT | Performed by: INTERNAL MEDICINE

## 2017-05-09 RX ORDER — SODIUM CHLORIDE 9 MG/ML
INJECTION, SOLUTION INTRAVENOUS CONTINUOUS
Status: DISCONTINUED | OUTPATIENT
Start: 2017-05-09 | End: 2017-05-09 | Stop reason: HOSPADM

## 2017-05-09 RX ORDER — LIDOCAINE HCL/PF 100 MG/5ML
SYRINGE (ML) INTRAVENOUS
Status: DISCONTINUED | OUTPATIENT
Start: 2017-05-09 | End: 2017-05-09

## 2017-05-09 RX ORDER — PROPOFOL 10 MG/ML
VIAL (ML) INTRAVENOUS CONTINUOUS PRN
Status: DISCONTINUED | OUTPATIENT
Start: 2017-05-09 | End: 2017-05-09

## 2017-05-09 RX ORDER — PROPOFOL 10 MG/ML
VIAL (ML) INTRAVENOUS
Status: DISCONTINUED | OUTPATIENT
Start: 2017-05-09 | End: 2017-05-09

## 2017-05-09 RX ADMIN — SODIUM CHLORIDE: 0.9 INJECTION, SOLUTION INTRAVENOUS at 08:05

## 2017-05-09 RX ADMIN — SODIUM CHLORIDE: 0.9 INJECTION, SOLUTION INTRAVENOUS at 09:05

## 2017-05-09 RX ADMIN — PROPOFOL 100 MCG/KG/MIN: 10 INJECTION, EMULSION INTRAVENOUS at 09:05

## 2017-05-09 RX ADMIN — TOPICAL ANESTHETIC 1 EACH: 200 SPRAY DENTAL; PERIODONTAL at 09:05

## 2017-05-09 RX ADMIN — PROPOFOL 30 MG: 10 INJECTION, EMULSION INTRAVENOUS at 09:05

## 2017-05-09 RX ADMIN — LIDOCAINE HYDROCHLORIDE 80 MG: 20 INJECTION, SOLUTION INTRAVENOUS at 09:05

## 2017-05-09 NOTE — ANESTHESIA PREPROCEDURE EVALUATION
05/09/2017  Deepali Stuart is a 85 y.o., female with HTN, pacemaker for sss, chronic afib,  here for EGD under MAC.      Past Medical History:   Diagnosis Date    Atrial fibrillation     chronic AF    CHF (congestive heart failure)     Diabetes insipidus     Diabetes mellitus type II     GERD (gastroesophageal reflux disease)     Hypertension     Iritis 12/10/2014    NPDR (nonproliferative diabetic retinopathy) 10/27/2014    PAF (paroxysmal atrial fibrillation)     Palpitations      Review of patient's allergies indicates:   Allergen Reactions    Plavix [clopidogrel]      Pacemaker Interrogation 1/10/17  Mode: VVIR  Magnet mode rate is 100 bpm  Magnet mode interval is 599 ms  Lower limit rate is 60 bpm  Max sensor rate is 110 bpm  Stable Magnet Rate.    Anesthesia Evaluation    I have reviewed the Patient Summary Reports.     I have reviewed the Medications.     Review of Systems  Anesthesia Hx:  No problems with previous Anesthesia   Denies Personal Hx of Anesthesia complications.   Social:  Non-Smoker    Hematology/Oncology:  Hematology Normal   Oncology Normal     Cardiovascular:   Pacemaker Hypertension Dysrhythmias atrial fibrillation    Pulmonary:  Pulmonary Normal    Renal/:  Renal/ Normal     Hepatic/GI:   GERD    Musculoskeletal:  Musculoskeletal Normal    Neurological:  Neurology Normal    Endocrine:   Diabetes      Lab Results   Component Value Date    WBC 5.16 05/03/2017    HGB 11.5 (L) 05/03/2017    HCT 34.4 (L) 05/03/2017     05/03/2017    CHOL 217 (H) 12/28/2016    TRIG 93 12/28/2016    HDL 67 12/28/2016    ALT 24 05/03/2017    AST 33 05/03/2017     05/03/2017    K 3.7 05/03/2017     05/03/2017    CREATININE 1.9 (H) 05/03/2017    BUN 13 05/03/2017    CO2 26 05/03/2017    TSH 0.899 03/08/2017    INR 1.5 05/08/2017    HGBA1C 5.6 12/28/2016     CXR  4/14/17  Increased density within the bases of both hemithoraces likely due to layering bilateral pleural effusions with associated bibasilar atelectasis/consolidation. Findings are more pronounced on the left than on the right. Findings do appear slightly increased when compared to 4/9/17. Mild pulmonary edema should be strongly considered.      TTE 3/9/17  EF 66%  Moderate MR  PA pressures 46.24      Physical Exam  General:  Well nourished    Airway/Jaw/Neck:  Airway Findings: Mouth Opening: Normal Tongue: Normal  General Airway Assessment: Adult  Mallampati: II  TM Distance: Normal, at least 6 cm  Jaw/Neck Findings:  Neck ROM: Normal ROM  Neck Findings: Normal    Eyes/Ears/Nose:  EYES/EARS/NOSE FINDINGS: Normal   Dental:  Dental Findings:   Chest/Lungs:  Chest/Lungs Findings: Clear to auscultation, Normal Respiratory Rate     Heart/Vascular:  Heart Findings: Rhythm: Irregularly Irregular  Heart Murmur  Systolic  Systolic Heart Murmur Grade: Grade II        Mental Status:  Mental Status Findings:  Cooperative, Alert and Oriented         Anesthesia Plan  Type of Anesthesia, risks & benefits discussed:  Anesthesia Type:  MAC  Patient's Preference:   Intra-op Monitoring Plan:   Intra-op Monitoring Plan Comments:   Post Op Pain Control Plan:   Post Op Pain Control Plan Comments:   Induction:   IV  Beta Blocker:  Patient is not currently on a Beta-Blocker (No further documentation required).       Informed Consent: Patient understands risks and agrees with Anesthesia plan.  Questions answered. Anesthesia consent signed with patient.  ASA Score: 3     Day of Surgery Review of History & Physical:    H&P update referred to the provider.         Ready For Surgery From Anesthesia Perspective.

## 2017-05-09 NOTE — IP AVS SNAPSHOT
[facility]  [address]  [place] [address]  Phone: [phone]           Patient Discharge Instructions   Our goal is to set you up for success. This packet includes information on your condition, medications, and your home care.  It will help you care for yourself to prevent having to return to the hospital.     Please ask your nurse if you have any questions.      There are many details to remember when preparing to leave the hospital. Here is what you will need to do:    1. Take your medicine. If you are prescribed medications, review your Medication List on the following pages. You may have new medications to  at the pharmacy and others that you'll need to stop taking. Review the instructions for how and when to take your medications. Talk with your doctor or nurses if you are unsure of what to do.     2. Go to your follow-up appointments. Specific follow-up information is listed in the following pages. Your may be contacted by a nurse or clinical provider about future appointments. Be sure we have all of the phone numbers to reach you. Please contact your provider's office if you are unable to make an appointment.     3. Watch for warning signs. Your doctor or nurse will give you detailed warning signs to watch for and when to call for assistance. These instructions may also include educational information about your condition. If you experience any of warning signs to your health, call your doctor.               ** Verify the list of medication(s) below is accurate and up to date. Carry this with you in case of emergency. If your medications have changed, please notify your healthcare provider.             Medication List      CHANGE how you take these medications        Additional Info                      blood sugar diagnostic Strp   Commonly known as:  TRUETRACK TEST   Quantity:  100 strip   Refills:  11   What changed:    - how much to take  - how to take this  - when to  take this  - additional instructions    Instructions:  FOLLOW PACKAGE DIRECTIONS     Begin Date    AM    Noon    PM    Bedtime       lancets Misc   Quantity:  100 each   Refills:  3   Dose:  1 Device   What changed:  when to take this    Instructions:  1 Device by Misc.(Non-Drug; Combo Route) route once daily.     Begin Date    AM    Noon    PM    Bedtime       warfarin 5 MG tablet   Commonly known as:  COUMADIN   Quantity:  45 tablet   Refills:  11   What changed:  See the new instructions.    Instructions:  TAKE 1 TABLET BY MOUTH EVERY DAY OR AS DIRECTED BY COUMADIN CLINIC     Begin Date    AM    Noon    PM    Bedtime         CONTINUE taking these medications        Additional Info                      amlodipine 5 MG tablet   Commonly known as:  NORVASC   Quantity:  90 tablet   Refills:  3   Dose:  5 mg   Comments:  **Patient requests 90 days supply**    Instructions:  Take 1 tablet (5 mg total) by mouth once daily.     Begin Date    AM    Noon    PM    Bedtime       atorvastatin 10 MG tablet   Commonly known as:  LIPITOR   Quantity:  90 tablet   Refills:  3    Instructions:  TAKE 1 TABLET BY MOUTH EVERY DAY     Begin Date    AM    Noon    PM    Bedtime       carvedilol 25 MG tablet   Commonly known as:  COREG   Quantity:  180 tablet   Refills:  3   Comments:  **Patient requests 90 days supply**    Instructions:  TAKE 1 TABLET BY MOUTH TWICE DAILY     Begin Date    AM    Noon    PM    Bedtime       furosemide 20 MG tablet   Commonly known as:  LASIX   Quantity:  30 tablet   Refills:  11   Dose:  20 mg    Instructions:  Take 1 tablet (20 mg total) by mouth once daily.     Begin Date    AM    Noon    PM    Bedtime       levothyroxine 50 MCG tablet   Commonly known as:  SYNTHROID   Quantity:  90 tablet   Refills:  3    Instructions:  TAKE 1 TABLET BY MOUTH EVERY DAY     Begin Date    AM    Noon    PM    Bedtime       lisinopril 20 MG tablet   Commonly known as:  PRINIVIL,ZESTRIL   Quantity:  90 tablet   Refills:  0    Dose:  20 mg    Instructions:  Take 1 tablet (20 mg total) by mouth once daily.     Begin Date    AM    Noon    PM    Bedtime       lorazepam 0.5 MG tablet   Commonly known as:  ATIVAN   Quantity:  90 tablet   Refills:  0   Dose:  0.5 mg    Instructions:  Take 1 tablet (0.5 mg total) by mouth every 8 (eight) hours as needed for Anxiety.     Begin Date    AM    Noon    PM    Bedtime       meclizine 25 mg tablet   Commonly known as:  ANTIVERT   Quantity:  60 tablet   Refills:  11    Instructions:  TAKE 2 TABLETS BY MOUTH THREE TIMES DAILY AS NEEDED FOR DIZZINESS     Begin Date    AM    Noon    PM    Bedtime       ondansetron 4 MG Tbdl   Commonly known as:  ZOFRAN-ODT   Quantity:  30 tablet   Refills:  3    Instructions:  DISSOLVE ONE TABLET BY MOUTH EVERY 8 HOURS AS NEEDED FOR NAUSEA AND VOMITING     Begin Date    AM    Noon    PM    Bedtime       pantoprazole 20 MG tablet   Commonly known as:  PROTONIX   Quantity:  30 tablet   Refills:  11   Dose:  20 mg    Instructions:  Take 1 tablet (20 mg total) by mouth once daily.     Begin Date    AM    Noon    PM    Bedtime       spironolactone 25 MG tablet   Commonly known as:  ALDACTONE   Quantity:  30 tablet   Refills:  4   Dose:  25 mg    Instructions:  Take 1 tablet (25 mg total) by mouth once daily.     Begin Date    AM    Noon    PM    Bedtime       sucralfate 100 mg/mL suspension   Commonly known as:  CARAFATE   Quantity:  1200 mL   Refills:  11   Dose:  1 g    Instructions:  Take 10 mLs (1 g total) by mouth 4 (four) times daily.     Begin Date    AM    Noon    PM    Bedtime       trazodone 50 MG tablet   Commonly known as:  DESYREL   Quantity:  90 tablet   Refills:  3   Comments:  **Patient requests 90 days supply**    Instructions:  TAKE 1/2 TO 1 TABLET BY MOUTH EVERY EVENING AS NEEDED FOR INSOMNIA     Begin Date    AM    Noon    PM    Bedtime         STOP taking these medications     ranitidine 300 MG tablet   Commonly known as:  ZANTAC                  Please  bring to all follow up appointments:    1. A copy of your discharge instructions.  2. All medicines you are currently taking in their original bottles.  3. Identification and insurance card.    Please arrive 15 minutes ahead of scheduled appointment time.    Please call 24 hours in advance if you must reschedule your appointment and/or time.        Your Scheduled Appointments     Aug 02, 2017 10:20 AM CDT   Telephonic Pacemaker Check with TELEPHONE CHECK, PACEMAKER   [facility] ([facility] )    [address]  [facility] [address] [phone]              Follow-up Information     Follow up with Vishal Salazar MD.    Specialty:  Family Medicine    Why:  As needed    Contact information:    [address] [facility] [address]  [phone]          Follow up with GARRETT Moncada In 4 weeks.    Specialty:  Gastroenterology    Contact information:    [address]  [place] [address] [phone]          Follow up with Jose F Osorio MD.    Specialty:  Gastroenterology    Why:  As needed, Office will call with biopsy / pathology report    Contact information:    [address]  [address]  [place] [address] [phone]          Discharge Instructions     Future Orders    Diet general     Questions:    Total calories:      Fat restriction, if any:      Protein restriction, if any:      Na restriction, if any:      Fluid restriction:      Additional restrictions:          Discharge Instructions       Post EGD Discharge Instruction    Deepali Stuart  5/9/2017  Jose F Muir*    RESTRICTIONS ON ACTIVITY:    -DO NOT drive a car or operate machinery until the day after procedure.  -Following Day: Return to full activities including work.  -Diet: Eat and drink normally unless instructed otherwise.    TREATMENT FOR COMMON SIDE EFFECTS:  *Sore Throat - treat with throat lozenges, gargle with warm salt water.  *Mild abdominal pain & bloating-  "rest and take liquids only.    SYMPTOMS TO WATCH FOR AND REPORT TO YOUR PHYSICIAN:  1. Chills or fever occurring 24 hours after procedure.  2. Pain in chest.  3. SEVERE abdominal pain or bloating.  4. Rectal bleeding which could be maroon or black.    If you have any questions or problems, please call your Physician:    Jose F Muir* Phone:     Lab Results: [phone]    If a complication or emergency situation arises and you are unable to reach your Physician - GO TO THE EMERGENCY ROOM.        Primary Diagnosis     Your primary diagnosis was:  Abdominal Pain, Pit Of Stomach      Admission Information     Date & Time Provider Department CSN    5/9/2017  7:18 AM Jose F Osorio MD [facility] [id number]      Care Providers     Provider Role Specialty Primary office phone    Jose F Osorio MD Attending Provider Gastroenterology [phone]    Jose F Osorio MD Surgeon  Gastroenterology [phone]      Your Vitals Were     BP Pulse Temp Resp Height Weight    126/78 80 97.9 °F (36.6 °C) 16 5' 2" (1.575 m) 65.8 kg (145 lb)    Last Period SpO2 BMI          (LMP Unknown) 100% 26.52 kg/m2        Recent Lab Values        5/27/2013 7/19/2013 5/21/2014 11/17/2014 5/11/2015 8/27/2015 11/17/2016 12/28/2016      8:55 AM  6:59 AM  7:48 AM  9:55 AM  8:07 AM  5:54 AM  6:32 AM  8:00 AM    A1C 6.4 (H) 6.2 5.9 5.9 6.1 6.3 (H) 5.7 5.6    Comment for A1C at  6:32 AM on 11/17/2016:  According to ADA guidelines, hemoglobin A1C <7.0% represents  optimal control in non-pregnant diabetic patients.  Different  metrics may apply to specific populations.   Standards of Medical Care in Diabetes - 2016.  For the purpose of screening for the presence of diabetes:  <5.7%     Consistent with the absence of diabetes  5.7-6.4%  Consistent with increasing risk for diabetes   (prediabetes)  >or=6.5%  Consistent with diabetes  Currently no consensus exists for use of hemoglobin " A1C  for diagnosis of diabetes for children.      Comment for A1C at  8:00 AM on 12/28/2016:  According to ADA guidelines, hemoglobin A1C <7.0% represents  optimal control in non-pregnant diabetic patients.  Different  metrics may apply to specific populations.   Standards of Medical Care in Diabetes - 2016.  For the purpose of screening for the presence of diabetes:  <5.7%     Consistent with the absence of diabetes  5.7-6.4%  Consistent with increasing risk for diabetes   (prediabetes)  >or=6.5%  Consistent with diabetes  Currently no consensus exists for use of hemoglobin A1C  for diagnosis of diabetes for children.        Pending Labs     Order Current Status    Specimen to Pathology - Surgery Collected (05/09/17 0923)      Allergies as of 5/9/2017        Reactions    Plavix [Clopidogrel]       [facility]     [facility] Nurse Care Line - 24/7 Assistance  Unless otherwise directed by your provider, please contact [facility], our nurse care line that is available for 24/7 assistance.     Registered nurses in the [facility] provide clinical advisement, health education, appointment booking, and other advisory services.  Call for this free service at [phone].        Advance Directives     An advance directive is a document which, in the event you are no longer able to make decisions for yourself, tells your healthcare team what kind of treatment you do or do not want to receive, or who you would like to make those decisions for you.  If you do not currently have an advance directive, [facility] encourages you to create one.  For more information call:  [phone]), [phone] ([phone]),  or log on to [email or web address].        Language Assistance Services     ATTENTION: Language assistance services are available, free of charge. Please call [phone].      ATENCIÓN: Si habla español, tiene a lee disposición servicios gratuitos de asistencia lingüística.  Talha kruse [phone].     [facility] Ý: N?u b?n nói Ti?ng Vi?t, có các d?ch v? h? tr? ngôn ng? mi?n phí dành cho b?n. G?i s? [phone].        Heart Failure Education       Heart Failure: Being Active  You have a condition called heart failure. Being active doesnt mean that you have to wear yourself out. Even a little movement each day helps to strengthen your heart. If you cant get out to exercise, you can do simple stretching and strengthening exercises at home. These are good ways to keep you well-conditioned and prevent you and your heart from becoming excessively weak.    Ideas to get you started  · Add a little movement to things you do now. Walk to mail letters. Park your car at the far end of the parking lot and walk to the store. Walk up a flight of stairs instead of taking the elevator.  · Choose activities you enjoy. You might walk, swim, or ride an exercise bike. Things like gardening and washing the car count, too. Other possibilities include: washing dishes, walking the dog, walking around the mall, and doing aerobic activities with friends.  · Join a group exercise program at a [facility] or [facility], a senior center, or a community center. Or look into a hospital cardiac rehabilitation program. Ask your doctor if you qualify.  Tips to keep you going  · Get up and get dressed each day. Go to a coffee shop and read a newspaper or go somewhere that you'll be in the presence of other active people. Youll feel more like being active.  · Make a plan. Choose one or more activities that you enjoy and that you can easily do. Then plan to do at least one each day. You might write your plan on a calendar.  · Go with a friend or a group if you like company. This can help you feel supported and stay motivated, too.  · Plan social events that you enjoy. This will keep you mentally engaged as well as physically motivated to do things you find pleasure in.  For your safety  · Talk with your healthcare provider before  starting an exercise program.  · Exercise indoors when its too hot or too cold outside, or when the air quality is poor. Try walking at a shopping mall.  · Wear socks and sturdy shoes to maintain your balance and prevent falls.  · Start slowly. Do a few minutes several times a day at first. Increase your time and speed little by little.  · Stop and rest whenever you feel tired or get short of breath.  · Dont push yourself on days when you dont feel well.  Date Last Reviewed: 3/20/2016  © 9598-8893 [employer]. [address] [place] [address]. All rights reserved. This information is not intended as a substitute for professional medical care. Always follow your healthcare professional's instructions.              Heart Failure: Evaluating Your Heart  You have a condition called heart failure. To evaluate your condition, your doctor will examine you, ask questions, and do some tests. Along with looking for signs of heart failure, the doctor looks for any other health problems that may have led to heart failure. The results of your evaluation will help your doctor form a treatment plan.  Health history and physical exam  Your visit will start with a health history. Tell the doctor about any symptoms youve noticed and about all medicines you take. Then youll have a physical exam. This includes listening to your heartbeat and breathing. Youll also be checked for swelling (edema) in your legs and neck. When you have fluid buildup or fluid in the lungs, it may be called congestive heart failure.  Diagnosing heart failure     During an echocardiogram, sound waves bounce off the heart. These are converted into a picture on the screen.   The following may be done to help your doctor form a diagnosis:  · X-rays show the size and shape of your heart. These pictures can also show fluid in your lungs.  · An electrocardiogram (ECG or EKG) shows the pattern of your heartbeat. Small pads (electrodes)  are placed on your chest, arms, and legs. Wires connect the pads to the ECG machine, which records your hearts electrical signals. This can give the doctor information about heart function.  · An echocardiogram uses ultrasound waves to show the structure and movement of your heart muscle. This shows how well the heart pumps. It also shows the thickness of the heart walls, and if the heart is enlarged. It is one of the most useful, non-invasive tests as it provides information about the heart's general function. This helps your doctor make treatment decisions.  · Lab tests evaluate small amounts of blood or urine for signs of problems. A BNP lab test can help diagnose and evaluate heart failure. BNP stands for B-type natriuretic peptide. The ventricles secrete more BNP when heart failure worsens. Lab tests can also provide information about metabolic dysfunction or heart dysfunction.  Your treatment plan  Based on the results of your evaluation and tests, your doctor will develop a treatment plan. This plan is designed to relieve some of your heart failure symptoms and help make you more comfortable. Your treatment plan may include:  · Medicine to help your heart work better and improve your quality of life  · Changes in what you eat and drink to help prevent fluid from backing up in your body  · Daily monitoring of your weight and heart failure symptoms to see how well your treatment plan is working  · Exercise to help you stay healthy  · Help with quitting smoking  · Emotional and psychological support to help adjust to the changes  · Referrals to other specialists to make sure you are being treated comprehensively  Date Last Reviewed: 3/21/2016  © 2982-8641 [employer], [employer]. [address] [place] [address], [place] [address]. All rights reserved. This information is not intended as a substitute for professional medical care. Always follow your healthcare professional's instructions.              Heart Failure:  Making Changes to Your Diet  You have a condition called heart failure. When you have heart failure, excess fluid is more likely to build up in your body because your heart isn't working well. This makes the heart work harder to pump blood. Fluid buildup causes symptoms such as shortness of breath and swelling (edema). This is often referred to as congestive heart failure or CHF. Controlling the amount of salt (sodium) you eat may help stop fluid from building up. Your doctor may also tell you to reduce the amount of fluid you drink.  Reading food labels    Your healthcare provider will tell you how much sodium you can eat each day. Read food labels to keep track. Keep in mind that certain foods are high in salt. These include canned, frozen, and processed foods. Check the amount of sodium in each serving. Watch out for high-sodium ingredients. These include MSG (monosodium glutamate), baking soda, and sodium phosphate.   Eating less salt  Give yourself time to get used to eating less salt. It may take a little while. Here are some tips to help:  · Take the saltshaker off the table. Replace it with salt-free herb mixes and spices.  · Eat fresh or plain frozen vegetables. These have much less salt than canned vegetables.  · Choose low-sodium snacks like sodium-free pretzels, crackers, or air-popped popcorn.  · Dont add salt to your food when youre cooking. Instead, season your foods with pepper, lemon, garlic, or onion.  · When you eat out, ask that your food be cooked without added salt.  · Avoid eating fried foods as these often have a great deal of salt.  If youre told to limit fluids  You may need to limit how much fluid you have to help prevent swelling. This includes anything that is liquid at room temperature, such as ice cream and soup. If your doctor tells you to limit fluid, try these tips:  · Measure drinks in a measuring cup before you drink them. This will help you meet daily goals.  · Chill drinks to  make them more refreshing.  · Suck on frozen lemon wedges to quench thirst.  · Only drink when youre thirsty.  · Chew sugarless gum or suck on hard candy to keep your mouth moist.  · Weigh yourself daily to know if your body's fluid content is rising.  My sodium goal  Your healthcare provider may give you a sodium goal to meet each day. This includes sodium found in food as well as salt that you add. My goal is to eat no more than ___________ mg of sodium per day.     When to call your doctor  Call your doctor right away if you have any symptoms of worsening heart failure. These can include:  · Sudden weight gain  · Increased swelling of your legs or ankles  · Trouble breathing when youre resting or at night  · Increase in the number of pillows you have to sleep on  · Chest pain, pressure, discomfort, or pain in the jaw, neck, or back   Date Last Reviewed: 3/21/2016  © 2897-5883 [employer]. [address] [place] [address]. All rights reserved. This information is not intended as a substitute for professional medical care. Always follow your healthcare professional's instructions.              Heart Failure: Medicines to Help Your Heart    You have a condition called heart failure (also known as congestive heart failure, or CHF). Your doctor will likely prescribe medicines for heart failure and any underlying health problems you have. Most heart failure patients take one or more types of medicinen. Your healthcare provider will work to find the combination of medicines that works best for you.  Heart failure medicines  Here are the most common heart failure medicines:  · ACE inhibitors lower blood pressure and decrease strain on the heart. This makes it easier for the heart to pump. Angiotensin receptor blockers have similar effects. These are prescribed for some patients instead of ACE inhibitors.  · Beta-blockers relieve stress on the heart. They also improve symptoms. They may also  improve the heart's pumping action over time.  · Diuretics (also called water pills) help rid your body of excess water. This can help rid your body of swelling (edema). Having less fluid to pump means your heart doesnt have to work as hard. Some diuretics make your body lose a mineral called potassium. Your doctor will tell you if you need to take supplements or eat more foods high in potassium.  · Digoxin helps your heart pump with more strength. This helps your heart pump more blood with each beat. So, more oxygen-rich blood travels to the rest of the body.  · Aldosterone antagonists help alter hormones and decrease strain on the heart.  · Hydralazine and nitrates are two separate medicines used together to treat heart failure. They may come in one combination pill. They lower blood pressure and decrease how hard the heart has to pump.  Medicines for related conditions  Controlling other heart problems helps keep heart failure under control, too. Depending on other heart problems you have, medicines may be prescribed to:  · Lower blood pressure (antihypertensives).  · Lower cholesterol levels (statins).  · Prevent blood clots (anticoagulants or aspirin).  · Keep the heartbeat steady (antiarrhythmics).  Date Last Reviewed: 3/5/2016  © 5387-3601 [employer]. [address] [place] [address], [place] [address]. All rights reserved. This information is not intended as a substitute for professional medical care. Always follow your healthcare professional's instructions.              Heart Failure: Procedures That May Help    The heart is a muscle that pumps oxygen-rich blood to all parts of the body. When you have heart failure, the heart is not able to pump as well as it should. Blood and fluid may back up into the lungs (congestive heart failure), and some parts of the body dont get enough oxygen-rich blood to work normally. These problems lead to the symptoms of heart failure.     Certain procedures may  help the heart pump better in some cases of heart failure. Some procedures are done to treat health problems that may have caused the heart failure such as coronary artery disease or heart rhythm problems. For more serious heart failure, other options are available.  Treating artery and valve problems  If you have coronary artery disease or valve disease, procedures may be done to improve blood flow. This helps the heart pump better, which can improve heart failure symptoms. First, your doctor may do a cardiac catheterization to help detect clogged blood vessels or valve damage. During this procedure, a  thin tube (catheter) in inserted into a blood vessel and guided to the heart. There a dye is injected and a special type of X-ray (angiogram) is taken of the blood vessels. Procedures to open a blocked artery or fix damaged valves can also be done using catheterization.  · Angioplasty uses a balloon-tipped instrument at the end of the catheter. The balloon is inflated to widen the narrowed artery. In many cases, a stent is expanded to further support the narrowed artery. A stent is a metal mesh tube.  · Valve surgery repairs or replacement of faulty valves can also be done during catheterization so blood can flow properly through the chambers of the heart.  Bypass surgery is another option to help treat blocked arteries. It uses a healthy blood vessel from elsewhere in the body. The healthy blood vessel is attached above and below the blocked area so that blood can flow around the blocked artery.  Treating heart rhythm problems  A device may be placed in the chest to help a weak heart maintain a healthy, heartbeat so the heart can pump more effectively:  · Pacemaker. A pacemaker is an implanted device that regulates your heartbeat electronically. It monitors your heart's rhythm and generates a painless electric impulse that helps the heart beat in a regular rhythm. A pacemaker is programmed to meet your specific  heart rhythm needs.  · Biventricular pacing/cardiac resynchronization therapy. A type of pacemaker that paces both pumping chambers of the heart at the same time to coordinate contractions and to improve the heart's function. Some people with heart failure are candidates for this therapy.  · Implantable cardioverter defibrillator. A device similar to a pacemaker that senses when the heart is beating too fast and delivers an electrical shock to convert the fast rhythm to a normal rhythm. This can be a life saving device.  In severe cases  In more serious cases of heart failure when other treatments no longer work, other options may include:  · Ventricular assist devices (VADs). These are mechanical devices used to take over the pumping function for one or both of the heart's ventricles, or pumping chambers. A VAD may be necessary when heart failure progresses to the point that medicines and other treatments no longer help. In some cases, a VAD may be used as a bridge to transplant.  · Heart transplant. This is replacing the diseased heart with a healthy one from a donor. This is an option for a few people who are very sick. A heart transplant is very serious and not an option for all patients. Your doctor can tell you more.  Date Last Reviewed: 3/20/2016  © 7330-7755 [employer]. [address] [place] [address], [place] [address]. All rights reserved. This information is not intended as a substitute for professional medical care. Always follow your healthcare professional's instructions.              Heart Failure: Tracking Your Weight  You have a condition called heart failure. When you have heart failure, a sudden weight gain or a steady rise in weight is a warning sign that your body is retaining too much water and salt. This could mean your heart failure is getting worse. If left untreated, it can cause problems for your lungs and result in shortness of breath. Weighing yourself each day is the best way to  know if youre retaining water. If your weight goes up quickly, call your doctor. You will be given instructions on how to get rid of the excess water. You will likely need medicines and to avoid salt. This will help your heart work better.  Call your doctor if you gain more than 2 pounds in 1 day, more than 5 pounds in 1 week, or whatever weight gain you were told to report by your doctor. This is often a sign of worsening heart failure and needs to be evaluated and treated. Your doctor will tell you what to do next.   Tips for weighing yourself    · Weigh yourself at the same time each morning, wearing the same clothes. Weigh yourself after urinating and before eating.  · Use the same scale each day. Make sure the numbers are easy to read. Put the scale on a flat, hard surface -- not on a rug or carpet.  · Do not stop weighing yourself. If you forget one day, weigh again the next morning.  How to use your weight chart  · Keep your weight chart near the scale. Write your weight on the chart as soon as you get off the scale.  · Fill in the month and the start date on the chart. Then write down your weight each day. Your chart will look like this:    · If you miss a day, leave the space blank. Weigh yourself the next day and write your weight in the next space.  · Take your weight chart with you when you go to see your doctor.  Date Last Reviewed: 3/20/2016  © 4415-9250 [employer]. [address] [place] [address], [place] [address]. All rights reserved. This information is not intended as a substitute for professional medical care. Always follow your healthcare professional's instructions.              Heart Failure: Warning Signs of a Flare-Up  You have a condition called heart failure. Once you have heart failure, flare-ups can happen. Below are signs that can mean your heart failure is getting worse. If you notice any of these warning signs, call your healthcare provider.  Swelling    · Your feet, ankles, or  lower legs get puffier.  · You notice skin changes on your lower legs.  · Your shoes feel too tight.  · Your clothes are tighter in the waist.  · You have trouble getting rings on or off your fingers.  Shortness of breath  · You have to breathe harder even when youre doing your normal activities or when youre resting.  · You are short of breath walking up stairs or even short distances.  · You wake up at night short of breath or coughing.  · You need to use more pillows or sit up to sleep.  · You wake up tired or restless.  Other warning signs  · You feel weaker, dizzy, or more tired.  · You have chest pain or changes in your heartbeat.  · You have a cough that wont go away.  · You cant remember things or dont feel like eating.  Tracking your weight  Gaining weight is often the first warning sign that heart failure is getting worse. Gaining even a few pounds can be a sign that your body is retaining excess water and salt. Weighing yourself each day in the morning after you urinate and before you eat, is the best way to know if you're retaining water. Get a scale that is easy to read and make sure you wear the same clothes and use the same scale every time you weigh. Your healthcare provider will show you how to track your weight. Call your doctor if you gain more than 2 pounds in 1 day, 5 pounds in 1 week, or whatever weight gain you were told to report by your doctor. This is often a sign of worsening heart failure and needs to be evaluated and treated before it compromises your breathing. Your doctor will tell you what to do next.    Date Last Reviewed: 3/15/2016  © 6369-7543 [employer]. [address] [place] [address], [place] [address]. All rights reserved. This information is not intended as a substitute for professional medical care. Always follow your healthcare professional's instructions.              Coumadin Discharge Instructions                         Chronic Kindey Disease Education              Diabetes Discharge Instructions                                   [facility] Sign-Up     Activating your [facility] account is as easy as 1-2-3!     1) Visit [email or web address], select Sign Up Now, enter this activation code and your date of birth, then select Next.  [email or web address]  Expires: 5/24/2017  3:14 PM      2) Create a username and password to use when you visit [facility] in the future and select a security question in case you lose your password and select Next.    3) Enter your e-mail address and click Sign Up!    Additional Information  If you have questions, please e-mail [email or web address] or call [phone] to talk to our [facility] staff. Remember, [facility] is NOT to be used for urgent needs. For medical emergencies, dial 911.          [facility] complies with applicable Federal civil rights laws and does not discriminate on the basis of race, color, national origin, age, disability, or sex.

## 2017-05-09 NOTE — DISCHARGE INSTRUCTIONS
Post EGD Discharge Instruction    Deepali Gage Narciso  5/9/2017  Jose F Muir*    RESTRICTIONS ON ACTIVITY:    -DO NOT drive a car or operate machinery until the day after procedure.  -Following Day: Return to full activities including work.  -Diet: Eat and drink normally unless instructed otherwise.    TREATMENT FOR COMMON SIDE EFFECTS:  *Sore Throat - treat with throat lozenges, gargle with warm salt water.  *Mild abdominal pain & bloating- rest and take liquids only.    SYMPTOMS TO WATCH FOR AND REPORT TO YOUR PHYSICIAN:  1. Chills or fever occurring 24 hours after procedure.  2. Pain in chest.  3. SEVERE abdominal pain or bloating.  4. Rectal bleeding which could be maroon or black.    If you have any questions or problems, please call your Physician:    Jose F Muir* Phone:     Lab Results: [phone]    If a complication or emergency situation arises and you are unable to reach your Physician - GO TO THE EMERGENCY ROOM.

## 2017-05-09 NOTE — ANESTHESIA POSTPROCEDURE EVALUATION
"Anesthesia Post Evaluation    Patient: Deepali Stuart    Procedure(s) Performed: Procedure(s) (LRB):  ESOPHAGOGASTRODUODENOSCOPY (EGD) (N/A)    Final Anesthesia Type: MAC  Patient location during evaluation: GI PACU  Patient participation: Yes- Able to Participate  Level of consciousness: awake and alert and oriented  Post-procedure vital signs: reviewed and stable  Pain management: adequate  Airway patency: patent  PONV status at discharge: No PONV  Anesthetic complications: no      Cardiovascular status: blood pressure returned to baseline and hemodynamically stable  Respiratory status: unassisted  Hydration status: euvolemic  Follow-up not needed.        Visit Vitals    BP (!) 176/84 (Patient Position: Lying, BP Method: Automatic)    Pulse 77    Temp 36.5 °C (97.7 °F) (Oral)    Resp 20    Ht 5' 2" (1.575 m)    Wt 65.8 kg (145 lb)    LMP  (LMP Unknown)    SpO2 97%    Breastfeeding No    BMI 26.52 kg/m2       Pain/Nona Score: Presence of Pain: jovanni (5/9/2017  8:37 AM)      "

## 2017-05-10 PROCEDURE — 99495 TRANSJ CARE MGMT MOD F2F 14D: CPT | Mod: S$PBB,,, | Performed by: FAMILY MEDICINE

## 2017-05-11 ENCOUNTER — TELEPHONE (OUTPATIENT)
Dept: INTERNAL MEDICINE | Facility: CLINIC | Age: 82
End: 2017-05-11

## 2017-05-11 NOTE — TELEPHONE ENCOUNTER
FYI- patient discontinue carafate 100 mg/ml because of loose stools. Patient discontinue zantac by gastroenterologist.

## 2017-05-11 NOTE — TELEPHONE ENCOUNTER
----- Message from Ynes Marie sent at 5/11/2017  1:15 PM CDT -----  Contact: Sarai/ [facility] [phone]  Pt is continuing to have loose stools and she feels it is the carafate because when she wasn't taking this medication, she felt better. Once pt started taking it again she felt weak so she has discontinued it.    Pt;s zantac was discontinued on 5/09/17 by the gastroenterologist after her test.

## 2017-05-15 ENCOUNTER — ANTI-COAG VISIT (OUTPATIENT)
Dept: CARDIOLOGY | Facility: CLINIC | Age: 82
End: 2017-05-15

## 2017-05-15 DIAGNOSIS — Z79.01 LONG TERM (CURRENT) USE OF ANTICOAGULANTS: ICD-10-CM

## 2017-05-15 LAB — INR PPP: 1.7

## 2017-05-15 NOTE — PROGRESS NOTES
Verbal result taken from ____HH nurse_____. PT/INR __1.7_____ Date drawn_5/15/17_______ Hardcopy to be faxed.

## 2017-05-22 ENCOUNTER — TELEPHONE (OUTPATIENT)
Dept: GASTROENTEROLOGY | Facility: CLINIC | Age: 82
End: 2017-05-22

## 2017-05-22 ENCOUNTER — ANTI-COAG VISIT (OUTPATIENT)
Dept: CARDIOLOGY | Facility: CLINIC | Age: 82
End: 2017-05-22

## 2017-05-22 DIAGNOSIS — Z79.01 LONG TERM (CURRENT) USE OF ANTICOAGULANTS: ICD-10-CM

## 2017-05-22 LAB — INR PPP: 2.1

## 2017-05-22 RX ORDER — ATORVASTATIN CALCIUM 10 MG/1
10 TABLET, FILM COATED ORAL DAILY
Qty: 90 TABLET | Refills: 3 | Status: ON HOLD | OUTPATIENT
Start: 2017-05-22 | End: 2018-05-25 | Stop reason: HOSPADM

## 2017-05-22 RX ORDER — LEVOTHYROXINE SODIUM 50 UG/1
50 TABLET ORAL DAILY
Qty: 90 TABLET | Refills: 3 | Status: ON HOLD | OUTPATIENT
Start: 2017-05-22 | End: 2018-05-26 | Stop reason: HOSPADM

## 2017-05-22 NOTE — TELEPHONE ENCOUNTER
----- Message from Jose F Osorio MD sent at 5/21/2017  8:36 PM CDT -----  Review of pathology report from EGD dated 9 zmsy 17:  SPECIMEN  1) Prominent gastric antrum fold biopsy.  2) Random gastric biopsy.  FINAL PATHOLOGIC DIAGNOSIS  1. Stomach, prominent gastric antrum fold, biopsy:  Reactive gastropathy. Negative for acute inflammation, intestinal metaplasia, or dysplasia.  2. Stomach, biopsy:  Compatible with benign reactive gastropathy, without significant inflammation. There is no necrosis, intestinal  metaplasia, or epithelial dysplasia identified.    IMP:- Benign reactive gastritis; NO mention of the presence of H.pylori    REC:- F/U with PCP  - F/U w GI clinic as needed    PCP: MD Jose F Moraes MD, FACP, FACG, AGAF [facility]  [address], [address], [place] [address]  Phone: [phone] Fax: [phone]    [address], [address], [facility] [place] [address]  Phone: [phone] Fax: [phone]    - Portions of this note were dictated using voice recognition software and may contain dictation related errors in spelling / grammar / syntax not discovered on text review.

## 2017-05-31 RX ORDER — LISINOPRIL 40 MG/1
TABLET ORAL
Qty: 90 TABLET | Refills: 3 | Status: SHIPPED | OUTPATIENT
Start: 2017-05-31 | End: 2017-07-24

## 2017-06-04 RX ORDER — TRAZODONE HYDROCHLORIDE 50 MG/1
TABLET ORAL
Qty: 30 TABLET | Refills: 11 | Status: ON HOLD | OUTPATIENT
Start: 2017-06-04 | End: 2018-05-26 | Stop reason: HOSPADM

## 2017-06-05 ENCOUNTER — ANTI-COAG VISIT (OUTPATIENT)
Dept: CARDIOLOGY | Facility: CLINIC | Age: 82
End: 2017-06-05

## 2017-06-05 DIAGNOSIS — Z79.01 LONG TERM (CURRENT) USE OF ANTICOAGULANTS: ICD-10-CM

## 2017-06-05 LAB — INR PPP: 2.5

## 2017-06-12 ENCOUNTER — TELEPHONE (OUTPATIENT)
Dept: INTERNAL MEDICINE | Facility: CLINIC | Age: 82
End: 2017-06-12

## 2017-06-12 RX ORDER — TRAMADOL HYDROCHLORIDE 50 MG/1
50 TABLET ORAL EVERY 6 HOURS PRN
Qty: 30 TABLET | Refills: 0 | Status: SHIPPED | OUTPATIENT
Start: 2017-06-12 | End: 2017-06-22

## 2017-06-12 NOTE — TELEPHONE ENCOUNTER
----- Message from Vesta Penaloza sent at 6/12/2017  2:01 PM CDT -----  Contact: Juan/Chayo Parker/[phone]  Pt is complaining of pain in shoulder and up right arm into her back. Pt states that she is taking tylenol and pain is not going away.Please advise,

## 2017-06-12 NOTE — TELEPHONE ENCOUNTER
Tramadol 50 mg by mouth 4 times a day when necessary pain has been prescribed.  Please inform the patient.  Thank you.

## 2017-06-12 NOTE — TELEPHONE ENCOUNTER
Home health nurse stated that patient is having right shoulder pain and upper back for about 2 days. Nurse notice a little bruise in the area, patient don't remember if she hit the area, no swelling. Vital signs are good. Patient has been taking tylenol with no relief. Any recommendations?

## 2017-06-19 ENCOUNTER — ANTI-COAG VISIT (OUTPATIENT)
Dept: CARDIOLOGY | Facility: CLINIC | Age: 82
End: 2017-06-19

## 2017-06-19 DIAGNOSIS — Z79.01 LONG TERM (CURRENT) USE OF ANTICOAGULANTS: ICD-10-CM

## 2017-06-19 LAB — INR PPP: 1.3

## 2017-06-26 ENCOUNTER — ANTI-COAG VISIT (OUTPATIENT)
Dept: CARDIOLOGY | Facility: CLINIC | Age: 82
End: 2017-06-26

## 2017-06-26 ENCOUNTER — TELEPHONE (OUTPATIENT)
Dept: INTERNAL MEDICINE | Facility: CLINIC | Age: 82
End: 2017-06-26

## 2017-06-26 DIAGNOSIS — Z79.01 LONG TERM (CURRENT) USE OF ANTICOAGULANTS: ICD-10-CM

## 2017-06-26 LAB — INR PPP: 1.9

## 2017-06-26 NOTE — TELEPHONE ENCOUNTER
Spoke with the patient concerning the nurse visit, patient state that she was having some abdominal issues. Patient stated that she is eating three times a day. She stated that she feels better and will continue eating. I schedule an appointment for 7/10/17, patient request a Monday for evaluation if needed.

## 2017-06-26 NOTE — PROGRESS NOTES
Verbal result taken from _[facility]________. PT/INR _1.9______ Date drawn__6/26/17_____ Hardcopy to be faxed.

## 2017-07-07 ENCOUNTER — NURSE TRIAGE (OUTPATIENT)
Dept: ADMINISTRATIVE | Facility: CLINIC | Age: 82
End: 2017-07-07

## 2017-07-07 NOTE — TELEPHONE ENCOUNTER
Reason for Disposition   SEVERE dizziness (e.g., unable to stand, requires support to walk, feels like passing out now)    Protocols used:  DIZZINESS-A-OH    Patient's home health nurse, Juan RN called to report patient is dizzy. She was on antivert in the past. Explained to  Nurse that patient should be taken to ED for evaluation. Ms. Martinez verbalized understanding.

## 2017-07-10 ENCOUNTER — ANTI-COAG VISIT (OUTPATIENT)
Dept: CARDIOLOGY | Facility: CLINIC | Age: 82
End: 2017-07-10

## 2017-07-10 ENCOUNTER — OFFICE VISIT (OUTPATIENT)
Dept: INTERNAL MEDICINE | Facility: CLINIC | Age: 82
End: 2017-07-10
Payer: MEDICARE

## 2017-07-10 ENCOUNTER — LAB VISIT (OUTPATIENT)
Dept: LAB | Facility: HOSPITAL | Age: 82
End: 2017-07-10
Attending: FAMILY MEDICINE
Payer: MEDICARE

## 2017-07-10 VITALS
SYSTOLIC BLOOD PRESSURE: 98 MMHG | BODY MASS INDEX: 23.4 KG/M2 | HEART RATE: 62 BPM | DIASTOLIC BLOOD PRESSURE: 60 MMHG | TEMPERATURE: 97 F | RESPIRATION RATE: 14 BRPM | WEIGHT: 127.19 LBS | HEIGHT: 62 IN

## 2017-07-10 DIAGNOSIS — R53.1 WEAKNESS: ICD-10-CM

## 2017-07-10 DIAGNOSIS — I50.31 ACUTE DIASTOLIC CHF (CONGESTIVE HEART FAILURE): ICD-10-CM

## 2017-07-10 DIAGNOSIS — I50.33 ACUTE ON CHRONIC DIASTOLIC HEART FAILURE: ICD-10-CM

## 2017-07-10 DIAGNOSIS — E11.319 CONTROLLED TYPE 2 DIABETES MELLITUS WITH RETINOPATHY OF BOTH EYES, WITHOUT LONG-TERM CURRENT USE OF INSULIN, MACULAR EDEMA PRESENCE UNSPECIFIED, UNSPECIFIED RETINOPATHY SEVERITY: ICD-10-CM

## 2017-07-10 DIAGNOSIS — R19.7 DIARRHEA, UNSPECIFIED TYPE: ICD-10-CM

## 2017-07-10 DIAGNOSIS — R53.1 GENERALIZED WEAKNESS: Primary | ICD-10-CM

## 2017-07-10 DIAGNOSIS — N17.0 ACUTE RENAL FAILURE WITH TUBULAR NECROSIS: ICD-10-CM

## 2017-07-10 DIAGNOSIS — I13.0 BENIGN HYPERTENSIVE HEART AND CKD, STAGE 3 (GFR 30-59), W CHF: ICD-10-CM

## 2017-07-10 DIAGNOSIS — Z79.01 LONG TERM (CURRENT) USE OF ANTICOAGULANTS: ICD-10-CM

## 2017-07-10 DIAGNOSIS — E55.9 VITAMIN D DEFICIENCY DISEASE: ICD-10-CM

## 2017-07-10 DIAGNOSIS — N18.30 CKD (CHRONIC KIDNEY DISEASE) STAGE 3, GFR 30-59 ML/MIN: ICD-10-CM

## 2017-07-10 DIAGNOSIS — R53.1 GENERALIZED WEAKNESS: ICD-10-CM

## 2017-07-10 DIAGNOSIS — E03.4 HYPOTHYROIDISM DUE TO ACQUIRED ATROPHY OF THYROID: ICD-10-CM

## 2017-07-10 DIAGNOSIS — N18.30 BENIGN HYPERTENSIVE HEART AND CKD, STAGE 3 (GFR 30-59), W CHF: ICD-10-CM

## 2017-07-10 DIAGNOSIS — I48.20 CHRONIC ATRIAL FIBRILLATION: ICD-10-CM

## 2017-07-10 LAB
ALBUMIN SERPL BCP-MCNC: 2.7 G/DL
ALP SERPL-CCNC: 48 U/L
ALT SERPL W/O P-5'-P-CCNC: 25 U/L
ANION GAP SERPL CALC-SCNC: 11 MMOL/L
AST SERPL-CCNC: 39 U/L
BASOPHILS # BLD AUTO: 0.03 K/UL
BASOPHILS NFR BLD: 0.5 %
BILIRUB SERPL-MCNC: 0.6 MG/DL
BUN SERPL-MCNC: 65 MG/DL
CALCIUM SERPL-MCNC: 8.8 MG/DL
CHLORIDE SERPL-SCNC: 103 MMOL/L
CO2 SERPL-SCNC: 16 MMOL/L
CREAT SERPL-MCNC: 2.7 MG/DL
DIFFERENTIAL METHOD: ABNORMAL
EOSINOPHIL # BLD AUTO: 0.1 K/UL
EOSINOPHIL NFR BLD: 1.7 %
ERYTHROCYTE [DISTWIDTH] IN BLOOD BY AUTOMATED COUNT: 14.6 %
EST. GFR  (AFRICAN AMERICAN): 17.9 ML/MIN/1.73 M^2
EST. GFR  (NON AFRICAN AMERICAN): 15.5 ML/MIN/1.73 M^2
GLUCOSE SERPL-MCNC: 163 MG/DL
HCT VFR BLD AUTO: 30.5 %
HGB BLD-MCNC: 10.7 G/DL
INR PPP: 2.9
LYMPHOCYTES # BLD AUTO: 1.3 K/UL
LYMPHOCYTES NFR BLD: 21.9 %
MCH RBC QN AUTO: 31.2 PG
MCHC RBC AUTO-ENTMCNC: 35.1 %
MCV RBC AUTO: 89 FL
MONOCYTES # BLD AUTO: 0.5 K/UL
MONOCYTES NFR BLD: 7.7 %
NEUTROPHILS # BLD AUTO: 4 K/UL
NEUTROPHILS NFR BLD: 68 %
PLATELET # BLD AUTO: 146 K/UL
PMV BLD AUTO: 9.9 FL
POTASSIUM SERPL-SCNC: 4.4 MMOL/L
PROT SERPL-MCNC: 6 G/DL
RBC # BLD AUTO: 3.43 M/UL
SODIUM SERPL-SCNC: 130 MMOL/L
T4 FREE SERPL-MCNC: 1.58 NG/DL
TSH SERPL DL<=0.005 MIU/L-ACNC: 1.61 UIU/ML
WBC # BLD AUTO: 5.88 K/UL

## 2017-07-10 PROCEDURE — 99999 PR PBB SHADOW E&M-EST. PATIENT-LVL III: CPT | Mod: PBBFAC,,, | Performed by: FAMILY MEDICINE

## 2017-07-10 PROCEDURE — 80053 COMPREHEN METABOLIC PANEL: CPT

## 2017-07-10 PROCEDURE — 84443 ASSAY THYROID STIM HORMONE: CPT

## 2017-07-10 PROCEDURE — 84439 ASSAY OF FREE THYROXINE: CPT

## 2017-07-10 PROCEDURE — 99214 OFFICE O/P EST MOD 30 MIN: CPT | Mod: S$PBB,,, | Performed by: FAMILY MEDICINE

## 2017-07-10 PROCEDURE — 36415 COLL VENOUS BLD VENIPUNCTURE: CPT | Mod: PO

## 2017-07-10 PROCEDURE — 1159F MED LIST DOCD IN RCRD: CPT | Mod: ,,, | Performed by: FAMILY MEDICINE

## 2017-07-10 PROCEDURE — 85025 COMPLETE CBC W/AUTO DIFF WBC: CPT

## 2017-07-10 RX ORDER — CARVEDILOL 12.5 MG/1
12.5 TABLET ORAL 2 TIMES DAILY
Qty: 180 TABLET | Refills: 3 | Status: SHIPPED | OUTPATIENT
Start: 2017-07-10 | End: 2017-11-07 | Stop reason: SDUPTHER

## 2017-07-10 NOTE — PROGRESS NOTES
Verbal result taken from [facility]_________. PT/INR _2.9______ Date drawn_7/10/17_______ Hardcopy to be faxed.

## 2017-07-10 NOTE — PATIENT INSTRUCTIONS
Decrease Carvedilol to 12.5 mg 2 times per day. (Can cut old table in half).  Boost or Ensure up to 3 times per day.  Hydrate daily with water and can add lemon to water if desire.

## 2017-07-10 NOTE — PROGRESS NOTES
Subjective:       Patient ID: Deepali Stuart is a 85 y.o. female.    Chief Complaint: Weight Loss; Fatigue; and Other (trouble walking)    HPI 85-year-old -American female with diastolic heart failure, atrial fibrillation status post pacemaker placement, chronic kidney disease, hypertension, and type 2 diabetes presents to clinic today secondary to complaints of weight loss, weakness, and frequent lightheadedness.  She had been seen in the emergency room in April secondary to episodes of epigastric abdominal pain and gastrointestinal bleeding.  CT scan revealed gastritis; therefore, the patient was started on pantoprazole and Carafate with improvement of symptoms; however, since starting Carafate she reports improvement in her appetite but states that with each dose of Carafate she had noted loose, watery bowel movements.  She reports frequent diarrhea with the use of Carafate; therefore, she began noticing weakness and frequent lightheadedness.  She denies any abdominal pain or nausea and reports that she eats 3 meals per day.  Her family cooks for her and make sure that she has 3 hot meals daily.  She has also been using Ensure as needed but reports loose stools.  She denies any chest pain, shortness of breath, cough, or congestion.  She reports continuing to attempt to ambulate around the home but feels weak just from walking from her bed to the restroom.  Review of Systems   Constitutional: Positive for fatigue. Negative for appetite change, chills and fever.   HENT: Negative for congestion, ear pain, hearing loss, postnasal drip, rhinorrhea, sinus pressure, sore throat and tinnitus.    Eyes: Negative for redness, itching and visual disturbance.   Respiratory: Negative for cough, chest tightness and shortness of breath.    Cardiovascular: Negative for chest pain and palpitations.   Gastrointestinal: Positive for diarrhea. Negative for abdominal pain, constipation, nausea and vomiting.   Genitourinary:  Negative for decreased urine volume, difficulty urinating, dysuria, frequency, hematuria and urgency.   Musculoskeletal: Positive for gait problem. Negative for back pain, myalgias, neck pain and neck stiffness.   Skin: Negative for rash.   Neurological: Positive for weakness and light-headedness. Negative for dizziness and headaches.   Psychiatric/Behavioral: Negative.        Objective:      Physical Exam   Constitutional: She is oriented to person, place, and time. She appears well-developed and well-nourished. No distress.   HENT:   Head: Normocephalic and atraumatic.   Right Ear: External ear normal.   Left Ear: External ear normal.   Nose: Nose normal.   Mouth/Throat: Oropharynx is clear and moist. Mucous membranes are dry. No oropharyngeal exudate.   Eyes: Conjunctivae and EOM are normal. Pupils are equal, round, and reactive to light. Right eye exhibits no discharge. Left eye exhibits no discharge. No scleral icterus.   Neck: Normal range of motion. Neck supple. No JVD present. No tracheal deviation present. No thyromegaly present.   Cardiovascular: Normal rate, normal heart sounds and intact distal pulses.  An irregularly irregular rhythm present. Exam reveals no gallop and no friction rub.    No murmur heard.  Pulmonary/Chest: Effort normal and breath sounds normal. No stridor. No respiratory distress. She has no wheezes. She has no rales.   Abdominal: Soft. Bowel sounds are normal. She exhibits no distension and no mass. There is no tenderness. There is no rebound and no guarding.   Musculoskeletal: Normal range of motion. She exhibits no edema or tenderness.   Wheelchair-bound   Lymphadenopathy:     She has no cervical adenopathy.   Neurological: She is alert and oriented to person, place, and time.   Skin: Skin is warm and dry. No rash noted. She is not diaphoretic. No erythema. No pallor.   Psychiatric: She has a normal mood and affect. Her behavior is normal. Judgment and thought content normal.    Nursing note and vitals reviewed.      Assessment:       1. Generalized weakness    2. Weakness    3. Diarrhea, unspecified type    4. Acute diastolic CHF (congestive heart failure)    5. CKD (chronic kidney disease) stage 3, GFR 30-59 ml/min    6. 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 Acute on chronic diastolic heart failure    8. Acute renal failure with tubular necrosis    9. Benign hypertensive heart and CKD, 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), w CHF    10. Chronic atrial fibrillation    11. Hypothyroidism due to acquired atrophy of thyroid    12. Vitamin D deficiency disease        Plan:       1.  CBC, CMP, TSH, and free T4 now.  2.  Encourage continued high caloric intake and Boost or Ensure for further supplementation.  3.  Discontinue Carafate secondary to diarrhea.  4.  Encourage hydration.  5.  Decrease carvedilol to 12.5 mg daily.  6.  Continue follow-up with cardiology as scheduled.  7.  Continue follow-up with GI as scheduled.  8.  Continue all other medications as prescribed.  9.  Return to clinic as needed if symptoms persist or worsen or within 2 weeks to 1 month for reevaluation.

## 2017-07-11 ENCOUNTER — TELEPHONE (OUTPATIENT)
Dept: INTERNAL MEDICINE | Facility: CLINIC | Age: 82
End: 2017-07-11

## 2017-07-11 NOTE — TELEPHONE ENCOUNTER
----- Message from Vishal Salazar MD sent at 7/11/2017  8:05 AM CDT -----  The patient's labs show worsening renal failure.  Strongly encourage increased hydration; but if symptoms continue to persist or worsen, I recommend ER visit for further evaluation and treatment and likely IV fluids.  Please inform the patient.  Thank you.

## 2017-07-11 NOTE — TELEPHONE ENCOUNTER
Informed the patient of test results, patient will have daughter call concerning test results.

## 2017-07-17 ENCOUNTER — ANTI-COAG VISIT (OUTPATIENT)
Dept: CARDIOLOGY | Facility: CLINIC | Age: 82
End: 2017-07-17

## 2017-07-17 DIAGNOSIS — Z79.01 LONG TERM (CURRENT) USE OF ANTICOAGULANTS: ICD-10-CM

## 2017-07-17 LAB — INR PPP: 3.5

## 2017-07-24 ENCOUNTER — LAB VISIT (OUTPATIENT)
Dept: LAB | Facility: HOSPITAL | Age: 82
End: 2017-07-24
Attending: FAMILY MEDICINE
Payer: MEDICARE

## 2017-07-24 ENCOUNTER — TELEPHONE (OUTPATIENT)
Dept: INTERNAL MEDICINE | Facility: CLINIC | Age: 82
End: 2017-07-24

## 2017-07-24 ENCOUNTER — OFFICE VISIT (OUTPATIENT)
Dept: INTERNAL MEDICINE | Facility: CLINIC | Age: 82
End: 2017-07-24
Payer: MEDICARE

## 2017-07-24 ENCOUNTER — ANTI-COAG VISIT (OUTPATIENT)
Dept: CARDIOLOGY | Facility: CLINIC | Age: 82
End: 2017-07-24

## 2017-07-24 VITALS
RESPIRATION RATE: 18 BRPM | HEART RATE: 61 BPM | BODY MASS INDEX: 23.93 KG/M2 | HEIGHT: 62 IN | DIASTOLIC BLOOD PRESSURE: 64 MMHG | WEIGHT: 130.06 LBS | TEMPERATURE: 98 F | SYSTOLIC BLOOD PRESSURE: 127 MMHG | OXYGEN SATURATION: 99 %

## 2017-07-24 DIAGNOSIS — N18.30 BENIGN HYPERTENSIVE HEART AND CKD, STAGE 3 (GFR 30-59), W CHF: ICD-10-CM

## 2017-07-24 DIAGNOSIS — Z79.01 LONG TERM (CURRENT) USE OF ANTICOAGULANTS: ICD-10-CM

## 2017-07-24 DIAGNOSIS — N18.30 CKD (CHRONIC KIDNEY DISEASE) STAGE 3, GFR 30-59 ML/MIN: ICD-10-CM

## 2017-07-24 DIAGNOSIS — I13.0 BENIGN HYPERTENSIVE HEART AND CKD, STAGE 3 (GFR 30-59), W CHF: ICD-10-CM

## 2017-07-24 DIAGNOSIS — R53.1 WEAKNESS: Primary | ICD-10-CM

## 2017-07-24 DIAGNOSIS — K21.9 GASTROESOPHAGEAL REFLUX DISEASE, ESOPHAGITIS PRESENCE NOT SPECIFIED: ICD-10-CM

## 2017-07-24 DIAGNOSIS — R53.1 WEAKNESS: ICD-10-CM

## 2017-07-24 LAB
ALBUMIN SERPL BCP-MCNC: 2.6 G/DL
ALP SERPL-CCNC: 49 U/L
ALT SERPL W/O P-5'-P-CCNC: 22 U/L
ANION GAP SERPL CALC-SCNC: 6 MMOL/L
AST SERPL-CCNC: 35 U/L
BILIRUB SERPL-MCNC: 0.5 MG/DL
BUN SERPL-MCNC: 54 MG/DL
CALCIUM SERPL-MCNC: 9 MG/DL
CHLORIDE SERPL-SCNC: 105 MMOL/L
CO2 SERPL-SCNC: 24 MMOL/L
CREAT SERPL-MCNC: 2 MG/DL
EST. GFR  (AFRICAN AMERICAN): 25.7 ML/MIN/1.73 M^2
EST. GFR  (NON AFRICAN AMERICAN): 22.3 ML/MIN/1.73 M^2
GLUCOSE SERPL-MCNC: 106 MG/DL
INR PPP: 1.8
POTASSIUM SERPL-SCNC: 4.4 MMOL/L
PROT SERPL-MCNC: 6.1 G/DL
SODIUM SERPL-SCNC: 135 MMOL/L

## 2017-07-24 PROCEDURE — 1126F AMNT PAIN NOTED NONE PRSNT: CPT | Mod: ,,, | Performed by: FAMILY MEDICINE

## 2017-07-24 PROCEDURE — 1159F MED LIST DOCD IN RCRD: CPT | Mod: ,,, | Performed by: FAMILY MEDICINE

## 2017-07-24 PROCEDURE — 99999 PR PBB SHADOW E&M-EST. PATIENT-LVL III: CPT | Mod: PBBFAC,,, | Performed by: FAMILY MEDICINE

## 2017-07-24 PROCEDURE — 99214 OFFICE O/P EST MOD 30 MIN: CPT | Mod: S$PBB,,, | Performed by: FAMILY MEDICINE

## 2017-07-24 PROCEDURE — 36415 COLL VENOUS BLD VENIPUNCTURE: CPT | Mod: PO

## 2017-07-24 PROCEDURE — 80053 COMPREHEN METABOLIC PANEL: CPT

## 2017-07-24 RX ORDER — METFORMIN HYDROCHLORIDE 500 MG/1
TABLET ORAL
Refills: 0 | COMMUNITY
Start: 2017-07-04 | End: 2017-10-13

## 2017-07-24 NOTE — TELEPHONE ENCOUNTER
----- Message from Vesta Penaloza sent at 7/24/2017 11:10 AM CDT -----  Contact: Juan/Homehealth nurse/[phone]  Pt was told there was a medicine change, however paperwork does not reflect medication change and pt does not remember what scripts needs to be changed. Please call and advise.

## 2017-07-24 NOTE — PROGRESS NOTES
Subjective:       Patient ID: Deepali Stuart is a 85 y.o. female.    Chief Complaint: Follow-up (2 week follow up for weakness)    HPI 85-year-old -American female with diastolic heart failure, atrial fibrillation status post pacemaker placement, chronic kidney disease, hypertension, and type 2 diabetes presents to clinic today for follow-up secondary to complaints of weakness, weight loss, and frequent lightheadedness.  She was seen approximately 2 weeks ago for these complaints but had previously been seen in the emergency room in April secondary to episodes of epigastric abdominal pain and gastrointestinal bleeding.  CT scan revealed gastritis and the patient was started on pantoprazole and Carafate with improvement of symptoms but since starting the Carafate the patient reported loose, watery bowel movements and frequent diarrhea; therefore, she stopped the medication.  She had noted decreased appetite.  She had also not been hydrating adequately.  Upon further evaluation the patient's kidney functions had declined with a creatinine of 2.7.  The patient was strongly encouraged to increase hydration and secondary to hypotension carvedilol was also decreased to 12.5 mg twice a day.  The patient returns today with improvement of symptoms.  She reports improvement in her nausea and is interested in decreasing the dose of pantoprazole.  Review of Systems   Constitutional: Negative for appetite change, chills, fatigue and fever.   HENT: Negative for congestion, ear pain, hearing loss, postnasal drip, rhinorrhea, sinus pressure, sore throat and tinnitus.    Eyes: Negative for redness, itching and visual disturbance.   Respiratory: Negative for cough, chest tightness and shortness of breath.    Cardiovascular: Negative for chest pain and palpitations.   Gastrointestinal: Negative for abdominal pain, constipation, diarrhea, nausea and vomiting.   Genitourinary: Negative for decreased urine volume, difficulty  urinating, dysuria, frequency, hematuria and urgency.   Musculoskeletal: Positive for gait problem. Negative for back pain, myalgias, neck pain and neck stiffness.   Skin: Negative for rash.   Neurological: Positive for weakness. Negative for dizziness, light-headedness and headaches.   Psychiatric/Behavioral: Negative.        Objective:      Physical Exam   Constitutional: She is oriented to person, place, and time. She appears well-developed and well-nourished. No distress.   HENT:   Head: Normocephalic and atraumatic.   Right Ear: External ear normal.   Left Ear: External ear normal.   Nose: Nose normal.   Mouth/Throat: Oropharynx is clear and moist. No oropharyngeal exudate.   Eyes: Conjunctivae and EOM are normal. Pupils are equal, round, and reactive to light. Right eye exhibits no discharge. Left eye exhibits no discharge. No scleral icterus.   Neck: Normal range of motion. Neck supple. No JVD present. No tracheal deviation present. No thyromegaly present.   Cardiovascular: Normal rate, regular rhythm, normal heart sounds and intact distal pulses.  Exam reveals no gallop and no friction rub.    No murmur heard.  Pulmonary/Chest: Effort normal and breath sounds normal. No stridor. No respiratory distress. She has no wheezes. She has no rales.   Abdominal: Soft. Bowel sounds are normal. She exhibits no distension and no mass. There is no tenderness. There is no rebound and no guarding.   Musculoskeletal: Normal range of motion. She exhibits no edema or tenderness.   Wheelchair-bound   Lymphadenopathy:     She has no cervical adenopathy.   Neurological: She is alert and oriented to person, place, and time.   Skin: Skin is warm and dry. No rash noted. She is not diaphoretic. No erythema. No pallor.   Psychiatric: She has a normal mood and affect. Her behavior is normal. Judgment and thought content normal.   Nursing note and vitals reviewed.      Assessment:       1. Weakness    2. Benign hypertensive heart and  CKD, 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), w CHF    3. CKD (chronic kidney disease) stage 3, GFR 30-59 ml/min    4. Gastroesophageal reflux disease, esophagitis presence not specified        Plan:       1.  CMP now.  2.  Continue hydration and follow-up with nephrology as scheduled.  3.  Continue carvedilol 12.5 mg twice a day, Lasix 20 mg daily, spironolactone 25 mg daily, and lisinopril 20 g daily.  Hypertension is well controlled.  4.  Return to clinic as needed if symptoms persist or worsen or as previously scheduled.

## 2017-07-24 NOTE — TELEPHONE ENCOUNTER
----- Message from Vishal Salazar MD sent at 7/24/2017 12:50 PM CDT -----  Kidney functions are improving.  Continue medications as prescribed an continue hydration.  Please inform the patient.  Thank you.

## 2017-07-31 ENCOUNTER — ANTI-COAG VISIT (OUTPATIENT)
Dept: CARDIOLOGY | Facility: CLINIC | Age: 82
End: 2017-07-31

## 2017-07-31 DIAGNOSIS — Z79.01 LONG TERM (CURRENT) USE OF ANTICOAGULANTS: ICD-10-CM

## 2017-07-31 LAB — INR PPP: 1.5

## 2017-08-01 ENCOUNTER — TELEPHONE (OUTPATIENT)
Dept: INTERNAL MEDICINE | Facility: CLINIC | Age: 82
End: 2017-08-01

## 2017-08-01 NOTE — TELEPHONE ENCOUNTER
Patient requesting a letter to [facility] stating that she no longer able to attend due to her health issues. Fax [phone]

## 2017-08-01 NOTE — TELEPHONE ENCOUNTER
----- Message from Jackelyn Pierce sent at 8/1/2017 11:02 AM CDT -----  Contact: pt daughter Fang Press [phone]  Pt daughter would like a call back from the nurse. She states that her mother needs a note from the Dr stating she is unable to attend the adult care center she goes to due to her health reasons.

## 2017-08-02 ENCOUNTER — CLINICAL SUPPORT (OUTPATIENT)
Dept: ELECTROPHYSIOLOGY | Facility: CLINIC | Age: 82
End: 2017-08-02
Payer: MEDICARE

## 2017-08-02 DIAGNOSIS — Z95.0 CARDIAC PACEMAKER IN SITU: ICD-10-CM

## 2017-08-02 DIAGNOSIS — I48.91 ATRIAL FIBRILLATION: ICD-10-CM

## 2017-08-02 PROCEDURE — 93293 PM PHONE R-STRIP DEVICE EVAL: CPT | Mod: PBBFAC | Performed by: INTERNAL MEDICINE

## 2017-08-07 ENCOUNTER — ANTI-COAG VISIT (OUTPATIENT)
Dept: CARDIOLOGY | Facility: CLINIC | Age: 82
End: 2017-08-07

## 2017-08-07 DIAGNOSIS — Z79.01 LONG TERM (CURRENT) USE OF ANTICOAGULANTS: ICD-10-CM

## 2017-08-07 LAB — INR PPP: 2.2

## 2017-08-14 ENCOUNTER — ANTI-COAG VISIT (OUTPATIENT)
Dept: CARDIOLOGY | Facility: CLINIC | Age: 82
End: 2017-08-14

## 2017-08-14 DIAGNOSIS — Z79.01 LONG TERM (CURRENT) USE OF ANTICOAGULANTS: ICD-10-CM

## 2017-08-14 LAB — INR PPP: 2

## 2017-08-28 ENCOUNTER — ANTI-COAG VISIT (OUTPATIENT)
Dept: CARDIOLOGY | Facility: CLINIC | Age: 82
End: 2017-08-28

## 2017-08-28 DIAGNOSIS — Z79.01 LONG TERM (CURRENT) USE OF ANTICOAGULANTS: ICD-10-CM

## 2017-08-28 LAB — INR PPP: 1.9

## 2017-08-28 RX ORDER — SPIRONOLACTONE 25 MG/1
TABLET ORAL
Qty: 30 TABLET | Refills: 0 | Status: SHIPPED | OUTPATIENT
Start: 2017-08-28 | End: 2017-09-07 | Stop reason: DRUGHIGH

## 2017-08-28 NOTE — PROGRESS NOTES
Mahesh with [facility] called in a verbal result dated 8/28/17 as: INR -1.9 / PT -22.8, hard copy to be faxed

## 2017-08-30 ENCOUNTER — DOCUMENTATION ONLY (OUTPATIENT)
Dept: NEPHROLOGY | Facility: CLINIC | Age: 82
End: 2017-08-30

## 2017-08-30 NOTE — PROGRESS NOTES
Labs from 8/28/17 reveal PTH of 90, calcium of 8.4 with no albumin and C3 of 69(L) and  C4 of  16.

## 2017-09-07 ENCOUNTER — OFFICE VISIT (OUTPATIENT)
Dept: NEPHROLOGY | Facility: CLINIC | Age: 82
End: 2017-09-07
Payer: MEDICARE

## 2017-09-07 VITALS
OXYGEN SATURATION: 97 % | HEIGHT: 62 IN | WEIGHT: 130 LBS | DIASTOLIC BLOOD PRESSURE: 78 MMHG | BODY MASS INDEX: 23.92 KG/M2 | SYSTOLIC BLOOD PRESSURE: 134 MMHG | HEART RATE: 60 BPM

## 2017-09-07 DIAGNOSIS — N18.4 CKD (CHRONIC KIDNEY DISEASE) STAGE 4, GFR 15-29 ML/MIN: Primary | ICD-10-CM

## 2017-09-07 DIAGNOSIS — R80.9 PROTEINURIA, UNSPECIFIED TYPE: ICD-10-CM

## 2017-09-07 DIAGNOSIS — I10 ESSENTIAL HYPERTENSION: ICD-10-CM

## 2017-09-07 PROCEDURE — 1126F AMNT PAIN NOTED NONE PRSNT: CPT | Mod: ,,, | Performed by: INTERNAL MEDICINE

## 2017-09-07 PROCEDURE — 99999 PR PBB SHADOW E&M-EST. PATIENT-LVL IV: CPT | Mod: PBBFAC,,, | Performed by: INTERNAL MEDICINE

## 2017-09-07 PROCEDURE — 99215 OFFICE O/P EST HI 40 MIN: CPT | Mod: S$PBB,,, | Performed by: INTERNAL MEDICINE

## 2017-09-07 PROCEDURE — 3078F DIAST BP <80 MM HG: CPT | Mod: ,,, | Performed by: INTERNAL MEDICINE

## 2017-09-07 PROCEDURE — 1159F MED LIST DOCD IN RCRD: CPT | Mod: ,,, | Performed by: INTERNAL MEDICINE

## 2017-09-07 PROCEDURE — 3075F SYST BP GE 130 - 139MM HG: CPT | Mod: ,,, | Performed by: INTERNAL MEDICINE

## 2017-09-07 PROCEDURE — 99214 OFFICE O/P EST MOD 30 MIN: CPT | Mod: PBBFAC | Performed by: INTERNAL MEDICINE

## 2017-09-07 NOTE — PROGRESS NOTES
Subjective:       Patient ID: Deepali Stuart is a 85 y.o. Black or  female who presents for follow-up evaluation of No chief complaint on file.    HPI This is an 85-year-old -American female with  hypertension, diabetes, atrial fibrillation, CAD, and CHF who is coming in for f/u of chronic kidney disease. The patient states that she has been doing better since last month when she had abd pain and has LEIA likely from poor po intake which is much better per pt.    BP and diabetes are good but does not recall numbers. Her apetite is better and is gaining weight. She has proteinuria.     PAST MEDICAL HISTORY: Significant for diabetes for 10 years, on medications and  had diet-controlled diabetes prior to that, she has hypertension for 20 years,   hard to control in the past, atrial fibrillation with pacemaker placement and   congestive heart failure.    SOCIAL HISTORY: Does not smoke. Does not drink.    FAMILY HISTORY: Noncontributory.  Review of Systems   Constitutional: Negative for appetite change and fatigue.        Gaining weight.   Eyes: Negative for discharge.   Respiratory: Negative for cough, shortness of breath and wheezing.    Cardiovascular: Negative for chest pain and palpitations.   Gastrointestinal: Negative for abdominal pain, diarrhea, nausea and vomiting.   Genitourinary: Negative for dysuria, frequency, hematuria and urgency.   Skin: Negative for color change and rash.   Psychiatric/Behavioral: Negative for confusion.   All other systems reviewed and are negative.      Objective:      Physical Exam   Constitutional: She is oriented to person, place, and time. She appears well-developed and well-nourished.   HENT:   Right Ear: External ear normal.   Left Ear: External ear normal.   Nose: Nose normal.   Mouth/Throat: Normal dentition.   Eyes: Conjunctivae, EOM and lids are normal. Pupils are equal, round, and reactive to light.   Neck: Trachea normal. No thyroid mass present.    Cardiovascular: Normal rate and regular rhythm.  Exam reveals no friction rub.    No murmur heard.  No lower extremity edema   Pulmonary/Chest: Effort normal and breath sounds normal. No respiratory distress. She has no decreased breath sounds. She has no rales.   Abdominal: Soft. Bowel sounds are normal. She exhibits no mass. There is no tenderness. There is no rebound. No hernia.   Musculoskeletal: She exhibits edema.   Trace edema   Neurological: She is alert and oriented to person, place, and time.   Skin: Skin is warm and dry. No rash noted. No erythema.   Psychiatric: She has a normal mood and affect. Judgment normal. Her mood appears not anxious. She does not exhibit a depressed mood.   Oriented to time, place and person.   Vitals reviewed.      Assessment:       No diagnosis found.    Plan:         This is an 85-year-old -American female with a   longstanding history of hypertension and diabetes coming in for f/u of   chronic kidney disease.  1. Renal. Her baseline creatinine appears to be around 1.2 to 1.4 in the past but higher recently with azam last month with creatinine improving with hydration with the   most recent creatinine of 2.0 from 2.7  with an MDRD GFR of 27  mL per minute. Her chronic  kidney disease is likely secondary to longstanding diabetes and hypertension.    She denies any NSAIDs. Baseline renal   Ultrasound stable with simple cyst and R kidney slightly smaller.   2. Hypertension. Blood pressures are stable. Continue the current regimen.   She is aware of the target blood pressures.  3. Diabetes. Stable off meds.  4. Proteinuria: She has proteinuria around 3 gms but now at 500 mg approx. This is  likely secondary to her longstanding diabetes and hypertension. I emphasized   the importance of controlling both of these to decrease the progression of   kidney disease and to decrease proteinuria.Serlogies negative with anca, sixto, complements. SPEP shows possible M protein-evaluated  by hematology. Recent SPEP nml.  Pt has neg UA with no blood or rbc's.  She is on lisinopril for proteinuria and nephro-protection and spironolactone. Asked her to hold spironolactone in light of low GFR. Her albumin is slightly low and she does not eat much protein in her diet. Discussed biopsy in the past.  Rec trying nepro last time but has not done so.    4. Renal osteodystrophy. Calcium is stable. Vitamin D levels are stable. pth stable at 90.   5. Anemia. H and H are stable at 9.9 and iron stable.    Return in 1-2 months.

## 2017-09-08 ENCOUNTER — TELEPHONE (OUTPATIENT)
Dept: NEPHROLOGY | Facility: CLINIC | Age: 82
End: 2017-09-08

## 2017-09-08 NOTE — TELEPHONE ENCOUNTER
Please find out what the new medication is ?    I asked them to stop the spironolactone if she is taking it and for her to update the med list as they did not know what meds she is taking.

## 2017-09-08 NOTE — TELEPHONE ENCOUNTER
I still don't know which new med she is talking about b/c I don't remember starting her on anything yesterday.

## 2017-09-08 NOTE — TELEPHONE ENCOUNTER
----- Message from Mojgan Gibson MA sent at 9/8/2017  2:37 PM CDT -----  Contact: Fang Press / daughter / tel: [phone]      ----- Message -----  From: Suzanne Francisco  Sent: 9/8/2017   1:58 PM  To: Sonal Hannah Staff    Caller says  The new  medication is costing $97.00   Pt. Is on a fixed income and cannot afford the   Nepro,  Can you give her a coupon or order another medication she can afford?    The list of medications is coming over the fax line [phone].  Returning your call.     Pls call.

## 2017-09-11 ENCOUNTER — ANTI-COAG VISIT (OUTPATIENT)
Dept: CARDIOLOGY | Facility: CLINIC | Age: 82
End: 2017-09-11

## 2017-09-11 DIAGNOSIS — Z79.01 LONG TERM (CURRENT) USE OF ANTICOAGULANTS: ICD-10-CM

## 2017-09-11 LAB — INR PPP: 2

## 2017-09-11 NOTE — PROGRESS NOTES
Verbal result taken from Deep/[facility]_________. PT/INR _2.0______ Date drawn_9/11/17._______ Hardcopy to be faxed.

## 2017-09-13 ENCOUNTER — OFFICE VISIT (OUTPATIENT)
Dept: PODIATRY | Facility: CLINIC | Age: 82
End: 2017-09-13
Payer: MEDICARE

## 2017-09-13 VITALS
HEART RATE: 57 BPM | BODY MASS INDEX: 23.92 KG/M2 | WEIGHT: 130 LBS | HEIGHT: 62 IN | SYSTOLIC BLOOD PRESSURE: 165 MMHG | DIASTOLIC BLOOD PRESSURE: 76 MMHG

## 2017-09-13 DIAGNOSIS — L60.0 INGROWN NAIL: Primary | ICD-10-CM

## 2017-09-13 PROCEDURE — 1125F AMNT PAIN NOTED PAIN PRSNT: CPT | Mod: ,,, | Performed by: PODIATRIST

## 2017-09-13 PROCEDURE — 99999 PR PBB SHADOW E&M-EST. PATIENT-LVL III: CPT | Mod: PBBFAC,,, | Performed by: PODIATRIST

## 2017-09-13 PROCEDURE — 1159F MED LIST DOCD IN RCRD: CPT | Mod: ,,, | Performed by: PODIATRIST

## 2017-09-13 PROCEDURE — 99203 OFFICE O/P NEW LOW 30 MIN: CPT | Mod: S$PBB,,, | Performed by: PODIATRIST

## 2017-09-13 PROCEDURE — 99213 OFFICE O/P EST LOW 20 MIN: CPT | Mod: PBBFAC,PO | Performed by: PODIATRIST

## 2017-09-13 PROCEDURE — 3077F SYST BP >= 140 MM HG: CPT | Mod: ,,, | Performed by: PODIATRIST

## 2017-09-13 PROCEDURE — 3078F DIAST BP <80 MM HG: CPT | Mod: ,,, | Performed by: PODIATRIST

## 2017-09-13 NOTE — PROGRESS NOTES
Subjective:    Patient ID: Deepali Stuart is a 85 y.o. female.    Chief Complaint: Diabetic Foot Exam and Ingrown Toenail (b/l)      HPI:   Deepali is a 85 y.o. female who presents to the clinic complaining of painful ingrown toenail on both feet.    HPI    Last Podiatry Enc: Visit date not found  Last Enc w/ Me: Visit date not found    Past Medical History:   Diagnosis Date    Atrial fibrillation     chronic AF    CHF (congestive heart failure)     Diabetes insipidus     Diabetes mellitus type II     GERD (gastroesophageal reflux disease)     Hypertension     Iritis 12/10/2014    NPDR (nonproliferative diabetic retinopathy) 10/27/2014    PAF (paroxysmal atrial fibrillation)     Palpitations      Past Surgical History:   Procedure Laterality Date    CATARACT EXTRACTION W/  INTRAOCULAR LENS IMPLANT Right n/a    OD over 10 years ago     CATARACT EXTRACTION W/  INTRAOCULAR LENS IMPLANT Left 11/11/14    OS ()    COLON SURGERY      Partial colectomy secondary to suspicious polyp    HYSTERECTOMY      INSERT / REPLACE / REMOVE PACEMAKER      SC PM 2012 [email or web address]    LASER ABLATION      3 years ago OS     Social History     Social History    Marital status:      Spouse name: N/A    Number of children: N/A    Years of education: N/A     Occupational History    Not on file.     Social History Main Topics    Smoking status: Never Smoker    Smokeless tobacco: Never Used    Alcohol use No    Drug use: No    Sexual activity: No     Other Topics Concern    Not on file     Social History Narrative    No narrative on file         Current Outpatient Prescriptions:     atorvastatin (LIPITOR) 10 MG tablet, Take 1 tablet (10 mg total) by mouth once daily., Disp: 90 tablet, Rfl: 3    blood sugar diagnostic (TRUETRACK TEST) Strp, FOLLOW PACKAGE DIRECTIONS (Patient taking differently: 1 strip by Misc.(Non-Drug; Combo Route) route 2 (two) times daily. FOLLOW PACKAGE DIRECTIONS), Disp: 100  strip, Rfl: 11    carvedilol (COREG) 12.5 MG tablet, Take 1 tablet (12.5 mg total) by mouth 2 (two) times daily., Disp: 180 tablet, Rfl: 3    furosemide (LASIX) 20 MG tablet, Take 1 tablet (20 mg total) by mouth once daily., Disp: 30 tablet, Rfl: 11    lancets Misc, 1 Device by Misc.(Non-Drug; Combo Route) route once daily. (Patient taking differently: 1 Device by Misc.(Non-Drug; Combo Route) route 2 (two) times daily. ), Disp: 100 each, Rfl: 3    levothyroxine (SYNTHROID) 50 MCG tablet, Take 1 tablet (50 mcg total) by mouth once daily., Disp: 90 tablet, Rfl: 3    lisinopril (PRINIVIL,ZESTRIL) 20 MG tablet, Take 1 tablet (20 mg total) by mouth once daily., Disp: 90 tablet, Rfl: 0    meclizine (ANTIVERT) 25 mg tablet, TAKE 2 TABLETS BY MOUTH THREE TIMES DAILY AS NEEDED FOR DIZZINESS, Disp: 60 tablet, Rfl: 11    metformin (GLUCOPHAGE) 500 MG tablet, TK 1 T PO BID WC, Disp: , Rfl: 0    ondansetron (ZOFRAN-ODT) 4 MG TbDL, DISSOLVE ONE TABLET BY MOUTH EVERY 8 HOURS AS NEEDED FOR NAUSEA AND VOMITING, Disp: 30 tablet, Rfl: 3    ranitidine (ZANTAC) 300 MG tablet, Take 1 tablet (300 mg total) by mouth every evening., Disp: 30 tablet, Rfl: 11    trazodone (DESYREL) 50 MG tablet, TAKE 1/2 TO 1 TABLET BY MOUTH EVERY EVENING AS NEEDED FOR INSOMNIA, Disp: 90 tablet, Rfl: 3    trazodone (DESYREL) 50 MG tablet, TAKE 1/2 TO 1 TABLET BY MOUTH EVERY EVENING AS NEEDED FOR INSOMNIA, Disp: 30 tablet, Rfl: 11    warfarin (COUMADIN) 5 MG tablet, TAKE 1 TABLET BY MOUTH EVERY DAY OR AS DIRECTED BY COUMADIN CLINIC (Patient taking differently: TAKE 1/2 TABLET BY MOUTH EVERY DAY IN THE EVENING OR AS DIRECTED BY COUMADIN CLINIC), Disp: 45 tablet, Rfl: 11    amlodipine (NORVASC) 5 MG tablet, TAKE 1 TABLET BY MOUTH DAILY, Disp: 90 tablet, Rfl: 3    lorazepam (ATIVAN) 0.5 MG tablet, Take 1 tablet (0.5 mg total) by mouth every 8 (eight) hours as needed for Anxiety., Disp: 90 tablet, Rfl: 0    mupirocin (BACTROBAN) 2 % ointment, Apply  "topically 3 (three) times daily., Disp: 22 g, Rfl: 1    ondansetron (ZOFRAN-ODT) 4 MG TbDL, DISSOLVE ONE TABLET BY MOUTH EVERY 6 HOURS AS NEEDED FOR NAUSEA AND VOMITING, Disp: 21 tablet, Rfl: 6   Review of patient's allergies indicates:   Allergen Reactions    Plavix [clopidogrel]        ROS  ROS Constitutional:  General Appearance: well nourished  Vascular: negative for cramps, edema and bruising  Musculoskeletal: negative for joint paint and joint edema  Skin: negative for rashes and lesions  Neurological: negative for burning, tingling and numbness  Gastrointestinal: negative for stomach pain, nausea and vomiting        Objective:        BP (!) 165/76   Pulse (!) 57   Ht 5' 2" (1.575 m)   Wt 59 kg (130 lb)   LMP  (LMP Unknown)   BMI 23.78 kg/m²     Ortho/SPM Exam  Physical Exam  LE exam con't:  V: DP 2/4, PT 2/4, CRT< 3s to all digits tested.     N: SILT in SP/DP/T/Karine/Saph distributions.    Derm: Skin intact. No erythema, edema or ecchymosis. B/l hallux nails mildly ingrowing and incurvated. No signs of infection    Ortho:  +Motor EHL/FHL/TA/GA   Compartments soft/compressible. No pain on passive stretch of big toe. No calf  pain.        Assessment:     Imaging / Labs:    No results found.        1. Ingrown nail          Plan:       Orders Placed This Encounter    mupirocin (BACTROBAN) 2 % ointment     Procedures     Deepali was seen today for diabetic foot exam and ingrown toenail.    Diagnoses and all orders for this visit:    Ingrown nail  -     mupirocin (BACTROBAN) 2 % ointment; Apply topically 3 (three) times daily.        Deepali Stuart is a 85 y.o. female presenting w/   1. Ingrown nail        -pt seen, evaluated, and managed  -dx discussed in detail. All questions/concerns addressed  -all tx options discussed. All alternatives, risks, benefits of all txs discussed  -The patient was educated regarding the above diagnosis. We discussed conservative care options regarding shoe wear and/or " padding.  -rxs dispensed: none  -discussed ingrowing toenails and all tx options. Pt opts for non-procedural slantback which was performed as a courtesy w/o complication  -pt to perform epsom salt or betadine soaks once daily x 2wks. Written instructions dispensed  -keep toe covered with triple abx ointment + bandaid x 2wks  -will plan for procedural removal at nxt visit or if ingrown nails recur    rtc 4 wks for possible procedure: b/l TNA w/o matrixectomy b/l hallux       Return in about 4 weeks (around 10/11/2017).

## 2017-09-13 NOTE — PATIENT INSTRUCTIONS
Instructions for Care after Ingrown Nail removal      Dressing Options- Traditional Method:    1. Soaking two times a day in WARM water with Epsom salts or diluted Povidone Iodine  (Betadine) for 15-20 minutes. You will need to purchase these products from the pharmacy.    2. Dry toe then apply an antibiotic cream or ointment such as Neosporin or Polysporin plus or  Garamycin and cover with a 2 x 2 inch size gauze and then secure with a 1 inch band aid.    3. In the second week, take the dressing off at bedtime to air dry the toe.        Understanding Ingrown Toenails    An ingrown nail is the result of a nail growing into the skin that surrounds it. This often occurs at either edge of the big toe. Ingrown nails may be caused by improper trimming, inherited nail deformities, injuries, fungal infections, or pressure.  Symptoms  Ingrown nails may cause pain at the tip of the toe or all the way to the base of the toe. The pain is often worse while walking. An ingrown nail may also lead to infection, inflammation, or a more serious condition. If its infected, you might see pus or redness.  Evaluation  To determine the extent of your problem, your healthcare provider examines and possibly presses the painful area. If other problems are suspected, blood tests, cultures, and X-rays may be done as well.  Treatment  If the nail isnt infected, your healthcare provider may trim the corner of it to help relieve your symptoms. He or she may need to remove one side of your nail back to the cuticle. The base of the nail may then be treated with a chemical to keep the ingrown part from growing back. Severe infections or ingrown nails may require antibiotics and temporary or permanent removal of a portion of the nail. To prevent pain, a local anesthetic may be used in these procedures. This treatment is usually done at your healthcare provider's office.  Prevention  Many nail problems can be prevented by wearing the right shoes  and trimming your nails properly. To help avoid infection, keep your feet clean and dry. If you have diabetes, talk with your healthcare provider before doing any foot self-care.  · The right shoes: Get your feet measured (your size may change as you age). Wear shoes that are supportive and roomy enough for your toes to wiggle. Look for shoes made of natural materials such as leather, which allow your feet to breathe.  · Proper trimming: To avoid problems, trim your toenails straight across without cutting down into the corners. If you cant trim your own nails, ask your healthcare provider to do so for you.  Date Last Reviewed: 10/1/2016  © 2482-2114 [employer]. [address] [place] [address], [place] [address]. All rights reserved. This information is not intended as a substitute for professional medical care. Always follow your healthcare professional's instructions.

## 2017-09-15 ENCOUNTER — TELEPHONE (OUTPATIENT)
Dept: NEPHROLOGY | Facility: CLINIC | Age: 82
End: 2017-09-15

## 2017-09-15 NOTE — TELEPHONE ENCOUNTER
Please see my note from 9/8.  I did not start her on a new med.  Is she talking about nepro?  If she is, please ask her that it was a recommendation and does not have to take it if its too expensive..

## 2017-09-16 ENCOUNTER — TELEPHONE (OUTPATIENT)
Dept: NEPHROLOGY | Facility: CLINIC | Age: 82
End: 2017-09-16

## 2017-09-16 NOTE — TELEPHONE ENCOUNTER
Please see my note from 9/8.  I did not start her on a new med.  Is she talking about nepro?  If she is, please ask her that it was a recommendation and does not have to take it if its too expensive..       Informed pt of above per Dr. Pruitt/ pt verbalized understanding

## 2017-09-21 RX ORDER — AMLODIPINE BESYLATE 5 MG/1
TABLET ORAL
Qty: 90 TABLET | Refills: 3 | Status: ON HOLD | OUTPATIENT
Start: 2017-09-21 | End: 2018-05-26 | Stop reason: HOSPADM

## 2017-09-24 RX ORDER — ONDANSETRON 4 MG/1
TABLET, ORALLY DISINTEGRATING ORAL
Qty: 21 TABLET | Refills: 6 | Status: ON HOLD | OUTPATIENT
Start: 2017-09-24 | End: 2018-05-25 | Stop reason: HOSPADM

## 2017-09-26 ENCOUNTER — ANTI-COAG VISIT (OUTPATIENT)
Dept: CARDIOLOGY | Facility: CLINIC | Age: 82
End: 2017-09-26

## 2017-09-26 ENCOUNTER — TELEPHONE (OUTPATIENT)
Dept: PODIATRY | Facility: CLINIC | Age: 82
End: 2017-09-26

## 2017-09-26 DIAGNOSIS — Z79.01 LONG TERM (CURRENT) USE OF ANTICOAGULANTS: ICD-10-CM

## 2017-09-26 LAB — INR PPP: 1.5

## 2017-09-26 NOTE — PROGRESS NOTES
Verbal result taken from ____Juan_____. PT/INR __17.4 / 1.5_____ Date drawn____9/26/2017____ Hardcopy to be faxed.

## 2017-09-26 NOTE — PROGRESS NOTES
Patient reports one missed dose. Will increase total weekly dose and repeat INR in 1 week. patient verbalized understanding. HH faxed

## 2017-09-26 NOTE — TELEPHONE ENCOUNTER
----- Message from Hui Gonzales sent at 9/26/2017 11:48 AM CDT -----  Contact: Juan/Home Health Nurse   Juan would like to speak with you regarding the pts having discoloration and prescribing her an antibiotic. Juan can be reached at [phone].

## 2017-09-27 ENCOUNTER — TELEPHONE (OUTPATIENT)
Dept: PODIATRY | Facility: CLINIC | Age: 82
End: 2017-09-27

## 2017-09-27 ENCOUNTER — TELEPHONE (OUTPATIENT)
Dept: INTERNAL MEDICINE | Facility: CLINIC | Age: 82
End: 2017-09-27

## 2017-09-28 RX ORDER — MUPIROCIN 20 MG/G
OINTMENT TOPICAL 3 TIMES DAILY
Qty: 22 G | Refills: 1 | Status: ON HOLD | OUTPATIENT
Start: 2017-09-28 | End: 2018-05-25 | Stop reason: HOSPADM

## 2017-09-29 ENCOUNTER — TELEPHONE (OUTPATIENT)
Dept: PODIATRY | Facility: CLINIC | Age: 82
End: 2017-09-29

## 2017-09-29 NOTE — TELEPHONE ENCOUNTER
Called pt at  Home [phone] no answer or answering machine , then I called her son at [phone] left message for her to call me wilber, then I called mallory at [phone] no answer and no answering machine

## 2017-10-03 ENCOUNTER — ANTI-COAG VISIT (OUTPATIENT)
Dept: CARDIOLOGY | Facility: CLINIC | Age: 82
End: 2017-10-03

## 2017-10-03 LAB — INR PPP: 1.7

## 2017-10-03 RX ORDER — METFORMIN HYDROCHLORIDE 500 MG/1
TABLET ORAL
Qty: 180 TABLET | Refills: 3 | Status: SHIPPED | OUTPATIENT
Start: 2017-10-03 | End: 2017-10-13

## 2017-10-04 ENCOUNTER — TELEPHONE (OUTPATIENT)
Dept: INTERNAL MEDICINE | Facility: CLINIC | Age: 82
End: 2017-10-04

## 2017-10-05 RX ORDER — SPIRONOLACTONE 25 MG/1
TABLET ORAL
Qty: 30 TABLET | Refills: 0 | OUTPATIENT
Start: 2017-10-05

## 2017-10-05 RX ORDER — SPIRONOLACTONE 25 MG/1
TABLET ORAL
Qty: 30 TABLET | Refills: 0 | Status: SHIPPED | OUTPATIENT
Start: 2017-10-05 | End: 2017-10-13

## 2017-10-09 ENCOUNTER — TELEPHONE (OUTPATIENT)
Dept: PODIATRY | Facility: CLINIC | Age: 82
End: 2017-10-09

## 2017-10-09 ENCOUNTER — ANTI-COAG VISIT (OUTPATIENT)
Dept: CARDIOLOGY | Facility: CLINIC | Age: 82
End: 2017-10-09

## 2017-10-09 LAB — INR PPP: 3.2

## 2017-10-09 NOTE — TELEPHONE ENCOUNTER
----- Message from Nicky Thomas sent at 10/9/2017 11:47 AM CDT -----  Contact: [facility]- Juan Martinez was calling on pt's nehalf regarding the pt's appt on 10/11. She stated pt would like to have appt rescheduled to Monday 10/16, if possible. Please call Juan at [phone]

## 2017-10-09 NOTE — PROGRESS NOTES
Verbal result taken from Deep/[facility] _________. PT/INR 3.2_____ Date drawn_10/09/17_______ Hardcopy to be faxed.

## 2017-10-13 ENCOUNTER — HOSPITAL ENCOUNTER (EMERGENCY)
Facility: OTHER | Age: 82
Discharge: HOME | End: 2017-10-13
Attending: EMERGENCY MEDICINE
Payer: MEDICARE

## 2017-10-13 VITALS
TEMPERATURE: 98 F | HEIGHT: 62 IN | BODY MASS INDEX: 23.92 KG/M2 | WEIGHT: 130 LBS | RESPIRATION RATE: 18 BRPM | OXYGEN SATURATION: 94 % | SYSTOLIC BLOOD PRESSURE: 133 MMHG | DIASTOLIC BLOOD PRESSURE: 94 MMHG | HEART RATE: 70 BPM

## 2017-10-13 DIAGNOSIS — R06.02 SHORTNESS OF BREATH: Primary | ICD-10-CM

## 2017-10-13 DIAGNOSIS — I50.9 ACUTE ON CHRONIC CONGESTIVE HEART FAILURE, UNSPECIFIED CONGESTIVE HEART FAILURE TYPE: ICD-10-CM

## 2017-10-13 DIAGNOSIS — F41.9 ANXIETY: ICD-10-CM

## 2017-10-13 LAB
ALBUMIN SERPL BCP-MCNC: 2.9 G/DL
ALP SERPL-CCNC: 70 U/L
ALT SERPL W/O P-5'-P-CCNC: 25 U/L
ANION GAP SERPL CALC-SCNC: 10 MMOL/L
APTT BLDCRRT: 42.7 SEC
AST SERPL-CCNC: 49 U/L
BASOPHILS # BLD AUTO: 0.06 K/UL
BASOPHILS NFR BLD: 0.9 %
BILIRUB SERPL-MCNC: 0.8 MG/DL
BNP SERPL-MCNC: 1490 PG/ML
BUN SERPL-MCNC: 28 MG/DL
CALCIUM SERPL-MCNC: 8.8 MG/DL
CHLORIDE SERPL-SCNC: 109 MMOL/L
CO2 SERPL-SCNC: 21 MMOL/L
CREAT SERPL-MCNC: 1.9 MG/DL
DIFFERENTIAL METHOD: ABNORMAL
EOSINOPHIL # BLD AUTO: 0.1 K/UL
EOSINOPHIL NFR BLD: 1.7 %
ERYTHROCYTE [DISTWIDTH] IN BLOOD BY AUTOMATED COUNT: 13.7 %
EST. GFR  (AFRICAN AMERICAN): 27 ML/MIN/1.73 M^2
EST. GFR  (NON AFRICAN AMERICAN): 24 ML/MIN/1.73 M^2
GLUCOSE SERPL-MCNC: 149 MG/DL
HCT VFR BLD AUTO: 34.4 %
HGB BLD-MCNC: 11.7 G/DL
INR PPP: 3.6
LIPASE SERPL-CCNC: 52 U/L
LYMPHOCYTES # BLD AUTO: 1.4 K/UL
LYMPHOCYTES NFR BLD: 21.6 %
MCH RBC QN AUTO: 32.4 PG
MCHC RBC AUTO-ENTMCNC: 34 G/DL
MCV RBC AUTO: 95 FL
MONOCYTES # BLD AUTO: 0.5 K/UL
MONOCYTES NFR BLD: 7.7 %
NEUTROPHILS # BLD AUTO: 4.4 K/UL
NEUTROPHILS NFR BLD: 67.9 %
PLATELET # BLD AUTO: 196 K/UL
PMV BLD AUTO: 9.5 FL
POTASSIUM SERPL-SCNC: 3.7 MMOL/L
PROT SERPL-MCNC: 6.7 G/DL
PROTHROMBIN TIME: 38.8 SEC
RBC # BLD AUTO: 3.61 M/UL
SODIUM SERPL-SCNC: 140 MMOL/L
TROPONIN I SERPL DL<=0.01 NG/ML-MCNC: 0.04 NG/ML
WBC # BLD AUTO: 6.53 K/UL

## 2017-10-13 PROCEDURE — 25000003 PHARM REV CODE 250: Performed by: EMERGENCY MEDICINE

## 2017-10-13 PROCEDURE — 93005 ELECTROCARDIOGRAM TRACING: CPT

## 2017-10-13 PROCEDURE — 93010 ELECTROCARDIOGRAM REPORT: CPT | Mod: ,,, | Performed by: INTERNAL MEDICINE

## 2017-10-13 PROCEDURE — 96374 THER/PROPH/DIAG INJ IV PUSH: CPT

## 2017-10-13 PROCEDURE — 83690 ASSAY OF LIPASE: CPT

## 2017-10-13 PROCEDURE — 84484 ASSAY OF TROPONIN QUANT: CPT

## 2017-10-13 PROCEDURE — 85025 COMPLETE CBC W/AUTO DIFF WBC: CPT

## 2017-10-13 PROCEDURE — 80053 COMPREHEN METABOLIC PANEL: CPT

## 2017-10-13 PROCEDURE — 85610 PROTHROMBIN TIME: CPT

## 2017-10-13 PROCEDURE — 83880 ASSAY OF NATRIURETIC PEPTIDE: CPT

## 2017-10-13 PROCEDURE — 99284 EMERGENCY DEPT VISIT MOD MDM: CPT | Mod: 25

## 2017-10-13 PROCEDURE — 85730 THROMBOPLASTIN TIME PARTIAL: CPT

## 2017-10-13 PROCEDURE — 63600175 PHARM REV CODE 636 W HCPCS: Performed by: EMERGENCY MEDICINE

## 2017-10-13 RX ORDER — LORAZEPAM 0.5 MG/1
0.5 TABLET ORAL EVERY 8 HOURS PRN
Qty: 20 TABLET | Refills: 0 | Status: SHIPPED | OUTPATIENT
Start: 2017-10-13 | End: 2017-12-04 | Stop reason: SDUPTHER

## 2017-10-13 RX ORDER — AMLODIPINE BESYLATE 5 MG/1
5 TABLET ORAL
Status: COMPLETED | OUTPATIENT
Start: 2017-10-13 | End: 2017-10-13

## 2017-10-13 RX ORDER — FUROSEMIDE 10 MG/ML
40 INJECTION INTRAMUSCULAR; INTRAVENOUS
Status: COMPLETED | OUTPATIENT
Start: 2017-10-13 | End: 2017-10-13

## 2017-10-13 RX ORDER — LORAZEPAM 0.5 MG/1
0.5 TABLET ORAL
Status: COMPLETED | OUTPATIENT
Start: 2017-10-13 | End: 2017-10-13

## 2017-10-13 RX ORDER — SUCRALFATE 1 G/10ML
1 SUSPENSION ORAL
Status: COMPLETED | OUTPATIENT
Start: 2017-10-13 | End: 2017-10-13

## 2017-10-13 RX ADMIN — SUCRALFATE 1 G: 1 SUSPENSION ORAL at 11:10

## 2017-10-13 RX ADMIN — LORAZEPAM 0.5 MG: 0.5 TABLET ORAL at 10:10

## 2017-10-13 RX ADMIN — AMLODIPINE BESYLATE 5 MG: 5 TABLET ORAL at 10:10

## 2017-10-13 RX ADMIN — FUROSEMIDE 40 MG: 10 INJECTION, SOLUTION INTRAMUSCULAR; INTRAVENOUS at 10:10

## 2017-10-13 NOTE — ED NOTES
Patient identifiers verified and correct for Deepali Gage Narciso.    LOC: The patient is awake, alert and aware of environment with an appropriate affect, the patient is oriented x 3 and speaking appropriately.  APPEARANCE: Patient resting comfortably and in no acute distress, patient is clean and well groomed, patient's clothing is properly fastened.  SKIN: The skin is warm and dry, color consistent with ethnicity, patient has normal skin turgor and moist mucus membranes, skin intact, no breakdown or bruising noted.  MUSCULOSKELETAL: Patient moving all extremities spontaneously, no obvious swelling or deformities noted. +generalized weakness  RESPIRATORY: Airway is open and patent, respirations are spontaneous, patient has a normal effort and rate, no accessory muscle use noted, breath sounds diminished bilateral lower lobes. +SOB, worse with exertion  CARDIAC: Patient has a normal rate and regular rhythm, no periphreal edema noted, capillary refill < 3 seconds.  ABDOMEN: Soft and non tender to palpation, no distention noted. +epigastric cramping  NEUROLOGIC: Eyes open spontaneously, behavior appropriate to situation, follows commands, facial expression symmetrical,  purposeful motor response noted, +dizziness

## 2017-10-13 NOTE — ED PROVIDER NOTES
"Encounter Date: 10/13/2017    SCRIBE #1 NOTE: I, Kimberly Lopez, am scribing for, and in the presence of, Dr. Mnotana.       History     Chief Complaint   Patient presents with    Shortness of Breath     starting last night, chronic abd pain w/ hx of anxiety       Time seen by provider: 9:53 AM on 10/13/2017    Deepali Stuart is a 85 y.o. female who presents to the ED with SOB. Symptoms have gradually worsened over the last two days. Patient endorses dyspnea on exertion, noting she is usually able to walk around the house with ease. She denies associated chest pain. She had an episode of epigastric discomfort last night thought to be from the food she consumed. It resolved after two [place] tablets. The patient also endorses a decreased appetite secondary to drinking "6 big bottles of water" a day and generalized weakness. She denies fever, chills, sore throat, cough, leg swelling, or any other symptoms at this time. She has no additional complaints.     The patient has a SHx including a cardiac pacemaker placement. Additional past medical, surgical, and social history as outlined in the nursing assessment was reviewed by me.      The history is provided by the patient and a relative.     Review of patient's allergies indicates:   Allergen Reactions    Plavix [clopidogrel]      Past Medical History:   Diagnosis Date    Atrial fibrillation     chronic AF    CHF (congestive heart failure)     Diabetes insipidus     Diabetes mellitus type II     GERD (gastroesophageal reflux disease)     Hypertension     Iritis 12/10/2014    NPDR (nonproliferative diabetic retinopathy) 10/27/2014    PAF (paroxysmal atrial fibrillation)     Palpitations      Past Surgical History:   Procedure Laterality Date    CATARACT EXTRACTION W/  INTRAOCULAR LENS IMPLANT Right n/a    OD over 10 years ago     CATARACT EXTRACTION W/  INTRAOCULAR LENS IMPLANT Left 11/11/14    OS ()    COLON SURGERY      Partial colectomy " secondary to suspicious polyp    HYSTERECTOMY      INSERT / REPLACE / REMOVE PACEMAKER      [place] [facility] 2012 [email or web address]    LASER ABLATION      3 years ago OS     Family History   Problem Relation Age of Onset    No Known Problems Mother     No Known Problems Father     No Known Problems Sister     No Known Problems Brother     No Known Problems Maternal Aunt     No Known Problems Maternal Uncle     No Known Problems Paternal Aunt     No Known Problems Paternal Uncle     No Known Problems Maternal Grandmother     No Known Problems Maternal Grandfather     No Known Problems Paternal Grandmother     No Known Problems Paternal Grandfather     Amblyopia Neg Hx     Blindness Neg Hx     Cancer Neg Hx     Cataracts Neg Hx     Diabetes Neg Hx     Glaucoma Neg Hx     Hypertension Neg Hx     Macular degeneration Neg Hx     Retinal detachment Neg Hx     Strabismus Neg Hx     Stroke Neg Hx     Thyroid disease Neg Hx      Social History   Substance Use Topics    Smoking status: Never Smoker    Smokeless tobacco: Never Used    Alcohol use No     Review of Systems   Constitutional: Positive for appetite change. Negative for chills and fever.   HENT: Negative for congestion, rhinorrhea and sore throat.    Respiratory: Positive for shortness of breath. Negative for cough.    Cardiovascular: Positive for chest pain (resolved). Negative for leg swelling.   Gastrointestinal: Negative for abdominal pain, diarrhea, nausea and vomiting.   Endocrine: Negative for polyuria.   Genitourinary: Negative for decreased urine volume and dysuria.   Musculoskeletal: Negative for back pain.   Skin: Negative for rash.   Allergic/Immunologic: Negative for immunocompromised state.   Neurological: Positive for weakness (generalized). Negative for dizziness and light-headedness.   Hematological: Does not bruise/bleed easily.   Psychiatric/Behavioral: Negative for confusion.     Physical Exam     Initial Vitals [10/13/17 0928]   BP  Pulse Resp Temp SpO2   (!) 175/100 82 18 98.1 °F (36.7 °C) 96 %      MAP       125         Physical Exam    Nursing note and vitals reviewed.  Constitutional: She appears well-developed and well-nourished. She is not diaphoretic. No distress.   HENT:   Head: Normocephalic and atraumatic.   Right Ear: External ear normal.   Left Ear: External ear normal.   Eyes: Conjunctivae and EOM are normal. Right eye exhibits no discharge. Left eye exhibits no discharge.   Neck: Normal range of motion. Neck supple. No tracheal deviation present.   Cardiovascular: Normal rate, normal heart sounds and intact distal pulses. An irregularly irregular rhythm present. Exam reveals no gallop and no friction rub.    No murmur heard.  Pulmonary/Chest: No stridor. No respiratory distress. She has no wheezes. She has no rhonchi. She has rales.   Diminished breath sounds at bases, worse on the right. Occasional crackles present in the lower lung fields.   Abdominal: Soft. Bowel sounds are normal. She exhibits no distension. There is no tenderness. There is no rebound and no guarding.   Musculoskeletal: Normal range of motion. She exhibits no edema or tenderness.   Neurological: She is alert and oriented to person, place, and time. She has normal strength. No cranial nerve deficit.   Skin: Skin is warm and dry. Capillary refill takes less than 2 seconds. No rash noted. No erythema. No pallor.   Psychiatric: She has a normal mood and affect. Her behavior is normal.       ED Course   Procedures  Labs Reviewed   TROPONIN I - Abnormal; Notable for the following:        Result Value    Troponin I 0.035 (*)     All other components within normal limits   B-TYPE NATRIURETIC PEPTIDE - Abnormal; Notable for the following:     BNP 1,490 (*)     All other components within normal limits   CBC W/ AUTO DIFFERENTIAL - Abnormal; Notable for the following:     RBC 3.61 (*)     Hemoglobin 11.7 (*)     Hematocrit 34.4 (*)     MCH 32.4 (*)     All other  components within normal limits   COMPREHENSIVE METABOLIC PANEL - Abnormal; Notable for the following:     CO2 21 (*)     Glucose 149 (*)     BUN, Bld 28 (*)     Creatinine 1.9 (*)     Albumin 2.9 (*)     AST 49 (*)     eGFR if  27 (*)     eGFR if non  24 (*)     All other components within normal limits   PROTIME-INR - Abnormal; Notable for the following:     Prothrombin Time 38.8 (*)     INR 3.6 (*)     All other components within normal limits   APTT - Abnormal; Notable for the following:     aPTT 42.7 (*)     All other components within normal limits   LIPASE     Imaging Results          X-Ray Chest PA And Lateral (Final result)  Result time 10/13/17 11:42:12    Final result by Fidencio Cartagena MD (10/13/17 11:42:12)                 Impression:        Bilateral effusions and atelectasis, left greater than right.      Electronically signed by: FIDENCIO CARTAGENA MD  Date:     10/13/17  Time:    11:42              Narrative:    Technique:  Chest PA and lateral radiographs    Comparison: Same-day radiograph, multiple other prior radiographs, last 4/8/17    Findings:     Bilateral pleural effusions, left greater than right with associated atelectasis similar to the appearance of multiple prior exams.  The mediastinal structures are midline. The cardiac silhouette is enlarged but unchanged in size. The osseous structures demonstrate no acute abnormality.                             X-Ray Chest AP Portable (Final result)  Result time 10/13/17 10:35:52    Final result by Silvio Jorge MD (10/13/17 10:35:52)                 Impression:     Overall, stable appearance of the chest with small effusion and atelectasis on the right.      Electronically signed by: SILVIO JORGE MD  Date:     10/13/17  Time:    10:35              Narrative:    Shortness of breath    Comparison: 4/14/17.    Single frontal view of the chest was obtained.    There is a single lead pacer device in the left  chest the tip of lead projected over right ventricle. The trachea is patent. The cardiac silhouette is enlarged. There is severe atherosclerosis. There is persistent stable blunting of the right CP angle likely related to combination of small pleural effusion and atelectasis. Left lower lungs and not clearly visualized and consolidation in this area not excluded. No pneumothorax. No free air below the diaphragm. The bones are unremarkable                            EKG Readings: (Independently Interpreted)   Initial Reading: No STEMI.   Atrial fibrillation with an occasional PVC, Q-waves in septal leads that are old, and nonspecific T-wave changes.      X-Rays:   Independently Interpreted Readings:   Chest X-Ray: No consolidation or pneumothorax. Right sided atelectasis. Left hemidiaphragm obscured by pacemaker.      Medical Decision Making:   History:   Old Medical Records: I decided to obtain old medical records.  Initial Assessment:   Patient is a 85 y.o. female who presents with worsening dyspnea. She admits to increased fluid intake in large volumes. I suspect she has pulmonary edema. I will obtain a CXR and check a BNP. EKG shows no acute ischemia and she denies chest pain. I will also ensure no PNA. She is currently hemodynamically stable. I will give Lasix and control BP with home medications. I will reassess.   Clinical Tests:   Lab Tests: Reviewed and Ordered  Radiological Study: Ordered and Reviewed  Medical Tests: Reviewed and Ordered  ED Management:  11:55 AM- patient's diagnostic workup reveals an elevated BNP of 1490.  Her troponin is additionally elevated at 0.035.  I believe this is a reflection of CHF and not acute coronary ischemia.  Patient remains chest pain-free.  Additionally she has no respiratory distress.  She has voided after receiving Lasix.  She is eating lunch with ease and wants to go home.  I believe she is stable for discharge with oral diuresis at home. I explained that she needs to  limit her fluid intake and discuss her treatment regimen with cardiology, nephrology, and her PCP.  I have advised she increase her Lasix dose for the next 3 days.     12:30 - I have discussed with patient and her son and daughter the diagnostic results, diagnosis, treatment plan, and need for follow-up. They have expressed understanding of my instructions. I am comfortable with her discharge home at this time.              Scribe Attestation:   Scribe #1: I performed the above scribed service and the documentation accurately describes the services I performed. I attest to the accuracy of the note.    I, Dr. Cynthia Montana, reviewed documentation as scribed above. I personally performed the services described in this documentation.  I agree that the record reflects my personal performance and is accurate and complete. Cynthia Montana MD.  10/13/2017 3:01 PM          ED Course      Clinical Impression:     1. Shortness of breath    2. Acute on chronic congestive heart failure, unspecified congestive heart failure type    3. Anxiety          Disposition:   Disposition: Discharged  Condition: Stable                        Cynthia Montana MD  10/13/17 1504

## 2017-10-13 NOTE — ED NOTES
"Pt to ED c/o SOB with dizziness and weakness since last night. Pt reports SOB worse with ambulation. Pt with hx of CHF and afib with pacemaker. Pt currently taking lasix-reports compliant. Pt reports mild epigastric "griping" pr cramping, denies pain, CP, fever, chills, cough, congestion. Pt AAOx4 and appropriate at this time. Respirations even and unlabored. No acute distress noted. Awaiting further orders. Pt updated on POC. Bed is locked and in lowest position with side rails up x2. Call bell within reach and pt oriented to use of call bell. Pt on continuous cardiac monitoring, continuous pulse ox, and continuous BP cuff. Allergy and fall risk bands applied per protocol. Will continue to monitor.     "

## 2017-10-13 NOTE — DISCHARGE INSTRUCTIONS
Take 40mg (two tablets) of Lasix daily for 3 days. This will help to remove more fluid from your lungs. You should also stop drinking so much water. Go back to your usual amount of water until you see your nephrologist.

## 2017-10-16 ENCOUNTER — PROCEDURE VISIT (OUTPATIENT)
Dept: PODIATRY | Facility: CLINIC | Age: 82
End: 2017-10-16
Payer: MEDICARE

## 2017-10-16 ENCOUNTER — ANTI-COAG VISIT (OUTPATIENT)
Dept: CARDIOLOGY | Facility: CLINIC | Age: 82
End: 2017-10-16

## 2017-10-16 VITALS — BODY MASS INDEX: 23.93 KG/M2 | HEIGHT: 62 IN | WEIGHT: 130.06 LBS

## 2017-10-16 DIAGNOSIS — N18.30 CKD (CHRONIC KIDNEY DISEASE) STAGE 3, GFR 30-59 ML/MIN: ICD-10-CM

## 2017-10-16 DIAGNOSIS — M20.10 VALGUS DEFORMITY OF GREAT TOE, UNSPECIFIED LATERALITY: ICD-10-CM

## 2017-10-16 DIAGNOSIS — M20.41 HAMMER TOES OF BOTH FEET: ICD-10-CM

## 2017-10-16 DIAGNOSIS — L60.0 INGROWN NAIL: Primary | ICD-10-CM

## 2017-10-16 DIAGNOSIS — M20.42 HAMMER TOES OF BOTH FEET: ICD-10-CM

## 2017-10-16 DIAGNOSIS — E11.21 CONTROLLED TYPE 2 DIABETES MELLITUS WITH DIABETIC NEPHROPATHY, WITHOUT LONG-TERM CURRENT USE OF INSULIN: ICD-10-CM

## 2017-10-16 DIAGNOSIS — I73.9 PERIPHERAL VASCULAR DISEASE: ICD-10-CM

## 2017-10-16 LAB — INR PPP: 3.2

## 2017-10-16 PROCEDURE — 11721 DEBRIDE NAIL 6 OR MORE: CPT | Mod: Q9,PBBFAC,PO

## 2017-10-16 PROCEDURE — 11721 DEBRIDE NAIL 6 OR MORE: CPT | Mod: Q9,S$PBB,,

## 2017-10-16 NOTE — PROGRESS NOTES
ER -10/13 -B/p was elevated, was given Spironolactone -25mg, Lorazepam -0.5mg, Lisinopril-20mg, drinking Propell water--but none this past week, has had SOB, weakness, not much activity, verified correct dose of coumadin, states Drs need to get together regarding her meds. Per ED notes patient had SOB from CHF. Patient discharged home on Ativan.

## 2017-10-16 NOTE — PROCEDURES
Procedures     Chief Complaint   Patient presents with    Diabetic Foot Exam     HgbA1c: 5.6 12/28/16 PCP: Martin 7/24/17    Ingrown Toenail     nasreen great toes        History of present illness: Patient returns to the clinic for at risk diabetic foot care and f/u b/l great toenail partial avulsions by Dr. Chun 2 weeks ago.  She does walk on her own, often uses a WC.  No reports of numbness and burning.      Review of Systems:   Vascular : negative rest pain, claudication, bruising   Musculoskeletal:+ for joint pain   Skin: negative for rashes, open wounds and lesions, + for thickened, painful and discolored toenails  Neurological: - for burning, tingling, paresthesia and numbness   All other unremarkable.    Past Medical, Family and Social History reviewed.    Constitutional:   Patient is oriented to person, place, and time. Vital signs are normal. Appears well-developed and well-nourished.     Vascular:   Dorsalis pedis pulses are 1+ on the right side, and 1+ on the left side.   Posterior tibial pulses are 1+ on the right side, and 1 + on the left side.   - digital hair growth, capillary fill time to all toes <3 seconds, toes are cool to touch  + swelling feet and ankles worse on the left    Skin/Dermatological:   Skin is thin, warm, shiny and atrophic. No cyanosis or clubbing. No rashes noted. No open wounds.   All ten toenails yellow discolored, thickened 3 mm, elongated 3 mm with subungual debris and tenderness, multiple cryptoisis, no tenderness    Musculoskeletal:   Mild bunions, hammertoes and bunionettes observed.  Decreased range of motion of bilateral midtarsal, subtalar joints, ankle joint dorsiflexion is restricted bilaterally. Muscle strength to tibialis anterior, extensor hallucis longus, extensor digitorum longus, peroneal muscles, flexor hallucis/digotorum longus, posterior tibial and gastrosoleal complex is 5/5.    Neurological:   - deficits to sharp/dull, light touch or vibratory sensation  bilateral feet     Assessment/Plan:   #1 Diabetes, neuropathy, foot deformity: Discussed diabetic footcare, daily foot inspections, tight blood sugar control and proper shoe gear. Recommended diabetic depth shoes with high toebox, padding for foot deformity and diabetic accommodative inserts/shoes and prescription was written.    #2 Onychomycosis/onychodystrophy: With patient permission, all ten toenails were debrided sharply with a nail nipper down to the base. Rasp was used to reduce thickening of toenails.  Patient tolerated well.  Return to clinic 4 months.

## 2017-10-23 ENCOUNTER — ANTI-COAG VISIT (OUTPATIENT)
Dept: CARDIOLOGY | Facility: CLINIC | Age: 82
End: 2017-10-23

## 2017-10-23 LAB — INR PPP: 1.8

## 2017-10-23 NOTE — PROGRESS NOTES
Verbal result taken from Deep/[facility]_________. PT/INR _1.8______ Date drawn_10/23/17_______ Hardcopy to be faxed.

## 2017-10-30 ENCOUNTER — ANTI-COAG VISIT (OUTPATIENT)
Dept: CARDIOLOGY | Facility: CLINIC | Age: 82
End: 2017-10-30

## 2017-10-30 LAB — INR PPP: 2.5

## 2017-10-30 NOTE — PROGRESS NOTES
Mahesh with [facility] called in a verbal result dated 10/30/17 as: INR -2.5 / PT -30.5, hard copy to be faxed

## 2017-11-06 ENCOUNTER — ANTI-COAG VISIT (OUTPATIENT)
Dept: CARDIOLOGY | Facility: CLINIC | Age: 82
End: 2017-11-06

## 2017-11-06 LAB — INR PPP: 3.1

## 2017-11-07 ENCOUNTER — OFFICE VISIT (OUTPATIENT)
Dept: INTERNAL MEDICINE | Facility: CLINIC | Age: 82
End: 2017-11-07
Payer: MEDICARE

## 2017-11-07 ENCOUNTER — CLINICAL SUPPORT (OUTPATIENT)
Dept: ELECTROPHYSIOLOGY | Facility: CLINIC | Age: 82
End: 2017-11-07
Payer: MEDICARE

## 2017-11-07 VITALS
HEART RATE: 86 BPM | DIASTOLIC BLOOD PRESSURE: 80 MMHG | SYSTOLIC BLOOD PRESSURE: 167 MMHG | HEIGHT: 62 IN | OXYGEN SATURATION: 97 % | TEMPERATURE: 98 F | BODY MASS INDEX: 26.13 KG/M2 | RESPIRATION RATE: 16 BRPM | WEIGHT: 142 LBS

## 2017-11-07 DIAGNOSIS — H26.9 CORTICAL CATARACT OF LEFT EYE: ICD-10-CM

## 2017-11-07 DIAGNOSIS — Z95.0 CARDIAC PACEMAKER IN SITU: ICD-10-CM

## 2017-11-07 DIAGNOSIS — I13.0 BENIGN HYPERTENSIVE HEART AND CKD, STAGE 3 (GFR 30-59), W CHF: ICD-10-CM

## 2017-11-07 DIAGNOSIS — Z00.00 WELL ADULT EXAM: Primary | ICD-10-CM

## 2017-11-07 DIAGNOSIS — I48.20 CHRONIC ATRIAL FIBRILLATION: ICD-10-CM

## 2017-11-07 DIAGNOSIS — I50.31 ACUTE DIASTOLIC CHF (CONGESTIVE HEART FAILURE): ICD-10-CM

## 2017-11-07 DIAGNOSIS — E55.9 VITAMIN D DEFICIENCY DISEASE: ICD-10-CM

## 2017-11-07 DIAGNOSIS — Z95.0 CARDIAC PACEMAKER IN SITU: Primary | ICD-10-CM

## 2017-11-07 DIAGNOSIS — H35.033 HYPERTENSIVE RETINOPATHY OF BOTH EYES: ICD-10-CM

## 2017-11-07 DIAGNOSIS — I10 ESSENTIAL HYPERTENSION: ICD-10-CM

## 2017-11-07 DIAGNOSIS — I50.33 ACUTE ON CHRONIC DIASTOLIC HEART FAILURE: ICD-10-CM

## 2017-11-07 DIAGNOSIS — E03.4 HYPOTHYROIDISM DUE TO ACQUIRED ATROPHY OF THYROID: ICD-10-CM

## 2017-11-07 DIAGNOSIS — E87.6 HYPOKALEMIA: ICD-10-CM

## 2017-11-07 DIAGNOSIS — H25.12 NUCLEAR SCLEROSIS OF LEFT EYE: ICD-10-CM

## 2017-11-07 DIAGNOSIS — Z09 FOLLOW UP: Primary | ICD-10-CM

## 2017-11-07 DIAGNOSIS — I48.19 PERSISTENT ATRIAL FIBRILLATION: ICD-10-CM

## 2017-11-07 DIAGNOSIS — H52.7 REFRACTIVE ERROR: ICD-10-CM

## 2017-11-07 DIAGNOSIS — Z79.01 LONG TERM CURRENT USE OF ANTICOAGULANT THERAPY: ICD-10-CM

## 2017-11-07 DIAGNOSIS — Z95.0 PACEMAKER: ICD-10-CM

## 2017-11-07 DIAGNOSIS — R53.1 GENERALIZED WEAKNESS: ICD-10-CM

## 2017-11-07 DIAGNOSIS — Z90.49 S/P PARTIAL COLECTOMY: ICD-10-CM

## 2017-11-07 DIAGNOSIS — E83.42 HYPOMAGNESEMIA: ICD-10-CM

## 2017-11-07 DIAGNOSIS — Q40.3 ABNORMAL GASTRIC FOLDS: ICD-10-CM

## 2017-11-07 DIAGNOSIS — I48.91 ATRIAL FIBRILLATION, UNSPECIFIED TYPE: ICD-10-CM

## 2017-11-07 DIAGNOSIS — I73.9 PERIPHERAL VASCULAR DISEASE: ICD-10-CM

## 2017-11-07 DIAGNOSIS — N18.30 BENIGN HYPERTENSIVE HEART AND CKD, STAGE 3 (GFR 30-59), W CHF: ICD-10-CM

## 2017-11-07 DIAGNOSIS — H20.9 IRITIS: ICD-10-CM

## 2017-11-07 DIAGNOSIS — E11.9 TYPE 2 DIABETES MELLITUS WITHOUT RETINOPATHY: ICD-10-CM

## 2017-11-07 DIAGNOSIS — I48.0 PAROXYSMAL ATRIAL FIBRILLATION: ICD-10-CM

## 2017-11-07 DIAGNOSIS — R10.13 EPIGASTRIC ABDOMINAL PAIN: ICD-10-CM

## 2017-11-07 DIAGNOSIS — Z96.1 PSEUDOPHAKIA: ICD-10-CM

## 2017-11-07 DIAGNOSIS — J81.1 CHRONIC PULMONARY EDEMA: ICD-10-CM

## 2017-11-07 DIAGNOSIS — H25.10 NUCLEAR SENILE CATARACT, UNSPECIFIED LATERALITY: ICD-10-CM

## 2017-11-07 DIAGNOSIS — E11.3299 NPDR (NONPROLIFERATIVE DIABETIC RETINOPATHY): ICD-10-CM

## 2017-11-07 DIAGNOSIS — R80.9 PROTEINURIA, UNSPECIFIED TYPE: ICD-10-CM

## 2017-11-07 DIAGNOSIS — K21.9 GASTROESOPHAGEAL REFLUX DISEASE, ESOPHAGITIS PRESENCE NOT SPECIFIED: ICD-10-CM

## 2017-11-07 DIAGNOSIS — R53.1 WEAKNESS: ICD-10-CM

## 2017-11-07 DIAGNOSIS — R06.02 SHORTNESS OF BREATH: ICD-10-CM

## 2017-11-07 DIAGNOSIS — H40.052 OCULAR HYPERTENSION OF LEFT EYE: ICD-10-CM

## 2017-11-07 DIAGNOSIS — F41.9 ANXIETY: ICD-10-CM

## 2017-11-07 DIAGNOSIS — R10.13 EPIGASTRIC PAIN: ICD-10-CM

## 2017-11-07 PROCEDURE — 99213 OFFICE O/P EST LOW 20 MIN: CPT | Mod: PBBFAC,PO | Performed by: FAMILY MEDICINE

## 2017-11-07 PROCEDURE — 93279 PRGRMG DEV EVAL PM/LDLS PM: CPT | Mod: PBBFAC | Performed by: INTERNAL MEDICINE

## 2017-11-07 PROCEDURE — 99999 PR PBB SHADOW E&M-EST. PATIENT-LVL III: CPT | Mod: PBBFAC,,, | Performed by: FAMILY MEDICINE

## 2017-11-07 PROCEDURE — 99213 OFFICE O/P EST LOW 20 MIN: CPT | Mod: S$PBB,,, | Performed by: FAMILY MEDICINE

## 2017-11-07 RX ORDER — CARVEDILOL 25 MG/1
25 TABLET ORAL 2 TIMES DAILY
Qty: 180 TABLET | Refills: 3 | Status: ON HOLD | OUTPATIENT
Start: 2017-11-07 | End: 2018-05-26 | Stop reason: HOSPADM

## 2017-11-07 RX ORDER — FUROSEMIDE 20 MG/1
20 TABLET ORAL 2 TIMES DAILY
Qty: 60 TABLET | Refills: 11 | Status: SHIPPED | OUTPATIENT
Start: 2017-11-07 | End: 2017-11-29 | Stop reason: SDUPTHER

## 2017-11-07 NOTE — PROGRESS NOTES
Subjective:       Patient ID: Deepali Stuart is a 85 y.o. female.    Chief Complaint: Follow-up (Er visit)    HPI 85-year-old -American female presents to clinic today accompanied by her daughter secondary to emergency room follow-up secondary to complaints of shortness of breath and weakness.  The patient has chronic diastolic heart failure with atrial fibrillation and frequently reports shortness of breath.  When she presented to the emergency room she noted shortness of breath worsening over the past 2 days with worsening shortness of breath with exertion.  She also continues to complain of epigastric discomfort and decreased appetite for which she has seen GI in the past.  Workup revealed an elevated BNP of 1490.  Chest x-ray revealed bilateral pleural effusions left greater than right.  The patient was given Lasix in the emergency room and reported improvement of symptoms after Lasix.  She was discharged with an increased dose of Lasix.  She returns today reporting continued symptoms.  She has not seen her cardiologist in approximately one year.  Review of Systems   Constitutional: Positive for fatigue. Negative for appetite change, chills and fever.   HENT: Negative for congestion, ear pain, hearing loss, postnasal drip, rhinorrhea, sinus pressure, sore throat and tinnitus.    Eyes: Negative for redness, itching and visual disturbance.   Respiratory: Positive for shortness of breath. Negative for cough and chest tightness.    Cardiovascular: Negative for chest pain and palpitations.   Gastrointestinal: Positive for abdominal pain. Negative for constipation, diarrhea, nausea and vomiting.   Genitourinary: Negative for decreased urine volume, difficulty urinating, dysuria, frequency, hematuria and urgency.   Musculoskeletal: Negative for back pain, myalgias, neck pain and neck stiffness.   Skin: Negative for rash.   Neurological: Positive for weakness. Negative for dizziness, light-headedness and  headaches.   Psychiatric/Behavioral: Negative.        Objective:      Physical Exam   Constitutional: She is oriented to person, place, and time. She appears well-developed and well-nourished. No distress.   HENT:   Head: Normocephalic and atraumatic.   Right Ear: External ear normal.   Left Ear: External ear normal.   Nose: Nose normal.   Mouth/Throat: Oropharynx is clear and moist. No oropharyngeal exudate.   Eyes: Conjunctivae and EOM are normal. Pupils are equal, round, and reactive to light. Right eye exhibits no discharge. Left eye exhibits no discharge. No scleral icterus.   Neck: Normal range of motion. Neck supple. No JVD present. No tracheal deviation present. No thyromegaly present.   Cardiovascular: Normal rate, normal heart sounds and intact distal pulses.  An irregularly irregular rhythm present. Exam reveals no gallop and no friction rub.    No murmur heard.  Pulmonary/Chest: Effort normal. No stridor. No respiratory distress. She has no wheezes. She has rales in the right lower field and the left lower field.   Abdominal: Soft. Bowel sounds are normal. She exhibits no distension and no mass. There is no tenderness. There is no rebound and no guarding.   Musculoskeletal: Normal range of motion. She exhibits no edema or tenderness.   Lymphadenopathy:     She has no cervical adenopathy.   Neurological: She is alert and oriented to person, place, and time.   Skin: Skin is warm and dry. No rash noted. She is not diaphoretic. No erythema. No pallor.   Psychiatric: She has a normal mood and affect. Her behavior is normal. Judgment and thought content normal.   Nursing note and vitals reviewed.      Assessment:       1. Follow up    2. Chronic pulmonary edema    3. Acute on chronic diastolic heart failure    4. Chronic atrial fibrillation    5. Persistent atrial fibrillation        Plan:       1.  I recommend increasing carvedilol to 25 mg twice a day secondary to continued hypertension.  2.  Secondary to  continued pulmonary edema I recommend increasing Lasix to 20 mg twice a day.  3.  Encourage follow-up with cardiology as soon as possible.  4.  Return to clinic as needed if symptoms persist or worsen or in 2 months for annual physical exam.

## 2017-11-09 RX ORDER — SPIRONOLACTONE 25 MG/1
TABLET ORAL
Qty: 30 TABLET | Refills: 0 | OUTPATIENT
Start: 2017-11-09

## 2017-11-13 ENCOUNTER — ANTI-COAG VISIT (OUTPATIENT)
Dept: CARDIOLOGY | Facility: CLINIC | Age: 82
End: 2017-11-13

## 2017-11-13 LAB — INR PPP: 2.6

## 2017-11-20 ENCOUNTER — ANTI-COAG VISIT (OUTPATIENT)
Dept: CARDIOLOGY | Facility: CLINIC | Age: 82
End: 2017-11-20

## 2017-11-20 DIAGNOSIS — Z79.01 LONG TERM CURRENT USE OF ANTICOAGULANT THERAPY: ICD-10-CM

## 2017-11-20 LAB — INR PPP: 2.8

## 2017-11-21 ENCOUNTER — TELEPHONE (OUTPATIENT)
Dept: INTERNAL MEDICINE | Facility: CLINIC | Age: 82
End: 2017-11-21

## 2017-11-21 NOTE — TELEPHONE ENCOUNTER
Faxed request received from [facility]. Faxed over last office visit notes as requested for recertifiction

## 2017-11-27 ENCOUNTER — TELEPHONE (OUTPATIENT)
Dept: INTERNAL MEDICINE | Facility: CLINIC | Age: 82
End: 2017-11-27

## 2017-11-27 DIAGNOSIS — I48.20 CHRONIC ATRIAL FIBRILLATION: ICD-10-CM

## 2017-11-27 DIAGNOSIS — E55.9 VITAMIN D DEFICIENCY DISEASE: ICD-10-CM

## 2017-11-27 DIAGNOSIS — H40.052 OCULAR HYPERTENSION OF LEFT EYE: ICD-10-CM

## 2017-11-27 DIAGNOSIS — N17.0 ACUTE RENAL FAILURE WITH TUBULAR NECROSIS: ICD-10-CM

## 2017-11-27 DIAGNOSIS — E11.9 TYPE 2 DIABETES MELLITUS WITHOUT RETINOPATHY: ICD-10-CM

## 2017-11-27 DIAGNOSIS — H25.12 NUCLEAR SCLEROSIS OF LEFT EYE: ICD-10-CM

## 2017-11-27 DIAGNOSIS — R10.13 EPIGASTRIC PAIN: ICD-10-CM

## 2017-11-27 DIAGNOSIS — E87.6 HYPOKALEMIA: ICD-10-CM

## 2017-11-27 DIAGNOSIS — R10.13 EPIGASTRIC ABDOMINAL PAIN: ICD-10-CM

## 2017-11-27 DIAGNOSIS — Z95.0 PACEMAKER: ICD-10-CM

## 2017-11-27 DIAGNOSIS — E83.42 HYPOMAGNESEMIA: ICD-10-CM

## 2017-11-27 DIAGNOSIS — R53.1 GENERALIZED WEAKNESS: ICD-10-CM

## 2017-11-27 DIAGNOSIS — F41.9 ANXIETY: ICD-10-CM

## 2017-11-27 DIAGNOSIS — I50.31 ACUTE DIASTOLIC CHF (CONGESTIVE HEART FAILURE): ICD-10-CM

## 2017-11-27 DIAGNOSIS — H26.9 CORTICAL CATARACT OF LEFT EYE: ICD-10-CM

## 2017-11-27 DIAGNOSIS — H20.9 IRITIS: ICD-10-CM

## 2017-11-27 DIAGNOSIS — E03.4 HYPOTHYROIDISM DUE TO ACQUIRED ATROPHY OF THYROID: ICD-10-CM

## 2017-11-27 DIAGNOSIS — E11.3299 NPDR (NONPROLIFERATIVE DIABETIC RETINOPATHY): ICD-10-CM

## 2017-11-27 DIAGNOSIS — Z79.01 LONG TERM CURRENT USE OF ANTICOAGULANT THERAPY: ICD-10-CM

## 2017-11-27 DIAGNOSIS — E11.21 CONTROLLED TYPE 2 DIABETES MELLITUS WITH DIABETIC NEPHROPATHY, WITHOUT LONG-TERM CURRENT USE OF INSULIN: ICD-10-CM

## 2017-11-27 DIAGNOSIS — I73.9 PERIPHERAL VASCULAR DISEASE: ICD-10-CM

## 2017-11-27 DIAGNOSIS — N18.30 CKD (CHRONIC KIDNEY DISEASE) STAGE 3, GFR 30-59 ML/MIN: ICD-10-CM

## 2017-11-27 DIAGNOSIS — R53.1 WEAKNESS: ICD-10-CM

## 2017-11-27 DIAGNOSIS — H52.7 REFRACTIVE ERROR: ICD-10-CM

## 2017-11-27 DIAGNOSIS — Q40.3 ABNORMAL GASTRIC FOLDS: Primary | ICD-10-CM

## 2017-11-27 DIAGNOSIS — I48.19 PERSISTENT ATRIAL FIBRILLATION: ICD-10-CM

## 2017-11-27 DIAGNOSIS — Z90.49 S/P PARTIAL COLECTOMY: ICD-10-CM

## 2017-11-27 DIAGNOSIS — Z96.1 PSEUDOPHAKIA: ICD-10-CM

## 2017-11-27 DIAGNOSIS — R06.02 SHORTNESS OF BREATH: ICD-10-CM

## 2017-11-27 DIAGNOSIS — H25.10 NUCLEAR SENILE CATARACT, UNSPECIFIED LATERALITY: ICD-10-CM

## 2017-11-27 DIAGNOSIS — K21.9 GASTROESOPHAGEAL REFLUX DISEASE, ESOPHAGITIS PRESENCE NOT SPECIFIED: ICD-10-CM

## 2017-11-27 DIAGNOSIS — I13.0 BENIGN HYPERTENSIVE HEART AND CKD, STAGE 3 (GFR 30-59), W CHF: ICD-10-CM

## 2017-11-27 DIAGNOSIS — I10 ESSENTIAL HYPERTENSION: ICD-10-CM

## 2017-11-27 DIAGNOSIS — J81.1 CHRONIC PULMONARY EDEMA: ICD-10-CM

## 2017-11-27 DIAGNOSIS — R80.9 PROTEINURIA, UNSPECIFIED TYPE: ICD-10-CM

## 2017-11-27 DIAGNOSIS — I50.33 ACUTE ON CHRONIC DIASTOLIC HEART FAILURE: ICD-10-CM

## 2017-11-27 DIAGNOSIS — H35.033 HYPERTENSIVE RETINOPATHY OF BOTH EYES: ICD-10-CM

## 2017-11-27 DIAGNOSIS — N18.30 BENIGN HYPERTENSIVE HEART AND CKD, STAGE 3 (GFR 30-59), W CHF: ICD-10-CM

## 2017-11-29 ENCOUNTER — TELEPHONE (OUTPATIENT)
Dept: NEPHROLOGY | Facility: CLINIC | Age: 82
End: 2017-11-29

## 2017-11-29 ENCOUNTER — OFFICE VISIT (OUTPATIENT)
Dept: CARDIOLOGY | Facility: CLINIC | Age: 82
End: 2017-11-29
Payer: MEDICARE

## 2017-11-29 VITALS
DIASTOLIC BLOOD PRESSURE: 77 MMHG | SYSTOLIC BLOOD PRESSURE: 159 MMHG | HEART RATE: 65 BPM | WEIGHT: 140.63 LBS | HEIGHT: 62 IN | BODY MASS INDEX: 25.88 KG/M2

## 2017-11-29 DIAGNOSIS — I10 ESSENTIAL HYPERTENSION: Primary | ICD-10-CM

## 2017-11-29 DIAGNOSIS — I50.32 CHRONIC DIASTOLIC CHF (CONGESTIVE HEART FAILURE), NYHA CLASS 3: ICD-10-CM

## 2017-11-29 DIAGNOSIS — I48.19 PERSISTENT ATRIAL FIBRILLATION: ICD-10-CM

## 2017-11-29 DIAGNOSIS — R06.02 SHORTNESS OF BREATH: ICD-10-CM

## 2017-11-29 PROCEDURE — 99214 OFFICE O/P EST MOD 30 MIN: CPT | Mod: PBBFAC | Performed by: INTERNAL MEDICINE

## 2017-11-29 PROCEDURE — 99999 PR PBB SHADOW E&M-EST. PATIENT-LVL IV: CPT | Mod: PBBFAC,GC,, | Performed by: INTERNAL MEDICINE

## 2017-11-29 PROCEDURE — 99214 OFFICE O/P EST MOD 30 MIN: CPT | Mod: S$PBB,GC,, | Performed by: INTERNAL MEDICINE

## 2017-11-29 RX ORDER — FUROSEMIDE 20 MG/1
40 TABLET ORAL DAILY
Qty: 60 TABLET | Refills: 11 | Status: SHIPPED | OUTPATIENT
Start: 2017-11-29 | End: 2018-04-17 | Stop reason: ALTCHOICE

## 2017-11-29 NOTE — PROGRESS NOTES
Cardiology Clinic Note  Reason for Visit: Re-establish Care    HPI:   Ms. Stuart is an 85 year old female with a history of afib, SSS s/p [place] PM 9/12, diastolic HF, HTN, and DM who presents for follow up.      She has been doing well and has kept a blood pressure diary.  Her BP has been running mostly in the 120s-130s.  She has dyspnea on exertion and her lasix was recently increased to 20 mg BID.  She can walk the length of a long hallway with no symptoms, but if she tries to go farther, she develops dyspnea.  She does not feel particularly limited by the symptoms and doesn't feel they have changed.  She notes some improvement since the lasix was increased.    She denies syncope, lightheadedness, orthopnea, PND, nausea or vomiting.    ROS:    Constitution: Negative for fever or chills. Negative for weight loss or gain.   HENT: Negative for sore throat or headaches. Negative for rhinorrhea.  Eyes: Negative for blurred or double vision.   Cardiovascular: See above  Pulmonary: Negative for SOB. Negative for cough.   Gastrointestinal: Negative for abdominal pain. Negative for nausea/ vomiting. Negative for diarrhea.   : Negative for dysuria.   Neurological: Negative for focal weakness or sensory changes.  PMH:     Past Medical History:   Diagnosis Date    Anxiety     Atrial fibrillation     chronic AF    CHF (congestive heart failure)     Diabetes insipidus     Diabetes mellitus type II     GERD (gastroesophageal reflux disease)     Hypertension     Iritis 12/10/2014    NPDR (nonproliferative diabetic retinopathy) 10/27/2014    PAF (paroxysmal atrial fibrillation)     Palpitations      Past Surgical History:   Procedure Laterality Date    CATARACT EXTRACTION W/  INTRAOCULAR LENS IMPLANT Right n/a    OD over 10 years ago     CATARACT EXTRACTION W/  INTRAOCULAR LENS IMPLANT Left 11/11/14    OS ()    COLON SURGERY      Partial colectomy secondary to suspicious polyp    HYSTERECTOMY       INSERT / REPLACE / REMOVE PACEMAKER      SC PM 2012 [email or web address]    LASER ABLATION      3 years ago OS     Allergies:     Allergies   Allergen Reactions    Plavix [Clopidogrel]      Medications:     Current Outpatient Prescriptions on File Prior to Visit   Medication Sig Dispense Refill    amlodipine (NORVASC) 5 MG tablet TAKE 1 TABLET BY MOUTH DAILY 90 tablet 3    atorvastatin (LIPITOR) 10 MG tablet Take 1 tablet (10 mg total) by mouth once daily. 90 tablet 3    blood sugar diagnostic (TRUETRACK TEST) Strp FOLLOW PACKAGE DIRECTIONS (Patient taking differently: 1 strip by Misc.(Non-Drug; Combo Route) route 2 (two) times daily. FOLLOW PACKAGE DIRECTIONS) 100 strip 11    carvedilol (COREG) 25 MG tablet Take 1 tablet (25 mg total) by mouth 2 (two) times daily. 180 tablet 3    lancets Misc 1 Device by Misc.(Non-Drug; Combo Route) route once daily. (Patient taking differently: 1 Device by Misc.(Non-Drug; Combo Route) route 2 (two) times daily. ) 100 each 3    levothyroxine (SYNTHROID) 50 MCG tablet Take 1 tablet (50 mcg total) by mouth once daily. 90 tablet 3    lisinopril (PRINIVIL,ZESTRIL) 20 MG tablet Take 1 tablet (20 mg total) by mouth once daily. 90 tablet 0    lorazepam (ATIVAN) 0.5 MG tablet Take 1 tablet (0.5 mg total) by mouth every 8 (eight) hours as needed for Anxiety. 20 tablet 0    meclizine (ANTIVERT) 25 mg tablet TAKE 2 TABLETS BY MOUTH THREE TIMES DAILY AS NEEDED FOR DIZZINESS 60 tablet 11    mupirocin (BACTROBAN) 2 % ointment Apply topically 3 (three) times daily. 22 g 1    ondansetron (ZOFRAN-ODT) 4 MG TbDL DISSOLVE ONE TABLET BY MOUTH EVERY 6 HOURS AS NEEDED FOR NAUSEA AND VOMITING 21 tablet 6    ranitidine (ZANTAC) 300 MG tablet Take 1 tablet (300 mg total) by mouth every evening. 30 tablet 11    trazodone (DESYREL) 50 MG tablet TAKE 1/2 TO 1 TABLET BY MOUTH EVERY EVENING AS NEEDED FOR INSOMNIA 30 tablet 11    warfarin (COUMADIN) 5 MG tablet TAKE 1 TABLET BY MOUTH EVERY DAY OR AS  "DIRECTED BY COUMADIN CLINIC (Patient taking differently: TAKE 1/2 TABLET BY MOUTH EVERY DAY IN THE EVENING OR AS DIRECTED BY COUMADIN CLINIC) 45 tablet 11    [DISCONTINUED] furosemide (LASIX) 20 MG tablet Take 1 tablet (20 mg total) by mouth 2 (two) times daily. 60 tablet 11     No current facility-administered medications on file prior to visit.      Social History:     Social History   Substance Use Topics    Smoking status: Never Smoker    Smokeless tobacco: Never Used    Alcohol use No     Family History:     Family History   Problem Relation Age of Onset    No Known Problems Mother     No Known Problems Father     No Known Problems Sister     No Known Problems Brother     No Known Problems Maternal Aunt     No Known Problems Maternal Uncle     No Known Problems Paternal Aunt     No Known Problems Paternal Uncle     No Known Problems Maternal Grandmother     No Known Problems Maternal Grandfather     No Known Problems Paternal Grandmother     No Known Problems Paternal Grandfather     Amblyopia Neg Hx     Blindness Neg Hx     Cancer Neg Hx     Cataracts Neg Hx     Diabetes Neg Hx     Glaucoma Neg Hx     Hypertension Neg Hx     Macular degeneration Neg Hx     Retinal detachment Neg Hx     Strabismus Neg Hx     Stroke Neg Hx     Thyroid disease Neg Hx      Physical Exam:     BP (!) 159/77 (BP Location: Left arm, Patient Position: Sitting, BP Method: Large (Automatic))   Pulse 65   Ht 5' 2" (1.575 m)   Wt 63.8 kg (140 lb 10.5 oz)   LMP  (LMP Unknown)   BMI 25.73 kg/m²      Constitutional: No acute distress, conversant  HEENT: Sclera anicteric, Pupils equal, round and reactive to light, extraocular motions intact, Oropharynx clear  Neck: No JVD, no carotid bruits  Cardiovascular: irregularly irregular, 2/6 systolic murmur heard best at LUSB, no rubs or gallops, normal S1/S2  Pulmonary: Clear to auscultation bilaterally  Abdominal: Abdomen soft, nontender, nondistended, positive bowel " sounds  Extremities: 1+ nasreen lower extremity edema, warm with palpable pulses  Skin: No ecchymosis, erythema, or ulcers  Psych: Alert and oriented, appropriate affect  Neuro: CNII-XII intact, no focal deficits    Labs:     Lab Results   Component Value Date     10/13/2017    K 3.7 10/13/2017     10/13/2017    CO2 21 (L) 10/13/2017    BUN 28 (H) 10/13/2017    CREATININE 1.9 (H) 10/13/2017    ANIONGAP 10 10/13/2017     Lab Results   Component Value Date    AST 49 (H) 10/13/2017    ALT 25 10/13/2017    ALKPHOS 70 10/13/2017    BILITOT 0.8 10/13/2017    ALBUMIN 2.9 (L) 10/13/2017     Lab Results   Component Value Date    CALCIUM 8.8 10/13/2017    MG 1.8 04/14/2017    PHOS 3.1 04/10/2017     Lab Results   Component Value Date    BNP 1,490 (H) 10/13/2017     (H) 04/18/2017     (H) 04/14/2017    Lab Results   Component Value Date    WBC 6.53 10/13/2017    HGB 11.7 (L) 10/13/2017    HCT 34.4 (L) 10/13/2017     10/13/2017    GRAN 4.4 10/13/2017    GRAN 67.9 10/13/2017     Lab Results   Component Value Date    INR 2.8 11/20/2017     Lab Results   Component Value Date    CHOL 217 (H) 12/28/2016    HDL 67 12/28/2016    LDLCALC 131.4 12/28/2016    TRIG 93 12/28/2016     Lab Results   Component Value Date    HGBA1C 5.6 12/28/2016     Lab Results   Component Value Date    TSH 1.613 07/10/2017          Imaging:   ECHO 8/15:    CONCLUSIONS     1 - Normal left ventricular systolic function (EF 60-65%).     2 - Concentric hypertrophy.     3 - Normal right ventricular systolic function .     4 - Biatrial enlargement.     5 - Trivial aortic stenosis, XIMENA = 2.14 cm2, peak velocity = 1.6 m/s, mean gradient = 5.0 mmHg.     6 - Mild mitral regurgitation.     7 - Mild tricuspid regurgitation.     8 - Trivial pulmonic regurgitation.     9 - The estimated PA systolic pressure is 38 mmHg.     EF   Date Value Ref Range Status   03/08/2017 66 55 - 65    08/27/2015 63 55 - 65    05/29/2013 25       Assessment:   In  summary, Ms. Stuart is an 85 year old female with a history of afib, SSS s/p [place] PM 9/12, diastolic HF, HTN, and DM who presents for follow up.     Plan:   #Chronic atrial fibrillation: Follows with Dr. Main in EP.  Rate controlled with carvedilol and on coumadin.  Follows in coumadin clinic.    #HTN: Elevated today, but keeps BP diary and most readings are between 120 and 130.  Will continue current medications.    #Diastolic heart failure: Does not appear markedly overloaded on exam.  Lasix recently increased to BID.  She reports frequent urination overnight so will change lasix to 40 mg every morning.  Can continue to titrate if necessary.  Weight stable from 3 weeks ago.    RTC in 3 months.    Signed:  Paulette Oseguera MD, MPH  Cardiology Fellow, PGY-5  Pager: [phone]  11/29/2017 1:06 PM    Follow-up:     Future Appointments  Date Time Provider Department Center   12/14/2017 10:00 AM Camden Pruitt DO [facility] NEPHRO [facility]   1/4/2018 9:00 AM LAB, [facility] [place]   1/4/2018 9:15 AM SPECIMEN, [facility]CLAB [place]   1/8/2018 9:00 AM Vishal Salazar MD [facility] IM [place]   2/8/2018 8:00 AM HOME MONITOR DEVICE CHECK, [facility] ARRHYTH [facility]   2/19/2018 10:00 AM Esteban Javier DPM [facility] POD [place]   2/28/2018 9:20 AM Paulette Oseguera MD [facility] CARDIO [facility]

## 2017-11-30 NOTE — PROGRESS NOTES
I have personally taken the history and examined this patient and agree with the Fellow's note as stated above.    I have encouraged more walking inside her home.  No change in meds warranted.

## 2017-12-04 ENCOUNTER — ANTI-COAG VISIT (OUTPATIENT)
Dept: CARDIOLOGY | Facility: CLINIC | Age: 82
End: 2017-12-04

## 2017-12-04 DIAGNOSIS — F41.9 ANXIETY: ICD-10-CM

## 2017-12-04 LAB — INR PPP: 3.2

## 2017-12-04 RX ORDER — LORAZEPAM 0.5 MG/1
0.5 TABLET ORAL EVERY 8 HOURS PRN
Qty: 30 TABLET | Refills: 0 | Status: ON HOLD | OUTPATIENT
Start: 2017-12-04 | End: 2018-05-25 | Stop reason: HOSPADM

## 2017-12-04 NOTE — TELEPHONE ENCOUNTER
"----- Message from Jackelyn Pierce sent at 2017  2:41 PM CST -----  Contact: Home Health Nurse Juan [phone]  RX request - refill or new RX.  Is this a refill or new RX:  Refill  RX name and strength: lorazepam (ATIVAN) 0.5 MG tablet ()  Directions:   Is this a 30 day or 90 day RX:    Pharmacy name and phone # (DON'T enter "on file" or "in chart"): [facility] [address] [place] [address] AT SEC of [address] [phone] (Phone)  [phone] (Fax)  Comments:        "

## 2017-12-04 NOTE — TELEPHONE ENCOUNTER
Spoke with Afia re: RX for Ativan (generic) @ [phone]. Lorazepam 0.5mg #30 no additional refills

## 2017-12-07 ENCOUNTER — TELEPHONE (OUTPATIENT)
Dept: INTERNAL MEDICINE | Facility: CLINIC | Age: 82
End: 2017-12-07

## 2017-12-07 NOTE — TELEPHONE ENCOUNTER
LMOR for Lydwina with [facility]. I called rx in on 12-4-17 and spoke with Afia. If she has any other questions, give me a call back at the office

## 2017-12-07 NOTE — TELEPHONE ENCOUNTER
----- Message from Ynes Khan sent at 12/7/2017 12:29 PM CST -----  Contact: [facility] [place] [facility]nader/[phone]  [facility] called in regards to checking the status of a Rx for LORazepam (ATIVAN) 0.5 MG tablet. This was given to the pt by the er  but she would like dr lancaster to sent in a Rx. She says it really help with her anxiety as needed.      [facility] pharmacy  Please advise

## 2017-12-18 ENCOUNTER — ANTI-COAG VISIT (OUTPATIENT)
Dept: CARDIOLOGY | Facility: CLINIC | Age: 82
End: 2017-12-18

## 2017-12-18 DIAGNOSIS — Z79.01 LONG TERM CURRENT USE OF ANTICOAGULANT THERAPY: ICD-10-CM

## 2017-12-18 LAB — INR PPP: 3.9

## 2017-12-18 NOTE — PROGRESS NOTES
Verbal result taken from [facility]_________. PT/INR _3.9______ Date drawn_12/18/17_______ Hardcopy to be faxed.

## 2018-01-02 ENCOUNTER — ANTI-COAG VISIT (OUTPATIENT)
Dept: CARDIOLOGY | Facility: CLINIC | Age: 83
End: 2018-01-02

## 2018-01-02 DIAGNOSIS — Z79.01 LONG TERM CURRENT USE OF ANTICOAGULANT THERAPY: ICD-10-CM

## 2018-01-02 LAB — INR PPP: 5.2

## 2018-01-03 ENCOUNTER — HOSPITAL ENCOUNTER (EMERGENCY)
Facility: OTHER | Age: 83
Discharge: HOME | End: 2018-01-03
Attending: EMERGENCY MEDICINE
Payer: MEDICARE

## 2018-01-03 VITALS
BODY MASS INDEX: 26.68 KG/M2 | RESPIRATION RATE: 20 BRPM | DIASTOLIC BLOOD PRESSURE: 89 MMHG | TEMPERATURE: 99 F | WEIGHT: 145 LBS | SYSTOLIC BLOOD PRESSURE: 179 MMHG | OXYGEN SATURATION: 93 % | HEART RATE: 80 BPM | HEIGHT: 62 IN

## 2018-01-03 DIAGNOSIS — Z79.01 ANTICOAGULATED: ICD-10-CM

## 2018-01-03 DIAGNOSIS — R06.02 SOB (SHORTNESS OF BREATH): Primary | ICD-10-CM

## 2018-01-03 DIAGNOSIS — J90 PLEURAL EFFUSION: ICD-10-CM

## 2018-01-03 LAB
ALBUMIN SERPL BCP-MCNC: 2.4 G/DL
ALP SERPL-CCNC: 70 U/L
ALT SERPL W/O P-5'-P-CCNC: 20 U/L
ANION GAP SERPL CALC-SCNC: 7 MMOL/L
APTT BLDCRRT: 44.1 SEC
AST SERPL-CCNC: 36 U/L
BASOPHILS # BLD AUTO: 0.03 K/UL
BASOPHILS NFR BLD: 0.4 %
BILIRUB SERPL-MCNC: 0.7 MG/DL
BNP SERPL-MCNC: 1247 PG/ML
BUN SERPL-MCNC: 20 MG/DL
CALCIUM SERPL-MCNC: 8.5 MG/DL
CHLORIDE SERPL-SCNC: 104 MMOL/L
CO2 SERPL-SCNC: 26 MMOL/L
CREAT SERPL-MCNC: 1.8 MG/DL
DIFFERENTIAL METHOD: ABNORMAL
EOSINOPHIL # BLD AUTO: 0.1 K/UL
EOSINOPHIL NFR BLD: 1.2 %
ERYTHROCYTE [DISTWIDTH] IN BLOOD BY AUTOMATED COUNT: 13.6 %
EST. GFR  (AFRICAN AMERICAN): 29 ML/MIN/1.73 M^2
EST. GFR  (NON AFRICAN AMERICAN): 25 ML/MIN/1.73 M^2
GLUCOSE SERPL-MCNC: 157 MG/DL
HCT VFR BLD AUTO: 34.7 %
HGB BLD-MCNC: 11.7 G/DL
INR PPP: 3.1
LYMPHOCYTES # BLD AUTO: 1.3 K/UL
LYMPHOCYTES NFR BLD: 17.4 %
MCH RBC QN AUTO: 31.2 PG
MCHC RBC AUTO-ENTMCNC: 33.7 G/DL
MCV RBC AUTO: 93 FL
MONOCYTES # BLD AUTO: 0.4 K/UL
MONOCYTES NFR BLD: 6 %
NEUTROPHILS # BLD AUTO: 5.4 K/UL
NEUTROPHILS NFR BLD: 74.7 %
PLATELET # BLD AUTO: 242 K/UL
PMV BLD AUTO: 9 FL
POTASSIUM SERPL-SCNC: 3.9 MMOL/L
PROT SERPL-MCNC: 6.4 G/DL
PROTHROMBIN TIME: 32.8 SEC
RBC # BLD AUTO: 3.75 M/UL
SODIUM SERPL-SCNC: 137 MMOL/L
WBC # BLD AUTO: 7.28 K/UL

## 2018-01-03 PROCEDURE — 93010 ELECTROCARDIOGRAM REPORT: CPT | Mod: ,,, | Performed by: INTERNAL MEDICINE

## 2018-01-03 PROCEDURE — 83880 ASSAY OF NATRIURETIC PEPTIDE: CPT

## 2018-01-03 PROCEDURE — 85730 THROMBOPLASTIN TIME PARTIAL: CPT

## 2018-01-03 PROCEDURE — 80053 COMPREHEN METABOLIC PANEL: CPT

## 2018-01-03 PROCEDURE — 85610 PROTHROMBIN TIME: CPT

## 2018-01-03 PROCEDURE — 99284 EMERGENCY DEPT VISIT MOD MDM: CPT | Mod: 25

## 2018-01-03 PROCEDURE — 85025 COMPLETE CBC W/AUTO DIFF WBC: CPT

## 2018-01-03 PROCEDURE — 93005 ELECTROCARDIOGRAM TRACING: CPT

## 2018-01-03 RX ORDER — BUTALBITAL, ACETAMINOPHEN AND CAFFEINE 50; 325; 40 MG/1; MG/1; MG/1
1 TABLET ORAL
Status: DISCONTINUED | OUTPATIENT
Start: 2018-01-03 | End: 2018-01-03

## 2018-01-03 NOTE — ED PROVIDER NOTES
Encounter Date: 1/3/2018    SCRIBE #1 NOTE: I, Paulina Winchester, am scribing for, and in the presence of, Dr. Naranjo.       History     Chief Complaint   Patient presents with    Shortness of Breath     SOB and dry cough x 1 wk. denies any fever or edema.      Time seen by provider: 1:59 PM    This is a 85 y.o. female, with HTN, DM, GERD, Afib, and CHF, who presents with complaint of SOB that worsened gradually one week ago. She experiences SOB with exertion, but not at rest. She reports rhinorrhea, non productive cough, and occaisional chills, but denies fever, diaphoresis, congestion, chest pain, leg swelling, abdominal pain, nausea, vomiting, myalgias, headache, or light headedness. She took her blood pressure medication this morning.      The history is provided by the patient.     Review of patient's allergies indicates:   Allergen Reactions    Plavix [clopidogrel]      Past Medical History:   Diagnosis Date    Anxiety     Atrial fibrillation     chronic AF    CHF (congestive heart failure)     Diabetes insipidus     Diabetes mellitus type II     GERD (gastroesophageal reflux disease)     Hypertension     Iritis 12/10/2014    NPDR (nonproliferative diabetic retinopathy) 10/27/2014    PAF (paroxysmal atrial fibrillation)     Palpitations      Past Surgical History:   Procedure Laterality Date    CATARACT EXTRACTION W/  INTRAOCULAR LENS IMPLANT Right n/a    OD over 10 years ago     CATARACT EXTRACTION W/  INTRAOCULAR LENS IMPLANT Left 11/11/14    OS ()    COLON SURGERY      Partial colectomy secondary to suspicious polyp    HYSTERECTOMY      INSERT / REPLACE / REMOVE PACEMAKER      SC PM 2012 [email or web address]    LASER ABLATION      3 years ago OS     Family History   Problem Relation Age of Onset    No Known Problems Mother     No Known Problems Father     No Known Problems Sister     No Known Problems Brother     No Known Problems Maternal Aunt     No Known Problems Maternal Uncle      No Known Problems Paternal Aunt     No Known Problems Paternal Uncle     No Known Problems Maternal Grandmother     No Known Problems Maternal Grandfather     No Known Problems Paternal Grandmother     No Known Problems Paternal Grandfather     Amblyopia Neg Hx     Blindness Neg Hx     Cancer Neg Hx     Cataracts Neg Hx     Diabetes Neg Hx     Glaucoma Neg Hx     Hypertension Neg Hx     Macular degeneration Neg Hx     Retinal detachment Neg Hx     Strabismus Neg Hx     Stroke Neg Hx     Thyroid disease Neg Hx      Social History   Substance Use Topics    Smoking status: Never Smoker    Smokeless tobacco: Never Used    Alcohol use No     Review of Systems   Constitutional: Positive for chills. Negative for diaphoresis and fever.   HENT: Positive for rhinorrhea. Negative for congestion and sore throat.    Eyes: Negative for visual disturbance.   Respiratory: Positive for cough and shortness of breath.    Cardiovascular: Negative for chest pain, palpitations and leg swelling.   Gastrointestinal: Negative for abdominal pain, diarrhea, nausea and vomiting.   Genitourinary: Negative for decreased urine volume, dysuria and vaginal discharge.   Musculoskeletal: Negative for joint swelling, myalgias, neck pain and neck stiffness.   Skin: Negative for rash and wound.   Neurological: Negative for weakness, light-headedness, numbness and headaches.   Psychiatric/Behavioral: Negative for confusion.       Physical Exam     Initial Vitals   BP Pulse Resp Temp SpO2   01/03/18 1239 01/03/18 1236 01/03/18 1236 01/03/18 1236 01/03/18 1236   (!) 175/79 71 18 98.6 °F (37 °C) 98 %      MAP       01/03/18 1239       111             Physical Exam    Nursing note and vitals reviewed.  Constitutional: She appears well-developed and well-nourished. She is not diaphoretic. No distress.   HENT:   Head: Normocephalic and atraumatic.   Nose: Nose normal.   Mouth/Throat: Oropharynx is clear and moist.   Eyes: Conjunctivae and  EOM are normal. Pupils are equal, round, and reactive to light.   Neck: Normal range of motion. Neck supple.   Cardiovascular: Normal rate and normal heart sounds. An irregularly irregular rhythm present. Exam reveals no gallop and no friction rub.    No murmur heard.  Pulmonary/Chest: No respiratory distress. She has no wheezes. She has no rhonchi. She has no rales.   BS decreased bilateral bases.   Abdominal: Soft. Bowel sounds are normal. She exhibits no distension. There is no tenderness. There is no rebound and no guarding.   Musculoskeletal: Normal range of motion. She exhibits no edema or tenderness.   Neurological: She is alert and oriented to person, place, and time. No cranial nerve deficit or sensory deficit.   Skin: Skin is warm and dry. No rash noted. No pallor.   Psychiatric: She has a normal mood and affect. Her behavior is normal.         ED Course   Procedures  Labs Reviewed   CBC W/ AUTO DIFFERENTIAL - Abnormal; Notable for the following:        Result Value    RBC 3.75 (*)     Hemoglobin 11.7 (*)     Hematocrit 34.7 (*)     MCH 31.2 (*)     MPV 9.0 (*)     Gran% 74.7 (*)     Lymph% 17.4 (*)     All other components within normal limits   COMPREHENSIVE METABOLIC PANEL - Abnormal; Notable for the following:     Glucose 157 (*)     Creatinine 1.8 (*)     Calcium 8.5 (*)     Albumin 2.4 (*)     Anion Gap 7 (*)     eGFR if  29 (*)     eGFR if non  25 (*)     All other components within normal limits   B-TYPE NATRIURETIC PEPTIDE - Abnormal; Notable for the following:     BNP 1,247 (*)     All other components within normal limits   PROTIME-INR - Abnormal; Notable for the following:     Prothrombin Time 32.8 (*)     INR 3.1 (*)     All other components within normal limits   APTT - Abnormal; Notable for the following:     aPTT 44.1 (*)     All other components within normal limits   URINALYSIS      Imaging Results          X-Ray Chest PA And Lateral (Final result)  Result  "time 01/03/18 14:21:26    Final result by Liyah Hendrix MD (01/03/18 14:21:26)                 Impression:     Stable abnormal chest.      Electronically signed by: Liyah Hendrix MD  Date:     01/03/18  Time:    14:21              Narrative:    2 views.  Comparison is October 17.    Pacemaker overlies left mid chest.  There is a stable moderate left pleural effusion and small right pleural effusion.  Associated consolidation/atelectasis present at the left lung base.  Cardiac size is enlarged but stable.  Calcification present along the wall of the aorta.  Degenerative change and scoliosis noted within the spine.                              EKG Readings: (Independently Interpreted)   Initial Reading: No STEMI.   Atrial fibrillation with a rate of 86 bpm.       X-Rays:   Independently Interpreted Readings:   Chest X-Ray: Moderate to large sized left pleural effusion and small right sided pleural effusion present. No pneumothorax or infiltrates.     Medical Decision Making:   Clinical Tests:   Lab Tests: Ordered and Reviewed  Radiological Study: Ordered and Reviewed  Medical Tests: Ordered and Reviewed  ED Management:  Urgent evaluation of 85-year-old female with complaint of shortness of breath times one week.  She admits she has chronic shortness of breath but feels is been gradually worsening.  She reports some chills, and chronic runny nose, but no other URI type symptoms.  On exam she is in no distress with decreased breath sounds at the bases.  She was in atrial fibrillation but no RVR.  Vital signs reveal hypertension, which she has a history, afebrile.  She has normal room air pulse oximetry.  Labs reveal stable abnormalities, mild anemia, chronic renal insufficiency at baseline, elevated BNP at baseline.  Chest x-ray is stable from previous with a moderate pleural effusion, but no worse.  Urinalysis had yet to be collected when patient's son arrived and patient suddenly "has has to go right now."  She " stated that she did not want to wait to provide a urine sample, and that she was feeling much better and desired to leave.  I advised her that she should complete her workup, but I do not feel that she is unstable, intoxicated, unable to make medical decisions.  I allowed her to go.  Workup seems abnormal but at her baseline, and she was able to ambulate without difficulty with her son.  She was advised to return if she changed her mind or for any other reason, and to follow closely with her PCP.              Attending Attestation:           Physician Attestation for Scribe:  Physician Attestation Statement for Scribe #1: I, Dr. Naranjo, reviewed documentation, as scribed by Paulina Winchester in my presence, and it is both accurate and complete.                 ED Course      Clinical Impression:     1. SOB (shortness of breath)    2. Pleural effusion    3. Anticoagulated                                 Fabiana Naranjo MD  01/03/18 1984

## 2018-01-03 NOTE — ED TRIAGE NOTES
Pt presents to ER w/ reports of + dry, non productive cough x 1 week. + increased SOB and WOB reports upon exertion. Denies chest pains, dizziness, blurred vision, weakness or fatigue, no fever or chills reported.

## 2018-01-03 NOTE — ED NOTES
Patient identifiers verified and correct for Deepali Stuart.    LOC: The patient is awake, alert and aware of environment with an appropriate affect, the patient is oriented x 3 and speaking appropriately.  APPEARANCE: Patient resting comfortably and in no acute distress, patient is clean and well groomed, patient's clothing is properly fastened.  SKIN: The skin is warm and dry, color consistent with ethnicity, patient has normal skin turgor and moist mucus membranes, skin intact, no breakdown or bruising noted.  MUSCULOSKELETAL: Patient moving all extremities spontaneously, no obvious swelling or deformities noted.  RESPIRATORY: Airway is open and patent, respirations are spontaneous, patient has a normal effort and rate, no accessory muscle use noted, bilateral breath sounds coarse in all lobes. + increased SOB and WOB noted upon exertion.   CARDIAC: Patient has a normal rate and currently a-fib on monitor, no periphreal edema noted, capillary refill < 3 seconds.  ABDOMEN: Soft and non tender to palpation, no distention noted. Pt denies active abd pains, N/V/D at this time.   NEUROLOGIC: PERRL, 3 mm bilaterally, eyes open spontaneously, behavior appropriate to situation, follows commands, facial expression symmetrical, bilateral hand grasp equal and even, purposeful motor response noted, normal sensation in all extremities when touched with a finger.    FAMILY MEMBER AT BEDSIDE.  FALL RISK BAND AND ALLERGY BAND APPLIED TO PT.

## 2018-01-04 ENCOUNTER — ANTI-COAG VISIT (OUTPATIENT)
Dept: CARDIOLOGY | Facility: CLINIC | Age: 83
End: 2018-01-04

## 2018-01-04 DIAGNOSIS — Z79.01 LONG TERM CURRENT USE OF ANTICOAGULANT THERAPY: ICD-10-CM

## 2018-01-04 LAB — INR PPP: 2.8

## 2018-01-04 NOTE — PROGRESS NOTES
Verbal result taken from [facility]_________. PT/INR _2.8______ Date drawn_01/04/18_______ Hardcopy to be faxed. Patient told nurse that she was taking (Ativan 0.5mg) q8 hours as needed. Finished on 01/03/18. Also is taking (Deshrel 50mg) 1/2 to 1 tablet as need for Insomnia).Nurse said that patient told her the nurse told her that is the reason her inr went up. Please advise.

## 2018-01-08 ENCOUNTER — OFFICE VISIT (OUTPATIENT)
Dept: INTERNAL MEDICINE | Facility: CLINIC | Age: 83
End: 2018-01-08
Payer: MEDICARE

## 2018-01-08 ENCOUNTER — ANTI-COAG VISIT (OUTPATIENT)
Dept: CARDIOLOGY | Facility: CLINIC | Age: 83
End: 2018-01-08

## 2018-01-08 VITALS
SYSTOLIC BLOOD PRESSURE: 134 MMHG | HEART RATE: 84 BPM | DIASTOLIC BLOOD PRESSURE: 78 MMHG | WEIGHT: 142.63 LBS | TEMPERATURE: 98 F | BODY MASS INDEX: 26.25 KG/M2 | HEIGHT: 62 IN

## 2018-01-08 DIAGNOSIS — E03.4 HYPOTHYROIDISM DUE TO ACQUIRED ATROPHY OF THYROID: ICD-10-CM

## 2018-01-08 DIAGNOSIS — Z90.49 S/P PARTIAL COLECTOMY: ICD-10-CM

## 2018-01-08 DIAGNOSIS — K21.9 GASTROESOPHAGEAL REFLUX DISEASE, ESOPHAGITIS PRESENCE NOT SPECIFIED: ICD-10-CM

## 2018-01-08 DIAGNOSIS — Z79.01 LONG TERM CURRENT USE OF ANTICOAGULANT THERAPY: ICD-10-CM

## 2018-01-08 DIAGNOSIS — N18.30 BENIGN HYPERTENSIVE HEART AND CKD, STAGE 3 (GFR 30-59), W CHF: ICD-10-CM

## 2018-01-08 DIAGNOSIS — I50.33 ACUTE ON CHRONIC DIASTOLIC HEART FAILURE: ICD-10-CM

## 2018-01-08 DIAGNOSIS — E11.21 CONTROLLED TYPE 2 DIABETES MELLITUS WITH DIABETIC NEPHROPATHY, WITHOUT LONG-TERM CURRENT USE OF INSULIN: ICD-10-CM

## 2018-01-08 DIAGNOSIS — I13.0 BENIGN HYPERTENSIVE HEART AND CKD, STAGE 3 (GFR 30-59), W CHF: ICD-10-CM

## 2018-01-08 DIAGNOSIS — F41.9 ANXIETY: ICD-10-CM

## 2018-01-08 DIAGNOSIS — Z95.0 PACEMAKER: ICD-10-CM

## 2018-01-08 DIAGNOSIS — N17.0 ACUTE RENAL FAILURE WITH TUBULAR NECROSIS: ICD-10-CM

## 2018-01-08 DIAGNOSIS — E11.9 TYPE 2 DIABETES MELLITUS WITHOUT RETINOPATHY: ICD-10-CM

## 2018-01-08 DIAGNOSIS — H40.052 OCULAR HYPERTENSION OF LEFT EYE: ICD-10-CM

## 2018-01-08 DIAGNOSIS — J81.1 CHRONIC PULMONARY EDEMA: ICD-10-CM

## 2018-01-08 DIAGNOSIS — I10 ESSENTIAL HYPERTENSION: ICD-10-CM

## 2018-01-08 DIAGNOSIS — Z00.00 WELL ADULT EXAM: Primary | ICD-10-CM

## 2018-01-08 DIAGNOSIS — E11.3299 NPDR (NONPROLIFERATIVE DIABETIC RETINOPATHY): ICD-10-CM

## 2018-01-08 DIAGNOSIS — H35.033 HYPERTENSIVE RETINOPATHY OF BOTH EYES: ICD-10-CM

## 2018-01-08 DIAGNOSIS — R53.1 GENERALIZED WEAKNESS: ICD-10-CM

## 2018-01-08 DIAGNOSIS — I48.20 CHRONIC ATRIAL FIBRILLATION: ICD-10-CM

## 2018-01-08 DIAGNOSIS — E55.9 VITAMIN D DEFICIENCY DISEASE: ICD-10-CM

## 2018-01-08 DIAGNOSIS — R53.1 WEAKNESS: ICD-10-CM

## 2018-01-08 DIAGNOSIS — R80.9 PROTEINURIA, UNSPECIFIED TYPE: ICD-10-CM

## 2018-01-08 DIAGNOSIS — R10.13 EPIGASTRIC PAIN: ICD-10-CM

## 2018-01-08 DIAGNOSIS — I50.32 CHRONIC DIASTOLIC CHF (CONGESTIVE HEART FAILURE), NYHA CLASS 3: ICD-10-CM

## 2018-01-08 DIAGNOSIS — I50.31 ACUTE DIASTOLIC CHF (CONGESTIVE HEART FAILURE): ICD-10-CM

## 2018-01-08 DIAGNOSIS — I73.9 PERIPHERAL VASCULAR DISEASE: ICD-10-CM

## 2018-01-08 LAB — INR PPP: 2.8

## 2018-01-08 PROCEDURE — 99215 OFFICE O/P EST HI 40 MIN: CPT | Mod: S$PBB,,, | Performed by: FAMILY MEDICINE

## 2018-01-08 PROCEDURE — 99213 OFFICE O/P EST LOW 20 MIN: CPT | Mod: PBBFAC,PO | Performed by: FAMILY MEDICINE

## 2018-01-08 PROCEDURE — 99999 PR PBB SHADOW E&M-EST. PATIENT-LVL III: CPT | Mod: PBBFAC,,, | Performed by: FAMILY MEDICINE

## 2018-01-08 RX ORDER — LISINOPRIL 20 MG/1
20 TABLET ORAL DAILY
Qty: 90 TABLET | Refills: 3 | Status: ON HOLD | OUTPATIENT
Start: 2018-01-08 | End: 2018-05-25 | Stop reason: HOSPADM

## 2018-01-08 NOTE — PROGRESS NOTES
Subjective:       Patient ID: Deepali Stuart is a 85 y.o. female.    Chief Complaint: Annual Exam    HPI 85-year-old -American female presents to clinic today accompanied by her son for annual physical exam.  She continues to have home health through [facility].  She continues to be followed by cardiology secondary to persistent atrial fibrillation, congestive heart failure, and hypertension.  She continues to complain of frequent shortness of breath and continues to have frequent episodes of what overload despite taking Lasix 40 mg daily.  She reports continued weakness and fatigue but on further questioning her son reports that she has stopped going to her daily  and frequently sits at home without activity.  She does continue to take the Lasix 40 mg daily but frequently reports weakness and decreased appetite; therefore, she does not hydrate.  She continues to have chronic kidney disease which fluctuates secondary to her hydration.  She continues to have well-controlled type 2 diabetes.  Hypothyroidism remains well controlled on levothyroxine 50 µg daily.  Hyperlipidemia remains well controlled on Lipitor 10 mg daily.  She continues to report frequent anxiety which per her son is likely secondary to her frequently remaining at home board.  She has a past surgical history of pacemaker placement, laser ablation secondary to atrial fibrillation, cataract extraction, hysterectomy, and partial colectomy.  She has no significant family medical history.  She is up-to-date with all vaccinations.  Review of Systems   Constitutional: Positive for appetite change and fatigue. Negative for chills and fever.   HENT: Negative for congestion, ear pain, hearing loss, postnasal drip, rhinorrhea, sinus pressure, sore throat and tinnitus.    Eyes: Negative for redness, itching and visual disturbance.   Respiratory: Positive for shortness of breath. Negative for cough and chest tightness.    Cardiovascular:  Negative for chest pain and palpitations.   Gastrointestinal: Positive for nausea. Negative for abdominal pain, constipation, diarrhea and vomiting.   Genitourinary: Negative for decreased urine volume, difficulty urinating, dysuria, frequency, hematuria and urgency.   Musculoskeletal: Negative for back pain, myalgias, neck pain and neck stiffness.   Skin: Negative for rash.   Neurological: Positive for weakness. Negative for dizziness, light-headedness and headaches.   Psychiatric/Behavioral: The patient is nervous/anxious.        Objective:      Physical Exam   Constitutional: She is oriented to person, place, and time. She appears well-developed and well-nourished. No distress.   HENT:   Head: Normocephalic and atraumatic.   Right Ear: External ear normal.   Left Ear: External ear normal.   Nose: Nose normal.   Mouth/Throat: Oropharynx is clear and moist. No oropharyngeal exudate.   Eyes: Conjunctivae and EOM are normal. Pupils are equal, round, and reactive to light. Right eye exhibits no discharge. Left eye exhibits no discharge. No scleral icterus.   Neck: Normal range of motion. Neck supple. No JVD present. No tracheal deviation present. No thyromegaly present.   Cardiovascular: Normal rate, regular rhythm, normal heart sounds and intact distal pulses.  Exam reveals no gallop and no friction rub.    No murmur heard.  Pulmonary/Chest: Effort normal. No stridor. No respiratory distress. She has no wheezes. She has rales (slight) in the right lower field and the left lower field.   Abdominal: Soft. Bowel sounds are normal. She exhibits no distension and no mass. There is no tenderness. There is no rebound and no guarding.   Musculoskeletal: Normal range of motion. She exhibits edema (trace). She exhibits no tenderness.   Wheelchair-bound   Lymphadenopathy:     She has no cervical adenopathy.   Neurological: She is alert and oriented to person, place, and time.   Skin: Skin is warm and dry. No rash noted. She is  not diaphoretic. No erythema. No pallor.   Psychiatric: She has a normal mood and affect. Her behavior is normal. Judgment and thought content normal.   Nursing note and vitals reviewed.      Assessment:       1. Well adult exam    2. Acute diastolic CHF (congestive heart failure)    3. Acute on chronic diastolic heart failure    4. Acute renal failure with tubular necrosis    5. Benign hypertensive heart and CKD, 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), w CHF    6. Chronic atrial fibrillation    7. Chronic diastolic CHF (congestive heart failure), NYHA class 3    8. Controlled type 2 diabetes mellitus with diabetic nephropathy, without long-term current use of insulin    9. Anxiety    10. Epigastric pain    11. Generalized weakness    12. Gastroesophageal reflux disease, esophagitis presence not specified    13. Essential hypertension    14. Hypertensive retinopathy of both eyes    15. Hypothyroidism due to acquired atrophy of thyroid    16. Long term current use of anticoagulant therapy    17. NPDR (nonproliferative diabetic retinopathy)    18. Ocular hypertension of left eye    19. Pacemaker    20. Peripheral vascular disease    21. Proteinuria, unspecified type    22. Chronic pulmonary edema    23. S/P partial colectomy    24. Type 2 diabetes mellitus without retinopathy - Both Eyes    25. Vitamin D deficiency disease    26. Weakness        Plan:       1.  CBC, CMP, UA, TSH, free T4, fasting lipids, vitamin D level, hemoglobin A1c, and urine microalbumin to creatinine ratio.  2.  Continue amlodipine, carvedilol, Lasix, and lisinopril as prescribed.  Atrial fibrillation and heart failure are stable.  Continue follow-up with cardiology as scheduled.  3.  Continue Coumadin as prescribed and continue follow-up with Coumadin clinic as scheduled.  4.  Encourage activity.  5.  Continue Zantac as prescribed.  6.  Continue use of Ativan 0.5 mg when necessary anxiety.  7.  Encourage hydration.  8.  Continue Lipitor 10 mg daily.   Hyperlipidemia is well controlled.  9.  Continue levothyroxine 50 µg daily.  Hypertension is well controlled.  10.  History of partial colectomy secondary to polyp.  Patient is currently stable.  11.  Continue vitamin D 2000 units daily.  12.  Return to clinic as needed or in 6 months for general exam.    40 minutes has been spent with the patient with approximately 50% of the clinic visit spent reviewing medications, discussing medical treatment, and counseling the patient and son.

## 2018-01-08 NOTE — PROGRESS NOTES
Patient, Deepali Stuart (MRN #[id number]), presented with a recent Estimated Glumerular Filtration Rate (EGFR) between 15 and 29 consistent with the definition of chronic kidney disease stage 4 (ICD10 - N18.4).    eGFR if    Date Value Ref Range Status   01/03/2018 29 (A) >60 mL/min/1.73 m^2 Final       The patient's chronic kidney disease stage 4 was monitored, evaluated, addressed and/or treated. This addendum to the medical record is made on 01/08/2018.

## 2018-01-09 ENCOUNTER — TELEPHONE (OUTPATIENT)
Dept: INTERNAL MEDICINE | Facility: CLINIC | Age: 83
End: 2018-01-09

## 2018-01-09 NOTE — TELEPHONE ENCOUNTER
----- Message from Lisa Miller sent at 1/9/2018  9:43 AM CST -----  Contact: Juan/Home Health Nurse/[phone]   Company is calling in regards to an appt for some labs that the pt needs done. They are calling to see if it is possible to spend them to the pt's home so that the nurse can do in home labs. Please call and advise.    Thank You

## 2018-01-09 NOTE — TELEPHONE ENCOUNTER
Spoke with Juan re; drawing labs tomorrow for pt at home.   Dr. Salazar ordered labs on Rx pad & I faxed information to [facility] @ [phone]

## 2018-01-10 ENCOUNTER — TELEPHONE (OUTPATIENT)
Dept: INTERNAL MEDICINE | Facility: CLINIC | Age: 83
End: 2018-01-10

## 2018-01-10 NOTE — TELEPHONE ENCOUNTER
----- Message from Maryjane Anderson sent at 1/10/2018  9:35 AM CST -----  Contact: [facility] [phone]  Juan was calling to inform Dr Salazar she was unable to collect the patients urine sample and would like to know can she wait until Monday on her next visit,please call

## 2018-01-11 ENCOUNTER — TELEPHONE (OUTPATIENT)
Dept: INTERNAL MEDICINE | Facility: CLINIC | Age: 83
End: 2018-01-11

## 2018-01-11 RX ORDER — BENZONATATE 200 MG/1
200 CAPSULE ORAL 3 TIMES DAILY PRN
Qty: 30 CAPSULE | Refills: 0 | Status: SHIPPED | OUTPATIENT
Start: 2018-01-11 | End: 2018-01-21

## 2018-01-11 NOTE — TELEPHONE ENCOUNTER
----- Message from Lisa Miller sent at 1/11/2018  8:34 AM CST -----  Contact: self/[phone] home  Pt is calling to speak with the doctor about a cough that she is having. She states that she has been coughing all night and it kept her up and also this morning she woke up with the cough. She would like to see if she can get something prescribed to her. Please call and advise.    Thank You

## 2018-01-11 NOTE — TELEPHONE ENCOUNTER
Faxed recent OV notes & labs to [facility] per their request.  Faxed to [phone]

## 2018-01-12 ENCOUNTER — TELEPHONE (OUTPATIENT)
Dept: INTERNAL MEDICINE | Facility: CLINIC | Age: 83
End: 2018-01-12

## 2018-01-12 NOTE — TELEPHONE ENCOUNTER
Received fax from [facility] re: lab results.    Faxed back over a confirmation that we did receive labs & continue all medications as prescribed.

## 2018-01-15 ENCOUNTER — ANTI-COAG VISIT (OUTPATIENT)
Dept: CARDIOLOGY | Facility: CLINIC | Age: 83
End: 2018-01-15

## 2018-01-15 ENCOUNTER — TELEPHONE (OUTPATIENT)
Dept: INTERNAL MEDICINE | Facility: CLINIC | Age: 83
End: 2018-01-15

## 2018-01-15 DIAGNOSIS — Z79.01 LONG TERM CURRENT USE OF ANTICOAGULANT THERAPY: ICD-10-CM

## 2018-01-15 LAB — INR PPP: 3.5

## 2018-01-15 NOTE — PROGRESS NOTES
Verbal result taken from [facility]_________. PT/INR _3.5______ Date drawn_01/15/18_______ Hardcopy to be faxed.

## 2018-01-15 NOTE — TELEPHONE ENCOUNTER
----- Message from Ynes Marie sent at 1/15/2018  3:36 PM CST -----  Contact: Juan [phone]  Pt has [facility]. Nurse was supposed to collect a urine specimen today but patient didn't feel well enough to get up. She has a cough and is taking the medication . Vital Signs were good. They will collect the specimen at next home visit.

## 2018-01-22 ENCOUNTER — TELEPHONE (OUTPATIENT)
Dept: INTERNAL MEDICINE | Facility: CLINIC | Age: 83
End: 2018-01-22

## 2018-01-22 ENCOUNTER — ANTI-COAG VISIT (OUTPATIENT)
Dept: CARDIOLOGY | Facility: CLINIC | Age: 83
End: 2018-01-22

## 2018-01-22 DIAGNOSIS — Z79.01 LONG TERM CURRENT USE OF ANTICOAGULANT THERAPY: ICD-10-CM

## 2018-01-22 LAB — INR PPP: 3.5

## 2018-01-22 RX ORDER — AMOXICILLIN AND CLAVULANATE POTASSIUM 875; 125 MG/1; MG/1
1 TABLET, FILM COATED ORAL EVERY 12 HOURS
Qty: 20 TABLET | Refills: 0 | Status: SHIPPED | OUTPATIENT
Start: 2018-01-22 | End: 2018-02-01

## 2018-01-22 NOTE — TELEPHONE ENCOUNTER
Spoke with nader re: Rx sent to pharmacy. She stated that she will inform the pt & family, so they go get the medication.

## 2018-01-22 NOTE — TELEPHONE ENCOUNTER
----- Message from Mehran Medina sent at 1/22/2018  9:22 AM CST -----  Contact: Juan - [facility] at [phone]  Pt is having a productive cough,coughing up white thick mucus,posterier lung congestion, and temp is 99/.4. Pt is complaining of weakness and urine specimen was collected this morning.

## 2018-01-22 NOTE — PROGRESS NOTES
Verbal result taken from [facility]_________. PT/INR _3.5______ Date drawn_01/22/18_______ Hardcopy to be faxed.

## 2018-01-23 ENCOUNTER — TELEPHONE (OUTPATIENT)
Dept: INTERNAL MEDICINE | Facility: CLINIC | Age: 83
End: 2018-01-23

## 2018-01-23 NOTE — TELEPHONE ENCOUNTER
Spoke with Juan with [facility] re: Pt's urine.    NO UTI. Negative urine.    She verbalized understanding

## 2018-01-24 NOTE — TELEPHONE ENCOUNTER
Received another fax re: urine results  Spoke with Rima re: NO UTI. Negative urine.  Continue meds as prescribed    She verbalized understanding

## 2018-01-25 ENCOUNTER — TELEPHONE (OUTPATIENT)
Dept: INTERNAL MEDICINE | Facility: CLINIC | Age: 83
End: 2018-01-25

## 2018-01-29 ENCOUNTER — ANTI-COAG VISIT (OUTPATIENT)
Dept: CARDIOLOGY | Facility: CLINIC | Age: 83
End: 2018-01-29

## 2018-01-29 DIAGNOSIS — R06.02 SOB (SHORTNESS OF BREATH): ICD-10-CM

## 2018-01-29 DIAGNOSIS — N18.30 BENIGN HYPERTENSIVE HEART AND CKD, STAGE 3 (GFR 30-59), W CHF: ICD-10-CM

## 2018-01-29 DIAGNOSIS — N18.30 CKD (CHRONIC KIDNEY DISEASE) STAGE 3, GFR 30-59 ML/MIN: ICD-10-CM

## 2018-01-29 DIAGNOSIS — R53.1 WEAKNESS: ICD-10-CM

## 2018-01-29 DIAGNOSIS — Z95.0 PACEMAKER: ICD-10-CM

## 2018-01-29 DIAGNOSIS — F41.9 ANXIETY: ICD-10-CM

## 2018-01-29 DIAGNOSIS — I10 ESSENTIAL HYPERTENSION: ICD-10-CM

## 2018-01-29 DIAGNOSIS — E55.9 VITAMIN D DEFICIENCY DISEASE: ICD-10-CM

## 2018-01-29 DIAGNOSIS — E03.4 HYPOTHYROIDISM DUE TO ACQUIRED ATROPHY OF THYROID: ICD-10-CM

## 2018-01-29 DIAGNOSIS — I50.43 ACUTE ON CHRONIC COMBINED SYSTOLIC AND DIASTOLIC HF (HEART FAILURE): ICD-10-CM

## 2018-01-29 DIAGNOSIS — E11.9 CONTROLLED TYPE 2 DIABETES MELLITUS WITHOUT COMPLICATION, WITHOUT LONG-TERM CURRENT USE OF INSULIN: ICD-10-CM

## 2018-01-29 DIAGNOSIS — I13.0 BENIGN HYPERTENSIVE HEART AND CKD, STAGE 3 (GFR 30-59), W CHF: ICD-10-CM

## 2018-01-29 DIAGNOSIS — J81.1 CHRONIC PULMONARY EDEMA: ICD-10-CM

## 2018-01-29 DIAGNOSIS — R80.9 PROTEINURIA: ICD-10-CM

## 2018-01-29 DIAGNOSIS — D69.6 THROMBOCYTOPENIA: ICD-10-CM

## 2018-01-29 DIAGNOSIS — I48.19 PERSISTENT ATRIAL FIBRILLATION: ICD-10-CM

## 2018-01-29 DIAGNOSIS — E46 MALNUTRITION: ICD-10-CM

## 2018-01-29 DIAGNOSIS — E11.9 TYPE 2 DIABETES MELLITUS WITHOUT RETINOPATHY: ICD-10-CM

## 2018-01-29 LAB — INR PPP: 2.3

## 2018-01-29 RX ORDER — DIAPER,BRIEF,ADULT, DISPOSABLE
EACH MISCELLANEOUS
Qty: 100 STRIP | Refills: 0 | Status: ON HOLD | OUTPATIENT
Start: 2018-01-29 | End: 2018-05-25 | Stop reason: HOSPADM

## 2018-02-05 ENCOUNTER — ANTI-COAG VISIT (OUTPATIENT)
Dept: CARDIOLOGY | Facility: CLINIC | Age: 83
End: 2018-02-05

## 2018-02-05 DIAGNOSIS — Z79.01 LONG TERM CURRENT USE OF ANTICOAGULANT THERAPY: Primary | ICD-10-CM

## 2018-02-05 LAB — INR PPP: 2.8

## 2018-02-08 ENCOUNTER — CLINICAL SUPPORT (OUTPATIENT)
Dept: ELECTROPHYSIOLOGY | Facility: CLINIC | Age: 83
End: 2018-02-08
Attending: INTERNAL MEDICINE
Payer: MEDICARE

## 2018-02-08 DIAGNOSIS — I48.91 ATRIAL FIBRILLATION, UNSPECIFIED TYPE: ICD-10-CM

## 2018-02-08 DIAGNOSIS — Z95.0 CARDIAC PACEMAKER IN SITU: ICD-10-CM

## 2018-02-08 PROCEDURE — 93294 REM INTERROG EVL PM/LDLS PM: CPT | Mod: ,,, | Performed by: INTERNAL MEDICINE

## 2018-02-08 PROCEDURE — 93296 REM INTERROG EVL PM/IDS: CPT | Mod: PBBFAC | Performed by: INTERNAL MEDICINE

## 2018-02-19 ENCOUNTER — ANTI-COAG VISIT (OUTPATIENT)
Dept: CARDIOLOGY | Facility: CLINIC | Age: 83
End: 2018-02-19

## 2018-02-19 ENCOUNTER — LAB VISIT (OUTPATIENT)
Dept: LAB | Facility: HOSPITAL | Age: 83
End: 2018-02-19
Attending: FAMILY MEDICINE
Payer: MEDICARE

## 2018-02-19 ENCOUNTER — OFFICE VISIT (OUTPATIENT)
Dept: PODIATRY | Facility: CLINIC | Age: 83
End: 2018-02-19
Payer: MEDICARE

## 2018-02-19 VITALS — BODY MASS INDEX: 26.25 KG/M2 | WEIGHT: 142.63 LBS | HEIGHT: 62 IN

## 2018-02-19 DIAGNOSIS — B35.1 ONYCHOMYCOSIS DUE TO DERMATOPHYTE: ICD-10-CM

## 2018-02-19 DIAGNOSIS — E11.42 DIABETIC PERIPHERAL NEUROPATHY ASSOCIATED WITH TYPE 2 DIABETES MELLITUS: ICD-10-CM

## 2018-02-19 DIAGNOSIS — L60.0 INGROWN NAIL: ICD-10-CM

## 2018-02-19 DIAGNOSIS — Z79.01 LONG TERM CURRENT USE OF ANTICOAGULANT THERAPY: ICD-10-CM

## 2018-02-19 DIAGNOSIS — E11.51 DIABETES MELLITUS WITH PERIPHERAL CIRCULATORY DISORDER: Primary | ICD-10-CM

## 2018-02-19 DIAGNOSIS — M20.42 HAMMER TOES OF BOTH FEET: ICD-10-CM

## 2018-02-19 DIAGNOSIS — M20.10 VALGUS DEFORMITY OF GREAT TOE, UNSPECIFIED LATERALITY: ICD-10-CM

## 2018-02-19 DIAGNOSIS — M20.41 HAMMER TOES OF BOTH FEET: ICD-10-CM

## 2018-02-19 LAB
INR PPP: 2.3
PROTHROMBIN TIME: 22.4 SEC

## 2018-02-19 PROCEDURE — 11721 DEBRIDE NAIL 6 OR MORE: CPT | Mod: Q9,S$PBB,,

## 2018-02-19 PROCEDURE — 1159F MED LIST DOCD IN RCRD: CPT | Mod: ,,,

## 2018-02-19 PROCEDURE — 36415 COLL VENOUS BLD VENIPUNCTURE: CPT | Mod: PO

## 2018-02-19 PROCEDURE — 1126F AMNT PAIN NOTED NONE PRSNT: CPT | Mod: ,,,

## 2018-02-19 PROCEDURE — 99999 PR PBB SHADOW E&M-EST. PATIENT-LVL III: CPT | Mod: PBBFAC,,,

## 2018-02-19 PROCEDURE — 85610 PROTHROMBIN TIME: CPT

## 2018-02-19 PROCEDURE — 11721 DEBRIDE NAIL 6 OR MORE: CPT | Mod: Q9,PBBFAC,PO

## 2018-02-19 PROCEDURE — 99213 OFFICE O/P EST LOW 20 MIN: CPT | Mod: S$PBB,25,,

## 2018-02-19 PROCEDURE — 99213 OFFICE O/P EST LOW 20 MIN: CPT | Mod: PBBFAC,PO

## 2018-02-19 RX ORDER — PANTOPRAZOLE SODIUM 20 MG/1
TABLET, DELAYED RELEASE ORAL
Refills: 2 | Status: ON HOLD | COMMUNITY
Start: 2018-01-04 | End: 2018-05-25 | Stop reason: HOSPADM

## 2018-02-19 RX ORDER — METFORMIN HYDROCHLORIDE 500 MG/1
TABLET ORAL
Refills: 3 | COMMUNITY
Start: 2018-01-04 | End: 2018-04-07

## 2018-02-19 NOTE — PROGRESS NOTES
Subjective:      Patient ID: Deepali Stuart is a 85 y.o. female.    Chief Complaint: Diabetic Foot Exam (HgbA1c: 5.6  12/28/16    PCP: Vishal Salazar 1/8/18)    Deepali is a 85 y.o. female who presents to the clinic for evaluation and treatment of high risk feet. Deepali has a past medical history of Anxiety; Atrial fibrillation; CHF (congestive heart failure); Diabetes insipidus; Diabetes mellitus type II; GERD (gastroesophageal reflux disease); Hypertension; Iritis (12/10/2014); NPDR (nonproliferative diabetic retinopathy) (10/27/2014); PAF (paroxysmal atrial fibrillation); and Palpitations. The patient's chief complaint is long, thick ingrown toenails.     PCP: Vishal Salazar MD        Current shoe gear:  Affected Foot: Casual shoes     Unaffected Foot: Casual shoes    Hemoglobin A1C   Date Value Ref Range Status   12/28/2016 5.6 4.5 - 6.2 % Final     Comment:     According to ADA guidelines, hemoglobin A1C <7.0% represents  optimal control in non-pregnant diabetic patients.  Different  metrics may apply to specific populations.   Standards of Medical Care in Diabetes - 2016.  For the purpose of screening for the presence of diabetes:  <5.7%     Consistent with the absence of diabetes  5.7-6.4%  Consistent with increasing risk for diabetes   (prediabetes)  >or=6.5%  Consistent with diabetes  Currently no consensus exists for use of hemoglobin A1C  for diagnosis of diabetes for children.     11/17/2016 5.7 4.5 - 6.2 % Final     Comment:     According to ADA guidelines, hemoglobin A1C <7.0% represents  optimal control in non-pregnant diabetic patients.  Different  metrics may apply to specific populations.   Standards of Medical Care in Diabetes - 2016.  For the purpose of screening for the presence of diabetes:  <5.7%     Consistent with the absence of diabetes  5.7-6.4%  Consistent with increasing risk for diabetes   (prediabetes)  >or=6.5%  Consistent with diabetes  Currently no consensus exists for use of  hemoglobin A1C  for diagnosis of diabetes for children.     08/27/2015 6.3 (H) 4.5 - 6.2 % Final       Past Medical History:   Diagnosis Date    Anxiety     Atrial fibrillation     chronic AF    CHF (congestive heart failure)     Diabetes insipidus     Diabetes mellitus type II     GERD (gastroesophageal reflux disease)     Hypertension     Iritis 12/10/2014    NPDR (nonproliferative diabetic retinopathy) 10/27/2014    PAF (paroxysmal atrial fibrillation)     Palpitations        Past Surgical History:   Procedure Laterality Date    CATARACT EXTRACTION W/  INTRAOCULAR LENS IMPLANT Right n/a    OD over 10 years ago     CATARACT EXTRACTION W/  INTRAOCULAR LENS IMPLANT Left 11/11/14    OS ()    COLON SURGERY      Partial colectomy secondary to suspicious polyp    HYSTERECTOMY      INSERT / REPLACE / REMOVE PACEMAKER      [facility] PM 2012 [email or web address]    LASER ABLATION      3 years ago OS       Family History   Problem Relation Age of Onset    No Known Problems Mother     No Known Problems Father     No Known Problems Sister     No Known Problems Brother     No Known Problems Maternal Aunt     No Known Problems Maternal Uncle     No Known Problems Paternal Aunt     No Known Problems Paternal Uncle     No Known Problems Maternal Grandmother     No Known Problems Maternal Grandfather     No Known Problems Paternal Grandmother     No Known Problems Paternal Grandfather     Amblyopia Neg Hx     Blindness Neg Hx     Cancer Neg Hx     Cataracts Neg Hx     Diabetes Neg Hx     Glaucoma Neg Hx     Hypertension Neg Hx     Macular degeneration Neg Hx     Retinal detachment Neg Hx     Strabismus Neg Hx     Stroke Neg Hx     Thyroid disease Neg Hx        Social History     Social History    Marital status:      Spouse name: N/A    Number of children: N/A    Years of education: N/A     Social History Main Topics    Smoking status: Never Smoker    Smokeless tobacco: Never Used     Alcohol use No    Drug use: No    Sexual activity: No     Other Topics Concern    None     Social History Narrative    None       Current Outpatient Prescriptions   Medication Sig Dispense Refill    amlodipine (NORVASC) 5 MG tablet TAKE 1 TABLET BY MOUTH DAILY 90 tablet 3    atorvastatin (LIPITOR) 10 MG tablet Take 1 tablet (10 mg total) by mouth once daily. 90 tablet 3    carvedilol (COREG) 25 MG tablet Take 1 tablet (25 mg total) by mouth 2 (two) times daily. 180 tablet 3    furosemide (LASIX) 20 MG tablet Take 2 tablets (40 mg total) by mouth once daily. 60 tablet 11    lancets Misc 1 Device by Misc.(Non-Drug; Combo Route) route once daily. (Patient taking differently: 1 Device by Misc.(Non-Drug; Combo Route) route 2 (two) times daily. ) 100 each 3    levothyroxine (SYNTHROID) 50 MCG tablet Take 1 tablet (50 mcg total) by mouth once daily. 90 tablet 3    lisinopril (PRINIVIL,ZESTRIL) 20 MG tablet Take 1 tablet (20 mg total) by mouth once daily. 90 tablet 3    meclizine (ANTIVERT) 25 mg tablet TAKE 2 TABLETS BY MOUTH THREE TIMES DAILY AS NEEDED FOR DIZZINESS 60 tablet 11    metFORMIN (GLUCOPHAGE) 500 MG tablet TK 1 T PO BID WC  3    mupirocin (BACTROBAN) 2 % ointment Apply topically 3 (three) times daily. 22 g 1    ondansetron (ZOFRAN-ODT) 4 MG TbDL DISSOLVE ONE TABLET BY MOUTH EVERY 6 HOURS AS NEEDED FOR NAUSEA AND VOMITING 21 tablet 6    pantoprazole (PROTONIX) 20 MG tablet   2    ranitidine (ZANTAC) 300 MG tablet Take 1 tablet (300 mg total) by mouth every evening. 30 tablet 11    trazodone (DESYREL) 50 MG tablet TAKE 1/2 TO 1 TABLET BY MOUTH EVERY EVENING AS NEEDED FOR INSOMNIA 30 tablet 11    TRUETRACK TEST Strp TEST TWICE DAILY. 100 strip 0    warfarin (COUMADIN) 5 MG tablet TAKE 1 TABLET BY MOUTH EVERY DAY OR AS DIRECTED BY COUMADIN CLINIC (Patient taking differently: TAKE 1/2 TABLET BY MOUTH EVERY DAY IN THE EVENING OR AS DIRECTED BY COUMADIN CLINIC) 45 tablet 11    LORazepam (ATIVAN)  0.5 MG tablet Take 1 tablet (0.5 mg total) by mouth every 8 (eight) hours as needed for Anxiety. 30 tablet 0     No current facility-administered medications for this visit.        Review of patient's allergies indicates:   Allergen Reactions    Plavix [clopidogrel]          ROS  ROS:  Constitution: Negative for chills, fever, weakness and malaise/fatigue.   HEENT: Negative for headaches.   Cardiovascular: Negative for chest pain and claudication.   Respiratory: Negative for cough and shortness of breath.   Musculoskeletal: (+) for foot pain.  Negative for muscle cramps and muscle weakness.   Gastrointestinal: Negative for nausea and vomiting.   Neurological:(+) for numbness, tingling and paresthesias.   Dermatological:  (--) for skin rash, (--) for keratosis, (+) for fungal toenails, (--) for wound.          Objective:      Physical Exam  Constitutional:  Patient is oriented to person, place, and time. Vital signs are normal.  Appears well-developed and well-nourished.     Vascular:  Dorsalis pedis pulses are 1/4 on the right side, and 1/4 on the left side.   Posterior tibial pulses are 1/4 on the right side, and 1/4 on the left side.   (--) digital hair growth, capillary fill time to all toes <3 seconds, toes are cool touch, trace pedal swelling    Skin/Dermatological:  Skin is warm and intact.  No cyanosis or clubbing.  No rashes noted.  No open wounds.  Right 1, 2, 3, 4, 5 and left 1, 2, 3, 4, 5  toenails yellow discolored, thickened 2-4 mm to base with subungual debris.  Right hallux medially ingrown,  (--) keratosis     Musculoskeletal:      Hallux abducto valgus bilaterally, hammertoes 2-5 observed.  Pedal rom within normal limits.  (--) ankle joint DF restriction with both knee flexed and extened.    Neurological:  (+) deficits to sharp/dull, light touch or vibratory sensation bilateral feet, ten points tested.   Muscle strength to tibialis anterior, extensor hallucis longus, extensor digitorum longus,  peroneal muscles, flexor hallucis/digotorum longus, posterior tibial and gastrosoleal complex is 5/5, normal tone without assymmetry   Patellar reflexes are 2+ on the right side and 2+ on the left side.  Achilles reflexes are 2+ on the right side and 2+ on the left side.          Assessment:       Encounter Diagnoses   Name Primary?    Diabetes mellitus with peripheral circulatory disorder Yes    Diabetic peripheral neuropathy associated with type 2 diabetes mellitus     Ingrown nail     Onychomycosis due to dermatophyte     Hammer toes of both feet     Valgus deformity of great toe, unspecified laterality          Plan:       Deepali was seen today for diabetic foot exam.    Diagnoses and all orders for this visit:    Diabetes mellitus with peripheral circulatory disorder    Diabetic peripheral neuropathy associated with type 2 diabetes mellitus    Ingrown nail    Onychomycosis due to dermatophyte    Hammer toes of both feet    Valgus deformity of great toe, unspecified laterality      I counseled the patient on her conditions, their implications and medical management.    Shoe inspection. Diabetic Foot Education. Patient reminded of the importance of good nutrition and blood sugar control to help prevent podiatric complications of diabetes. Patient instructed on proper foot hygeine. We discussed wearing proper shoe gear, daily foot inspections, never walking without protective shoe gear, never putting sharp instruments to feet.  We also discussed padding and shoes with high toe boxes for foot deformities.    Ingrown nail right hallux was removed .  Soak in Epsom Salts for 5 days, after soaking put Neosporin and a band aid.      - With patient's permission, right 1, 2, 3, 4, 5 and left 1, 2, 3, 4, 5 toenails were aggressively reduced and debrided  to their soft tissue attachment mechanically with nail nipper, removing all offending nail and debris. Patient relates relief following the procedure. Patient will  continue to monitor the areas daily, inspect feet, wear protective shoe gear when ambulatory, moisturizer to maintain skin integrity and follow in this office in approximately 3 months, sooner [email or web address].        Esteban Gongora DPM

## 2018-02-26 ENCOUNTER — TELEPHONE (OUTPATIENT)
Dept: NEPHROLOGY | Facility: CLINIC | Age: 83
End: 2018-02-26

## 2018-02-26 DIAGNOSIS — N18.4 CKD (CHRONIC KIDNEY DISEASE) STAGE 4, GFR 15-29 ML/MIN: Primary | ICD-10-CM

## 2018-02-26 NOTE — TELEPHONE ENCOUNTER
Creatinine stable at 2.1 with kidney function of 24% in 12/5/17.  Please make a urgent slot appointment.  RFP before appointment.      SPEP normal. HGB 10.3 and iron studies stable. ANCA, MANPREET neg.PTH is 77. c3 78(L) c4 18(nml). UA with trace blood but no RBC's.

## 2018-02-28 ENCOUNTER — OFFICE VISIT (OUTPATIENT)
Dept: CARDIOLOGY | Facility: CLINIC | Age: 83
End: 2018-02-28
Payer: MEDICARE

## 2018-02-28 VITALS
HEIGHT: 62 IN | HEART RATE: 91 BPM | WEIGHT: 148.56 LBS | SYSTOLIC BLOOD PRESSURE: 153 MMHG | DIASTOLIC BLOOD PRESSURE: 73 MMHG | BODY MASS INDEX: 27.34 KG/M2

## 2018-02-28 DIAGNOSIS — I10 ESSENTIAL HYPERTENSION: ICD-10-CM

## 2018-02-28 DIAGNOSIS — Z95.0 PACEMAKER: ICD-10-CM

## 2018-02-28 DIAGNOSIS — I48.20 CHRONIC ATRIAL FIBRILLATION: Primary | ICD-10-CM

## 2018-02-28 DIAGNOSIS — N18.30 CKD (CHRONIC KIDNEY DISEASE) STAGE 3, GFR 30-59 ML/MIN: ICD-10-CM

## 2018-02-28 DIAGNOSIS — I50.32 CHRONIC DIASTOLIC CHF (CONGESTIVE HEART FAILURE), NYHA CLASS 3: ICD-10-CM

## 2018-02-28 DIAGNOSIS — Z79.01 LONG TERM CURRENT USE OF ANTICOAGULANT THERAPY: ICD-10-CM

## 2018-02-28 PROCEDURE — 99999 PR PBB SHADOW E&M-EST. PATIENT-LVL V: CPT | Mod: PBBFAC,GC,, | Performed by: INTERNAL MEDICINE

## 2018-02-28 PROCEDURE — 99215 OFFICE O/P EST HI 40 MIN: CPT | Mod: PBBFAC | Performed by: INTERNAL MEDICINE

## 2018-02-28 PROCEDURE — 99214 OFFICE O/P EST MOD 30 MIN: CPT | Mod: S$PBB,GC,, | Performed by: INTERNAL MEDICINE

## 2018-02-28 NOTE — PROGRESS NOTES
Cardiology Clinic Note  Reason for Visit: Follow up    HPI:   Ms. Stuart is an 86 year old female with a history of afib, SSS s/p [facility] [place] PM 9/12, diastolic HF, HTN, and DM who presents for follow up.      She has been doing well and has no complaints today.  She does have occasional dyspnea on exertion, but he does not feel particularly limited by the symptoms and doesn't feel they have changed.     She denies syncope, lightheadedness, chest pain, orthopnea, PND, nausea or vomiting.    ROS:    Constitution: Negative for fever or chills. Negative for weight loss or gain.   HENT: Negative for sore throat or headaches. Negative for rhinorrhea.  Eyes: Negative for blurred or double vision.   Cardiovascular: See above  Pulmonary: Negative for SOB. Negative for cough.   Gastrointestinal: Negative for abdominal pain. Negative for nausea/ vomiting. Negative for diarrhea.   : Negative for dysuria.   Neurological: Negative for focal weakness or sensory changes.  PMH:     Past Medical History:   Diagnosis Date    Anxiety     Atrial fibrillation     chronic AF    CHF (congestive heart failure)     Diabetes insipidus     Diabetes mellitus type II     GERD (gastroesophageal reflux disease)     Hypertension     Iritis 12/10/2014    NPDR (nonproliferative diabetic retinopathy) 10/27/2014    PAF (paroxysmal atrial fibrillation)     Palpitations      Past Surgical History:   Procedure Laterality Date    CATARACT EXTRACTION W/  INTRAOCULAR LENS IMPLANT Right n/a    OD over 10 years ago     CATARACT EXTRACTION W/  INTRAOCULAR LENS IMPLANT Left 11/11/14    OS ()    COLON SURGERY      Partial colectomy secondary to suspicious polyp    HYSTERECTOMY      INSERT / REPLACE / REMOVE PACEMAKER      SC PM 2012 [email or web address]    LASER ABLATION      3 years ago OS     Allergies:     Allergies   Allergen Reactions    Plavix [Clopidogrel]      Medications:     Current Outpatient Prescriptions on File Prior to Visit    Medication Sig Dispense Refill    amlodipine (NORVASC) 5 MG tablet TAKE 1 TABLET BY MOUTH DAILY 90 tablet 3    atorvastatin (LIPITOR) 10 MG tablet Take 1 tablet (10 mg total) by mouth once daily. 90 tablet 3    carvedilol (COREG) 25 MG tablet Take 1 tablet (25 mg total) by mouth 2 (two) times daily. 180 tablet 3    furosemide (LASIX) 20 MG tablet Take 2 tablets (40 mg total) by mouth once daily. 60 tablet 11    lancets Misc 1 Device by Misc.(Non-Drug; Combo Route) route once daily. (Patient taking differently: 1 Device by Misc.(Non-Drug; Combo Route) route 2 (two) times daily. ) 100 each 3    levothyroxine (SYNTHROID) 50 MCG tablet Take 1 tablet (50 mcg total) by mouth once daily. 90 tablet 3    lisinopril (PRINIVIL,ZESTRIL) 20 MG tablet Take 1 tablet (20 mg total) by mouth once daily. 90 tablet 3    meclizine (ANTIVERT) 25 mg tablet TAKE 2 TABLETS BY MOUTH THREE TIMES DAILY AS NEEDED FOR DIZZINESS 60 tablet 11    metFORMIN (GLUCOPHAGE) 500 MG tablet TK 1 T PO BID WC  3    mupirocin (BACTROBAN) 2 % ointment Apply topically 3 (three) times daily. 22 g 1    ondansetron (ZOFRAN-ODT) 4 MG TbDL DISSOLVE ONE TABLET BY MOUTH EVERY 6 HOURS AS NEEDED FOR NAUSEA AND VOMITING 21 tablet 6    pantoprazole (PROTONIX) 20 MG tablet   2    ranitidine (ZANTAC) 300 MG tablet Take 1 tablet (300 mg total) by mouth every evening. 30 tablet 11    trazodone (DESYREL) 50 MG tablet TAKE 1/2 TO 1 TABLET BY MOUTH EVERY EVENING AS NEEDED FOR INSOMNIA 30 tablet 11    TRUETRACK TEST Strp TEST TWICE DAILY. 100 strip 0    warfarin (COUMADIN) 5 MG tablet TAKE 1 TABLET BY MOUTH EVERY DAY OR AS DIRECTED BY COUMADIN CLINIC (Patient taking differently: TAKE 1/2 TABLET BY MOUTH EVERY DAY IN THE EVENING OR AS DIRECTED BY COUMADIN CLINIC) 45 tablet 11    LORazepam (ATIVAN) 0.5 MG tablet Take 1 tablet (0.5 mg total) by mouth every 8 (eight) hours as needed for Anxiety. 30 tablet 0     No current facility-administered medications on file  "prior to visit.      Social History:     Social History   Substance Use Topics    Smoking status: Never Smoker    Smokeless tobacco: Never Used    Alcohol use No     Family History:     Family History   Problem Relation Age of Onset    No Known Problems Mother     No Known Problems Father     No Known Problems Sister     No Known Problems Brother     No Known Problems Maternal Aunt     No Known Problems Maternal Uncle     No Known Problems Paternal Aunt     No Known Problems Paternal Uncle     No Known Problems Maternal Grandmother     No Known Problems Maternal Grandfather     No Known Problems Paternal Grandmother     No Known Problems Paternal Grandfather     Amblyopia Neg Hx     Blindness Neg Hx     Cancer Neg Hx     Cataracts Neg Hx     Diabetes Neg Hx     Glaucoma Neg Hx     Hypertension Neg Hx     Macular degeneration Neg Hx     Retinal detachment Neg Hx     Strabismus Neg Hx     Stroke Neg Hx     Thyroid disease Neg Hx      Physical Exam:     BP (!) 153/73 (BP Location: Left arm, Patient Position: Sitting, BP Method: Large (Automatic))   Pulse 91   Ht 5' 2" (1.575 m)   Wt 67.4 kg (148 lb 9.4 oz)   LMP  (LMP Unknown)   BMI 27.18 kg/m²      Constitutional: No acute distress, conversant  HEENT: Sclera anicteric, Pupils equal, round and reactive to light, extraocular motions intact, Oropharynx clear  Neck: No JVD, no carotid bruits  Cardiovascular: irregularly irregular, 2/6 systolic murmur heard best at LUSB, no rubs or gallops, normal S1/S2  Pulmonary: Clear to auscultation bilaterally  Abdominal: Abdomen soft, nontender, nondistended, positive bowel sounds  Extremities: 1+ nasreen lower extremity edema (feet only), warm with palpable pulses  Skin: No ecchymosis, erythema, or ulcers  Psych: Alert and oriented, appropriate affect  Neuro: CNII-XII intact, no focal deficits    Labs:     Lab Results   Component Value Date     01/03/2018    K 3.9 01/03/2018     01/03/2018    " CO2 26 01/03/2018    BUN 20 01/03/2018    CREATININE 1.8 (H) 01/03/2018    ANIONGAP 7 (L) 01/03/2018     Lab Results   Component Value Date    AST 36 01/03/2018    ALT 20 01/03/2018    ALKPHOS 70 01/03/2018    BILITOT 0.7 01/03/2018    ALBUMIN 2.4 (L) 01/03/2018     Lab Results   Component Value Date    CALCIUM 8.5 (L) 01/03/2018    MG 1.8 04/14/2017    PHOS 3.1 04/10/2017     Lab Results   Component Value Date    BNP 1,247 (H) 01/03/2018    BNP 1,490 (H) 10/13/2017     (H) 04/18/2017    Lab Results   Component Value Date    WBC 7.28 01/03/2018    HGB 11.7 (L) 01/03/2018    HCT 34.7 (L) 01/03/2018     01/03/2018    GRAN 5.4 01/03/2018    GRAN 74.7 (H) 01/03/2018     Lab Results   Component Value Date    INR 2.3 (H) 02/19/2018    INR 2.8 02/05/2018     Lab Results   Component Value Date    CHOL 217 (H) 12/28/2016    HDL 67 12/28/2016    LDLCALC 131.4 12/28/2016    TRIG 93 12/28/2016     Lab Results   Component Value Date    HGBA1C 5.6 12/28/2016     Lab Results   Component Value Date    TSH 1.613 07/10/2017          Imaging:   ECHO 8/15:    CONCLUSIONS     1 - Normal left ventricular systolic function (EF 60-65%).     2 - Concentric hypertrophy.     3 - Normal right ventricular systolic function .     4 - Biatrial enlargement.     5 - Trivial aortic stenosis, XIMENA = 2.14 cm2, peak velocity = 1.6 m/s, mean gradient = 5.0 mmHg.     6 - Mild mitral regurgitation.     7 - Mild tricuspid regurgitation.     8 - Trivial pulmonic regurgitation.     9 - The estimated PA systolic pressure is 38 mmHg.     EF   Date Value Ref Range Status   03/08/2017 66 55 - 65    08/27/2015 63 55 - 65    05/29/2013 25       Assessment:   In summary, Ms. Stuart is an 86 year old female with a history of afib, SSS s/p [place] PM 9/12, diastolic HF, HTN, and DM who presents for follow up.     Plan:   #Chronic atrial fibrillation: Follows with Dr. Main in EP.  Rate controlled with carvedilol and on coumadin.  Follows in coumadin  clinic.    #HTN: Permissive hypertension given age.  Will continue current medications.    #Diastolic heart failure: Appears compensated and euvolemic.  Cont lasix 40 mg daily.    RTC in 6 months.    Signed:  Paulette Oseguera MD, MPH  Cardiology Fellow, PGY-6  Pager: [phone]  2/28/2018 11:16 AM    Follow-up:     Future Appointments  Date Time Provider Department Center   3/8/2018 4:30 PM Camden Pruitt DO [facility]   5/11/2018 10:15 AM Esteban Javier DPM [facility] POD [place]   5/15/2018 8:00 AM HOME MONITOR DEVICE CHECK, [facility]   8/15/2018 8:00 AM HOME MONITOR DEVICE CHECK, [facility]

## 2018-03-06 ENCOUNTER — ANTI-COAG VISIT (OUTPATIENT)
Dept: CARDIOLOGY | Facility: CLINIC | Age: 83
End: 2018-03-06

## 2018-03-06 LAB — INR PPP: 2.4

## 2018-03-08 ENCOUNTER — OFFICE VISIT (OUTPATIENT)
Dept: NEPHROLOGY | Facility: CLINIC | Age: 83
End: 2018-03-08
Payer: MEDICARE

## 2018-03-08 ENCOUNTER — TELEPHONE (OUTPATIENT)
Dept: NEPHROLOGY | Facility: CLINIC | Age: 83
End: 2018-03-08

## 2018-03-08 VITALS
HEART RATE: 83 BPM | WEIGHT: 147.06 LBS | OXYGEN SATURATION: 98 % | DIASTOLIC BLOOD PRESSURE: 60 MMHG | SYSTOLIC BLOOD PRESSURE: 118 MMHG | BODY MASS INDEX: 26.9 KG/M2

## 2018-03-08 DIAGNOSIS — R80.9 PROTEINURIA, UNSPECIFIED TYPE: Primary | ICD-10-CM

## 2018-03-08 DIAGNOSIS — I10 ESSENTIAL HYPERTENSION: ICD-10-CM

## 2018-03-08 DIAGNOSIS — N18.4 CKD (CHRONIC KIDNEY DISEASE) STAGE 4, GFR 15-29 ML/MIN: ICD-10-CM

## 2018-03-08 PROCEDURE — 99214 OFFICE O/P EST MOD 30 MIN: CPT | Mod: S$PBB,,, | Performed by: INTERNAL MEDICINE

## 2018-03-08 PROCEDURE — 99212 OFFICE O/P EST SF 10 MIN: CPT | Mod: PBBFAC | Performed by: INTERNAL MEDICINE

## 2018-03-08 PROCEDURE — 99999 PR PBB SHADOW E&M-EST. PATIENT-LVL II: CPT | Mod: PBBFAC,,, | Performed by: INTERNAL MEDICINE

## 2018-03-08 NOTE — PROGRESS NOTES
Subjective:       Patient ID: Deepali Stuart is a 86 y.o. Black or  female who presents for follow-up evaluation of No chief complaint on file.    HPI This is an 86-year-old -American female with  hypertension, diabetes, atrial fibrillation, CAD, and CHF who is coming in for f/u of chronic kidney disease.  Had the flu in jan.    BP and diabetes are good but does not recall numbers as home health nurse checks her BP's. Creatinine at 1.88. She has proteinuria. Denies NSAID's.     PAST MEDICAL HISTORY: Significant for diabetes for 10 years, on medications and  had diet-controlled diabetes prior to that, she has hypertension for 20 years,   hard to control in the past, atrial fibrillation with pacemaker placement and   congestive heart failure.    SOCIAL HISTORY: Does not smoke. Does not drink.    FAMILY HISTORY: Noncontributory.  Review of Systems   Constitutional: Positive for appetite change and fatigue.   Eyes: Negative for discharge.   Respiratory: Negative for cough, shortness of breath and wheezing.    Cardiovascular: Negative for chest pain and palpitations.   Gastrointestinal: Negative for abdominal pain, diarrhea, nausea and vomiting.   Genitourinary: Negative for dysuria, frequency, hematuria and urgency.   Skin: Negative for color change and rash.   Psychiatric/Behavioral: Negative for confusion.   All other systems reviewed and are negative.      Objective:      Physical Exam   Constitutional: She is oriented to person, place, and time. She appears well-developed and well-nourished.   HENT:   Right Ear: External ear normal.   Left Ear: External ear normal.   Nose: Nose normal.   Mouth/Throat: Normal dentition.   Eyes: Conjunctivae, EOM and lids are normal. Pupils are equal, round, and reactive to light.   Neck: Trachea normal. No thyroid mass present.   Cardiovascular: Normal rate and regular rhythm.  Exam reveals no friction rub.    No murmur heard.  No lower extremity edema    Pulmonary/Chest: Effort normal and breath sounds normal. No respiratory distress. She has no decreased breath sounds. She has no rales.   Abdominal: Soft. Bowel sounds are normal. She exhibits no mass. There is no tenderness. There is no rebound. No hernia.   Musculoskeletal: She exhibits edema.   +1 edema   Neurological: She is alert and oriented to person, place, and time.   Skin: Skin is warm and dry. No rash noted. No erythema.   Psychiatric: She has a normal mood and affect. Judgment normal. Her mood appears not anxious. She does not exhibit a depressed mood.   Oriented to time, place and person.   Vitals reviewed.      Assessment:       No diagnosis found.    Plan:       Creatinine of 2.1 with kidney function of 24% in 12/5/17.   SPEP normal. HGB 10.3 and iron studies stable. ANCA, MANPREET neg.PTH is 77. c3 78(L) c4 18(nml). UA with trace blood but no RBC's.       This is an 86-year-old -American female with a   longstanding history of hypertension and diabetes coming in for f/u of   chronic kidney disease.  1. Renal. Her baseline creatinine appears to be around  1.4 to 1.7  in the past but higher with azam with the most recent creatinine of 1.88 with an MDRD GFR of 28  mL per minute. Her chronic  kidney disease is likely secondary to longstanding diabetes and hypertension.  Likely new baseline of 1.8-2.    She denies any NSAIDs. Baseline renal   Ultrasound stable with simple cyst and R kidney slightly smaller.   2. Hypertension. Blood pressures are stable. Continue the current regimen.   She is aware of the target blood pressures.  3. Diabetes. Stable off meds.  4. Proteinuria: She has proteinuria around 3 gms initially improved but back to 3.2 gms. This is  likely secondary to her longstanding diabetes and hypertension although well controlled now. I emphasized   the importance of controlling both of these to decrease the progression of   kidney disease and to decrease proteinuria.Serlogies negative with  anca, sixto, complements. SPEP shows possible M protein-evaluated by hematology. Recent SPEP nml.  Pt has neg UA with no blood or rbc's.  She is on lisinopril for proteinuria and nephro-protection and currently off spironolactone. Asked her to hold spironolactone in light of low GFR. Her albumin is slightly low and she does not eat much protein in her diet. Discussed biopsy options and daughter and pt wants to hold off and be monitored.  Rec trying nepro  but has diahrrea.    4. Renal osteodystrophy. Calcium/phos  is stable. Vitamin D levels are stable. pth stable at 77.   5. Anemia. H and H are stable at 10 and iron stable.    Return in 1-2 months.    Addendum:  3/8/18:  I pth of 73, c3 65, c4 19, ca 7.6

## 2018-03-14 ENCOUNTER — TELEPHONE (OUTPATIENT)
Dept: INTERNAL MEDICINE | Facility: CLINIC | Age: 83
End: 2018-03-14

## 2018-03-19 ENCOUNTER — ANTI-COAG VISIT (OUTPATIENT)
Dept: CARDIOLOGY | Facility: CLINIC | Age: 83
End: 2018-03-19

## 2018-03-19 DIAGNOSIS — Z79.01 LONG TERM CURRENT USE OF ANTICOAGULANT THERAPY: ICD-10-CM

## 2018-03-19 LAB — INR PPP: 3.2

## 2018-03-19 NOTE — PROGRESS NOTES
Mahesh with [facility] claled in a verbal result dated today 3/19/18 as: INR -3.2 / PT -38.4, hard copy to be faxed

## 2018-03-20 ENCOUNTER — TELEPHONE (OUTPATIENT)
Dept: INTERNAL MEDICINE | Facility: CLINIC | Age: 83
End: 2018-03-20

## 2018-03-26 ENCOUNTER — TELEPHONE (OUTPATIENT)
Dept: INTERNAL MEDICINE | Facility: CLINIC | Age: 83
End: 2018-03-26

## 2018-03-26 ENCOUNTER — ANTI-COAG VISIT (OUTPATIENT)
Dept: CARDIOLOGY | Facility: CLINIC | Age: 83
End: 2018-03-26

## 2018-03-26 DIAGNOSIS — Z79.01 LONG TERM CURRENT USE OF ANTICOAGULANT THERAPY: ICD-10-CM

## 2018-03-26 LAB — INR PPP: 3.4

## 2018-03-26 NOTE — PROGRESS NOTES
Verbal result taken from jose from [facility] _________. PT/INR _40.8 /3.4______ Date drawn__3/26/18______ Hardcopy to be faxed.

## 2018-03-26 NOTE — TELEPHONE ENCOUNTER
----- Message from Ynes Khan sent at 3/26/2018 11:14 AM CDT -----  Contact: daughter/mallory/[phone]  Pt daughter called in regards to the pt can't walk/blurry vision/wants to sleep in the bed with her daughter.      Please advise

## 2018-03-26 NOTE — TELEPHONE ENCOUNTER
Spoke with pt's daughter , Fang.   Pt has an eye Dr mar tomorrow. Let's go from there to see if there is anyhting else going on. Her weakness and not wanting to move about could be resulting from her vision.    Daughter to call after appt, if need to we can make an appt w/ Dr. Salazar

## 2018-03-27 ENCOUNTER — OFFICE VISIT (OUTPATIENT)
Dept: OPTOMETRY | Facility: CLINIC | Age: 83
End: 2018-03-27
Payer: MEDICARE

## 2018-03-27 DIAGNOSIS — H04.123 DRY EYES, BILATERAL: ICD-10-CM

## 2018-03-27 DIAGNOSIS — H52.4 PRESBYOPIA: ICD-10-CM

## 2018-03-27 DIAGNOSIS — E11.9 TYPE 2 DIABETES MELLITUS WITHOUT RETINOPATHY: Primary | ICD-10-CM

## 2018-03-27 DIAGNOSIS — Z13.5 SCREENING FOR GLAUCOMA: ICD-10-CM

## 2018-03-27 PROCEDURE — 99999 PR PBB SHADOW E&M-EST. PATIENT-LVL II: CPT | Mod: PBBFAC,,, | Performed by: OPTOMETRIST

## 2018-03-27 PROCEDURE — 99212 OFFICE O/P EST SF 10 MIN: CPT | Mod: PBBFAC,PO | Performed by: OPTOMETRIST

## 2018-03-27 PROCEDURE — 92015 DETERMINE REFRACTIVE STATE: CPT | Mod: ,,, | Performed by: OPTOMETRIST

## 2018-03-27 PROCEDURE — 92014 COMPRE OPH EXAM EST PT 1/>: CPT | Mod: S$PBB,,, | Performed by: OPTOMETRIST

## 2018-03-27 NOTE — PROGRESS NOTES
HPI     Patient's age: 86 y.o.  Not using ATs as directed  Approximate date of last eye examination:  1/9/17  Name of last eye doctor seen: Dr. Brooks    Pt states that she is here for yearly exam vision seem not so good like a   cloud over it it has gradually gotten worse, and eye are dry was given   drops not using them at this time.  Had red eyes on last week used a drug   store brand for redness.  .  Wears glasses? yes      Headaches?  no  Eye pain/discomfort?  no                                                                                     Flashes?  no  Floaters?  no  Diplopia/Double vision?  no    Family history of eye disease?  no     ! OTC eyedrops currently using:  Drug store drop for red eyes   ! Prescription eye meds currently using:  None    Hemoglobin A1C       Date                     Value               Ref Range             Status                12/28/2016               5.6                 4.5 - 6.2 %           Final                     11/17/2016               5.7                 4.5 - 6.2 %           Final                     08/27/2015               6.3 (H)             4.5 - 6.2 %           Final                      Last edited by Heriberto Brooks, OD on 3/27/2018 10:01 AM. (History)        ROS     Positive for: Endocrine (DM), Eyes (cat surgery OU/vasc occlusion OD)    Negative for: Constitutional, Gastrointestinal, Neurological, Skin,   Genitourinary, Musculoskeletal, HENT, Cardiovascular, Respiratory,   Psychiatric, Allergic/Imm, Heme/Lymph    Last edited by Heriberto Brooks, OD on 3/27/2018 10:01 AM. (History)        Assessment /Plan     For exam results, see Encounter Report.    Type 2 diabetes mellitus without retinopathy    Dry eyes, bilateral    Screening for glaucoma    Presbyopia      1. Mild pco sp pciol OU (hx iritis/iop spike OS--see Dr zepeda notes).  iop normal OU today  2. 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 DM-  WITHOUT RETINOPATHY.  Advised yearly DFE  4. LUNA--AGAIN advised SYSTANE BAL or SOOTHE XP ATs QID daily!  Pt to dc redness relievers    PLAN:    rtc 1 yr

## 2018-04-02 ENCOUNTER — ANTI-COAG VISIT (OUTPATIENT)
Dept: CARDIOLOGY | Facility: CLINIC | Age: 83
End: 2018-04-02

## 2018-04-02 DIAGNOSIS — Z79.01 LONG TERM CURRENT USE OF ANTICOAGULANT THERAPY: ICD-10-CM

## 2018-04-02 LAB — INR PPP: 1.8

## 2018-04-03 NOTE — PROGRESS NOTES
Verbal result taken from Jack/verbal________. PT/INR _1.8______ Date drawn__04/02/18______ Hardcopy to be faxed. Still waiting for faxed copy 4/03/18.

## 2018-04-04 ENCOUNTER — TELEPHONE (OUTPATIENT)
Dept: INTERNAL MEDICINE | Facility: CLINIC | Age: 83
End: 2018-04-04

## 2018-04-04 NOTE — TELEPHONE ENCOUNTER
Spoke with Rima re: pt.. Ok to do portable xray & let us know results.   Please make use pt is continuing with the zantac    Rima verbalized understanding

## 2018-04-04 NOTE — TELEPHONE ENCOUNTER
----- Message from Ynes Khan sent at 4/4/2018  2:09 PM CDT -----  Contact: [facility]/carolyn/[phone]  [facility] called in regards to see if the dr will let them do a portable  abd x ray due to pt having upper add and stomach pain and if she needs a f/u. Pt has a lot of gas and is very uncomfortable.        Please advise

## 2018-04-05 ENCOUNTER — TELEPHONE (OUTPATIENT)
Dept: INTERNAL MEDICINE | Facility: CLINIC | Age: 83
End: 2018-04-05

## 2018-04-05 NOTE — TELEPHONE ENCOUNTER
If the patient's symptoms persist or worsen, she should schedule an appointment for further evaluation.  Otherwise, recommend increased hydration and fiber. Thank you.

## 2018-04-05 NOTE — TELEPHONE ENCOUNTER
----- Message from Gail Maravilla sent at 4/5/2018  4:03 PM CDT -----  Contact: jose with [facility]. [phone]  Calling to let you know that portable x ray company could not get machine into her [place]. States patient in feeling okay but would like to know if she should come in office for a visit and possible x ray.

## 2018-04-06 NOTE — TELEPHONE ENCOUNTER
Spoke with Rima at [facility] re: xray. If pt feels worse & wants the xray, then she can call & make an appt, but otherwise Dr. Salazar wants her to increase her hydration & fiber to help with relieving the constipation.    Kyler verbalized understanding & will inform the pt.

## 2018-04-07 ENCOUNTER — HOSPITAL ENCOUNTER (EMERGENCY)
Facility: OTHER | Age: 83
Discharge: HOME | End: 2018-04-07
Attending: EMERGENCY MEDICINE
Payer: MEDICARE

## 2018-04-07 VITALS
TEMPERATURE: 98 F | WEIGHT: 145 LBS | OXYGEN SATURATION: 96 % | HEART RATE: 88 BPM | HEIGHT: 62 IN | BODY MASS INDEX: 26.68 KG/M2 | SYSTOLIC BLOOD PRESSURE: 190 MMHG | DIASTOLIC BLOOD PRESSURE: 88 MMHG | RESPIRATION RATE: 19 BRPM

## 2018-04-07 DIAGNOSIS — E87.70 HYPERVOLEMIA, UNSPECIFIED HYPERVOLEMIA TYPE: Primary | ICD-10-CM

## 2018-04-07 DIAGNOSIS — R10.9 ABDOMINAL PAIN: ICD-10-CM

## 2018-04-07 LAB
ALBUMIN SERPL BCP-MCNC: 2.1 G/DL
ALP SERPL-CCNC: 60 U/L
ALT SERPL W/O P-5'-P-CCNC: 20 U/L
ANION GAP SERPL CALC-SCNC: 7 MMOL/L
AST SERPL-CCNC: 35 U/L
BASOPHILS # BLD AUTO: 0.03 K/UL
BASOPHILS NFR BLD: 0.7 %
BILIRUB SERPL-MCNC: 0.6 MG/DL
BUN SERPL-MCNC: 23 MG/DL
CALCIUM SERPL-MCNC: 8 MG/DL
CHLORIDE SERPL-SCNC: 106 MMOL/L
CO2 SERPL-SCNC: 25 MMOL/L
CREAT SERPL-MCNC: 2.4 MG/DL
DIFFERENTIAL METHOD: ABNORMAL
EOSINOPHIL # BLD AUTO: 0.3 K/UL
EOSINOPHIL NFR BLD: 5.9 %
ERYTHROCYTE [DISTWIDTH] IN BLOOD BY AUTOMATED COUNT: 14.6 %
EST. GFR  (AFRICAN AMERICAN): 20 ML/MIN/1.73 M^2
EST. GFR  (NON AFRICAN AMERICAN): 18 ML/MIN/1.73 M^2
GLUCOSE SERPL-MCNC: 134 MG/DL
HCT VFR BLD AUTO: 32.1 %
HGB BLD-MCNC: 10.9 G/DL
INR PPP: 3.8
LIPASE SERPL-CCNC: 33 U/L
LYMPHOCYTES # BLD AUTO: 1.2 K/UL
LYMPHOCYTES NFR BLD: 28 %
MAGNESIUM SERPL-MCNC: 1.6 MG/DL
MCH RBC QN AUTO: 31.2 PG
MCHC RBC AUTO-ENTMCNC: 34 G/DL
MCV RBC AUTO: 92 FL
MONOCYTES # BLD AUTO: 0.4 K/UL
MONOCYTES NFR BLD: 8.6 %
NEUTROPHILS # BLD AUTO: 2.5 K/UL
NEUTROPHILS NFR BLD: 56.3 %
PHOSPHATE SERPL-MCNC: 3.6 MG/DL
PLATELET # BLD AUTO: 170 K/UL
PMV BLD AUTO: 8.7 FL
POTASSIUM SERPL-SCNC: 3.4 MMOL/L
PROT SERPL-MCNC: 5.4 G/DL
PROTHROMBIN TIME: 40.2 SEC
RBC # BLD AUTO: 3.49 M/UL
SODIUM SERPL-SCNC: 138 MMOL/L
T4 FREE SERPL-MCNC: 1.45 NG/DL
TROPONIN I SERPL DL<=0.01 NG/ML-MCNC: 0.03 NG/ML
TSH SERPL DL<=0.005 MIU/L-ACNC: 4.94 UIU/ML
WBC # BLD AUTO: 4.4 K/UL

## 2018-04-07 PROCEDURE — 84484 ASSAY OF TROPONIN QUANT: CPT

## 2018-04-07 PROCEDURE — 83690 ASSAY OF LIPASE: CPT

## 2018-04-07 PROCEDURE — 84443 ASSAY THYROID STIM HORMONE: CPT

## 2018-04-07 PROCEDURE — 99285 EMERGENCY DEPT VISIT HI MDM: CPT | Mod: 25

## 2018-04-07 PROCEDURE — 93005 ELECTROCARDIOGRAM TRACING: CPT

## 2018-04-07 PROCEDURE — 93010 ELECTROCARDIOGRAM REPORT: CPT | Mod: ,,, | Performed by: INTERNAL MEDICINE

## 2018-04-07 PROCEDURE — 85025 COMPLETE CBC W/AUTO DIFF WBC: CPT

## 2018-04-07 PROCEDURE — 84439 ASSAY OF FREE THYROXINE: CPT

## 2018-04-07 PROCEDURE — 80053 COMPREHEN METABOLIC PANEL: CPT

## 2018-04-07 PROCEDURE — 83735 ASSAY OF MAGNESIUM: CPT

## 2018-04-07 PROCEDURE — 85610 PROTHROMBIN TIME: CPT

## 2018-04-07 PROCEDURE — 63600175 PHARM REV CODE 636 W HCPCS: Performed by: EMERGENCY MEDICINE

## 2018-04-07 PROCEDURE — 96375 TX/PRO/DX INJ NEW DRUG ADDON: CPT

## 2018-04-07 PROCEDURE — 25500020 PHARM REV CODE 255: Performed by: EMERGENCY MEDICINE

## 2018-04-07 PROCEDURE — 84100 ASSAY OF PHOSPHORUS: CPT

## 2018-04-07 PROCEDURE — 96374 THER/PROPH/DIAG INJ IV PUSH: CPT

## 2018-04-07 RX ORDER — ONDANSETRON 2 MG/ML
8 INJECTION INTRAMUSCULAR; INTRAVENOUS
Status: COMPLETED | OUTPATIENT
Start: 2018-04-07 | End: 2018-04-07

## 2018-04-07 RX ORDER — FUROSEMIDE 10 MG/ML
40 INJECTION INTRAMUSCULAR; INTRAVENOUS
Status: COMPLETED | OUTPATIENT
Start: 2018-04-07 | End: 2018-04-07

## 2018-04-07 RX ADMIN — FUROSEMIDE 40 MG: 10 INJECTION, SOLUTION INTRAVENOUS at 02:04

## 2018-04-07 RX ADMIN — IOHEXOL 15 ML: 350 INJECTION, SOLUTION INTRAVENOUS at 02:04

## 2018-04-07 RX ADMIN — ONDANSETRON HYDROCHLORIDE 8 MG: 2 INJECTION, SOLUTION INTRAMUSCULAR; INTRAVENOUS at 10:04

## 2018-04-07 NOTE — ED NOTES
Patient is resting comfortably. Family at bedside. The patient is drinking her po contrast without complication. Denies denies pain, SOB, N/V/D at this time, will continue to monitor.

## 2018-04-07 NOTE — ED NOTES
I explained to the patient that we are going to premedicate her with antinausea medication prior to her drinking po contrast. She stated understanding. She denies pain, SOB, and nausea at this time. No acute distress, will continue to monitor.

## 2018-04-07 NOTE — DISCHARGE INSTRUCTIONS
Take an extra tablet of Lasix tomorrow (take 3 pills instead of 2) to help get more fluid out of your abdomen. Go back to your regular dose of Lasix on Monday.

## 2018-04-07 NOTE — ED NOTES
Patient was able to use the bedside commode but had a BM in the same receptical as the urine so a urine specimen could not be contained without contamination. She was returned to bed without complication. No acute distress, will continue to monitor.

## 2018-04-07 NOTE — ED PROVIDER NOTES
"Encounter Date: 4/7/2018    SCRIBE #1 NOTE: I, Lisa Chen, am scribing for, and in the presence of, Dr. Montana.       History     Chief Complaint   Patient presents with    Abdominal Pain     Pt reports upper abd pain x couple of days. Frequent belching. Denies n/v, reports loose stools which are normal for pt. LBM this AM, "little bitty." Appears to have upper abd bulge. Daughter reports this area came about 2 weeks ago.     Time seen by provider: 10:10 AM    This is a 86 y.o. female who presents with complaint of abdominal pain that has been present for approximately one week. Patient describes the pain as a "cramping pain" that is accompanied by feeling "bloated" and a loss of appetite. Patient states she thought it was gas and she called her PCP who instructed her to take fiber which had not provided relief. She reports she passed a small amount of stool this morning. Patient denies chest pain, back pain, vomiting, fevers, dysuria, cough, rhinorrhea, or sore throat. Patient reports leg swelling at baseline. She states she has been compliant with her prescribed medications.  She has no additional complaints.    Additional past medical, surgical, and social history as outlined in the nursing assessment was reviewed by me.            The history is provided by the patient.     Review of patient's allergies indicates:   Allergen Reactions    Plavix [clopidogrel]      Past Medical History:   Diagnosis Date    Anxiety     Atrial fibrillation     chronic AF    CHF (congestive heart failure)     Diabetes insipidus     Diabetes mellitus type II     GERD (gastroesophageal reflux disease)     Hypertension     Iritis 12/10/2014    NPDR (nonproliferative diabetic retinopathy) 10/27/2014    PAF (paroxysmal atrial fibrillation)     Palpitations      Past Surgical History:   Procedure Laterality Date    CATARACT EXTRACTION W/  INTRAOCULAR LENS IMPLANT Right n/a    OD over 10 years ago     CATARACT EXTRACTION W/  " "INTRAOCULAR LENS IMPLANT Left 11/11/14    OS ()    COLON SURGERY      Partial colectomy secondary to suspicious polyp    HYSTERECTOMY      INSERT / REPLACE / REMOVE PACEMAKER      [facility] 2012 [email or web address]    LASER ABLATION      3 years ago OS     Family History   Problem Relation Age of Onset    No Known Problems Mother     No Known Problems Father     No Known Problems Sister     No Known Problems Brother     No Known Problems Maternal Aunt     No Known Problems Maternal Uncle     No Known Problems Paternal Aunt     No Known Problems Paternal Uncle     No Known Problems Maternal Grandmother     No Known Problems Maternal Grandfather     No Known Problems Paternal Grandmother     No Known Problems Paternal Grandfather     Amblyopia Neg Hx     Blindness Neg Hx     Cancer Neg Hx     Cataracts Neg Hx     Diabetes Neg Hx     Glaucoma Neg Hx     Hypertension Neg Hx     Macular degeneration Neg Hx     Retinal detachment Neg Hx     Strabismus Neg Hx     Stroke Neg Hx     Thyroid disease Neg Hx      Social History   Substance Use Topics    Smoking status: Never Smoker    Smokeless tobacco: Never Used    Alcohol use No     Review of Systems   Constitutional: Positive for appetite change. Negative for chills and fever.   HENT: Negative for congestion, rhinorrhea and sore throat.    Respiratory: Negative for cough and shortness of breath.    Cardiovascular: Negative for chest pain.   Gastrointestinal: Positive for abdominal pain and constipation. Negative for diarrhea, nausea and vomiting.        Positive for abdominal "bloating."   Endocrine: Negative for polyuria.   Genitourinary: Negative for decreased urine volume and dysuria.   Musculoskeletal: Negative for back pain.   Skin: Negative for rash.   Allergic/Immunologic: Negative for immunocompromised state.   Neurological: Negative for dizziness and weakness.   Hematological: Does not bruise/bleed easily.   Psychiatric/Behavioral: " Negative for confusion.       Physical Exam     Initial Vitals [04/07/18 0852]   BP Pulse Resp Temp SpO2   (!) 176/79 83 16 98.1 °F (36.7 °C) 98 %      MAP       111.33         Physical Exam    Nursing note and vitals reviewed.  Constitutional: She appears well-developed and well-nourished. She is not diaphoretic. No distress.   HENT:   Head: Normocephalic and atraumatic.   Right Ear: External ear normal.   Left Ear: External ear normal.   Eyes: Conjunctivae and EOM are normal. Right eye exhibits no discharge. Left eye exhibits no discharge.   Neck: Normal range of motion. Neck supple. No tracheal deviation present.   Cardiovascular: Normal rate, regular rhythm, normal heart sounds and intact distal pulses. Exam reveals no gallop and no friction rub.    No murmur heard.  Pulmonary/Chest: Breath sounds normal. No stridor. No respiratory distress. She has no wheezes. She has no rhonchi. She has no rales.   Lungs auscultated on anterior chest wall.   Abdominal: Soft. She exhibits no distension. There is tenderness. There is no rebound and no guarding.   Decreased bowel sounds with mild distension. Tenderness in the epigastrium but no rebound or guarding.    Musculoskeletal: Normal range of motion. She exhibits edema. She exhibits no tenderness.   Non-pitting edema bilateral lower extremities.    Neurological: She is alert and oriented to person, place, and time. She has normal strength. No cranial nerve deficit.   Skin: Skin is warm and dry. Capillary refill takes less than 2 seconds. No erythema. No pallor.   Psychiatric: She has a normal mood and affect. Her behavior is normal.         ED Course   Procedures  Labs Reviewed   CBC W/ AUTO DIFFERENTIAL - Abnormal; Notable for the following:        Result Value    RBC 3.49 (*)     Hemoglobin 10.9 (*)     Hematocrit 32.1 (*)     MCH 31.2 (*)     RDW 14.6 (*)     MPV 8.7 (*)     All other components within normal limits   LIPASE   MAGNESIUM   PHOSPHORUS   PROTIME-INR    COMPREHENSIVE METABOLIC PANEL   TROPONIN I   TSH     EKG Readings: (Independently Interpreted)   Initial Reading: No STEMI.   A-fib at a rate of 72 bpm. Poor R wave progression with occasional PVC. Compared to January it is unchanged.      Imaging Results    None         X-Rays:   Independently Interpreted Readings:   Chest X-Ray: Cardiomegaly with probable effusion and atelectasis. Wires in place.      Medical Decision Making:   Initial Assessment:   Patient presents with upset stomach and abdominal distension. I have concern for impaction vs ileus or obstruction. I will obtain a CT of her abdomen. She is currently hemodynamically stable and has no respiratory distress. I will reassess.   Clinical Tests:   Lab Tests: Reviewed and Ordered  Radiological Study: Reviewed and Ordered  Medical Tests: Ordered and Reviewed  ED Management:  1:53 PM - Patient remains comfortable and denies any pain since arrival to ED. Her labs are grossly unchanged from previous visits except a small rise in her creatinine. It has been this high in the past. I do not believe it is the cause of her symptoms today.  Her CT is pending.     14:30 - patient's CT was consistent with fluid overload and pelvic ascites.  There was also mention of cholelithiasis.  Patient had a negative Wang sign and unremarkable LFTs, including bilirubin.  I do not suspect acute cholecystitis. I will give Lasix for relief at this time. I will continue to monitor.     16:20 - Patient voided and notes abdominal distension has improved. She wants to go home. I have advised an increased dose of Lasix tomorrow and close follow-up with Dr. Salazar for reassessment and to follow her Cre. I have discussed with patient and her daughter the diagnostic results, diagnosis, treatment plan, and need for follow-up. Patient has expressed understanding of my instructions. I am comfortable with her discharge home at this time.              Scribe Attestation:   Scribe #1: I  performed the above scribed service and the documentation accurately describes the services I performed. I attest to the accuracy of the note.    Attending Attestation:           Physician Attestation for Scribe:  Physician Attestation Statement for Scribe #1: I, Dr. Montana, reviewed documentation, as scribed by Lisa Chen in my presence, and it is both accurate and complete.                    Clinical Impression:     1. Hypervolemia, unspecified hypervolemia type    2. Abdominal pain                               Cynthia Montana MD  04/07/18 1915

## 2018-04-07 NOTE — ED NOTES
Patient is resting comfortably. Dr. Montana was at bedside to reevaluate the patient, no acute distress, denies pain, SOB, N/V/D. Will continue to monitor.

## 2018-04-07 NOTE — ED NOTES
The patient is resting quietly, eyes open, son at bedside. Dr. Montana rounded on the patient. She denies pain, SOB. No acute distress, will continue to monitor.

## 2018-04-09 ENCOUNTER — ANTI-COAG VISIT (OUTPATIENT)
Dept: CARDIOLOGY | Facility: CLINIC | Age: 83
End: 2018-04-09

## 2018-04-09 DIAGNOSIS — Z79.01 LONG TERM CURRENT USE OF ANTICOAGULANT THERAPY: ICD-10-CM

## 2018-04-09 LAB — INR PPP: 3

## 2018-04-10 NOTE — PROGRESS NOTES
In ER 4/7 due to fluid. Was given lasix and improved. INR was high. Patient denies dose adjustment for high INR. INR still on upper end today. Will adjust dose based on trend, reevaluate next week. We may need to change tablet strength if new dose is too much of a change.

## 2018-04-11 ENCOUNTER — LAB VISIT (OUTPATIENT)
Dept: LAB | Facility: HOSPITAL | Age: 83
End: 2018-04-11
Attending: FAMILY MEDICINE
Payer: MEDICARE

## 2018-04-11 ENCOUNTER — OFFICE VISIT (OUTPATIENT)
Dept: INTERNAL MEDICINE | Facility: CLINIC | Age: 83
End: 2018-04-11
Payer: MEDICARE

## 2018-04-11 VITALS
SYSTOLIC BLOOD PRESSURE: 129 MMHG | DIASTOLIC BLOOD PRESSURE: 71 MMHG | TEMPERATURE: 98 F | BODY MASS INDEX: 29.53 KG/M2 | WEIGHT: 160.5 LBS | HEIGHT: 62 IN | HEART RATE: 77 BPM

## 2018-04-11 DIAGNOSIS — R60.9 EDEMA, UNSPECIFIED TYPE: Primary | ICD-10-CM

## 2018-04-11 DIAGNOSIS — N17.0 ACUTE RENAL FAILURE WITH TUBULAR NECROSIS: ICD-10-CM

## 2018-04-11 DIAGNOSIS — R60.9 EDEMA, UNSPECIFIED TYPE: ICD-10-CM

## 2018-04-11 DIAGNOSIS — E87.70 HYPERVOLEMIA, UNSPECIFIED HYPERVOLEMIA TYPE: ICD-10-CM

## 2018-04-11 DIAGNOSIS — I50.33 ACUTE ON CHRONIC DIASTOLIC HEART FAILURE: ICD-10-CM

## 2018-04-11 LAB
ALBUMIN SERPL BCP-MCNC: 1.8 G/DL
ALP SERPL-CCNC: 59 U/L
ALT SERPL W/O P-5'-P-CCNC: 18 U/L
ANION GAP SERPL CALC-SCNC: 3 MMOL/L
AST SERPL-CCNC: 41 U/L
BASOPHILS # BLD AUTO: 0.05 K/UL
BASOPHILS NFR BLD: 1.1 %
BILIRUB SERPL-MCNC: 0.6 MG/DL
BNP SERPL-MCNC: 913 PG/ML
BUN SERPL-MCNC: 24 MG/DL
CALCIUM SERPL-MCNC: 7.9 MG/DL
CHLORIDE SERPL-SCNC: 106 MMOL/L
CO2 SERPL-SCNC: 26 MMOL/L
CREAT SERPL-MCNC: 2.3 MG/DL
DIFFERENTIAL METHOD: ABNORMAL
EOSINOPHIL # BLD AUTO: 0.2 K/UL
EOSINOPHIL NFR BLD: 4.8 %
ERYTHROCYTE [DISTWIDTH] IN BLOOD BY AUTOMATED COUNT: 14.6 %
EST. GFR  (AFRICAN AMERICAN): 21.5 ML/MIN/1.73 M^2
EST. GFR  (NON AFRICAN AMERICAN): 18.7 ML/MIN/1.73 M^2
GLUCOSE SERPL-MCNC: 132 MG/DL
HCT VFR BLD AUTO: 27.7 %
HGB BLD-MCNC: 9.2 G/DL
IMM GRANULOCYTES # BLD AUTO: 0.01 K/UL
IMM GRANULOCYTES NFR BLD AUTO: 0.2 %
LYMPHOCYTES # BLD AUTO: 1 K/UL
LYMPHOCYTES NFR BLD: 22.9 %
MCH RBC QN AUTO: 31.7 PG
MCHC RBC AUTO-ENTMCNC: 33.2 G/DL
MCV RBC AUTO: 96 FL
MONOCYTES # BLD AUTO: 0.4 K/UL
MONOCYTES NFR BLD: 9.3 %
NEUTROPHILS # BLD AUTO: 2.8 K/UL
NEUTROPHILS NFR BLD: 61.7 %
NRBC BLD-RTO: 0 /100 WBC
PLATELET # BLD AUTO: 168 K/UL
PMV BLD AUTO: 9.9 FL
POTASSIUM SERPL-SCNC: 3.6 MMOL/L
PROT SERPL-MCNC: 4.7 G/DL
RBC # BLD AUTO: 2.9 M/UL
SODIUM SERPL-SCNC: 135 MMOL/L
WBC # BLD AUTO: 4.54 K/UL

## 2018-04-11 PROCEDURE — 85025 COMPLETE CBC W/AUTO DIFF WBC: CPT

## 2018-04-11 PROCEDURE — 83880 ASSAY OF NATRIURETIC PEPTIDE: CPT

## 2018-04-11 PROCEDURE — 36415 COLL VENOUS BLD VENIPUNCTURE: CPT | Mod: PO

## 2018-04-11 PROCEDURE — 99213 OFFICE O/P EST LOW 20 MIN: CPT | Mod: S$PBB,,, | Performed by: FAMILY MEDICINE

## 2018-04-11 PROCEDURE — 99999 PR PBB SHADOW E&M-EST. PATIENT-LVL III: CPT | Mod: PBBFAC,,, | Performed by: FAMILY MEDICINE

## 2018-04-11 PROCEDURE — 99213 OFFICE O/P EST LOW 20 MIN: CPT | Mod: PBBFAC,PO | Performed by: FAMILY MEDICINE

## 2018-04-11 PROCEDURE — 80053 COMPREHEN METABOLIC PANEL: CPT

## 2018-04-11 RX ORDER — FUROSEMIDE 40 MG/1
40 TABLET ORAL 2 TIMES DAILY
Qty: 14 TABLET | Refills: 0 | Status: SHIPPED | OUTPATIENT
Start: 2018-04-11 | End: 2018-04-17 | Stop reason: ALTCHOICE

## 2018-04-11 RX ORDER — FUROSEMIDE 40 MG/1
TABLET ORAL
Qty: 180 TABLET | Refills: 0 | OUTPATIENT
Start: 2018-04-11

## 2018-04-11 NOTE — PROGRESS NOTES
Subjective:       Patient ID: Deepali Stuart is a 86 y.o. female.    Chief Complaint: Follow-up (ER) and Edema (abdomen)    HPI 86-year-old -American female with acute on chronic diastolic heart failure, renal failure, and atrial fibrillation presents to clinic today for an emergency room follow-up secondary to a chief complaint of abdominal pain and bloating.  At this time the patient reports normal bowel movements and does report a slight improvement of symptoms after treatment in the emergency room.  Workup in the ER did reveal fluid overload with ascites.  Labs were overall within normal limits except for worsening renal failure.  CT scan revealed fluid overload.  Liver functions are unremarkable.  Review of Systems   Constitutional: Negative for appetite change, chills, fatigue and fever.   HENT: Negative for congestion, ear pain, hearing loss, postnasal drip, rhinorrhea, sinus pressure, sore throat and tinnitus.    Eyes: Negative for redness, itching and visual disturbance.   Respiratory: Negative for cough, chest tightness and shortness of breath.    Cardiovascular: Positive for leg swelling. Negative for chest pain and palpitations.   Gastrointestinal: Positive for abdominal distention. Negative for abdominal pain, constipation, diarrhea, nausea and vomiting.   Genitourinary: Negative for decreased urine volume, difficulty urinating, dysuria, frequency, hematuria and urgency.   Musculoskeletal: Negative for back pain, myalgias, neck pain and neck stiffness.   Skin: Negative for rash.   Neurological: Negative for dizziness, light-headedness and headaches.   Psychiatric/Behavioral: Negative.        Objective:      Physical Exam   Constitutional: She is oriented to person, place, and time. She appears well-developed and well-nourished. No distress.   HENT:   Head: Normocephalic and atraumatic.   Right Ear: External ear normal.   Left Ear: External ear normal.   Nose: Nose normal.   Mouth/Throat:  Oropharynx is clear and moist. No oropharyngeal exudate.   Eyes: Conjunctivae and EOM are normal. Pupils are equal, round, and reactive to light. Right eye exhibits no discharge. Left eye exhibits no discharge. No scleral icterus.   Neck: Normal range of motion. Neck supple. No JVD present. No tracheal deviation present. No thyromegaly present.   Cardiovascular: Normal rate, regular rhythm, normal heart sounds and intact distal pulses.  Exam reveals no gallop and no friction rub.    No murmur heard.  Pulmonary/Chest: Effort normal and breath sounds normal. No stridor. No respiratory distress. She has no wheezes. She has no rales.   Abdominal: Soft. Bowel sounds are normal. She exhibits distension (edema). She exhibits no mass. There is no tenderness. There is no rebound and no guarding.   Musculoskeletal: Normal range of motion. She exhibits edema (2 + edema). She exhibits no tenderness.   Lymphadenopathy:     She has no cervical adenopathy.   Neurological: She is alert and oriented to person, place, and time.   Skin: Skin is warm and dry. No rash noted. She is not diaphoretic. No erythema. No pallor.   Psychiatric: She has a normal mood and affect. Her behavior is normal. Judgment and thought content normal.   Nursing note and vitals reviewed.      Assessment:       1. Edema, unspecified type    2. Acute on chronic diastolic heart failure    3. Acute renal failure with tubular necrosis    4. Hypervolemia, unspecified hypervolemia type        Plan:       1.  CBC, CMP, and BNP.  2.  Increase Lasix to 40 mg twice a day.  3.  Encourage follow-up with cardiology and nephrology.  4.  Return to clinic as needed if symptoms persist or worsen.

## 2018-04-12 ENCOUNTER — DOCUMENTATION ONLY (OUTPATIENT)
Dept: NEPHROLOGY | Facility: CLINIC | Age: 83
End: 2018-04-12

## 2018-04-13 ENCOUNTER — TELEPHONE (OUTPATIENT)
Dept: INTERNAL MEDICINE | Facility: CLINIC | Age: 83
End: 2018-04-13

## 2018-04-13 NOTE — TELEPHONE ENCOUNTER
----- Message from Vishal Salazar MD sent at 4/11/2018  5:01 PM CDT -----  Kidney functions are mildly improving but are still low.  The patient's BNP is still elevated but is improving.  This indicates continued volume overload and heart strain.  Continue the Lasix as recommended and have the patient follow-up with cardiology and nephrology.  Please inform the patient.  Thank you.

## 2018-04-13 NOTE — TELEPHONE ENCOUNTER
Spoke with pt and her daughter re: labs results. Kidneys are improving but are very slow. Indicates overload on the heart.   make sure pt follows up with cardiology 7 nephrology. Daughter stated that pt has an appt with cardiology but waiting on the kidney dr to call back.    Pt verbalized understanding

## 2018-04-16 ENCOUNTER — ANTI-COAG VISIT (OUTPATIENT)
Dept: CARDIOLOGY | Facility: CLINIC | Age: 83
End: 2018-04-16

## 2018-04-16 DIAGNOSIS — Z79.01 LONG TERM CURRENT USE OF ANTICOAGULANT THERAPY: ICD-10-CM

## 2018-04-16 LAB — INR PPP: 2.7

## 2018-04-16 NOTE — PROGRESS NOTES
jose with [facility] called in a verbal result dated 4/16/18 as: INR -2.7 / PT -32.4, hard copy to be faxed

## 2018-04-17 ENCOUNTER — HOSPITAL ENCOUNTER (OUTPATIENT)
Dept: CARDIOLOGY | Facility: CLINIC | Age: 83
Discharge: HOME | End: 2018-04-17
Attending: NURSE PRACTITIONER
Payer: MEDICARE

## 2018-04-17 ENCOUNTER — OFFICE VISIT (OUTPATIENT)
Dept: CARDIOLOGY | Facility: CLINIC | Age: 83
End: 2018-04-17
Payer: MEDICARE

## 2018-04-17 VITALS
WEIGHT: 158.75 LBS | DIASTOLIC BLOOD PRESSURE: 72 MMHG | SYSTOLIC BLOOD PRESSURE: 150 MMHG | HEART RATE: 67 BPM | OXYGEN SATURATION: 96 % | HEIGHT: 62 IN | BODY MASS INDEX: 29.21 KG/M2

## 2018-04-17 DIAGNOSIS — Z79.01 LONG TERM CURRENT USE OF ANTICOAGULANT THERAPY: ICD-10-CM

## 2018-04-17 DIAGNOSIS — I73.9 PERIPHERAL VASCULAR DISEASE: ICD-10-CM

## 2018-04-17 DIAGNOSIS — E88.09 HYPOALBUMINEMIA DUE TO PROTEIN-CALORIE MALNUTRITION: ICD-10-CM

## 2018-04-17 DIAGNOSIS — I10 ESSENTIAL HYPERTENSION: ICD-10-CM

## 2018-04-17 DIAGNOSIS — N18.30 CKD (CHRONIC KIDNEY DISEASE) STAGE 3, GFR 30-59 ML/MIN: ICD-10-CM

## 2018-04-17 DIAGNOSIS — Z95.0 PACEMAKER: ICD-10-CM

## 2018-04-17 DIAGNOSIS — E03.4 HYPOTHYROIDISM DUE TO ACQUIRED ATROPHY OF THYROID: ICD-10-CM

## 2018-04-17 DIAGNOSIS — E87.6 HYPOKALEMIA: ICD-10-CM

## 2018-04-17 DIAGNOSIS — I48.20 CHRONIC ATRIAL FIBRILLATION: Chronic | ICD-10-CM

## 2018-04-17 DIAGNOSIS — I50.43: Primary | Chronic | ICD-10-CM

## 2018-04-17 DIAGNOSIS — E83.42 HYPOMAGNESEMIA: ICD-10-CM

## 2018-04-17 DIAGNOSIS — I50.43: ICD-10-CM

## 2018-04-17 DIAGNOSIS — E11.21 CONTROLLED TYPE 2 DIABETES MELLITUS WITH DIABETIC NEPHROPATHY, WITHOUT LONG-TERM CURRENT USE OF INSULIN: ICD-10-CM

## 2018-04-17 DIAGNOSIS — E46 HYPOALBUMINEMIA DUE TO PROTEIN-CALORIE MALNUTRITION: ICD-10-CM

## 2018-04-17 LAB
AORTIC VALVE STENOSIS: ABNORMAL
ESTIMATED PA SYSTOLIC PRESSURE: 45.51
GLOBAL PERICARDIAL EFFUSION: ABNORMAL
MITRAL VALVE REGURGITATION: ABNORMAL
RETIRED EF AND QEF - SEE NOTES: 60 (ref 55–65)
TRICUSPID VALVE REGURGITATION: ABNORMAL

## 2018-04-17 PROCEDURE — 99999 PR PBB SHADOW E&M-EST. PATIENT-LVL III: CPT | Mod: PBBFAC,,, | Performed by: NURSE PRACTITIONER

## 2018-04-17 PROCEDURE — 99214 OFFICE O/P EST MOD 30 MIN: CPT | Mod: S$PBB,,, | Performed by: NURSE PRACTITIONER

## 2018-04-17 PROCEDURE — 93306 TTE W/DOPPLER COMPLETE: CPT | Mod: PBBFAC | Performed by: INTERNAL MEDICINE

## 2018-04-17 PROCEDURE — 96372 THER/PROPH/DIAG INJ SC/IM: CPT | Mod: PBBFAC

## 2018-04-17 PROCEDURE — 99213 OFFICE O/P EST LOW 20 MIN: CPT | Mod: PBBFAC | Performed by: NURSE PRACTITIONER

## 2018-04-17 RX ORDER — METOLAZONE 2.5 MG/1
2.5 TABLET ORAL
Qty: 10 TABLET | Refills: 11 | Status: ON HOLD | OUTPATIENT
Start: 2018-04-17 | End: 2018-05-25 | Stop reason: HOSPADM

## 2018-04-17 RX ORDER — BUMETANIDE 1 MG/1
1.5 TABLET ORAL 2 TIMES DAILY
Qty: 90 TABLET | Refills: 11 | Status: ON HOLD | OUTPATIENT
Start: 2018-04-17 | End: 2018-05-25 | Stop reason: HOSPADM

## 2018-04-17 RX ORDER — FUROSEMIDE 10 MG/ML
40 INJECTION INTRAMUSCULAR; INTRAVENOUS ONCE
Status: COMPLETED | OUTPATIENT
Start: 2018-04-17 | End: 2018-04-17

## 2018-04-17 RX ADMIN — FUROSEMIDE 40 MG: 10 INJECTION, SOLUTION INTRAVENOUS at 10:04

## 2018-04-17 NOTE — PROGRESS NOTES
Ms. Stuart is a patient of Dr. Oseguera and was last seen in [facility] 2/28/2018.    Subjective:   Patient ID:  Deepali Stuart is a 86 y.o. female who presents for follow-up of Edema    Problems:  Chronic Atrial fibrillation  DM type II  HTN  HFpEF, EF 65-70%  Pulmonary hypertension, 46 mmHg  CKD stage IV    HPI  Ms. Stuart is in clinic today for acute on chronic diastolic heart failure.  She was seen by her PCP on 4/11/18 and her lasix was doubled to 40mg BID.  Reports no increased urination since increasing the diuretic.  She sleeps on 3-4 pillows at night but that is unchanged.  Reports bruising on back without trauma.  Patient is taking warfarin for thromboembolic prophylaxis.  Denies bleeding gums, epistaxis, hematuria, and hematochezia.  Last INR yesterday, 2.7.      Review of Systems   Constitution: Negative for diaphoresis, weakness, malaise/fatigue, weight gain and weight loss.   Eyes: Negative for visual disturbance.   Cardiovascular: Positive for dyspnea on exertion, leg swelling and orthopnea. Negative for chest pain, claudication, irregular heartbeat, near-syncope, palpitations, paroxysmal nocturnal dyspnea and syncope.        Denies chest pressure   Respiratory: Positive for shortness of breath. Negative for cough, hemoptysis, sleep disturbances due to breathing and snoring.    Endocrine: Negative for cold intolerance and heat intolerance.   Hematologic/Lymphatic: Negative for bleeding problem. Does not bruise/bleed easily.   Musculoskeletal: Negative for myalgias.   Gastrointestinal: Positive for change in bowel habit, bowel incontinence and diarrhea (For the last week). Negative for bloating, abdominal pain, anorexia, constipation, nausea and vomiting.   Neurological: Negative for difficulty with concentration, disturbances in coordination, excessive daytime sleepiness, dizziness, headaches, light-headedness, loss of balance and numbness.   Psychiatric/Behavioral: The patient does not have  insomnia.      Allergies and current medications updated and reviewed:  Review of patient's allergies indicates:   Allergen Reactions    Plavix [clopidogrel]      Current Outpatient Prescriptions   Medication Sig    amlodipine (NORVASC) 5 MG tablet TAKE 1 TABLET BY MOUTH DAILY    atorvastatin (LIPITOR) 10 MG tablet Take 1 tablet (10 mg total) by mouth once daily.    carvedilol (COREG) 25 MG tablet Take 1 tablet (25 mg total) by mouth 2 (two) times daily.    furosemide (LASIX) 20 MG tablet Take 2 tablets (40 mg total) by mouth once daily.    furosemide (LASIX) 40 MG tablet Take 1 tablet (40 mg total) by mouth 2 (two) times daily. One week supply for acute treatment.    lancets Misc 1 Device by Misc.(Non-Drug; Combo Route) route once daily. (Patient taking differently: 1 Device by Misc.(Non-Drug; Combo Route) route 2 (two) times daily. )    levothyroxine (SYNTHROID) 50 MCG tablet Take 1 tablet (50 mcg total) by mouth once daily.    lisinopril (PRINIVIL,ZESTRIL) 20 MG tablet Take 1 tablet (20 mg total) by mouth once daily.    LORazepam (ATIVAN) 0.5 MG tablet Take 1 tablet (0.5 mg total) by mouth every 8 (eight) hours as needed for Anxiety.    meclizine (ANTIVERT) 25 mg tablet TAKE 2 TABLETS BY MOUTH THREE TIMES DAILY AS NEEDED FOR DIZZINESS    mupirocin (BACTROBAN) 2 % ointment Apply topically 3 (three) times daily.    ondansetron (ZOFRAN-ODT) 4 MG TbDL DISSOLVE ONE TABLET BY MOUTH EVERY 6 HOURS AS NEEDED FOR NAUSEA AND VOMITING    pantoprazole (PROTONIX) 20 MG tablet     ranitidine (ZANTAC) 300 MG tablet Take 1 tablet (300 mg total) by mouth every evening.    trazodone (DESYREL) 50 MG tablet TAKE 1/2 TO 1 TABLET BY MOUTH EVERY EVENING AS NEEDED FOR INSOMNIA    TRUETRACK TEST Strp TEST TWICE DAILY.    warfarin (COUMADIN) 5 MG tablet TAKE 1 TABLET BY MOUTH EVERY DAY OR AS DIRECTED BY COUMADIN CLINIC (Patient taking differently: TAKE 1/2 TABLET BY MOUTH EVERY DAY IN THE EVENING OR AS DIRECTED BY  "COUMADIN CLINIC)     No current facility-administered medications for this visit.      Objective:     Right Arm BP - Sittin/72 (18 0947)    BP (!) 150/72 (BP Location: Left arm, Patient Position: Sitting, BP Method: Large (Automatic))   Pulse 67   Ht 5' 2" (1.575 m)   Wt 72 kg (158 lb 11.7 oz)   LMP  (LMP Unknown)   SpO2 96%   BMI 29.03 kg/m²     Physical Exam   Constitutional: She is oriented to person, place, and time. She appears well-developed and well-nourished. She is active. No distress.   HENT:   Head: Normocephalic and atraumatic.   Eyes: Conjunctivae and lids are normal. No scleral icterus.   Neck: Neck supple. Hepatojugular reflux present. Normal carotid pulses and no JVD present. Carotid bruit is not present.   Cardiovascular: Normal rate, regular rhythm, S1 normal, S2 normal and intact distal pulses.  PMI is not displaced.  Exam reveals no gallop and no friction rub.    Murmur heard.   Harsh midsystolic murmur is present with a grade of 1/6  at the upper right sternal border radiating to the neck  High-pitched blowing holosystolic murmur of grade 2/6 is also present at the apex.  Pulses:       Carotid pulses are 2+ on the right side, and 2+ on the left side.       Radial pulses are 2+ on the right side, and 2+ on the left side.        Dorsalis pedis pulses are 2+ on the right side, and 2+ on the left side.        Posterior tibial pulses are 1+ on the right side, and 1+ on the left side.   Pulmonary/Chest: Effort normal. No respiratory distress. She has no decreased breath sounds. She has no wheezes. She has rhonchi in the right lower field and the left lower field. She has no rales. She exhibits no tenderness.   Abdominal: Soft. Normal appearance and bowel sounds are normal. She exhibits no distension, no fluid wave, no abdominal bruit, no ascites and no pulsatile midline mass. There is no hepatosplenomegaly. There is no tenderness.   Musculoskeletal: She exhibits edema (BLE edema +1 " to knees and sacral and lower back edema to lumbar trace to +1).   Neurological: She is alert and oriented to person, place, and time. Gait normal.   Skin: Skin is warm, dry and intact. No rash noted. She is not diaphoretic. Nails show no clubbing.   Psychiatric: She has a normal mood and affect. Her speech is normal and behavior is normal. Judgment and thought content normal. Cognition and memory are normal.   Nursing note and vitals reviewed.      Chemistry        Component Value Date/Time     (L) 04/11/2018 0923    K 3.6 04/11/2018 0923     04/11/2018 0923    CO2 26 04/11/2018 0923    BUN 24 (H) 04/11/2018 0923    CREATININE 2.3 (H) 04/11/2018 0923     (H) 04/11/2018 0923        Component Value Date/Time    CALCIUM 7.9 (L) 04/11/2018 0923    ALKPHOS 59 04/11/2018 0923    AST 41 (H) 04/11/2018 0923    ALT 18 04/11/2018 0923    BILITOT 0.6 04/11/2018 0923    ESTGFRAFRICA 21.5 (A) 04/11/2018 0923    EGFRNONAA 18.7 (A) 04/11/2018 0923        Lab Results   Component Value Date    LABA1C 6.3 (H) 12/31/2012    HGBA1C 5.6 12/28/2016       Recent Labs  Lab 12/28/16  0800  10/13/17  1023 01/03/18  1408 04/07/18  0940 04/11/18  0923   WHITE BLOOD CELL COUNT 5.58  < > 6.53 7.28 4.40 4.54   HEMOGLOBIN 12.4  < > 11.7 L 11.7 L 10.9 L 9.2 L   HEMATOCRIT 36.8 L  < > 34.4 L 34.7 L 32.1 L 27.7 L   MCV 94  < > 95 93 92 96   PLATELETS 188  < > 196 242 170 168   BNP  --   < > 1,490 H 1,247 H  --  913 H   TSH 2.448  < >  --   --  4.936 H  --    CHOLESTEROL 217 H  --   --   --   --   --    HDL 67  --   --   --   --   --    LDL CHOLESTEROL 131.4  --   --   --   --   --    TRIGLYCERIDES 93  --   --   --   --   --    HDL/CHOLESTEROL RATIO 30.9  --   --   --   --   --    < > = values in this interval not displayed.  No results found for: IRON, TIBC, FERRITIN, SATURATEDIRO      Recent Labs  Lab 04/02/18 04/07/18  0940 04/09/18 04/16/18   INR 1.8 3.8 H 3.0 2.7        Test(s) Reviewed  I have reviewed the following in  detail:  [] Stress test   [] Angiography   [] Echocardiogram   [x] Labs   [] Other:         Assessment/Plan:     Acute on chronic combined systolic and diastolic heart failure, NYHA class 3  Comments:  Decompensated. Switch furosemide to bumex 1.5mg BID. Add Metolazone 2.5mg Q3 days starting tomorrow. Lasix 40mg IVP today. Daily weights. 1.5gm sodium diet.   Orders:  -     metOLazone (ZAROXOLYN) 2.5 MG tablet; Take 1 tablet (2.5 mg total) by mouth Every 3 (three) days.  Dispense: 10 tablet; Refill: 11  -     furosemide injection 40 mg; Inject 4 mLs (40 mg total) into the vein once.  -     bumetanide (BUMEX) 1 MG tablet; Take 1.5 tablets (1.5 mg total) by mouth 2 (two) times daily.  Dispense: 90 tablet; Refill: 11  -     2D echo with color flow doppler; Future  -     Magnesium; Future; Expected date: 04/20/2018  -     Basic metabolic panel; Future; Expected date: 04/20/2018  -     Brain natriuretic peptide; Future; Expected date: 04/20/2018  -     CBC auto differential; Future; Expected date: 04/20/2018    Essential hypertension  Comments:  BP not at goal <130/80. Suspect volume overload contributing to elevated BP. Will diurese and reassess    Pacemaker    Peripheral vascular disease  Comments:  Asymptomatic. Taking ASA and moderate intensity statin therapy. Encouraged walking    CKD (chronic kidney disease) stage 3, GFR 30-59 ml/min    Hypokalemia  Comments:  Last potassium 3.6. Resolved    Hypomagnesemia  Comments:  Resolved. Last mag level 1.6    Chronic atrial fibrillation  Comments:  Irregularly irregular. Anticoagulated and rate controlled. No changes.    Long term current use of anticoagulant therapy  Comments:  Reports bruising secondary to mild trauma on back and leg. Denies bleeding. F/U with coumadin clinic as planned.     Controlled type 2 diabetes mellitus with diabetic nephropathy, without long-term current use of insulin  Comments:  A1C not checked since 2016. Last  on 4/11/18. F/U with  PCP    Hypothyroidism due to acquired atrophy of thyroid  Comments:  TSH elevated mildly 4/7/18. Defer to PCP    Hypoalbuminemia due to protein-calorie malnutrition  Comments:  Albumin 1.9 which could also be contributing to LE edema with loss of onchotic pressure. Poor appetite and intake. Encouraged increased protein intake.            Follow-up in about 3 days (around 4/20/2018).

## 2018-04-17 NOTE — PATIENT INSTRUCTIONS
Stop the lasix (furosemide)    Start taking the bumex 1 and 1/2 tablets (1.5mg) by mouth 2 times a day.    Take the metolazone 2.5mg by mouth tomorrow and then every 3 days thereafter.    Weigh yourself every morning after you go to the bathroom and write it down on a log.  Please bring log    Restrict your water intake to 6 cups (48 ounces).    Please have your blood drawn on Friday morning and an echo before you come back to see us on Friday.

## 2018-04-20 ENCOUNTER — OFFICE VISIT (OUTPATIENT)
Dept: CARDIOLOGY | Facility: CLINIC | Age: 83
End: 2018-04-20
Payer: MEDICARE

## 2018-04-20 ENCOUNTER — LAB VISIT (OUTPATIENT)
Dept: LAB | Facility: HOSPITAL | Age: 83
End: 2018-04-20
Payer: MEDICARE

## 2018-04-20 VITALS
OXYGEN SATURATION: 93 % | HEART RATE: 73 BPM | WEIGHT: 156.75 LBS | DIASTOLIC BLOOD PRESSURE: 62 MMHG | HEIGHT: 62 IN | SYSTOLIC BLOOD PRESSURE: 131 MMHG | BODY MASS INDEX: 28.84 KG/M2

## 2018-04-20 DIAGNOSIS — E83.51 HYPOCALCEMIA: ICD-10-CM

## 2018-04-20 DIAGNOSIS — Z95.0 PACEMAKER: ICD-10-CM

## 2018-04-20 DIAGNOSIS — E88.09 HYPOALBUMINEMIA: ICD-10-CM

## 2018-04-20 DIAGNOSIS — E03.4 HYPOTHYROIDISM DUE TO ACQUIRED ATROPHY OF THYROID: ICD-10-CM

## 2018-04-20 DIAGNOSIS — I50.43: Chronic | ICD-10-CM

## 2018-04-20 DIAGNOSIS — I48.20 CHRONIC ATRIAL FIBRILLATION: Chronic | ICD-10-CM

## 2018-04-20 DIAGNOSIS — E11.21 CONTROLLED TYPE 2 DIABETES MELLITUS WITH DIABETIC NEPHROPATHY, WITHOUT LONG-TERM CURRENT USE OF INSULIN: ICD-10-CM

## 2018-04-20 DIAGNOSIS — D64.9 NORMOCYTIC ANEMIA: ICD-10-CM

## 2018-04-20 DIAGNOSIS — I50.31 ACUTE DIASTOLIC CHF (CONGESTIVE HEART FAILURE): Primary | ICD-10-CM

## 2018-04-20 DIAGNOSIS — Z79.01 LONG TERM CURRENT USE OF ANTICOAGULANT THERAPY: ICD-10-CM

## 2018-04-20 DIAGNOSIS — N18.4 CKD (CHRONIC KIDNEY DISEASE), STAGE IV: ICD-10-CM

## 2018-04-20 DIAGNOSIS — I10 ESSENTIAL HYPERTENSION: ICD-10-CM

## 2018-04-20 LAB
ANION GAP SERPL CALC-SCNC: 7 MMOL/L
BASOPHILS # BLD AUTO: 0.05 K/UL
BASOPHILS NFR BLD: 0.7 %
BNP SERPL-MCNC: 1217 PG/ML
BUN SERPL-MCNC: 22 MG/DL
CALCIUM SERPL-MCNC: 6.8 MG/DL
CHLORIDE SERPL-SCNC: 106 MMOL/L
CO2 SERPL-SCNC: 24 MMOL/L
CREAT SERPL-MCNC: 2.2 MG/DL
DIFFERENTIAL METHOD: ABNORMAL
EOSINOPHIL # BLD AUTO: 0.2 K/UL
EOSINOPHIL NFR BLD: 3 %
ERYTHROCYTE [DISTWIDTH] IN BLOOD BY AUTOMATED COUNT: 14.7 %
EST. GFR  (AFRICAN AMERICAN): 22.7 ML/MIN/1.73 M^2
EST. GFR  (NON AFRICAN AMERICAN): 19.7 ML/MIN/1.73 M^2
GLUCOSE SERPL-MCNC: 148 MG/DL
HCT VFR BLD AUTO: 28.3 %
HGB BLD-MCNC: 9.4 G/DL
IMM GRANULOCYTES # BLD AUTO: 0.02 K/UL
IMM GRANULOCYTES NFR BLD AUTO: 0.3 %
LYMPHOCYTES # BLD AUTO: 0.9 K/UL
LYMPHOCYTES NFR BLD: 13 %
MAGNESIUM SERPL-MCNC: 1.5 MG/DL
MCH RBC QN AUTO: 31.9 PG
MCHC RBC AUTO-ENTMCNC: 33.2 G/DL
MCV RBC AUTO: 96 FL
MONOCYTES # BLD AUTO: 0.6 K/UL
MONOCYTES NFR BLD: 8.6 %
NEUTROPHILS # BLD AUTO: 5.2 K/UL
NEUTROPHILS NFR BLD: 74.4 %
NRBC BLD-RTO: 0 /100 WBC
PLATELET # BLD AUTO: 154 K/UL
PMV BLD AUTO: 10 FL
POTASSIUM SERPL-SCNC: 3.5 MMOL/L
RBC # BLD AUTO: 2.95 M/UL
SODIUM SERPL-SCNC: 137 MMOL/L
WBC # BLD AUTO: 6.94 K/UL

## 2018-04-20 PROCEDURE — 80048 BASIC METABOLIC PNL TOTAL CA: CPT

## 2018-04-20 PROCEDURE — 36415 COLL VENOUS BLD VENIPUNCTURE: CPT

## 2018-04-20 PROCEDURE — 83735 ASSAY OF MAGNESIUM: CPT

## 2018-04-20 PROCEDURE — 99999 PR PBB SHADOW E&M-EST. PATIENT-LVL V: CPT | Mod: PBBFAC,,, | Performed by: NURSE PRACTITIONER

## 2018-04-20 PROCEDURE — 93010 ELECTROCARDIOGRAM REPORT: CPT | Mod: S$PBB,,, | Performed by: INTERNAL MEDICINE

## 2018-04-20 PROCEDURE — 99215 OFFICE O/P EST HI 40 MIN: CPT | Mod: PBBFAC,25 | Performed by: NURSE PRACTITIONER

## 2018-04-20 PROCEDURE — 93005 ELECTROCARDIOGRAM TRACING: CPT | Mod: PBBFAC | Performed by: INTERNAL MEDICINE

## 2018-04-20 PROCEDURE — 83880 ASSAY OF NATRIURETIC PEPTIDE: CPT

## 2018-04-20 PROCEDURE — 85025 COMPLETE CBC W/AUTO DIFF WBC: CPT

## 2018-04-20 PROCEDURE — 99215 OFFICE O/P EST HI 40 MIN: CPT | Mod: S$PBB,,, | Performed by: NURSE PRACTITIONER

## 2018-04-20 RX ORDER — CALCIUM CARBONATE 500(1250)
2 TABLET ORAL 2 TIMES DAILY
Qty: 120 TABLET | Refills: 11 | Status: ON HOLD | OUTPATIENT
Start: 2018-04-20 | End: 2018-05-25 | Stop reason: HOSPADM

## 2018-04-20 RX ORDER — VIT C/E/ZN/COPPR/LUTEIN/ZEAXAN 250MG-90MG
1000 CAPSULE ORAL 2 TIMES DAILY
Qty: 60 CAPSULE | Refills: 11 | Status: ON HOLD | COMMUNITY
Start: 2018-04-20 | End: 2018-05-26 | Stop reason: HOSPADM

## 2018-04-20 NOTE — PROGRESS NOTES
Ms. Stuart is a patient of Dr. Oseguera and was last seen in [facility] 4/17/2018.      Subjective:   Patient ID:  Deepali Stuart is a 86 y.o. female who presents for follow-up of Congestive Heart Failure (2 day f/u )    Problems:  Chronic Atrial fibrillation  DM type II  HTN  HFpEF, EF 65-70%  Pulmonary hypertension, 46 mmHg  CKD stage IV    HPI  Ms. Stuart is in clinic today for acute on chronic diastolic heart failure, NYHA class III.  Reports improvement in breathing since she received the IVP lasix and started the bumex and metolazone. States that she has had a robust urinary response. Weight has come down a couple of pounds since her last visit. She can now walk about 20ft but still becomes dyspneic.      Review of Systems   Constitution: Negative for decreased appetite, diaphoresis, weakness, malaise/fatigue, weight gain and weight loss.   Eyes: Negative for visual disturbance.   Cardiovascular: Positive for dyspnea on exertion, leg swelling and orthopnea. Negative for chest pain, claudication, irregular heartbeat, near-syncope, palpitations, paroxysmal nocturnal dyspnea and syncope.        Denies chest pressure   Respiratory: Negative for cough, hemoptysis, shortness of breath, sleep disturbances due to breathing and snoring.    Endocrine: Negative for cold intolerance and heat intolerance.   Hematologic/Lymphatic: Negative for bleeding problem. Does not bruise/bleed easily.   Musculoskeletal: Negative for myalgias.   Gastrointestinal: Negative for bloating, abdominal pain, anorexia, change in bowel habit, constipation, diarrhea, nausea and vomiting.   Neurological: Negative for difficulty with concentration, disturbances in coordination, excessive daytime sleepiness, dizziness, headaches, light-headedness, loss of balance and numbness.   Psychiatric/Behavioral: The patient does not have insomnia.        Allergies and current medications updated and reviewed:  Review of patient's allergies indicates:    Allergen Reactions    Plavix [clopidogrel]      Current Outpatient Prescriptions   Medication Sig    amlodipine (NORVASC) 5 MG tablet TAKE 1 TABLET BY MOUTH DAILY    atorvastatin (LIPITOR) 10 MG tablet Take 1 tablet (10 mg total) by mouth once daily.    bumetanide (BUMEX) 1 MG tablet Take 1.5 tablets (1.5 mg total) by mouth 2 (two) times daily.    carvedilol (COREG) 25 MG tablet Take 1 tablet (25 mg total) by mouth 2 (two) times daily.    lancets Misc 1 Device by Misc.(Non-Drug; Combo Route) route once daily. (Patient taking differently: 1 Device by Misc.(Non-Drug; Combo Route) route 2 (two) times daily. )    levothyroxine (SYNTHROID) 50 MCG tablet Take 1 tablet (50 mcg total) by mouth once daily.    lisinopril (PRINIVIL,ZESTRIL) 20 MG tablet Take 1 tablet (20 mg total) by mouth once daily.    meclizine (ANTIVERT) 25 mg tablet TAKE 2 TABLETS BY MOUTH THREE TIMES DAILY AS NEEDED FOR DIZZINESS    metOLazone (ZAROXOLYN) 2.5 MG tablet Take 1 tablet (2.5 mg total) by mouth Every 3 (three) days.    mupirocin (BACTROBAN) 2 % ointment Apply topically 3 (three) times daily.    ondansetron (ZOFRAN-ODT) 4 MG TbDL DISSOLVE ONE TABLET BY MOUTH EVERY 6 HOURS AS NEEDED FOR NAUSEA AND VOMITING    pantoprazole (PROTONIX) 20 MG tablet     ranitidine (ZANTAC) 300 MG tablet Take 1 tablet (300 mg total) by mouth every evening.    trazodone (DESYREL) 50 MG tablet TAKE 1/2 TO 1 TABLET BY MOUTH EVERY EVENING AS NEEDED FOR INSOMNIA    TRUETRACK TEST Strp TEST TWICE DAILY.    warfarin (COUMADIN) 5 MG tablet TAKE 1 TABLET BY MOUTH EVERY DAY OR AS DIRECTED BY COUMADIN CLINIC (Patient taking differently: TAKE 1/2 TABLET BY MOUTH EVERY DAY IN THE EVENING OR AS DIRECTED BY COUMADIN CLINIC)    calcium carbonate (OS-JERMAN) 500 mg calcium (1,250 mg) tablet Take 2 tablets (1,000 mg total) by mouth 2 (two) times daily.    cholecalciferol, vitamin D3, 1,000 unit capsule Take 1 capsule (1,000 Units total) by mouth 2 (two) times daily.  "   LORazepam (ATIVAN) 0.5 MG tablet Take 1 tablet (0.5 mg total) by mouth every 8 (eight) hours as needed for Anxiety.     No current facility-administered medications for this visit.        Objective:     Right Arm BP - Sittin/62 (18 1022)  Left Arm BP - Sittin/62 (18 1022)    /62 (BP Location: Left arm, Patient Position: Sitting, BP Method: Large (Automatic))   Pulse 73   Ht 5' 2" (1.575 m)   Wt 71.1 kg (156 lb 12 oz)   LMP  (LMP Unknown)   SpO2 (!) 93%   BMI 28.67 kg/m²       Physical Exam   Constitutional: She is oriented to person, place, and time. Vital signs are normal. She appears well-developed and well-nourished. She is active. No distress.   HENT:   Head: Normocephalic and atraumatic.   Eyes: Conjunctivae and lids are normal. No scleral icterus.   Neck: Neck supple. JVD present. Normal carotid pulses and no hepatojugular reflux present. Carotid bruit is not present.   Cardiovascular: Normal rate, S1 normal, S2 normal and intact distal pulses.  An irregularly irregular rhythm present. PMI is not displaced.  Exam reveals no gallop and no friction rub.    Murmur heard.  High-pitched blowing holosystolic murmur is present with a grade of 2/6  at the apex   Harsh midsystolic murmur of grade 1/6 is also present at the upper right sternal border radiating to the neck.  Pulses:       Carotid pulses are 2+ on the right side, and 2+ on the left side.       Radial pulses are 2+ on the right side, and 2+ on the left side.        Dorsalis pedis pulses are 2+ on the right side, and 2+ on the left side.        Posterior tibial pulses are 1+ on the right side, and 1+ on the left side.   Pulmonary/Chest: Effort normal and breath sounds normal. No respiratory distress. She has no decreased breath sounds. She has no wheezes. She has no rhonchi. She has no rales. She exhibits no tenderness.   Bibasilar crackles do not extending into middle lobes any longer. Improved breath sounds from " Tuesday   Abdominal: Soft. Normal appearance and bowel sounds are normal. She exhibits no distension, no fluid wave, no abdominal bruit, no ascites and no pulsatile midline mass. There is no hepatosplenomegaly. There is no tenderness.   Musculoskeletal: She exhibits edema (Improved from Tuesday).   Neurological: She is alert and oriented to person, place, and time. Gait normal.   Skin: Skin is warm, dry and intact. No rash noted. She is not diaphoretic. Nails show no clubbing.   Psychiatric: She has a normal mood and affect. Her speech is normal and behavior is normal. Judgment and thought content normal. Cognition and memory are normal.   Nursing note and vitals reviewed.      Chemistry        Component Value Date/Time     04/20/2018 0926    K 3.5 04/20/2018 0926     04/20/2018 0926    CO2 24 04/20/2018 0926    BUN 22 04/20/2018 0926    CREATININE 2.2 (H) 04/20/2018 0926     (H) 04/20/2018 0926        Component Value Date/Time    CALCIUM 6.8 (LL) 04/20/2018 0926    ALKPHOS 59 04/11/2018 0923    AST 41 (H) 04/11/2018 0923    ALT 18 04/11/2018 0923    BILITOT 0.6 04/11/2018 0923    ESTGFRAFRICA 22.7 (A) 04/20/2018 0926    EGFRNONAA 19.7 (A) 04/20/2018 0926        Lab Results   Component Value Date    LABA1C 6.3 (H) 12/31/2012    HGBA1C 5.6 12/28/2016       Recent Labs  Lab 12/28/16  0800  01/03/18  1408 04/07/18  0940 04/11/18  0923 04/20/18  0926   WHITE BLOOD CELL COUNT 5.58  < > 7.28 4.40 4.54 6.94   HEMOGLOBIN 12.4  < > 11.7 L 10.9 L 9.2 L 9.4 L   HEMATOCRIT 36.8 L  < > 34.7 L 32.1 L 27.7 L 28.3 L   MCV 94  < > 93 92 96 96   PLATELETS 188  < > 242 170 168 154   BNP  --   < > 1,247 H  --  913 H 1,217 H   TSH 2.448  < >  --  4.936 H  --   --    CHOLESTEROL 217 H  --   --   --   --   --    HDL 67  --   --   --   --   --    LDL CHOLESTEROL 131.4  --   --   --   --   --    TRIGLYCERIDES 93  --   --   --   --   --    HDL/CHOLESTEROL RATIO 30.9  --   --   --   --   --    < > = values in this interval  not displayed.  No results found for: IRON, TIBC, FERRITIN, SATURATEDIRO  Lab Results   Component Value Date    VCRINWON42 247 07/25/2013     Lab Results   Component Value Date    FOLATE 18.9 07/25/2013         Recent Labs  Lab 04/02/18 04/07/18  0940 04/09/18 04/16/18   INR 1.8 3.8 H 3.0 2.7        Test(s) Reviewed  I have reviewed the following in detail:  [] Stress test   [] Angiography   [x] Echocardiogram   [x] Labs   [] Other:         Assessment/Plan:     Acute diastolic CHF (congestive heart failure)  Comments:  Volume overload has improved but she is not euvolemic. Continue current diuresis plan as pt is responding. Repeat bmp and magnesium in 2 weeks.  Orders:  -     Basic metabolic panel; Future; Expected date: 05/04/2018  -     Magnesium; Future; Expected date: 04/20/2018    Essential hypertension  Comments:  BP is improving with increased diuresis. Will continue to monitor. No changes to current regimen.     Pacemaker    CKD (chronic kidney disease), stage IV    Chronic atrial fibrillation  Comments:  Irregularly irregular. Continue anticoagulation and rate controlling medications.     Long term current use of anticoagulant therapy  Comments:  Denies bleeding. F/U with coumadin clinic as planned.    Normocytic anemia  Comments:  Suspect anemia is secondary to CKD. F/U with nephrology    Hypocalcemia  Comments:  Corrected calcium based on albumin of 1.8 is 8.8.  Calcium 1,000 mg BID and vitamin D3 1,000 units BID. ECG does not have prolonged QT interval.  F/U with nephrology  Orders:  -     calcium carbonate (OS-JERMAN) 500 mg calcium (1,250 mg) tablet; Take 2 tablets (1,000 mg total) by mouth 2 (two) times daily.  Dispense: 120 tablet; Refill: 11  -     cholecalciferol, vitamin D3, 1,000 unit capsule; Take 1 capsule (1,000 Units total) by mouth 2 (two) times daily.  Dispense: 60 capsule; Refill: 11  -     EKG 12-lead  -     Basic metabolic panel; Future; Expected date: 05/04/2018  -     Magnesium; Future;  Expected date: 04/20/2018  -     VITAMIN D; Future; Expected date: 05/04/2018    Hypoalbuminemia   Comments:   Albumin 1.8. Unclear etiology. Have pt f/u nephrology to see if this is nephrotic syndrome.       Controlled type 2 diabetes mellitus with diabetic nephropathy, without long-term current use of insulin  Comments:  A1C at goal <7. F/U with PCP as planned.    Hypothyroidism due to acquired atrophy of thyroid      Follow-up in about 2 weeks (around 5/4/2018). with labs before.

## 2018-04-20 NOTE — PATIENT INSTRUCTIONS
Food sources of iron  Iron is found in a few types of foods. Good sources include:  · Red meat, poultry, fish, eggs  · Dried fruits (especially raisins, prunes, figs)  · Legumes such as dried beans and lentils  · Breads and cereals with iron added  · Blackstrap molasses  · Spinach  · Foods cooked in cast-iron pans. This is especially true of acidic foods, such as tomatoes and michelle.    Start calcium 1,000 mg by mouth two times daily and vitamin D3 1,000 units two times daily.    Keep appointment with Dr. Pruitt on 5/3/18.

## 2018-04-23 ENCOUNTER — TELEPHONE (OUTPATIENT)
Dept: CARDIOLOGY | Facility: CLINIC | Age: 83
End: 2018-04-23

## 2018-04-23 ENCOUNTER — ANTI-COAG VISIT (OUTPATIENT)
Dept: CARDIOLOGY | Facility: CLINIC | Age: 83
End: 2018-04-23

## 2018-04-23 DIAGNOSIS — Z79.01 LONG TERM CURRENT USE OF ANTICOAGULANT THERAPY: ICD-10-CM

## 2018-04-23 DIAGNOSIS — E83.42 HYPOMAGNESEMIA: Primary | ICD-10-CM

## 2018-04-23 LAB — INR PPP: 3.1

## 2018-04-23 RX ORDER — LANOLIN ALCOHOL/MO/W.PET/CERES
400 CREAM (GRAM) TOPICAL DAILY
Qty: 30 TABLET | Refills: 11 | Status: ON HOLD | OUTPATIENT
Start: 2018-04-23 | End: 2018-05-25 | Stop reason: HOSPADM

## 2018-04-23 NOTE — TELEPHONE ENCOUNTER
Left message for patient to call back          Millicent Garcia NP sent to  You 2 hours ago (8:00 AM)      Matt Kunz,   Can you please let Ms. Stuart know that her magnesium level came back slightly low, 1.5?  I sent in a prescription for magnesium 400mg daily.   Thanks!   Millicent (Routing comment)

## 2018-04-30 ENCOUNTER — ANTI-COAG VISIT (OUTPATIENT)
Dept: CARDIOLOGY | Facility: CLINIC | Age: 83
End: 2018-04-30

## 2018-04-30 DIAGNOSIS — Z79.01 LONG TERM CURRENT USE OF ANTICOAGULANT THERAPY: ICD-10-CM

## 2018-04-30 LAB — INR PPP: 2.1

## 2018-04-30 NOTE — TRANSFER OF CARE
"Anesthesia Transfer of Care Note    Patient: Deepali Stuart    Procedure(s) Performed: Procedure(s) (LRB):  ESOPHAGOGASTRODUODENOSCOPY (EGD) (N/A)    Patient location: GI    Anesthesia Type: MAC    Transport from OR: Transported from OR on room air with adequate spontaneous ventilation    Post pain: adequate analgesia    Post assessment: no apparent anesthetic complications and tolerated procedure well    Post vital signs: stable    Level of consciousness: awake, alert and oriented    Nausea/Vomiting: no nausea/vomiting    Complications: none          Last vitals:   Visit Vitals    BP (!) 176/84 (Patient Position: Lying, BP Method: Automatic)    Pulse 77    Temp 36.5 °C (97.7 °F) (Oral)    Resp 20    Ht 5' 2" (1.575 m)    Wt 65.8 kg (145 lb)    LMP  (LMP Unknown)    SpO2 97%    Breastfeeding No    BMI 26.52 kg/m2     " 30-Apr-2018 07:58

## 2018-05-03 ENCOUNTER — OFFICE VISIT (OUTPATIENT)
Dept: NEPHROLOGY | Facility: CLINIC | Age: 83
End: 2018-05-03
Payer: MEDICARE

## 2018-05-03 VITALS
SYSTOLIC BLOOD PRESSURE: 126 MMHG | WEIGHT: 130.75 LBS | HEART RATE: 67 BPM | HEIGHT: 62 IN | OXYGEN SATURATION: 97 % | BODY MASS INDEX: 24.06 KG/M2 | DIASTOLIC BLOOD PRESSURE: 62 MMHG

## 2018-05-03 DIAGNOSIS — R80.9 PROTEINURIA, UNSPECIFIED TYPE: ICD-10-CM

## 2018-05-03 DIAGNOSIS — I10 ESSENTIAL HYPERTENSION: ICD-10-CM

## 2018-05-03 DIAGNOSIS — N18.4 CKD (CHRONIC KIDNEY DISEASE) STAGE 4, GFR 15-29 ML/MIN: Primary | ICD-10-CM

## 2018-05-03 PROCEDURE — 99214 OFFICE O/P EST MOD 30 MIN: CPT | Mod: S$PBB,,, | Performed by: INTERNAL MEDICINE

## 2018-05-03 PROCEDURE — 99999 PR PBB SHADOW E&M-EST. PATIENT-LVL III: CPT | Mod: PBBFAC,,, | Performed by: INTERNAL MEDICINE

## 2018-05-03 PROCEDURE — 99213 OFFICE O/P EST LOW 20 MIN: CPT | Mod: PBBFAC | Performed by: INTERNAL MEDICINE

## 2018-05-03 NOTE — PROGRESS NOTES
Subjective:       Patient ID: Deepali Stuart is a 86 y.o. Black or  female who presents for follow-up evaluation of No chief complaint on file.    HPI This is an 86-year-old -American female with  hypertension, diabetes, atrial fibrillation, CAD, and CHF who is coming in for f/u of chronic kidney disease.     BP and diabetes are good but does not recall numbers as home health nurse checks her BP's. Creatinine at 2.2. She has proteinuria. Denies NSAID's.     PAST MEDICAL HISTORY: Significant for diabetes for 10 years, on medications and  had diet-controlled diabetes prior to that, she has hypertension for 20 years,   hard to control in the past, atrial fibrillation with pacemaker placement and   congestive heart failure.    SOCIAL HISTORY: Does not smoke. Does not drink.    FAMILY HISTORY: Noncontributory.  Review of Systems   Constitutional: Positive for appetite change and fatigue.        Weight loss   Eyes: Negative for discharge.   Respiratory: Negative for cough, shortness of breath and wheezing.    Cardiovascular: Negative for chest pain and palpitations.   Gastrointestinal: Negative for abdominal pain, diarrhea, nausea and vomiting.   Genitourinary: Negative for dysuria, frequency, hematuria and urgency.   Skin: Negative for color change and rash.   Psychiatric/Behavioral: Negative for confusion.   All other systems reviewed and are negative.      Objective:      Physical Exam   Constitutional: She is oriented to person, place, and time. She appears well-developed and well-nourished.   HENT:   Right Ear: External ear normal.   Left Ear: External ear normal.   Nose: Nose normal.   Mouth/Throat: Normal dentition.   Eyes: Conjunctivae, EOM and lids are normal. Pupils are equal, round, and reactive to light.   Neck: Trachea normal. No thyroid mass present.   Cardiovascular: Normal rate and regular rhythm.  Exam reveals no friction rub.    No murmur heard.  No lower extremity edema    Pulmonary/Chest: Effort normal and breath sounds normal. No respiratory distress. She has no decreased breath sounds. She has no rales.   Abdominal: Soft. Bowel sounds are normal. She exhibits no mass. There is no tenderness. There is no rebound. No hernia.   Musculoskeletal: She exhibits no edema.   No edema   Neurological: She is alert and oriented to person, place, and time.   Skin: Skin is warm and dry. No rash noted. No erythema.   Psychiatric: She has a normal mood and affect. Judgment normal. Her mood appears not anxious. She does not exhibit a depressed mood.   Oriented to time, place and person.   Vitals reviewed.      Assessment:       No diagnosis found.    Plan:       Creatinine of 2.1 with kidney function of 24% in 12/5/17.   SPEP normal. HGB 10.3 and iron studies stable. ANCA, MANPREET neg.PTH is 77. c3 78(L) c4 18(nml). UA with trace blood but no RBC's.   3/8/18:  I pth of 73, c3 65, c4 19, ca 7.6      This is an 86-year-old -American female with a   longstanding history of hypertension and diabetes coming in for f/u of   chronic kidney disease.  1. Renal. Her baseline creatinine appears to be around  1.4 to 1.7  in the past but higher with azam with the most recent creatinine of 2.2 with an MDRD GFR of 22  mL per minute. Her chronic  kidney disease is likely secondary to longstanding diabetes and hypertension.  Likely new baseline of 1.8-2.    She denies any NSAIDs. Baseline renal   Ultrasound stable with simple cyst and R kidney slightly smaller. Pt does not want HD in the future.  2. Hypertension. Blood pressures are stable. Continue the current regimen.   She is aware of the target blood pressures.  3. Diabetes. Stable off meds.  4. Proteinuria: She has proteinuria around 3 gms initially improved but back to 2.6 gms in 4/18. This is  likely secondary to her longstanding diabetes and hypertension although well controlled now. I emphasized   the importance of controlling both of these to decrease  the progression of   kidney disease and to decrease proteinuria.Serlogies negative with anca, sixto, complements. SPEP shows possible M protein-evaluated by hematology. Last SPEP nml.  Pt has neg UA with no blood or rbc's.  She is on lisinopril for proteinuria and nephro-protection and currently off spironolactone. Asked her to hold spironolactone in light of low GFR. Her albumin is slightly low and she does not eat much protein in her diet. Discussed biopsy options and daughter and pt wants to hold off and be monitored.  Rec trying nepro  but has diahrrea.    4. Renal osteodystrophy. Calcium/phos  is stable. Vitamin D levels are stable. pth stable at 93.   5. Anemia. H and H are stable  and iron studies stable.    Return in 1-2 months.

## 2018-05-07 ENCOUNTER — ANTI-COAG VISIT (OUTPATIENT)
Dept: CARDIOLOGY | Facility: CLINIC | Age: 83
End: 2018-05-07

## 2018-05-07 ENCOUNTER — TELEPHONE (OUTPATIENT)
Dept: INTERNAL MEDICINE | Facility: CLINIC | Age: 83
End: 2018-05-07

## 2018-05-07 DIAGNOSIS — Z79.01 LONG TERM CURRENT USE OF ANTICOAGULANT THERAPY: ICD-10-CM

## 2018-05-07 LAB — INR PPP: 1.8

## 2018-05-07 NOTE — PROGRESS NOTES
Verbal result taken from [facility]_________. PT/INR _1.8______ Date drawn_05/07/18_______ Hardcopy to be faxed.

## 2018-05-07 NOTE — TELEPHONE ENCOUNTER
Spoke with nader with [facility]. Pt's blood sugar, less than 2 hr after eating bread & jello is 278.  Pt is not on any DM medications at this time.    Please advise

## 2018-05-07 NOTE — TELEPHONE ENCOUNTER
----- Message from Josie Petty sent at 5/7/2018  2:05 PM CDT -----  Contact: Juan/[facility]/[phone]  Juan would like to inform the doctor of the pt's high glucose reading of  278 non-fasting. Ara stated the pt is asystematic for hyperglycemic.

## 2018-05-08 ENCOUNTER — OFFICE VISIT (OUTPATIENT)
Dept: CARDIOLOGY | Facility: CLINIC | Age: 83
End: 2018-05-08
Payer: MEDICARE

## 2018-05-08 ENCOUNTER — LAB VISIT (OUTPATIENT)
Dept: LAB | Facility: HOSPITAL | Age: 83
End: 2018-05-08
Attending: INTERNAL MEDICINE
Payer: MEDICARE

## 2018-05-08 VITALS
SYSTOLIC BLOOD PRESSURE: 159 MMHG | HEART RATE: 62 BPM | HEIGHT: 62 IN | BODY MASS INDEX: 24.95 KG/M2 | DIASTOLIC BLOOD PRESSURE: 69 MMHG | WEIGHT: 135.56 LBS

## 2018-05-08 DIAGNOSIS — Z79.01 LONG TERM CURRENT USE OF ANTICOAGULANT THERAPY: ICD-10-CM

## 2018-05-08 DIAGNOSIS — I10 ESSENTIAL HYPERTENSION: Chronic | ICD-10-CM

## 2018-05-08 DIAGNOSIS — Z95.0 PACEMAKER: ICD-10-CM

## 2018-05-08 DIAGNOSIS — E11.21 CONTROLLED TYPE 2 DIABETES MELLITUS WITH DIABETIC NEPHROPATHY, WITHOUT LONG-TERM CURRENT USE OF INSULIN: Chronic | ICD-10-CM

## 2018-05-08 DIAGNOSIS — I50.32 CHRONIC DIASTOLIC HEART FAILURE: Primary | ICD-10-CM

## 2018-05-08 DIAGNOSIS — I48.20 CHRONIC ATRIAL FIBRILLATION: Chronic | ICD-10-CM

## 2018-05-08 DIAGNOSIS — I50.33 ACUTE ON CHRONIC DIASTOLIC HEART FAILURE: ICD-10-CM

## 2018-05-08 DIAGNOSIS — E03.4 HYPOTHYROIDISM DUE TO ACQUIRED ATROPHY OF THYROID: Chronic | ICD-10-CM

## 2018-05-08 LAB
ANION GAP SERPL CALC-SCNC: 8 MMOL/L
BUN SERPL-MCNC: 44 MG/DL
CALCIUM SERPL-MCNC: 8.2 MG/DL
CHLORIDE SERPL-SCNC: 88 MMOL/L
CO2 SERPL-SCNC: 30 MMOL/L
CREAT SERPL-MCNC: 2.7 MG/DL
EST. GFR  (AFRICAN AMERICAN): 17.7 ML/MIN/1.73 M^2
EST. GFR  (NON AFRICAN AMERICAN): 15.4 ML/MIN/1.73 M^2
GLUCOSE SERPL-MCNC: 148 MG/DL
POTASSIUM SERPL-SCNC: 3.5 MMOL/L
SODIUM SERPL-SCNC: 126 MMOL/L

## 2018-05-08 PROCEDURE — 80048 BASIC METABOLIC PNL TOTAL CA: CPT

## 2018-05-08 PROCEDURE — 99213 OFFICE O/P EST LOW 20 MIN: CPT | Mod: PBBFAC | Performed by: NURSE PRACTITIONER

## 2018-05-08 PROCEDURE — 36415 COLL VENOUS BLD VENIPUNCTURE: CPT

## 2018-05-08 PROCEDURE — 99999 PR PBB SHADOW E&M-EST. PATIENT-LVL III: CPT | Mod: PBBFAC,,, | Performed by: NURSE PRACTITIONER

## 2018-05-08 PROCEDURE — 99214 OFFICE O/P EST MOD 30 MIN: CPT | Mod: S$PBB,,, | Performed by: NURSE PRACTITIONER

## 2018-05-08 RX ORDER — FUROSEMIDE 20 MG/1
TABLET ORAL
Refills: 11 | Status: ON HOLD | COMMUNITY
Start: 2018-02-03 | End: 2018-05-22

## 2018-05-08 NOTE — PATIENT INSTRUCTIONS
Stop the daily metolazone.    Salt restriction of 1,500mg a day. Weigh yourself daily.  Take extra dose of metolazone if weight increases 2 or more pounds in a 24 hr period, or 5 pounds over a 7 day period. Contact our office if weight does not return to baseline within 1-2 days.

## 2018-05-08 NOTE — PROGRESS NOTES
Ms. Stuart is a patient of Dr. Oseguera and was last seen in [facility] 4/20/2018.    Subjective:   Patient ID:  Deepali Stuart is a 86 y.o. female who presents for follow-up of Congestive Heart Failure (2 week f/u )    Problems:  Chronic Atrial fibrillation  DM type II  HTN  HFpEF, EF 65-70%  Pulmonary hypertension, 46 mmHg  CKD stage IV  Pacemaker    HPI  Ms. Stuart is in clinic today for acute on chronic diastolic heart failure, NYHA class III.  She has lost 25 pounds since 4/11/18.  She missed her repeat lab work so her renal function and potassium post diuresis is not known. States that her SOB is better but she still has weakness and some MARTINEZ.   Patient denies chest pain with exertion or at rest, palpitations, dizziness, syncope, edema, orthopnea, PND, or claudication.  Reports no routine exercise.      Review of Systems   Constitution: Positive for weakness and weight loss. Negative for decreased appetite, diaphoresis, malaise/fatigue and weight gain.   Eyes: Negative for visual disturbance.   Cardiovascular: Positive for dyspnea on exertion. Negative for chest pain, claudication, irregular heartbeat, leg swelling, near-syncope, orthopnea, palpitations, paroxysmal nocturnal dyspnea and syncope.        Denies chest pressure   Respiratory: Negative for cough, hemoptysis, shortness of breath, sleep disturbances due to breathing and snoring.    Endocrine: Negative for cold intolerance and heat intolerance.   Hematologic/Lymphatic: Negative for bleeding problem. Does not bruise/bleed easily.   Musculoskeletal: Negative for myalgias.   Gastrointestinal: Negative for bloating, abdominal pain, anorexia, change in bowel habit, constipation, diarrhea, nausea and vomiting.   Neurological: Negative for difficulty with concentration, disturbances in coordination, excessive daytime sleepiness, dizziness, headaches, light-headedness, loss of balance and numbness.   Psychiatric/Behavioral: The patient does not have  insomnia.        Allergies and current medications updated and reviewed:  Review of patient's allergies indicates:   Allergen Reactions    Plavix [clopidogrel]      Current Outpatient Prescriptions   Medication Sig    amlodipine (NORVASC) 5 MG tablet TAKE 1 TABLET BY MOUTH DAILY    atorvastatin (LIPITOR) 10 MG tablet Take 1 tablet (10 mg total) by mouth once daily.    bumetanide (BUMEX) 1 MG tablet Take 1.5 tablets (1.5 mg total) by mouth 2 (two) times daily.    calcium carbonate (OS-JERMAN) 500 mg calcium (1,250 mg) tablet Take 2 tablets (1,000 mg total) by mouth 2 (two) times daily.    carvedilol (COREG) 25 MG tablet Take 1 tablet (25 mg total) by mouth 2 (two) times daily.    cholecalciferol, vitamin D3, 1,000 unit capsule Take 1 capsule (1,000 Units total) by mouth 2 (two) times daily.    lancets Misc 1 Device by Misc.(Non-Drug; Combo Route) route once daily. (Patient taking differently: 1 Device by Misc.(Non-Drug; Combo Route) route 2 (two) times daily. )    levothyroxine (SYNTHROID) 50 MCG tablet Take 1 tablet (50 mcg total) by mouth once daily.    lisinopril (PRINIVIL,ZESTRIL) 20 MG tablet Take 1 tablet (20 mg total) by mouth once daily.    magnesium oxide (MAG-OX) 400 mg tablet Take 1 tablet (400 mg total) by mouth once daily.    meclizine (ANTIVERT) 25 mg tablet TAKE 2 TABLETS BY MOUTH THREE TIMES DAILY AS NEEDED FOR DIZZINESS    metOLazone (ZAROXOLYN) 2.5 MG tablet Take 1 tablet (2.5 mg total) by mouth Every 3 (three) days.    mupirocin (BACTROBAN) 2 % ointment Apply topically 3 (three) times daily.    ondansetron (ZOFRAN-ODT) 4 MG TbDL DISSOLVE ONE TABLET BY MOUTH EVERY 6 HOURS AS NEEDED FOR NAUSEA AND VOMITING    pantoprazole (PROTONIX) 20 MG tablet     ranitidine (ZANTAC) 300 MG tablet Take 1 tablet (300 mg total) by mouth every evening.    trazodone (DESYREL) 50 MG tablet TAKE 1/2 TO 1 TABLET BY MOUTH EVERY EVENING AS NEEDED FOR INSOMNIA    TRUETRACK TEST Strp TEST TWICE DAILY.     "warfarin (COUMADIN) 5 MG tablet TAKE 1 TABLET BY MOUTH EVERY DAY OR AS DIRECTED BY COUMADIN CLINIC (Patient taking differently: TAKE 1/2 TABLET BY MOUTH EVERY DAY IN THE EVENING OR AS DIRECTED BY COUMADIN CLINIC)    LORazepam (ATIVAN) 0.5 MG tablet Take 1 tablet (0.5 mg total) by mouth every 8 (eight) hours as needed for Anxiety.     No current facility-administered medications for this visit.      Objective:     Right Arm BP - Sittin/71 (18 1635)  Left Arm BP - Sittin/69 (18 1635)    BP (!) 159/69 (BP Location: Left arm, Patient Position: Sitting, BP Method: Large (Automatic))   Pulse 62   Ht 5' 2" (1.575 m)   Wt 61.5 kg (135 lb 9.3 oz)   LMP  (LMP Unknown)   BMI 24.80 kg/m²     Pulse oximetry 97% and did not drop with ambulating in the halls.  No noted tachypnea or labored breathing with ambulation.    Physical Exam   Constitutional: She is oriented to person, place, and time. Vital signs are normal. She appears well-developed and well-nourished. She is active. No distress.   HENT:   Head: Normocephalic and atraumatic.   Eyes: Conjunctivae and lids are normal. No scleral icterus.   Neck: Neck supple. Normal carotid pulses, no hepatojugular reflux and no JVD present. Carotid bruit is not present.   Cardiovascular: Normal rate, regular rhythm, S1 normal, S2 normal and intact distal pulses.  PMI is not displaced.  Exam reveals no gallop and no friction rub.    Murmur heard.   Harsh midsystolic murmur is present with a grade of 2/6  at the upper right sternal border radiating to the neck  High-pitched blowing holosystolic murmur of grade 1/6 is also present at the apex.   Crescendo presystolic murmur is present   Pulses:       Carotid pulses are 2+ on the right side, and 2+ on the left side.       Radial pulses are 2+ on the right side, and 2+ on the left side.        Dorsalis pedis pulses are 2+ on the right side, and 2+ on the left side.        Posterior tibial pulses are 1+ on the " right side, and 1+ on the left side.   Pulmonary/Chest: Effort normal and breath sounds normal. No respiratory distress. She has no decreased breath sounds. She has no wheezes. She has no rhonchi. She has no rales. She exhibits no tenderness.   Abdominal: Soft. Normal appearance and bowel sounds are normal. She exhibits no distension, no fluid wave, no abdominal bruit, no ascites and no pulsatile midline mass. There is no hepatosplenomegaly. There is no tenderness.   Musculoskeletal: She exhibits edema (presacral +1 edema).   Neurological: She is alert and oriented to person, place, and time. Gait normal.   Skin: Skin is warm, dry and intact. No rash noted. She is not diaphoretic. Nails show no clubbing.   Psychiatric: She has a normal mood and affect. Her speech is normal and behavior is normal. Judgment and thought content normal. Cognition and memory are normal.   Nursing note and vitals reviewed.      Chemistry        Component Value Date/Time     (L) 05/08/2018 1740    K 3.5 05/08/2018 1740    CL 88 (L) 05/08/2018 1740    CO2 30 (H) 05/08/2018 1740    BUN 44 (H) 05/08/2018 1740    CREATININE 2.7 (H) 05/08/2018 1740     (H) 05/08/2018 1740        Component Value Date/Time    CALCIUM 8.2 (L) 05/08/2018 1740    ALKPHOS 59 04/11/2018 0923    AST 41 (H) 04/11/2018 0923    ALT 18 04/11/2018 0923    BILITOT 0.6 04/11/2018 0923    ESTGFRAFRICA 17.7 (A) 05/08/2018 1740    EGFRNONAA 15.4 (A) 05/08/2018 1740        Lab Results   Component Value Date    LABA1C 6.3 (H) 12/31/2012    HGBA1C 5.6 12/28/2016         Recent Labs  Lab 12/28/16  0800  01/03/18  1408 04/07/18  0940 04/11/18  0923 04/20/18  0926   WHITE BLOOD CELL COUNT 5.58  < > 7.28 4.40 4.54 6.94   HEMOGLOBIN 12.4  < > 11.7 L 10.9 L 9.2 L 9.4 L   HEMATOCRIT 36.8 L  < > 34.7 L 32.1 L 27.7 L 28.3 L   MCV 94  < > 93 92 96 96   PLATELETS 188  < > 242 170 168 154   BNP  --   < > 1,247 H  --  913 H 1,217 H   TSH 2.448  < >  --  4.936 H  --   --     CHOLESTEROL 217 H  --   --   --   --   --    HDL 67  --   --   --   --   --    LDL CHOLESTEROL 131.4  --   --   --   --   --    TRIGLYCERIDES 93  --   --   --   --   --    HDL/CHOLESTEROL RATIO 30.9  --   --   --   --   --    < > = values in this interval not displayed.      Recent Labs  Lab 04/16/18 04/23/18 04/30/18 05/07/18   INR 2.7 3.1 2.1 1.8        Test(s) Reviewed  I have reviewed the following in detail:  [] Stress test   [] Angiography   [x] Echocardiogram   [x] Labs   [] Other:         Assessment/Plan:     Chronic diastolic heart failure  Comments:  Well compensated. Instructed to stop the metolazone and make it as needed. Continue furosemide.  Orders:  -     Basic metabolic panel; Future; Expected date: 05/08/2018    Chronic atrial fibrillation  Comments:  Irregularly irregular. Continue anticoagulation.     Long term current use of anticoagulant therapy  Comments:  Denies bleeding. F/u with coumadin clinic as planned.     Essential hypertension  Comments:  BP elevated in clinic today. Requested pt call with BP log in 2 weeks.     Pacemaker  Comments:  PPM for SSS.     Hypothyroidism due to acquired atrophy of thyroid  Comments:  TSH mildly elevated in 4/2018. Fu with PCP    Controlled type 2 diabetes mellitus with diabetic nephropathy, without long-term current use of insulin  Comments:  No recent A1C. Last A1C in 2016 was at goal <7. Not on medications. F/U PCP      Follow-up in about 6 weeks (around 6/19/2018).

## 2018-05-09 ENCOUNTER — TELEPHONE (OUTPATIENT)
Dept: CARDIOLOGY | Facility: CLINIC | Age: 83
End: 2018-05-09

## 2018-05-09 DIAGNOSIS — E87.1 HYPONATREMIA: Primary | ICD-10-CM

## 2018-05-09 DIAGNOSIS — N17.9 ACUTE KIDNEY INJURY SUPERIMPOSED ON CHRONIC KIDNEY DISEASE: ICD-10-CM

## 2018-05-09 DIAGNOSIS — N18.9 ACUTE KIDNEY INJURY SUPERIMPOSED ON CHRONIC KIDNEY DISEASE: ICD-10-CM

## 2018-05-09 NOTE — TELEPHONE ENCOUNTER
Sodium 126. CRT increased to 2.7 and BUN increased to 44.  Spoke with Ms. Stuart and her son regarding holding her lasix (furosemide) for 2 days.  Instructed to still not take the metolazone.  Would like to re-check her labs in 1 week.

## 2018-05-09 NOTE — PROGRESS NOTES
Dose plan was 0mg mon, 2.5mg all other days. INR was dosed on Tuesday, so we advised to take 5mg Tu, then change to 2.5mg daily. Now Fang reports patient took 2.5mg Monday, so just continue 2.5mg daily and repeat INR next Monday.

## 2018-05-09 NOTE — PROGRESS NOTES
Fang called 05/09/18 to inform us that pt did not hold her coumadin on (5/7/18) she took (2.5mg warfarin) and also on 5/8/18( 2.5mg). Please advise.

## 2018-05-14 ENCOUNTER — ANTI-COAG VISIT (OUTPATIENT)
Dept: CARDIOLOGY | Facility: CLINIC | Age: 83
End: 2018-05-14

## 2018-05-14 DIAGNOSIS — Z79.01 LONG TERM CURRENT USE OF ANTICOAGULANT THERAPY: ICD-10-CM

## 2018-05-14 LAB
INR PPP: 1.8
INR PPP: NORMAL

## 2018-05-15 ENCOUNTER — CLINICAL SUPPORT (OUTPATIENT)
Dept: ELECTROPHYSIOLOGY | Facility: CLINIC | Age: 83
End: 2018-05-15
Payer: MEDICARE

## 2018-05-15 DIAGNOSIS — Z95.0 CARDIAC PACEMAKER IN SITU: ICD-10-CM

## 2018-05-15 DIAGNOSIS — I48.91 ATRIAL FIBRILLATION, UNSPECIFIED TYPE: ICD-10-CM

## 2018-05-15 PROCEDURE — 93296 REM INTERROG EVL PM/IDS: CPT | Mod: PBBFAC | Performed by: INTERNAL MEDICINE

## 2018-05-15 PROCEDURE — 93294 REM INTERROG EVL PM/LDLS PM: CPT | Mod: ,,, | Performed by: INTERNAL MEDICINE

## 2018-05-16 DIAGNOSIS — I50.33 ACUTE ON CHRONIC DIASTOLIC HEART FAILURE: ICD-10-CM

## 2018-05-16 DIAGNOSIS — N17.0 ACUTE RENAL FAILURE WITH TUBULAR NECROSIS: ICD-10-CM

## 2018-05-16 DIAGNOSIS — R60.9 EDEMA, UNSPECIFIED TYPE: ICD-10-CM

## 2018-05-16 RX ORDER — FUROSEMIDE 20 MG/1
20 TABLET ORAL DAILY
Qty: 30 TABLET | Refills: 11 | Status: ON HOLD | OUTPATIENT
Start: 2018-05-16 | End: 2018-05-25 | Stop reason: HOSPADM

## 2018-05-20 ENCOUNTER — HOSPITAL ENCOUNTER (INPATIENT)
Facility: OTHER | Age: 83
LOS: 6 days | Discharge: HOSPICE | DRG: 291 | End: 2018-05-26
Attending: EMERGENCY MEDICINE | Admitting: EMERGENCY MEDICINE
Payer: MEDICARE

## 2018-05-20 DIAGNOSIS — I50.9 ACUTE CONGESTIVE HEART FAILURE: ICD-10-CM

## 2018-05-20 DIAGNOSIS — J96.01 ACUTE RESPIRATORY FAILURE WITH HYPOXIA: ICD-10-CM

## 2018-05-20 DIAGNOSIS — I50.9 ACUTE CONGESTIVE HEART FAILURE, UNSPECIFIED HEART FAILURE TYPE: Primary | ICD-10-CM

## 2018-05-20 DIAGNOSIS — Z51.5 ENCOUNTER FOR PALLIATIVE CARE: ICD-10-CM

## 2018-05-20 DIAGNOSIS — N17.9 ACUTE KIDNEY INJURY: ICD-10-CM

## 2018-05-20 DIAGNOSIS — I48.20 CHRONIC ATRIAL FIBRILLATION: ICD-10-CM

## 2018-05-20 DIAGNOSIS — I13.0 HYPERTENSIVE HEART AND KIDNEY DISEASE WITH HF AND WITH CKD STAGE IV: ICD-10-CM

## 2018-05-20 DIAGNOSIS — E11.21 CONTROLLED TYPE 2 DIABETES MELLITUS WITH DIABETIC NEPHROPATHY, WITHOUT LONG-TERM CURRENT USE OF INSULIN: ICD-10-CM

## 2018-05-20 DIAGNOSIS — N18.4 HYPERTENSIVE HEART AND KIDNEY DISEASE WITH HF AND WITH CKD STAGE IV: ICD-10-CM

## 2018-05-20 DIAGNOSIS — E03.4 HYPOTHYROIDISM DUE TO ACQUIRED ATROPHY OF THYROID: ICD-10-CM

## 2018-05-20 DIAGNOSIS — N17.9 AKI (ACUTE KIDNEY INJURY): ICD-10-CM

## 2018-05-20 DIAGNOSIS — R06.02 SHORTNESS OF BREATH: ICD-10-CM

## 2018-05-20 DIAGNOSIS — I50.43 HEART FAILURE, SYSTOLIC AND DIASTOLIC, ACUTE ON CHRONIC: ICD-10-CM

## 2018-05-20 DIAGNOSIS — E87.1 HYPONATREMIA: ICD-10-CM

## 2018-05-20 DIAGNOSIS — I10 ESSENTIAL HYPERTENSION: ICD-10-CM

## 2018-05-20 DIAGNOSIS — I50.9 CHF (CONGESTIVE HEART FAILURE): ICD-10-CM

## 2018-05-20 LAB
ALBUMIN SERPL BCP-MCNC: 2.5 G/DL
ALLENS TEST: ABNORMAL
ALP SERPL-CCNC: 59 U/L
ALT SERPL W/O P-5'-P-CCNC: 20 U/L
ANION GAP SERPL CALC-SCNC: 12 MMOL/L
AST SERPL-CCNC: 36 U/L
BASOPHILS # BLD AUTO: 0.02 K/UL
BASOPHILS NFR BLD: 0.3 %
BILIRUB SERPL-MCNC: 0.8 MG/DL
BNP SERPL-MCNC: 2585 PG/ML
BUN SERPL-MCNC: 49 MG/DL
CALCIUM SERPL-MCNC: 7.9 MG/DL
CHLORIDE SERPL-SCNC: 87 MMOL/L
CO2 SERPL-SCNC: 28 MMOL/L
CREAT SERPL-MCNC: 3.6 MG/DL
DELSYS: ABNORMAL
DIFFERENTIAL METHOD: ABNORMAL
EOSINOPHIL # BLD AUTO: 0.1 K/UL
EOSINOPHIL NFR BLD: 0.8 %
ERYTHROCYTE [DISTWIDTH] IN BLOOD BY AUTOMATED COUNT: 13.2 %
ERYTHROCYTE [SEDIMENTATION RATE] IN BLOOD BY WESTERGREN METHOD: 17 MM/H
EST. GFR  (AFRICAN AMERICAN): 13 ML/MIN/1.73 M^2
EST. GFR  (NON AFRICAN AMERICAN): 11 ML/MIN/1.73 M^2
FIO2: 100
FLOW: 15
GLUCOSE SERPL-MCNC: 155 MG/DL
HCO3 UR-SCNC: 34.4 MMOL/L (ref 24–28)
HCT VFR BLD AUTO: 26.7 %
HGB BLD-MCNC: 9.3 G/DL
INR PPP: 3.5
LYMPHOCYTES # BLD AUTO: 1.1 K/UL
LYMPHOCYTES NFR BLD: 18.5 %
MAGNESIUM SERPL-MCNC: 1.6 MG/DL
MCH RBC QN AUTO: 31.5 PG
MCHC RBC AUTO-ENTMCNC: 34.8 G/DL
MCV RBC AUTO: 91 FL
MODE: ABNORMAL
MONOCYTES # BLD AUTO: 0.3 K/UL
MONOCYTES NFR BLD: 5 %
NEUTROPHILS # BLD AUTO: 4.5 K/UL
NEUTROPHILS NFR BLD: 75.2 %
PCO2 BLDA: 33.5 MMHG (ref 35–45)
PH SMN: 7.62 [PH] (ref 7.35–7.45)
PHOSPHATE SERPL-MCNC: 3.3 MG/DL
PLATELET # BLD AUTO: 129 K/UL
PMV BLD AUTO: 8.8 FL
PO2 BLDA: 74 MMHG (ref 40–60)
POC BE: 13 MMOL/L
POC SATURATED O2: 97 % (ref 95–100)
POCT GLUCOSE: 144 MG/DL (ref 70–110)
POTASSIUM SERPL-SCNC: 3.4 MMOL/L
PROT SERPL-MCNC: 6 G/DL
PROTHROMBIN TIME: 37.7 SEC
RBC # BLD AUTO: 2.95 M/UL
SAMPLE: ABNORMAL
SITE: ABNORMAL
SODIUM SERPL-SCNC: 127 MMOL/L
SP02: 100
TROPONIN I SERPL DL<=0.01 NG/ML-MCNC: 0.2 NG/ML
WBC # BLD AUTO: 6.04 K/UL

## 2018-05-20 PROCEDURE — 93005 ELECTROCARDIOGRAM TRACING: CPT

## 2018-05-20 PROCEDURE — 94761 N-INVAS EAR/PLS OXIMETRY MLT: CPT

## 2018-05-20 PROCEDURE — 99900035 HC TECH TIME PER 15 MIN (STAT)

## 2018-05-20 PROCEDURE — 25000003 PHARM REV CODE 250: Performed by: EMERGENCY MEDICINE

## 2018-05-20 PROCEDURE — 99285 EMERGENCY DEPT VISIT HI MDM: CPT | Mod: 25

## 2018-05-20 PROCEDURE — 63600175 PHARM REV CODE 636 W HCPCS: Performed by: INTERNAL MEDICINE

## 2018-05-20 PROCEDURE — 83880 ASSAY OF NATRIURETIC PEPTIDE: CPT

## 2018-05-20 PROCEDURE — 83735 ASSAY OF MAGNESIUM: CPT

## 2018-05-20 PROCEDURE — 99223 1ST HOSP IP/OBS HIGH 75: CPT | Mod: ,,, | Performed by: INTERNAL MEDICINE

## 2018-05-20 PROCEDURE — 63600175 PHARM REV CODE 636 W HCPCS: Performed by: EMERGENCY MEDICINE

## 2018-05-20 PROCEDURE — 84484 ASSAY OF TROPONIN QUANT: CPT

## 2018-05-20 PROCEDURE — 93010 ELECTROCARDIOGRAM REPORT: CPT | Mod: ,,, | Performed by: INTERNAL MEDICINE

## 2018-05-20 PROCEDURE — 25000003 PHARM REV CODE 250: Performed by: INTERNAL MEDICINE

## 2018-05-20 PROCEDURE — 93010 ELECTROCARDIOGRAM REPORT: CPT | Mod: 76,,, | Performed by: INTERNAL MEDICINE

## 2018-05-20 PROCEDURE — A4216 STERILE WATER/SALINE, 10 ML: HCPCS | Performed by: INTERNAL MEDICINE

## 2018-05-20 PROCEDURE — 27000221 HC OXYGEN, UP TO 24 HOURS

## 2018-05-20 PROCEDURE — 84100 ASSAY OF PHOSPHORUS: CPT

## 2018-05-20 PROCEDURE — 85610 PROTHROMBIN TIME: CPT

## 2018-05-20 PROCEDURE — 82803 BLOOD GASES ANY COMBINATION: CPT

## 2018-05-20 PROCEDURE — 80053 COMPREHEN METABOLIC PANEL: CPT

## 2018-05-20 PROCEDURE — 85025 COMPLETE CBC W/AUTO DIFF WBC: CPT

## 2018-05-20 PROCEDURE — 11000001 HC ACUTE MED/SURG PRIVATE ROOM

## 2018-05-20 PROCEDURE — 96374 THER/PROPH/DIAG INJ IV PUSH: CPT

## 2018-05-20 RX ORDER — CHOLECALCIFEROL (VITAMIN D3) 25 MCG
1000 TABLET ORAL DAILY
Status: DISCONTINUED | OUTPATIENT
Start: 2018-05-21 | End: 2018-05-26 | Stop reason: HOSPADM

## 2018-05-20 RX ORDER — FAMOTIDINE 20 MG/1
20 TABLET, FILM COATED ORAL 2 TIMES DAILY
Status: DISCONTINUED | OUTPATIENT
Start: 2018-05-20 | End: 2018-05-22

## 2018-05-20 RX ORDER — IBUPROFEN 200 MG
24 TABLET ORAL
Status: DISCONTINUED | OUTPATIENT
Start: 2018-05-20 | End: 2018-05-26 | Stop reason: HOSPADM

## 2018-05-20 RX ORDER — CARVEDILOL 12.5 MG/1
25 TABLET ORAL 2 TIMES DAILY
Status: DISCONTINUED | OUTPATIENT
Start: 2018-05-20 | End: 2018-05-26 | Stop reason: HOSPADM

## 2018-05-20 RX ORDER — FUROSEMIDE 10 MG/ML
40 INJECTION INTRAMUSCULAR; INTRAVENOUS 2 TIMES DAILY
Status: DISCONTINUED | OUTPATIENT
Start: 2018-05-20 | End: 2018-05-21

## 2018-05-20 RX ORDER — LISINOPRIL 20 MG/1
20 TABLET ORAL DAILY
Status: DISCONTINUED | OUTPATIENT
Start: 2018-05-21 | End: 2018-05-20

## 2018-05-20 RX ORDER — SODIUM CHLORIDE 0.9 % (FLUSH) 0.9 %
3 SYRINGE (ML) INJECTION EVERY 8 HOURS
Status: DISCONTINUED | OUTPATIENT
Start: 2018-05-20 | End: 2018-05-26 | Stop reason: HOSPADM

## 2018-05-20 RX ORDER — WARFARIN 2.5 MG/1
2.5 TABLET ORAL DAILY
Status: DISCONTINUED | OUTPATIENT
Start: 2018-05-20 | End: 2018-05-21

## 2018-05-20 RX ORDER — CALCIUM CARBONATE 500(1250)
1000 TABLET ORAL 2 TIMES DAILY
Status: DISCONTINUED | OUTPATIENT
Start: 2018-05-20 | End: 2018-05-23

## 2018-05-20 RX ORDER — ATORVASTATIN CALCIUM 10 MG/1
10 TABLET, FILM COATED ORAL DAILY
Status: DISCONTINUED | OUTPATIENT
Start: 2018-05-21 | End: 2018-05-26

## 2018-05-20 RX ORDER — TRAZODONE HYDROCHLORIDE 50 MG/1
50 TABLET ORAL NIGHTLY
Status: DISCONTINUED | OUTPATIENT
Start: 2018-05-20 | End: 2018-05-26 | Stop reason: HOSPADM

## 2018-05-20 RX ORDER — LANOLIN ALCOHOL/MO/W.PET/CERES
400 CREAM (GRAM) TOPICAL DAILY
Status: DISCONTINUED | OUTPATIENT
Start: 2018-05-21 | End: 2018-05-23

## 2018-05-20 RX ORDER — IBUPROFEN 200 MG
16 TABLET ORAL
Status: DISCONTINUED | OUTPATIENT
Start: 2018-05-20 | End: 2018-05-26 | Stop reason: HOSPADM

## 2018-05-20 RX ORDER — LEVOTHYROXINE SODIUM 50 UG/1
50 TABLET ORAL DAILY
Status: DISCONTINUED | OUTPATIENT
Start: 2018-05-21 | End: 2018-05-26 | Stop reason: HOSPADM

## 2018-05-20 RX ORDER — FUROSEMIDE 10 MG/ML
40 INJECTION INTRAMUSCULAR; INTRAVENOUS
Status: COMPLETED | OUTPATIENT
Start: 2018-05-20 | End: 2018-05-20

## 2018-05-20 RX ORDER — METOLAZONE 2.5 MG/1
2.5 TABLET ORAL
Status: DISCONTINUED | OUTPATIENT
Start: 2018-05-20 | End: 2018-05-21

## 2018-05-20 RX ORDER — GLUCAGON 1 MG
1 KIT INJECTION
Status: DISCONTINUED | OUTPATIENT
Start: 2018-05-20 | End: 2018-05-26 | Stop reason: HOSPADM

## 2018-05-20 RX ORDER — AMLODIPINE BESYLATE 5 MG/1
5 TABLET ORAL DAILY
Status: DISCONTINUED | OUTPATIENT
Start: 2018-05-21 | End: 2018-05-26 | Stop reason: HOSPADM

## 2018-05-20 RX ORDER — INSULIN ASPART 100 [IU]/ML
0-5 INJECTION, SOLUTION INTRAVENOUS; SUBCUTANEOUS
Status: DISCONTINUED | OUTPATIENT
Start: 2018-05-20 | End: 2018-05-26 | Stop reason: HOSPADM

## 2018-05-20 RX ADMIN — TRAZODONE HYDROCHLORIDE 50 MG: 50 TABLET ORAL at 09:05

## 2018-05-20 RX ADMIN — Medication 1000 MG: at 09:05

## 2018-05-20 RX ADMIN — METOLAZONE 2.5 MG: 2.5 TABLET ORAL at 07:05

## 2018-05-20 RX ADMIN — NITROGLYCERIN 0.5 INCH: 20 OINTMENT TOPICAL at 01:05

## 2018-05-20 RX ADMIN — SODIUM CHLORIDE, PRESERVATIVE FREE 3 ML: 5 INJECTION INTRAVENOUS at 09:05

## 2018-05-20 RX ADMIN — FAMOTIDINE 20 MG: 20 TABLET ORAL at 09:05

## 2018-05-20 RX ADMIN — CARVEDILOL 25 MG: 12.5 TABLET, FILM COATED ORAL at 09:05

## 2018-05-20 RX ADMIN — FUROSEMIDE 40 MG: 10 INJECTION, SOLUTION INTRAMUSCULAR; INTRAVENOUS at 07:05

## 2018-05-20 RX ADMIN — FUROSEMIDE 40 MG: 10 INJECTION, SOLUTION INTRAVENOUS at 01:05

## 2018-05-20 NOTE — ED PROVIDER NOTES
"Encounter Date: 5/20/2018    SCRIBE #1 NOTE: I, Oren Quevedo, am scribing for, and in the presence of, Dr. Montana.       History     Chief Complaint   Patient presents with    Shortness of Breath     Pt c/o increased SOB with lower extremity swelling. Pt c/o abdominal pain, nausea & weakness. Pt's daughter reports that pt is now having difficulty ambulating with her walker. Pt's daughter also reports that pt has exhibited intermittent confusion.     Seen by provider: 12:30 PM    Patient is a 86 y.o. female with a history of HTN, AFIB, CHF, DM, and GERD who presents to the ED with complaint of worsening shortness of breath since yesterday. Per daughter, she is having difficulty walking secondary to dyspnea. She also reports the patient's chronic bilateral leg swelling is worse than baseline. Patient states she is urinating slightly less than normal. She states she drinks six bottles of water per day but not more as agreed upon her physician. She denies chest pain, fever, or cough. She is not on home oxygen.    Patient also complains of intermittent upper abdominal pain since yesterday. Daughter reports a decreased appetite and states "she wasn't looking good." Per daughter, she is "forgetting words" and seems "extremely depressed." She denies vomiting, diarrhea, or constipation. She has no additional complaints.    Additional past medical, surgical, and social history as outlined in the nursing assessment was reviewed by me.      The history is provided by the patient and a relative (daughter).     Review of patient's allergies indicates:   Allergen Reactions    Plavix [clopidogrel]      Past Medical History:   Diagnosis Date    Anxiety     Atrial fibrillation     chronic AF    CHF (congestive heart failure)     Diabetes insipidus     Diabetes mellitus type II     GERD (gastroesophageal reflux disease)     Hypertension     Iritis 12/10/2014    NPDR (nonproliferative diabetic retinopathy) 10/27/2014    " PAF (paroxysmal atrial fibrillation)     Palpitations      Past Surgical History:   Procedure Laterality Date    CATARACT EXTRACTION W/  INTRAOCULAR LENS IMPLANT Right n/a    OD over 10 years ago     CATARACT EXTRACTION W/  INTRAOCULAR LENS IMPLANT Left 11/11/14    OS ()    COLON SURGERY      Partial colectomy secondary to suspicious polyp    HYSTERECTOMY      INSERT / REPLACE / REMOVE PACEMAKER      [facility] PM 2012 [email or web address]    LASER ABLATION      3 years ago OS     Family History   Problem Relation Age of Onset    No Known Problems Mother     No Known Problems Father     No Known Problems Sister     No Known Problems Brother     No Known Problems Maternal Aunt     No Known Problems Maternal Uncle     No Known Problems Paternal Aunt     No Known Problems Paternal Uncle     No Known Problems Maternal Grandmother     No Known Problems Maternal Grandfather     No Known Problems Paternal Grandmother     No Known Problems Paternal Grandfather     Amblyopia Neg Hx     Blindness Neg Hx     Cancer Neg Hx     Cataracts Neg Hx     Diabetes Neg Hx     Glaucoma Neg Hx     Hypertension Neg Hx     Macular degeneration Neg Hx     Retinal detachment Neg Hx     Strabismus Neg Hx     Stroke Neg Hx     Thyroid disease Neg Hx      Social History   Substance Use Topics    Smoking status: Never Smoker    Smokeless tobacco: Never Used    Alcohol use No     Review of Systems   Constitutional: Positive for appetite change (decreased). Negative for chills and fever.   HENT: Negative for congestion, rhinorrhea and sore throat.    Respiratory: Positive for shortness of breath. Negative for cough.    Cardiovascular: Positive for leg swelling (bilateral, worse than baseline). Negative for chest pain.   Gastrointestinal: Positive for abdominal pain (intermittent, upper abdomen). Negative for diarrhea, nausea and vomiting.   Endocrine: Negative for polyuria.   Genitourinary: Positive for decreased urine  volume. Negative for dysuria.   Musculoskeletal: Negative for back pain.   Skin: Negative for rash.   Allergic/Immunologic: Negative for immunocompromised state.   Neurological: Negative for dizziness.   Hematological: Does not bruise/bleed easily.   Psychiatric/Behavioral: Positive for confusion.       Physical Exam     Initial Vitals [05/20/18 1219]   BP Pulse Resp Temp SpO2   131/63 66 20 97.5 °F (36.4 °C) (!) 85 %      MAP       85.67         Physical Exam    Nursing note and vitals reviewed.  Constitutional: She appears well-developed and well-nourished. She is not diaphoretic. No distress.   HENT:   Head: Normocephalic and atraumatic.   Right Ear: External ear normal.   Left Ear: External ear normal.   Eyes: Conjunctivae and EOM are normal. Right eye exhibits no discharge. Left eye exhibits no discharge.   Neck: Normal range of motion. Neck supple. No tracheal deviation present. JVD present.   Cardiovascular: Normal rate, regular rhythm and intact distal pulses. Exam reveals gallop (S3 gallop). Exam reveals no friction rub.    No murmur heard.  Pulmonary/Chest: No stridor. She is in respiratory distress (mild respiratory distress). She has no wheezes. She has no rhonchi. She has no rales.   Decreased breath sounds at the bases of the lungs.   Abdominal: Soft. She exhibits no distension. There is no tenderness. There is no rebound and no guarding.   Musculoskeletal: Normal range of motion. She exhibits edema (2+ edema bilateral lower extremities).   Neurological: She is alert and oriented to person, place, and time. No cranial nerve deficit.   Skin: Skin is warm and dry. Capillary refill takes less than 2 seconds. No rash noted. No pallor.   Psychiatric: Her behavior is normal. Thought content normal.   Flat affect.         ED Course   Critical Care  Date/Time: 5/20/2018 2:04 PM  Performed by: ADAN GODWIN  Authorized by: ADAN GODWIN         Labs Reviewed   B-TYPE NATRIURETIC PEPTIDE - Abnormal;  Notable for the following:        Result Value    BNP 2,585 (*)     All other components within normal limits   TROPONIN I - Abnormal; Notable for the following:     Troponin I 0.201 (*)     All other components within normal limits   CBC W/ AUTO DIFFERENTIAL - Abnormal; Notable for the following:     RBC 2.95 (*)     Hemoglobin 9.3 (*)     Hematocrit 26.7 (*)     MCH 31.5 (*)     Platelets 129 (*)     MPV 8.8 (*)     Gran% 75.2 (*)     All other components within normal limits   COMPREHENSIVE METABOLIC PANEL - Abnormal; Notable for the following:     Sodium 127 (*)     Potassium 3.4 (*)     Chloride 87 (*)     Glucose 155 (*)     BUN, Bld 49 (*)     Creatinine 3.6 (*)     Calcium 7.9 (*)     Albumin 2.5 (*)     eGFR if  13 (*)     eGFR if non  11 (*)     All other components within normal limits   PROTIME-INR - Abnormal; Notable for the following:     Prothrombin Time 37.7 (*)     INR 3.5 (*)     All other components within normal limits    Narrative:     Recoll. [id number] by PL at 05/20/2018 13:49, reason: Specimen   hemolyzed. Notified Estela Zazueta RN ER at 1349. Request lab   staff to draw.   ISTAT PROCEDURE - Abnormal; Notable for the following:     POC PH 7.618 (*)     POC PCO2 33.5 (*)     POC PO2 74 (*)     POC HCO3 34.4 (*)     All other components within normal limits   MAGNESIUM   PHOSPHORUS     Imaging Results          X-Ray Chest AP Portable (Final result)  Result time 05/20/18 13:07:02    Final result by Venkat Goldstein MD (05/20/18 13:07:02)                 Impression:      1. Bilateral pleural effusions with associated compressive atelectasis of the lower lobes, superimposed developing left lower lung zone consolidation however is not excluded.  Correlation recommended.      Electronically signed by: Venkat Goldstein MD  Date:    05/20/2018  Time:    13:07             Narrative:    EXAMINATION:  XR CHEST AP PORTABLE    CLINICAL HISTORY:  Shortness of  breath    TECHNIQUE:  Single frontal view of the chest was performed.    COMPARISON:  04/07/2018    FINDINGS:  Patient is rotated, limiting evaluation.  The cardiomediastinal silhouette is prominent, similar to the previous exam noting calcification of the aorta..  There are bilateral pleural effusions, left greater than right, worsened..  The trachea is midline.  The lungs are symmetrically expanded bilaterally with patchy increased interstitial and parenchymal attenuation, primarily projected over the bilateral lower lung zones.  Superimposed left lower lung zone consolidation is not excluded..  There is no pneumothorax.  The osseous structures are remarkable for degenerative changes noting dextroscoliotic curvature of the thoracic spine..                                EKG Readings: (Independently Interpreted)   Atrial fibrillation. Rate of 76. Poor r wave progression. Left axis deviation. Inverted T waves in V5 and V6. Otherwise normal. No STEMI.       X-Rays:   Independently Interpreted Readings:   Chest X-Ray: Bilateral effusions.     Medical Decision Making:   Initial Assessment:   Patient presents with worsening dyspnea and leg swelling. She clinically appears fluid overloaded. I will check a BNP. I will give lasix and nitroglycerin. She has no indication for intubation at this time, but does have mild respiratory distress. I will reassess.  Independently Interpreted Test(s):   I have ordered and independently interpreted X-rays - see prior notes.  I have ordered and independently interpreted EKG Reading(s) - see prior notes  Clinical Tests:   Lab Tests: Ordered and Reviewed  Radiological Study: Ordered and Reviewed  Medical Tests: Reviewed and Ordered  ED Management:  1:28 PM - Patient's BNP is 2500 and chest x-ray is consistent with an acute CHF exacerbation. She reports symptomatic improvement after Nitropaste, but she still has not voided. She is currently maintaining spO2 of 96% on 5L NC (downgraded by  me from partial rebreather at 15L). I will continue to monitor and determine disposition of floor vs. ICU based on her oxygen needs.    2:00 PM - Patient remains comfortable. I will admit to floor at this time. Case discussed with Dr. Gutiérrez of the hospitalist service who has accepted this admission and will assume care of the patient at this time.              Scribe Attestation:   Scribe #1: I performed the above scribed service and the documentation accurately describes the services I performed. I attest to the accuracy of the note.    Attending Attestation:           Physician Attestation for Scribe:  Physician Attestation Statement for Scribe #1: I, Dr. Montana, reviewed documentation, as scribed by Oren Quevedo in my presence, and it is both accurate and complete.                    Clinical Impression:     1. Acute congestive heart failure, unspecified heart failure type    2. Shortness of breath    3. Acute kidney injury    4. CHF (congestive heart failure)    5. Acute congestive heart failure                              Cynthia Montana MD  05/20/18 1942

## 2018-05-20 NOTE — PLAN OF CARE
6:36 PM - called by RN regarding elevated INR for patient on Coumadin. INR of 3.5. Coumadin dose held this evening. INR repeat in AM.

## 2018-05-21 PROBLEM — I50.33 ACUTE ON CHRONIC DIASTOLIC CONGESTIVE HEART FAILURE: Status: ACTIVE | Noted: 2018-05-20

## 2018-05-21 LAB
ALBUMIN SERPL BCP-MCNC: 2.4 G/DL
ALP SERPL-CCNC: 49 U/L
ALT SERPL W/O P-5'-P-CCNC: 18 U/L
ANION GAP SERPL CALC-SCNC: 11 MMOL/L
ANION GAP SERPL CALC-SCNC: 11 MMOL/L
ANION GAP SERPL CALC-SCNC: 9 MMOL/L
AST SERPL-CCNC: 31 U/L
BASOPHILS # BLD AUTO: 0.02 K/UL
BASOPHILS NFR BLD: 0.3 %
BILIRUB SERPL-MCNC: 0.6 MG/DL
BUN SERPL-MCNC: 52 MG/DL
BUN SERPL-MCNC: 53 MG/DL
BUN SERPL-MCNC: 54 MG/DL
CALCIUM SERPL-MCNC: 7.5 MG/DL
CALCIUM SERPL-MCNC: 7.7 MG/DL
CALCIUM SERPL-MCNC: 7.9 MG/DL
CHLORIDE SERPL-SCNC: 86 MMOL/L
CHLORIDE SERPL-SCNC: 87 MMOL/L
CHLORIDE SERPL-SCNC: 87 MMOL/L
CHLORIDE UR-SCNC: 83 MMOL/L
CHOLEST SERPL-MCNC: 136 MG/DL
CHOLEST/HDLC SERPL: 3 {RATIO}
CO2 SERPL-SCNC: 27 MMOL/L
CO2 SERPL-SCNC: 29 MMOL/L
CO2 SERPL-SCNC: 30 MMOL/L
CREAT SERPL-MCNC: 3.7 MG/DL
CREAT UR-MCNC: 41.9 MG/DL
DIFFERENTIAL METHOD: ABNORMAL
EOSINOPHIL # BLD AUTO: 0.1 K/UL
EOSINOPHIL NFR BLD: 1.5 %
EOSINOPHIL URNS QL WRIGHT STN: NORMAL
ERYTHROCYTE [DISTWIDTH] IN BLOOD BY AUTOMATED COUNT: 13.3 %
EST. GFR  (AFRICAN AMERICAN): 12 ML/MIN/1.73 M^2
EST. GFR  (NON AFRICAN AMERICAN): 11 ML/MIN/1.73 M^2
ESTIMATED AVG GLUCOSE: 114 MG/DL
GLUCOSE SERPL-MCNC: 107 MG/DL
GLUCOSE SERPL-MCNC: 144 MG/DL
GLUCOSE SERPL-MCNC: 147 MG/DL
HBA1C MFR BLD HPLC: 5.6 %
HCT VFR BLD AUTO: 25.1 %
HDLC SERPL-MCNC: 46 MG/DL
HDLC SERPL: 33.8 %
HGB BLD-MCNC: 8.5 G/DL
INR PPP: 3.4
LDLC SERPL CALC-MCNC: 73.6 MG/DL
LYMPHOCYTES # BLD AUTO: 1.2 K/UL
LYMPHOCYTES NFR BLD: 18.7 %
MAGNESIUM SERPL-MCNC: 1.7 MG/DL
MCH RBC QN AUTO: 30.9 PG
MCHC RBC AUTO-ENTMCNC: 33.9 G/DL
MCV RBC AUTO: 91 FL
MONOCYTES # BLD AUTO: 0.4 K/UL
MONOCYTES NFR BLD: 5.9 %
NEUTROPHILS # BLD AUTO: 4.8 K/UL
NEUTROPHILS NFR BLD: 73.3 %
NONHDLC SERPL-MCNC: 90 MG/DL
OSMOLALITY UR: 289 MOSM/KG
PHOSPHATE SERPL-MCNC: 3.8 MG/DL
PLATELET # BLD AUTO: 128 K/UL
PMV BLD AUTO: 9 FL
POCT GLUCOSE: 110 MG/DL (ref 70–110)
POCT GLUCOSE: 166 MG/DL (ref 70–110)
POCT GLUCOSE: 192 MG/DL (ref 70–110)
POTASSIUM SERPL-SCNC: 3.4 MMOL/L
POTASSIUM SERPL-SCNC: 3.5 MMOL/L
POTASSIUM SERPL-SCNC: 4.2 MMOL/L
PROT SERPL-MCNC: 5.5 G/DL
PROTHROMBIN TIME: 36.5 SEC
RBC # BLD AUTO: 2.75 M/UL
SODIUM SERPL-SCNC: 123 MMOL/L
SODIUM SERPL-SCNC: 126 MMOL/L
SODIUM SERPL-SCNC: 128 MMOL/L
SODIUM UR-SCNC: 86 MMOL/L
TRIGL SERPL-MCNC: 82 MG/DL
URATE SERPL-MCNC: 11 MG/DL
WBC # BLD AUTO: 6.59 K/UL

## 2018-05-21 PROCEDURE — 11000001 HC ACUTE MED/SURG PRIVATE ROOM

## 2018-05-21 PROCEDURE — 83735 ASSAY OF MAGNESIUM: CPT

## 2018-05-21 PROCEDURE — 99900035 HC TECH TIME PER 15 MIN (STAT)

## 2018-05-21 PROCEDURE — 27000221 HC OXYGEN, UP TO 24 HOURS

## 2018-05-21 PROCEDURE — 84300 ASSAY OF URINE SODIUM: CPT

## 2018-05-21 PROCEDURE — 36415 COLL VENOUS BLD VENIPUNCTURE: CPT

## 2018-05-21 PROCEDURE — 83036 HEMOGLOBIN GLYCOSYLATED A1C: CPT

## 2018-05-21 PROCEDURE — 80061 LIPID PANEL: CPT

## 2018-05-21 PROCEDURE — A4216 STERILE WATER/SALINE, 10 ML: HCPCS | Performed by: INTERNAL MEDICINE

## 2018-05-21 PROCEDURE — 85610 PROTHROMBIN TIME: CPT

## 2018-05-21 PROCEDURE — 82436 ASSAY OF URINE CHLORIDE: CPT

## 2018-05-21 PROCEDURE — 82570 ASSAY OF URINE CREATININE: CPT

## 2018-05-21 PROCEDURE — 93306 TTE W/DOPPLER COMPLETE: CPT

## 2018-05-21 PROCEDURE — 84100 ASSAY OF PHOSPHORUS: CPT

## 2018-05-21 PROCEDURE — 25000003 PHARM REV CODE 250: Performed by: INTERNAL MEDICINE

## 2018-05-21 PROCEDURE — 99233 SBSQ HOSP IP/OBS HIGH 50: CPT | Mod: ,,, | Performed by: INTERNAL MEDICINE

## 2018-05-21 PROCEDURE — 80048 BASIC METABOLIC PNL TOTAL CA: CPT

## 2018-05-21 PROCEDURE — 94761 N-INVAS EAR/PLS OXIMETRY MLT: CPT

## 2018-05-21 PROCEDURE — 87205 SMEAR GRAM STAIN: CPT

## 2018-05-21 PROCEDURE — 25000003 PHARM REV CODE 250: Performed by: NURSE PRACTITIONER

## 2018-05-21 PROCEDURE — 84550 ASSAY OF BLOOD/URIC ACID: CPT

## 2018-05-21 PROCEDURE — 63600175 PHARM REV CODE 636 W HCPCS: Performed by: INTERNAL MEDICINE

## 2018-05-21 PROCEDURE — 85025 COMPLETE CBC W/AUTO DIFF WBC: CPT

## 2018-05-21 PROCEDURE — 80053 COMPREHEN METABOLIC PANEL: CPT

## 2018-05-21 PROCEDURE — 83935 ASSAY OF URINE OSMOLALITY: CPT

## 2018-05-21 RX ORDER — ACETAMINOPHEN 325 MG/1
650 TABLET ORAL EVERY 6 HOURS PRN
Status: DISCONTINUED | OUTPATIENT
Start: 2018-05-21 | End: 2018-05-26 | Stop reason: HOSPADM

## 2018-05-21 RX ORDER — FUROSEMIDE 10 MG/ML
40 INJECTION INTRAMUSCULAR; INTRAVENOUS DAILY
Status: DISCONTINUED | OUTPATIENT
Start: 2018-05-22 | End: 2018-05-24

## 2018-05-21 RX ADMIN — SODIUM CHLORIDE, PRESERVATIVE FREE 3 ML: 5 INJECTION INTRAVENOUS at 02:05

## 2018-05-21 RX ADMIN — SODIUM CHLORIDE, PRESERVATIVE FREE 3 ML: 5 INJECTION INTRAVENOUS at 09:05

## 2018-05-21 RX ADMIN — ACETAMINOPHEN 650 MG: 325 TABLET, FILM COATED ORAL at 05:05

## 2018-05-21 RX ADMIN — LEVOTHYROXINE SODIUM 50 MCG: 50 TABLET ORAL at 08:05

## 2018-05-21 RX ADMIN — Medication 1000 MG: at 08:05

## 2018-05-21 RX ADMIN — FUROSEMIDE 40 MG: 10 INJECTION, SOLUTION INTRAMUSCULAR; INTRAVENOUS at 08:05

## 2018-05-21 RX ADMIN — FAMOTIDINE 20 MG: 20 TABLET ORAL at 09:05

## 2018-05-21 RX ADMIN — SODIUM CHLORIDE, PRESERVATIVE FREE 3 ML: 5 INJECTION INTRAVENOUS at 08:05

## 2018-05-21 RX ADMIN — VITAMIN D, TAB 1000IU (100/BT) 1000 UNITS: 25 TAB at 08:05

## 2018-05-21 RX ADMIN — Medication 1000 MG: at 09:05

## 2018-05-21 RX ADMIN — CARVEDILOL 25 MG: 12.5 TABLET, FILM COATED ORAL at 09:05

## 2018-05-21 RX ADMIN — ACETAMINOPHEN 650 MG: 325 TABLET, FILM COATED ORAL at 10:05

## 2018-05-21 RX ADMIN — AMLODIPINE BESYLATE 5 MG: 5 TABLET ORAL at 08:05

## 2018-05-21 RX ADMIN — FAMOTIDINE 20 MG: 20 TABLET ORAL at 08:05

## 2018-05-21 RX ADMIN — ATORVASTATIN CALCIUM 10 MG: 10 TABLET, FILM COATED ORAL at 08:05

## 2018-05-21 RX ADMIN — Medication 400 MG: at 08:05

## 2018-05-21 RX ADMIN — TRAZODONE HYDROCHLORIDE 50 MG: 50 TABLET ORAL at 09:05

## 2018-05-21 RX ADMIN — CARVEDILOL 25 MG: 12.5 TABLET, FILM COATED ORAL at 08:05

## 2018-05-21 NOTE — CONSULTS
Consult Note  Nephrology    Consult Requested By: Nahomy Gutiérrez, *  Reason for Consult: LEIA/Hyponatremia    SUBJECTIVE:     History of Present Illness:  Patient is a 86 y.o. female presents with worsening shortness of breath over the last few days and came to emergency room for further evaluation.  Workup revealing for hyponatremia and acute on chronic kidney disease.  Admitted for diuresis.  Extensive medical history as outlined below including chronic kidney disease stage IV secondary to hypertension and diabetic nephropathy.  She is followed by [facility] nephrology Dr. Pruitt.  Recent baseline creatinine around 2.2.  Has been on aggressive diuretic therapy as an outpatient including Lasix, Bumex, metolazone.    Consulted for evaluation this morning.  Patient seen and examined.  Multiple family members at bedside.  Questions and concerns addressed.  Patient complains of persistent shortness of breath and costochondritis.  Having difficulty urinating as any movement causes pains in her chest.    Epic reviewed in detail and updated treatment team.    No nausea no vomiting no diarrhea and no fever no chills  Positive for shortness of breath and chest pain (costochondritis)    Past Medical History:   Diagnosis Date    Anxiety     Atrial fibrillation     chronic AF    CHF (congestive heart failure)     CKD (chronic kidney disease), stage IV     Diabetes insipidus     Diabetes mellitus type II     GERD (gastroesophageal reflux disease)     Hypertension     Iritis 12/10/2014    NPDR (nonproliferative diabetic retinopathy) 10/27/2014    PAF (paroxysmal atrial fibrillation)     Palpitations      Past Surgical History:   Procedure Laterality Date    CATARACT EXTRACTION W/  INTRAOCULAR LENS IMPLANT Right n/a    OD over 10 years ago     CATARACT EXTRACTION W/  INTRAOCULAR LENS IMPLANT Left 11/11/14    OS ()    COLON SURGERY      Partial colectomy secondary to suspicious polyp     HYSTERECTOMY      INSERT / REPLACE / REMOVE PACEMAKER      SC PM 2012 [email or web address]    LASER ABLATION      3 years ago OS     Family History   Problem Relation Age of Onset    No Known Problems Mother     No Known Problems Father     No Known Problems Sister     No Known Problems Brother     No Known Problems Maternal Aunt     No Known Problems Maternal Uncle     No Known Problems Paternal Aunt     No Known Problems Paternal Uncle     No Known Problems Maternal Grandmother     No Known Problems Maternal Grandfather     No Known Problems Paternal Grandmother     No Known Problems Paternal Grandfather     Amblyopia Neg Hx     Blindness Neg Hx     Cancer Neg Hx     Cataracts Neg Hx     Diabetes Neg Hx     Glaucoma Neg Hx     Hypertension Neg Hx     Macular degeneration Neg Hx     Retinal detachment Neg Hx     Strabismus Neg Hx     Stroke Neg Hx     Thyroid disease Neg Hx      Social History   Substance Use Topics    Smoking status: Never Smoker    Smokeless tobacco: Never Used    Alcohol use No       Review of patient's allergies indicates:   Allergen Reactions    Plavix [clopidogrel]         Review of Systems:  Constitutional: No fever or chills  Respiratory:  shortness of breath  Cardiovascular: No chest pain or palpitations  Gastrointestinal: No nausea or vomiting  Neurological: No confusion or weakness    OBJECTIVE:     Vital Signs (Most Recent)  Temp: 97.8 °F (36.6 °C) (05/21/18 0745)  Pulse: 68 (05/21/18 0800)  Resp: (!) 22 (05/21/18 0745)  BP: (!) 157/67 (05/21/18 0745)  SpO2: (!) 94 % (05/21/18 0800)    Vital Signs Range (Last 24H):  Temp:  [97.2 °F (36.2 °C)-97.8 °F (36.6 °C)]   Pulse:  [66-75]   Resp:  [16-22]   BP: (131-159)/(63-99)   SpO2:  [75 %-100 %]       Intake/Output Summary (Last 24 hours) at 05/21/18 1119  Last data filed at 05/21/18 1105   Gross per 24 hour   Intake                0 ml   Output              170 ml   Net             -170 ml       Physical Exam:  General  appearance: elderly/frail  Eyes:  Conjunctivae/corneas clear. PERRL.  Lungs: Normal respiratory effort,   Crackles bases.    Heart: Regular/irregular rate and rhythm, S1, S2 normal, no murmur, rub or linda.  Abdomen: Soft, non-tender non-distended; bowel sounds normal; no masses,  no organomegaly  Extremities: No cyanosis or clubbing. trace edema.    Skin: Skin color, texture, turgor normal. No rashes or lesions  Neurologic: general weakness.      Laboratory:    Recent Labs  Lab 05/21/18  0532   WBC 6.59   RBC 2.75*   HGB 8.5*   HCT 25.1*   *   MCV 91   MCH 30.9   MCHC 33.9     BMP:   Recent Labs  Lab 05/21/18  0533      *   K 3.5   CL 87*   CO2 30*   BUN 52*   CREATININE 3.7*   CALCIUM 7.7*   MG 1.7     Lab Results   Component Value Date    CALCIUM 7.7 (L) 05/21/2018    PHOS 3.8 05/21/2018     BNP    Recent Labs  Lab 05/20/18  1238   BNP 2,585*     Lab Results   Component Value Date    URICACID 11.0 (H) 05/21/2018   No results found for: IRON, TIBC, FERRITIN, SATURATEDIRO  Lab Results   Component Value Date    CALCIUM 7.7 (L) 05/21/2018    PHOS 3.8 05/21/2018       Diagnostic Results:  X-Ray Chest AP Portable   Final Result      1. Bilateral pleural effusions with associated compressive atelectasis of the lower lobes, superimposed developing left lower lung zone consolidation however is not excluded.  Correlation recommended.         Electronically signed by: Venkat Goldstein MD   Date:    05/20/2018   Time:    13:07      US Retroperitoneal Complete (Kidney and    (Results Pending)       ASSESSMENT/PLAN:     1. LEIA on chronic kidney disease stage IV with baseline creatinine around 2.2 secondary to overdiuresis (N 17.9, N 18.4): Followed by [facility] nephrology.  Recent ultrasound noted and underlying kidney disease secondary to hypertension and diabetes.  Has refused kidney biopsy in the past.  No ACE, ARB, or NSAIDs.  Agree with gentle diuresis but will hold Bumex and metolazone for now.   Very fine line with keeping her hydrated.  We'll check urine studies and follow-up.  Monitor trends and hopefully can keep her off of renal replacement therapy as she would be a poor candidate due to her age and multiple comorbidities.Renally dose meds, avoid nephrotoxins, and monitor I/O's closely.  2. Hyponatremia secondary to diuresis (E 87.1):  Follow trends.Awaiting urine osmolality to see if any inappropriate ADH.  Continue with water restriction for now.  Anticipate need for Samsca soon.  3. Acute on chronic combined heart failure (I50.33): As above  4. AFIB on OAC (I48.91):  Defer.  Hold coumadin for now with elevated INR.  Poor candidate.    5. Anemia of CKD (D63.1):  Check iron/b12/folate.  Will need EPO therapy.    6. Dispo:  Consider DNR.       Thanks for consult  See above  Will follow.

## 2018-05-21 NOTE — HPI
Ms. Stuart is an 86 year old woman who presented to the ED with shortness of breath, decreased activity and confusion for one day.  Her daughter reports that patient seems to have declined since her pacemaker placement. Patient normally eats a low salt diet but she went to a [employer] party yesterday and ate some high salt foods.  On presentation her oxygen saturation on room air was in the low 80's, and she was placed on a NRB in the ED before being weaned down to 5 liters NC.  Labs were notable for BUN/creatinine 49/3.6, BNP 2,585, mildly elevated troponin, sodium 127.  CXR showed bilateral effusions and associated atelectasis.  She was admitted for treatment of heart failure in the setting of worsening chronic kidney disease.    Medical history includes CHF with pulmonary hypertension, chronic atrial fibrillation, type II diabetes and hypothyroidism.  Her pacemaker was placed 2012.  She is followed by Dr. 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  She is not interested in HD.

## 2018-05-21 NOTE — PLAN OF CARE
Problem: Diabetes, Type 2 (Adult)  Goal: Signs and Symptoms of Listed Potential Problems Will be Absent, Minimized or Managed (Diabetes, Type 2)  Signs and symptoms of listed potential problems will be absent, minimized or managed by discharge/transition of care (reference Diabetes, Type 2 (Adult) CPG).   Outcome: Ongoing (interventions implemented as appropriate)  ACHS glucose checks.  Pt refusing subQ insulin, does not like needles.    Problem: Fall Risk (Adult)  Goal: Absence of Falls  Patient will demonstrate the desired outcomes by discharge/transition of care.   Outcome: Ongoing (interventions implemented as appropriate)  Pt and family educated on fall risk factors and dyspnea with exertion and the importance in utilizing staff assist when ambulating.    Problem: Patient Care Overview  Goal: Plan of Care Review  Outcome: Ongoing (interventions implemented as appropriate)  A/Ox3-4.  Overall aches/musculoskeletal pain treated with PO prn tylenol.  Increased to 5L nasal cannula humidified d/t SOA.  Jordan catheter placed to measure I/O and to allow pt to rest in bed rather than exert herself getting up to the commode per shift.  Some slight urethral bleeding post jordan insertion, MD aware.  +2 BLE edema.  No BM per shift.  ACHS glucose checks, poor appetite on diabetic diet with 1L FR.  PIV saline locked.  Family at bedside.    Problem: Pressure Ulcer Risk (Irwin Scale) (Adult,Obstetrics,Pediatric)  Goal: Skin Integrity  Patient will demonstrate the desired outcomes by discharge/transition of care.   Outcome: Ongoing (interventions implemented as appropriate)  Encouraging frequent weight shift to maintain skin integrity.

## 2018-05-21 NOTE — PLAN OF CARE
Problem: Patient Care Overview  Goal: Plan of Care Review  Outcome: Ongoing (interventions implemented as appropriate)  Patient had an uneventful night, no falls, no skin breakdown, no trauma. VSS. Purposeful hourly rounding done throughout the shift. Safety precautions in place.  No c/o pain. Stated that she rested well last night.

## 2018-05-21 NOTE — PROGRESS NOTES
Paged Dr. Gutiérrez to notify of pt's pain and resp status this morning.  Requesting for pain meds, no pain medicine currently ordered for pt.  Waiting for response.

## 2018-05-21 NOTE — ASSESSMENT & PLAN NOTE
Etiology may be low perfusion state due to CHF exacerbation  Hopeful for improvement with diuresis  Will consult Nephrology for further recommendations.

## 2018-05-21 NOTE — ASSESSMENT & PLAN NOTE
Etiology appears to be dietary noncompliance  Will start IV diuresis  Daily weights  Low sodium diet  Accurate Is and Os  Check BMP,Mg and Phos daily, replete accordingly  Will consult Cardiology

## 2018-05-21 NOTE — PLAN OF CARE
Met with patient at bedside to complete discharge planning assessment. Patient is current with Dr Vishal Salazar PCP & pharmacy of choice is [facility] [place]. Patient lives at home with her daughter Fang who provides daily extensive assist needed with ADLs. Fang will also provide transportation home. Patient is under the care of [facility] & it's her preference to resume their care upon discharge. Patient has multiple pieces of DME utilized daily      05/21/18 1009   Discharge Assessment   Assessment Type Discharge Planning Assessment   Confirmed/corrected address and phone number on facesheet? Yes   Assessment information obtained from? Patient;Caregiver   Communicated expected length of stay with patient/caregiver no   Prior to hospitilization cognitive status: Alert/Oriented   Prior to hospitalization functional status: Partially Dependent   Current cognitive status: Alert/Oriented   Current Functional Status: Partially Dependent   Lives With child(sam), adult   Able to Return to Prior Arrangements yes   Is patient able to care for self after discharge? No   Who are your caregiver(s) and their phone number(s)? Fang Press [phone]   Patient's perception of discharge disposition home health   Readmission Within The Last 30 Days no previous admission in last 30 days   Patient currently being followed by outpatient case management? No   Patient currently receives any other outside agency services? Yes   Name and contact number of agency or person providing outside services [facility]   Is it the patient/care giver preference to resume care with the current outside agency? Yes   Equipment Currently Used at Home cane, quad;rollator;wheelchair;bedside commode   Do you have any problems affording any of your prescribed medications? No   Is the patient taking medications as prescribed? yes   Does the patient have transportation home? Yes   Transportation Available family or friend  will provide   Does the patient receive services at the [facility]? No   Discharge Plan A Home Health   Patient/Family In Agreement With Plan yes

## 2018-05-21 NOTE — PLAN OF CARE
Problem: Patient Care Overview  Goal: Plan of Care Review  Outcome: Ongoing (interventions implemented as appropriate)  Patient on 4L nc. Sats 97%. Pt. in no distress, will continue to monitor.

## 2018-05-21 NOTE — PROGRESS NOTES
1125: Paged MD to discuss jordan catheter insertion.  Waiting for response.    1205: Spoke in person with Dr. Gutiérrez regarding bloody urethral discharge post jordan catheter insertion.  During jordan placement, no resistance present.  Pt denied pain during and after insertion.  Small amount of blood present.  MD acknowledged, no new orders received.  Will continue to monitor.

## 2018-05-21 NOTE — PROGRESS NOTES
Verbalizes understanding of discharge instructions, Rx changes, and follow-up care/appts.  Pt's home medications returned to pt.  All belongings with pt.  To discharge home today, daughter to drive pt home.

## 2018-05-21 NOTE — H&P
[facility] Hospital Medicine  History & Physical    Patient Name: Deepali Stuart  MRN: [id number]  Admission Date: 5/20/2018  Attending Physician: Nahomy Gutiérrez, *   Primary Care Provider: Vishal Salazar MD         Patient information was obtained from patient, relative(s) and ER records.     Subjective:     Principal Problem:Acute congestive heart failure    Chief Complaint:   Chief Complaint   Patient presents with    Shortness of Breath     Pt c/o increased SOB with lower extremity swelling. Pt c/o abdominal pain, nausea & weakness. Pt's daughter reports that pt is now having difficulty ambulating with her walker. Pt's daughter also reports that pt has exhibited intermittent confusion.        HPI: 86 y.o. female with a history of CHF, HTN, AFIB, DM, and Hypothyroidism presents to the ED c/o shortness of breath x 1 day.  Daughter reports that patient follows a strict no-low salt diet, but on yesterday she became extremely short of breath and confused.  She also has not been ambulating in the past day.  She also states that she seems to have declined since pacemaker placement. Patient admitss that she went to a threadsy party yesterday and ate some high salt foods.  Did require nonrebreather in the ER as oxygen saturaion on room air was in the low 80's. Patient improved in the ER and oxygen requirements were weaned down to 5L on nasal cannula. She denies chest pain, fever, chills, nausea, vomiting, or diarrhea.     Past Medical History:   Diagnosis Date    Anxiety     Atrial fibrillation     chronic AF    CHF (congestive heart failure)     Diabetes insipidus     Diabetes mellitus type II     GERD (gastroesophageal reflux disease)     Hypertension     Iritis 12/10/2014    NPDR (nonproliferative diabetic retinopathy) 10/27/2014    PAF (paroxysmal atrial fibrillation)     Palpitations        Past Surgical History:   Procedure Laterality Date    CATARACT EXTRACTION W/   INTRAOCULAR LENS IMPLANT Right n/a    OD over 10 years ago     CATARACT EXTRACTION W/  INTRAOCULAR LENS IMPLANT Left 11/11/14    OS ()    COLON SURGERY      Partial colectomy secondary to suspicious polyp    HYSTERECTOMY      INSERT / REPLACE / REMOVE PACEMAKER      SC PM 2012 [email or web address]    LASER ABLATION      3 years ago OS       Review of patient's allergies indicates:   Allergen Reactions    Plavix [clopidogrel]        No current facility-administered medications on file prior to encounter.      Current Outpatient Prescriptions on File Prior to Encounter   Medication Sig    amlodipine (NORVASC) 5 MG tablet TAKE 1 TABLET BY MOUTH DAILY    atorvastatin (LIPITOR) 10 MG tablet Take 1 tablet (10 mg total) by mouth once daily.    bumetanide (BUMEX) 1 MG tablet Take 1.5 tablets (1.5 mg total) by mouth 2 (two) times daily.    calcium carbonate (OS-JERMAN) 500 mg calcium (1,250 mg) tablet Take 2 tablets (1,000 mg total) by mouth 2 (two) times daily.    carvedilol (COREG) 25 MG tablet Take 1 tablet (25 mg total) by mouth 2 (two) times daily.    cholecalciferol, vitamin D3, 1,000 unit capsule Take 1 capsule (1,000 Units total) by mouth 2 (two) times daily.    furosemide (LASIX) 20 MG tablet     furosemide (LASIX) 20 MG tablet Take 1 tablet (20 mg total) by mouth once daily.    lancets Misc 1 Device by Misc.(Non-Drug; Combo Route) route once daily. (Patient taking differently: 1 Device by Misc.(Non-Drug; Combo Route) route 2 (two) times daily. )    levothyroxine (SYNTHROID) 50 MCG tablet Take 1 tablet (50 mcg total) by mouth once daily.    lisinopril (PRINIVIL,ZESTRIL) 20 MG tablet Take 1 tablet (20 mg total) by mouth once daily.    LORazepam (ATIVAN) 0.5 MG tablet Take 1 tablet (0.5 mg total) by mouth every 8 (eight) hours as needed for Anxiety.    magnesium oxide (MAG-OX) 400 mg tablet Take 1 tablet (400 mg total) by mouth once daily.    meclizine (ANTIVERT) 25 mg tablet TAKE 2 TABLETS BY MOUTH  THREE TIMES DAILY AS NEEDED FOR DIZZINESS    metOLazone (ZAROXOLYN) 2.5 MG tablet Take 1 tablet (2.5 mg total) by mouth Every 3 (three) days.    mupirocin (BACTROBAN) 2 % ointment Apply topically 3 (three) times daily.    ondansetron (ZOFRAN-ODT) 4 MG TbDL DISSOLVE ONE TABLET BY MOUTH EVERY 6 HOURS AS NEEDED FOR NAUSEA AND VOMITING    pantoprazole (PROTONIX) 20 MG tablet     ranitidine (ZANTAC) 300 MG tablet Take 1 tablet (300 mg total) by mouth every evening.    trazodone (DESYREL) 50 MG tablet TAKE 1/2 TO 1 TABLET BY MOUTH EVERY EVENING AS NEEDED FOR INSOMNIA    TRUETRACK TEST Strp TEST TWICE DAILY.    warfarin (COUMADIN) 5 MG tablet TAKE 1 TABLET BY MOUTH EVERY DAY OR AS DIRECTED BY COUMADIN CLINIC (Patient taking differently: TAKE 1/2 TABLET BY MOUTH EVERY DAY IN THE EVENING OR AS DIRECTED BY COUMADIN CLINIC)     Family History     Problem Relation (Age of Onset)    No Known Problems Mother, Father, Sister, Brother, Maternal Aunt, Maternal Uncle, Paternal Aunt, Paternal Uncle, Maternal Grandmother, Maternal Grandfather, Paternal Grandmother, Paternal Grandfather        Social History Main Topics    Smoking status: Never Smoker    Smokeless tobacco: Never Used    Alcohol use No    Drug use: No    Sexual activity: No     Review of Systems   Constitutional: Positive for fatigue. Negative for chills and fever.   Respiratory: Positive for shortness of breath. Negative for cough.    Cardiovascular: Positive for leg swelling. Negative for chest pain.   Gastrointestinal: Negative for abdominal distention and abdominal pain.   Genitourinary: Negative for dysuria and flank pain.   Musculoskeletal: Negative for arthralgias and back pain.   Neurological: Positive for weakness.   Psychiatric/Behavioral: Positive for confusion.     Objective:     Vital Signs (Most Recent):  Temp: 97.6 °F (36.4 °C) (05/20/18 2003)  Pulse: 74 (05/20/18 2042)  Resp: 18 (05/20/18 2042)  BP: (!) 143/67 (05/20/18 2003)  SpO2: 97 %  (05/20/18 2042) Vital Signs (24h Range):  Temp:  [97.5 °F (36.4 °C)-97.8 °F (36.6 °C)] 97.6 °F (36.4 °C)  Pulse:  [66-75] 74  Resp:  [16-22] 18  SpO2:  [75 %-100 %] 97 %  BP: (131-159)/(63-99) 143/67     Weight: 61.7 kg (136 lb)  Body mass index is 24.87 kg/m².    Physical Exam   Constitutional: She is oriented to person, place, and time. She appears well-developed and well-nourished. She is cooperative. She appears ill. Nasal cannula in place.   HENT:   Head: Normocephalic and atraumatic.   Eyes: Conjunctivae and EOM are normal.   Neck: Normal range of motion.   Cardiovascular: Normal rate and regular rhythm.    Pulmonary/Chest: Effort normal.   Decreased breath sounds bilaterally   Abdominal: Soft. Bowel sounds are normal.   Neurological: She is alert and oriented to person, place, and time.   Skin: Skin is warm and dry.   Psychiatric: She has a normal mood and affect. Her behavior is normal. Judgment and thought content normal.         CRANIAL NERVES     CN III, IV, VI   Extraocular motions are normal.        Significant Labs:   ABGs:   Recent Labs  Lab 05/20/18  1251   PH 7.618*   PCO2 33.5*   HCO3 34.4*   POCSATURATED 97   BE 13     CBC:   Recent Labs  Lab 05/20/18  1238   WBC 6.04   HGB 9.3*   HCT 26.7*   *     CMP:   Recent Labs  Lab 05/20/18  1238   *   K 3.4*   CL 87*   CO2 28   *   BUN 49*   CREATININE 3.6*   CALCIUM 7.9*   PROT 6.0   ALBUMIN 2.5*   BILITOT 0.8   ALKPHOS 59   AST 36   ALT 20   ANIONGAP 12   EGFRNONAA 11*     Cardiac Markers:   Recent Labs  Lab 05/20/18  1238   BNP 2,585*     Coagulation:   Recent Labs  Lab 05/20/18  1408   INR 3.5*     Lipid Panel: No results for input(s): CHOL, HDL, LDLCALC, TRIG, CHOLHDL in the last 48 hours.  Magnesium:   Recent Labs  Lab 05/20/18  1238   MG 1.6     POCT Glucose:   Recent Labs  Lab 05/20/18  2111   POCTGLUCOSE 144*     Troponin:   Recent Labs  Lab 05/20/18  1238   TROPONINI 0.201*     All pertinent labs within the past 24 hours have  been reviewed.    Significant Imaging: I have reviewed all pertinent imaging results/findings within the past 24 hours.   Imaging Results          X-Ray Chest AP Portable (Final result)  Result time 05/20/18 13:07:02    Final result by Venkat Goldstein MD (05/20/18 13:07:02)                 Impression:      1. Bilateral pleural effusions with associated compressive atelectasis of the lower lobes, superimposed developing left lower lung zone consolidation however is not excluded.  Correlation recommended.      Electronically signed by: Venkat Goldstein MD  Date:    05/20/2018  Time:    13:07             Narrative:    EXAMINATION:  XR CHEST AP PORTABLE    CLINICAL HISTORY:  Shortness of breath    TECHNIQUE:  Single frontal view of the chest was performed.    COMPARISON:  04/07/2018    FINDINGS:  Patient is rotated, limiting evaluation.  The cardiomediastinal silhouette is prominent, similar to the previous exam noting calcification of the aorta..  There are bilateral pleural effusions, left greater than right, worsened..  The trachea is midline.  The lungs are symmetrically expanded bilaterally with patchy increased interstitial and parenchymal attenuation, primarily projected over the bilateral lower lung zones.  Superimposed left lower lung zone consolidation is not excluded..  There is no pneumothorax.  The osseous structures are remarkable for degenerative changes noting dextroscoliotic curvature of the thoracic spine..                                  Assessment/Plan:     * Acute congestive heart failure    Etiology appears to be dietary noncompliance  Will start IV diuresis  Daily weights  Low sodium diet  Accurate Is and Os  Check BMP,Mg and Phos daily, replete accordingly  Will consult Cardiology          Hyponatremia    Most likely due to CHF  Will fluid restrict and check sodium daily  Will consult Nephrology          LEIA (acute kidney injury)    Etiology may be low perfusion state due to CHF  exacerbation  Hopeful for improvement with diuresis  Will consult Nephrology for further recommendations.           Pacemaker              Hypertension    Continue carvdilol  Will hold lisinopril in setting of worsening renal disease  Continue IV diuresis          Hypothyroidism    Continue synthroid        Diabetes type 2, controlled    Will check A1C  Start SSI, blood glucose qachs  Will adjust regimen based on blood sugars        Chronic atrial fibrillation    Continue warfarin   Check INR  Continue carvedilol for rate control          VTE Risk Mitigation         Ordered     warfarin (COUMADIN) tablet 2.5 mg  Daily      05/20/18 3380             Nahomy Gutiérrez MD  Department of Hospital Medicine   [facility]

## 2018-05-21 NOTE — ASSESSMENT & PLAN NOTE
Continue carvdilol  Will hold lisinopril in setting of worsening renal disease  Continue IV diuresis

## 2018-05-21 NOTE — SUBJECTIVE & OBJECTIVE
Past Medical History:   Diagnosis Date    Anxiety     Atrial fibrillation     chronic AF    CHF (congestive heart failure)     Diabetes insipidus     Diabetes mellitus type II     GERD (gastroesophageal reflux disease)     Hypertension     Iritis 12/10/2014    NPDR (nonproliferative diabetic retinopathy) 10/27/2014    PAF (paroxysmal atrial fibrillation)     Palpitations        Past Surgical History:   Procedure Laterality Date    CATARACT EXTRACTION W/  INTRAOCULAR LENS IMPLANT Right n/a    OD over 10 years ago     CATARACT EXTRACTION W/  INTRAOCULAR LENS IMPLANT Left 11/11/14    OS ()    COLON SURGERY      Partial colectomy secondary to suspicious polyp    HYSTERECTOMY      INSERT / REPLACE / REMOVE PACEMAKER      SC PM 2012 [email or web address]    LASER ABLATION      3 years ago OS       Review of patient's allergies indicates:   Allergen Reactions    Plavix [clopidogrel]        No current facility-administered medications on file prior to encounter.      Current Outpatient Prescriptions on File Prior to Encounter   Medication Sig    amlodipine (NORVASC) 5 MG tablet TAKE 1 TABLET BY MOUTH DAILY    atorvastatin (LIPITOR) 10 MG tablet Take 1 tablet (10 mg total) by mouth once daily.    bumetanide (BUMEX) 1 MG tablet Take 1.5 tablets (1.5 mg total) by mouth 2 (two) times daily.    calcium carbonate (OS-JERMAN) 500 mg calcium (1,250 mg) tablet Take 2 tablets (1,000 mg total) by mouth 2 (two) times daily.    carvedilol (COREG) 25 MG tablet Take 1 tablet (25 mg total) by mouth 2 (two) times daily.    cholecalciferol, vitamin D3, 1,000 unit capsule Take 1 capsule (1,000 Units total) by mouth 2 (two) times daily.    furosemide (LASIX) 20 MG tablet     furosemide (LASIX) 20 MG tablet Take 1 tablet (20 mg total) by mouth once daily.    lancets Misc 1 Device by Misc.(Non-Drug; Combo Route) route once daily. (Patient taking differently: 1 Device by Misc.(Non-Drug; Combo Route) route 2 (two) times  daily. )    levothyroxine (SYNTHROID) 50 MCG tablet Take 1 tablet (50 mcg total) by mouth once daily.    lisinopril (PRINIVIL,ZESTRIL) 20 MG tablet Take 1 tablet (20 mg total) by mouth once daily.    LORazepam (ATIVAN) 0.5 MG tablet Take 1 tablet (0.5 mg total) by mouth every 8 (eight) hours as needed for Anxiety.    magnesium oxide (MAG-OX) 400 mg tablet Take 1 tablet (400 mg total) by mouth once daily.    meclizine (ANTIVERT) 25 mg tablet TAKE 2 TABLETS BY MOUTH THREE TIMES DAILY AS NEEDED FOR DIZZINESS    metOLazone (ZAROXOLYN) 2.5 MG tablet Take 1 tablet (2.5 mg total) by mouth Every 3 (three) days.    mupirocin (BACTROBAN) 2 % ointment Apply topically 3 (three) times daily.    ondansetron (ZOFRAN-ODT) 4 MG TbDL DISSOLVE ONE TABLET BY MOUTH EVERY 6 HOURS AS NEEDED FOR NAUSEA AND VOMITING    pantoprazole (PROTONIX) 20 MG tablet     ranitidine (ZANTAC) 300 MG tablet Take 1 tablet (300 mg total) by mouth every evening.    trazodone (DESYREL) 50 MG tablet TAKE 1/2 TO 1 TABLET BY MOUTH EVERY EVENING AS NEEDED FOR INSOMNIA    TRUETRACK TEST Strp TEST TWICE DAILY.    warfarin (COUMADIN) 5 MG tablet TAKE 1 TABLET BY MOUTH EVERY DAY OR AS DIRECTED BY COUMADIN CLINIC (Patient taking differently: TAKE 1/2 TABLET BY MOUTH EVERY DAY IN THE EVENING OR AS DIRECTED BY COUMADIN CLINIC)     Family History     Problem Relation (Age of Onset)    No Known Problems Mother, Father, Sister, Brother, Maternal Aunt, Maternal Uncle, Paternal Aunt, Paternal Uncle, Maternal Grandmother, Maternal Grandfather, Paternal Grandmother, Paternal Grandfather        Social History Main Topics    Smoking status: Never Smoker    Smokeless tobacco: Never Used    Alcohol use No    Drug use: No    Sexual activity: No     Review of Systems   Constitutional: Positive for fatigue. Negative for chills and fever.   Respiratory: Positive for shortness of breath. Negative for cough.    Cardiovascular: Positive for leg swelling. Negative for  chest pain.   Gastrointestinal: Negative for abdominal distention and abdominal pain.   Genitourinary: Negative for dysuria and flank pain.   Musculoskeletal: Negative for arthralgias and back pain.   Neurological: Positive for weakness.   Psychiatric/Behavioral: Positive for confusion.     Objective:     Vital Signs (Most Recent):  Temp: 97.6 °F (36.4 °C) (05/20/18 2003)  Pulse: 74 (05/20/18 2042)  Resp: 18 (05/20/18 2042)  BP: (!) 143/67 (05/20/18 2003)  SpO2: 97 % (05/20/18 2042) Vital Signs (24h Range):  Temp:  [97.5 °F (36.4 °C)-97.8 °F (36.6 °C)] 97.6 °F (36.4 °C)  Pulse:  [66-75] 74  Resp:  [16-22] 18  SpO2:  [75 %-100 %] 97 %  BP: (131-159)/(63-99) 143/67     Weight: 61.7 kg (136 lb)  Body mass index is 24.87 kg/m².    Physical Exam   Constitutional: She is oriented to person, place, and time. She appears well-developed and well-nourished. She is cooperative. She appears ill. Nasal cannula in place.   HENT:   Head: Normocephalic and atraumatic.   Eyes: Conjunctivae and EOM are normal.   Neck: Normal range of motion.   Cardiovascular: Normal rate and regular rhythm.    Pulmonary/Chest: Effort normal.   Decreased breath sounds bilaterally   Abdominal: Soft. Bowel sounds are normal.   Neurological: She is alert and oriented to person, place, and time.   Skin: Skin is warm and dry.   Psychiatric: She has a normal mood and affect. Her behavior is normal. Judgment and thought content normal.         CRANIAL NERVES     CN III, IV, VI   Extraocular motions are normal.        Significant Labs:   ABGs:   Recent Labs  Lab 05/20/18  1251   PH 7.618*   PCO2 33.5*   HCO3 34.4*   POCSATURATED 97   BE 13     CBC:   Recent Labs  Lab 05/20/18  1238   WBC 6.04   HGB 9.3*   HCT 26.7*   *     CMP:   Recent Labs  Lab 05/20/18  1238   *   K 3.4*   CL 87*   CO2 28   *   BUN 49*   CREATININE 3.6*   CALCIUM 7.9*   PROT 6.0   ALBUMIN 2.5*   BILITOT 0.8   ALKPHOS 59   AST 36   ALT 20   ANIONGAP 12   EGFRNONAA 11*      Cardiac Markers:   Recent Labs  Lab 05/20/18  1238   BNP 2,585*     Coagulation:   Recent Labs  Lab 05/20/18  1408   INR 3.5*     Lipid Panel: No results for input(s): CHOL, HDL, LDLCALC, TRIG, CHOLHDL in the last 48 hours.  Magnesium:   Recent Labs  Lab 05/20/18  1238   MG 1.6     POCT Glucose:   Recent Labs  Lab 05/20/18  2111   POCTGLUCOSE 144*     Troponin:   Recent Labs  Lab 05/20/18  1238   TROPONINI 0.201*     All pertinent labs within the past 24 hours have been reviewed.    Significant Imaging: I have reviewed all pertinent imaging results/findings within the past 24 hours.   Imaging Results          X-Ray Chest AP Portable (Final result)  Result time 05/20/18 13:07:02    Final result by Venkat Goldstein MD (05/20/18 13:07:02)                 Impression:      1. Bilateral pleural effusions with associated compressive atelectasis of the lower lobes, superimposed developing left lower lung zone consolidation however is not excluded.  Correlation recommended.      Electronically signed by: Venkat Goldstein MD  Date:    05/20/2018  Time:    13:07             Narrative:    EXAMINATION:  XR CHEST AP PORTABLE    CLINICAL HISTORY:  Shortness of breath    TECHNIQUE:  Single frontal view of the chest was performed.    COMPARISON:  04/07/2018    FINDINGS:  Patient is rotated, limiting evaluation.  The cardiomediastinal silhouette is prominent, similar to the previous exam noting calcification of the aorta..  There are bilateral pleural effusions, left greater than right, worsened..  The trachea is midline.  The lungs are symmetrically expanded bilaterally with patchy increased interstitial and parenchymal attenuation, primarily projected over the bilateral lower lung zones.  Superimposed left lower lung zone consolidation is not excluded..  There is no pneumothorax.  The osseous structures are remarkable for degenerative changes noting dextroscoliotic curvature of the thoracic spine..

## 2018-05-22 PROBLEM — I50.43 HEART FAILURE, SYSTOLIC AND DIASTOLIC, ACUTE ON CHRONIC: Status: ACTIVE | Noted: 2018-05-20

## 2018-05-22 LAB
25(OH)D3+25(OH)D2 SERPL-MCNC: 26 NG/ML
ALBUMIN SERPL BCP-MCNC: 2.4 G/DL
ALP SERPL-CCNC: 52 U/L
ALT SERPL W/O P-5'-P-CCNC: 19 U/L
ANION GAP SERPL CALC-SCNC: 11 MMOL/L
ANION GAP SERPL CALC-SCNC: 11 MMOL/L
AST SERPL-CCNC: 31 U/L
BILIRUB SERPL-MCNC: 1 MG/DL
BUN SERPL-MCNC: 55 MG/DL
BUN SERPL-MCNC: 58 MG/DL
CALCIUM SERPL-MCNC: 7.6 MG/DL
CALCIUM SERPL-MCNC: 7.8 MG/DL
CHLORIDE SERPL-SCNC: 85 MMOL/L
CHLORIDE SERPL-SCNC: 87 MMOL/L
CO2 SERPL-SCNC: 27 MMOL/L
CO2 SERPL-SCNC: 28 MMOL/L
CREAT SERPL-MCNC: 3.6 MG/DL
CREAT SERPL-MCNC: 3.6 MG/DL
DIASTOLIC DYSFUNCTION: YES
EST. GFR  (AFRICAN AMERICAN): 13 ML/MIN/1.73 M^2
EST. GFR  (AFRICAN AMERICAN): 13 ML/MIN/1.73 M^2
EST. GFR  (NON AFRICAN AMERICAN): 11 ML/MIN/1.73 M^2
EST. GFR  (NON AFRICAN AMERICAN): 11 ML/MIN/1.73 M^2
ESTIMATED PA SYSTOLIC PRESSURE: 61.15
FERRITIN SERPL-MCNC: 96 NG/ML
FOLATE SERPL-MCNC: 14.6 NG/ML
GLUCOSE SERPL-MCNC: 133 MG/DL
GLUCOSE SERPL-MCNC: 138 MG/DL
INR PPP: 4.1
IRON SERPL-MCNC: 35 UG/DL
MAGNESIUM SERPL-MCNC: 1.7 MG/DL
MITRAL VALVE REGURGITATION: ABNORMAL
OSMOLALITY SERPL: 282 MOSM/KG
PHOSPHATE SERPL-MCNC: 3.3 MG/DL
POCT GLUCOSE: 121 MG/DL (ref 70–110)
POTASSIUM SERPL-SCNC: 3.3 MMOL/L
POTASSIUM SERPL-SCNC: 4 MMOL/L
PROT SERPL-MCNC: 5.6 G/DL
PROTHROMBIN TIME: 43.2 SEC
PTH-INTACT SERPL-MCNC: 409.5 PG/ML
RETIRED EF AND QEF - SEE NOTES: 42 (ref 55–65)
SATURATED IRON: 10 %
SODIUM SERPL-SCNC: 124 MMOL/L
SODIUM SERPL-SCNC: 125 MMOL/L
TOTAL IRON BINDING CAPACITY: 348 UG/DL
TRANSFERRIN SERPL-MCNC: 235 MG/DL
TRICUSPID VALVE REGURGITATION: ABNORMAL
VIT B12 SERPL-MCNC: 597 PG/ML

## 2018-05-22 PROCEDURE — 63600175 PHARM REV CODE 636 W HCPCS: Performed by: INTERNAL MEDICINE

## 2018-05-22 PROCEDURE — 80048 BASIC METABOLIC PNL TOTAL CA: CPT

## 2018-05-22 PROCEDURE — 36415 COLL VENOUS BLD VENIPUNCTURE: CPT

## 2018-05-22 PROCEDURE — 82607 VITAMIN B-12: CPT

## 2018-05-22 PROCEDURE — 11000001 HC ACUTE MED/SURG PRIVATE ROOM

## 2018-05-22 PROCEDURE — 80053 COMPREHEN METABOLIC PANEL: CPT

## 2018-05-22 PROCEDURE — 27100092 HC HIGH FLOW DELIVERY CANNULA

## 2018-05-22 PROCEDURE — 83540 ASSAY OF IRON: CPT

## 2018-05-22 PROCEDURE — 99900035 HC TECH TIME PER 15 MIN (STAT)

## 2018-05-22 PROCEDURE — 63600175 PHARM REV CODE 636 W HCPCS: Performed by: NURSE PRACTITIONER

## 2018-05-22 PROCEDURE — 25000003 PHARM REV CODE 250: Performed by: INTERNAL MEDICINE

## 2018-05-22 PROCEDURE — 82306 VITAMIN D 25 HYDROXY: CPT

## 2018-05-22 PROCEDURE — 84100 ASSAY OF PHOSPHORUS: CPT

## 2018-05-22 PROCEDURE — 94761 N-INVAS EAR/PLS OXIMETRY MLT: CPT

## 2018-05-22 PROCEDURE — 25000003 PHARM REV CODE 250: Performed by: HOSPITALIST

## 2018-05-22 PROCEDURE — A4216 STERILE WATER/SALINE, 10 ML: HCPCS | Performed by: INTERNAL MEDICINE

## 2018-05-22 PROCEDURE — 25000003 PHARM REV CODE 250: Performed by: NURSE PRACTITIONER

## 2018-05-22 PROCEDURE — 63600175 PHARM REV CODE 636 W HCPCS: Performed by: PHYSICIAN ASSISTANT

## 2018-05-22 PROCEDURE — 99233 SBSQ HOSP IP/OBS HIGH 50: CPT | Mod: ,,, | Performed by: HOSPITALIST

## 2018-05-22 PROCEDURE — 82728 ASSAY OF FERRITIN: CPT

## 2018-05-22 PROCEDURE — 27100171 HC OXYGEN HIGH FLOW UP TO 24 HOURS

## 2018-05-22 PROCEDURE — 83735 ASSAY OF MAGNESIUM: CPT

## 2018-05-22 PROCEDURE — 82746 ASSAY OF FOLIC ACID SERUM: CPT

## 2018-05-22 PROCEDURE — 85610 PROTHROMBIN TIME: CPT

## 2018-05-22 PROCEDURE — 83970 ASSAY OF PARATHORMONE: CPT

## 2018-05-22 PROCEDURE — 83930 ASSAY OF BLOOD OSMOLALITY: CPT

## 2018-05-22 PROCEDURE — 27000221 HC OXYGEN, UP TO 24 HOURS

## 2018-05-22 RX ORDER — TOLVAPTAN 15 MG/1
15 TABLET ORAL ONCE
Status: COMPLETED | OUTPATIENT
Start: 2018-05-22 | End: 2018-05-22

## 2018-05-22 RX ORDER — FAMOTIDINE 20 MG/1
20 TABLET, FILM COATED ORAL DAILY
Status: DISCONTINUED | OUTPATIENT
Start: 2018-05-23 | End: 2018-05-26 | Stop reason: HOSPADM

## 2018-05-22 RX ORDER — ONDANSETRON 2 MG/ML
8 INJECTION INTRAMUSCULAR; INTRAVENOUS ONCE
Status: COMPLETED | OUTPATIENT
Start: 2018-05-22 | End: 2018-05-22

## 2018-05-22 RX ORDER — DOCUSATE SODIUM 100 MG/1
100 CAPSULE, LIQUID FILLED ORAL DAILY
Status: DISCONTINUED | OUTPATIENT
Start: 2018-05-22 | End: 2018-05-22

## 2018-05-22 RX ORDER — AMOXICILLIN 250 MG
1 CAPSULE ORAL 2 TIMES DAILY
Status: DISCONTINUED | OUTPATIENT
Start: 2018-05-22 | End: 2018-05-25

## 2018-05-22 RX ORDER — TRAMADOL HYDROCHLORIDE 50 MG/1
50 TABLET ORAL EVERY 6 HOURS PRN
Status: DISCONTINUED | OUTPATIENT
Start: 2018-05-22 | End: 2018-05-26 | Stop reason: HOSPADM

## 2018-05-22 RX ORDER — POLYETHYLENE GLYCOL 3350 17 G/17G
17 POWDER, FOR SOLUTION ORAL
Status: DISCONTINUED | OUTPATIENT
Start: 2018-05-22 | End: 2018-05-25

## 2018-05-22 RX ORDER — POTASSIUM CHLORIDE 20 MEQ/1
20 TABLET, EXTENDED RELEASE ORAL ONCE
Status: COMPLETED | OUTPATIENT
Start: 2018-05-22 | End: 2018-05-22

## 2018-05-22 RX ORDER — DEXTROMETHORPHAN HYDROBROMIDE, GUAIFENESIN 5; 100 MG/5ML; MG/5ML
1300 LIQUID ORAL DAILY PRN
Status: ON HOLD | COMMUNITY
End: 2018-05-25 | Stop reason: HOSPADM

## 2018-05-22 RX ADMIN — POLYETHYLENE GLYCOL 3350 17 G: 17 POWDER, FOR SOLUTION ORAL at 04:05

## 2018-05-22 RX ADMIN — ATORVASTATIN CALCIUM 10 MG: 10 TABLET, FILM COATED ORAL at 08:05

## 2018-05-22 RX ADMIN — Medication 1000 MG: at 09:05

## 2018-05-22 RX ADMIN — TRAZODONE HYDROCHLORIDE 50 MG: 50 TABLET ORAL at 09:05

## 2018-05-22 RX ADMIN — CARVEDILOL 25 MG: 12.5 TABLET, FILM COATED ORAL at 09:05

## 2018-05-22 RX ADMIN — ONDANSETRON HYDROCHLORIDE 8 MG: 2 INJECTION, SOLUTION INTRAMUSCULAR; INTRAVENOUS at 02:05

## 2018-05-22 RX ADMIN — DOCUSATE SODIUM 100 MG: 100 CAPSULE, LIQUID FILLED ORAL at 09:05

## 2018-05-22 RX ADMIN — IRON SUCROSE 100 MG: 20 INJECTION, SOLUTION INTRAVENOUS at 10:05

## 2018-05-22 RX ADMIN — Medication 400 MG: at 08:05

## 2018-05-22 RX ADMIN — VITAMIN D, TAB 1000IU (100/BT) 1000 UNITS: 25 TAB at 08:05

## 2018-05-22 RX ADMIN — SODIUM CHLORIDE, PRESERVATIVE FREE 3 ML: 5 INJECTION INTRAVENOUS at 06:05

## 2018-05-22 RX ADMIN — CARVEDILOL 25 MG: 12.5 TABLET, FILM COATED ORAL at 08:05

## 2018-05-22 RX ADMIN — TOLVAPTAN 15 MG: 15 TABLET ORAL at 10:05

## 2018-05-22 RX ADMIN — STANDARDIZED SENNA CONCENTRATE AND DOCUSATE SODIUM 1 TABLET: 8.6; 5 TABLET, FILM COATED ORAL at 09:05

## 2018-05-22 RX ADMIN — Medication 1000 MG: at 08:05

## 2018-05-22 RX ADMIN — SODIUM CHLORIDE, PRESERVATIVE FREE 3 ML: 5 INJECTION INTRAVENOUS at 02:05

## 2018-05-22 RX ADMIN — FUROSEMIDE 40 MG: 10 INJECTION, SOLUTION INTRAVENOUS at 08:05

## 2018-05-22 RX ADMIN — TRAMADOL HYDROCHLORIDE 50 MG: 50 TABLET, COATED ORAL at 09:05

## 2018-05-22 RX ADMIN — TRAMADOL HYDROCHLORIDE 50 MG: 50 TABLET, COATED ORAL at 07:05

## 2018-05-22 RX ADMIN — AMLODIPINE BESYLATE 5 MG: 5 TABLET ORAL at 08:05

## 2018-05-22 RX ADMIN — ACETAMINOPHEN 650 MG: 325 TABLET, FILM COATED ORAL at 04:05

## 2018-05-22 RX ADMIN — POTASSIUM CHLORIDE 20 MEQ: 1500 TABLET, EXTENDED RELEASE ORAL at 09:05

## 2018-05-22 RX ADMIN — LEVOTHYROXINE SODIUM 50 MCG: 50 TABLET ORAL at 08:05

## 2018-05-22 RX ADMIN — SODIUM CHLORIDE, PRESERVATIVE FREE 3 ML: 5 INJECTION INTRAVENOUS at 09:05

## 2018-05-22 RX ADMIN — FAMOTIDINE 20 MG: 20 TABLET ORAL at 08:05

## 2018-05-22 NOTE — PLAN OF CARE
Problem: Patient Care Overview  Goal: Plan of Care Review  Outcome: Ongoing (interventions implemented as appropriate)  Requiring 5 liters for adequate saturation.

## 2018-05-22 NOTE — PROGRESS NOTES
Progress Note  Nephrology    Admit Date: 5/20/2018   LOS: 2 days     SUBJECTIVE:     Follow-up For: Hyponatremia, LEIA on CKD Stage 4    Interval History: In pain this morning, generalized.  Denies SOB or CP.  UOP not recorded.  Na 124.    OBJECTIVE:     Vital Signs (Most Recent)  Temp: 97.8 °F (36.6 °C) (05/22/18 0733)  Pulse: 72 (05/22/18 0824)  Resp: 20 (05/22/18 0824)  BP: (!) 158/65 (05/22/18 0733)  SpO2: (!) 92 % (05/22/18 0824)    Vital Signs Range (Last 24H):  Temp:  [97.3 °F (36.3 °C)-97.8 °F (36.6 °C)]   Pulse:  [60-79]   Resp:  [18-20]   BP: (139-158)/(62-70)   SpO2:  [89 %-95 %]     Review of Systems:   Constitutional: no fever or chills.  Generalized pain.   Respiratory: no cough or shorness of breath   Cardiovascular: no chest pain or palpitations   Gastrointestinal: no nausea or vomiting, no abdominal pain or change in bowel habits   Genitourinary: no hematuria or dysuria   Musculoskeletal: no arthralgias or myalgias   Neurological: no seizures or tremors    Physical Exam:  Gen: AAOx3, frail, contracted, in mild distress  HEENT: mmm, sclera anicteric  CV: RRR, no m/r  Resp: Diminished breath sounds at bases, no rales or wheezes  GI: soft, ND, NTTP, +BS  Skin: warm, dry  Extr: no edema    Laboratory:    Recent Labs  Lab 05/20/18  1238 05/21/18  0533 05/21/18  1229 05/21/18 1951 05/22/18  0529   * 128* 126* 123* 124*   K 3.4* 3.5 3.4* 4.2 3.3*   CL 87* 87* 86* 87* 85*   CO2 28 30* 29 27 28   BUN 49* 52* 53* 54* 55*   CREATININE 3.6* 3.7* 3.7* 3.7* 3.6*   CALCIUM 7.9* 7.7* 7.9* 7.5* 7.8*   PHOS 3.3 3.8  --   --  3.3       Recent Labs  Lab 05/20/18  1238 05/21/18  0532   WBC 6.04 6.59   HGB 9.3* 8.5*   HCT 26.7* 25.1*   * 128*        Diagnostic Results:  Labs: Reviewed  US: Reviewed    ASSESSMENT/PLAN:     1. LEIA on chronic kidney disease stage IV (N 17.9, N 18.4):   - Followed Dr. Pruitt at [facility], most recently seen 5/3/18.  Her most recent Cr 2.2 due to AKIs, but prior to that her baseline Cr  ~ 1.4-1.7.  CKD has been attributed to longstanding DM and HTN.  Nephrotic syndrome serology has been negative.  Pt and daughter have declined a renal bx for now.  - Cr stable at 3.6-3.7 but hopeful with gentle diuresis that will improve.  - Per notes from Dr. Pruitt, pt declines dialysis therapies.  - Renally dose meds, avoid nephrotoxins, and monitor I/O's closely.  2. Hyponatremia secondary to diuresis (E 87.1):  Waiting on Sosm, but appears hypervolemic.  Will give a dose of Tolvaptan 15mg po x1 now.  She does not need to be on fluid restriction for the next 24 hours.    3. Hypokalemia (E87.5): From diuretics.  Replace with po now.  4. 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 AFIB on OAC (I48.91):  Dr. Garcia to manage Coumadin and INR.    6. Anemia of CKD (D63.1) with Iron deficiency (D50.0):  Normal B12/folate.  Will start IV Venofer qd x5 days.    7. Dispo:  Consider DNR.       Thank you for allowing me to participate in the care of this patient.      Caleb Garcia MD  Nephrology

## 2018-05-22 NOTE — HOSPITAL COURSE
Patient was started on IV diuresis, with some clinical improvement in her shortness of breath but she remained hypoxemic.  Cardiology and Nephrology were consulted.  She was started on fluid restriction to treat low sodium, and then received a dose of tolvaptan and her fluids were liberalized.  She did not have a good response to the initial dose and it was increased and repeated on 5/23 and then 5/24.  In the meantime she was sleeping most of the time and was intermittently confused.  Her oxygen requirement increased until she was on high flow with saturation in the low 90's.  Eventually we had conversations with the family about her prognosis given the advanced heart failure with hypoxemia and worsening kidney function.  At first they wanted to take her home with hospice, but eventually agreed that her oxygen requirement and frailty made this too difficult.  She was transferred to [facility] in the afternoon 5/26/18.

## 2018-05-22 NOTE — PROGRESS NOTES
[facility] Hospital Medicine  Progress Note    Patient Name: Deepali Stuart  MRN: [id number]  Patient Class: IP- Inpatient   Admission Date: 5/20/2018  Length of Stay: 2 days  Attending Physician: Mariaelena Villagomez MD  Primary Care Provider: Vishal Salazar MD        Subjective:     Principal Problem:Heart failure, systolic and diastolic, acute on chronic    HPI:  Ms. Stuart is an 86 year old woman who presented to the ED with shortness of breath, decreased activity and confusion for one day.  Her daughter reports that patient seems to have declined since her pacemaker placement. Patient normally eats a low salt diet but she went to a [employer] party yesterday and ate some high salt foods.  On presentation her oxygen saturation on room air was in the low 80's, and she was placed on a NRB in the ED before being weaned down to 5 liters NC.  Labs were notable for BUN/creatinine 49/3.6, BNP 2,585, mildly elevated troponin, sodium 127.  CXR showed bilateral effusions and associated atelectasis.  She was admitted for treatment of heart failure in the setting of worsening chronic kidney disease.    Medical history includes CHF with pulmonary hypertension, chronic atrial fibrillation, type II diabetes and hypothyroidism.  Her pacemaker was placed 2012.  She is followed by Dr. 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  She is not interested in HD.    Hospital Course:  Patient was started on IV diuresis, with some clinical improvement in her shortness of breath.  Cardiology and Nephrology were consulted.  She was started on fluid restriction to treat low sodium, and then received a dose of tolvaptan.      Interval History:  Patient alert.  Family at bedside report she is not eating or drinking and has not had a BM in at least 3 days.  She reports having severe stomach pain last night for which she was finally given a tramadol with some relief this morning.    Also discussed  code status with her, given that she is not interested in HD or other procedures.  She confirms that her desire is to be comfortable and not do anything aggressive like CPR or intubation.  When it is her time she wants to die peacefully. Family at bedside witnessed our conversation and agree to DNR status.    Review of Systems   Constitutional: Negative for chills and fever.   Respiratory: Positive for shortness of breath. Negative for cough.    Cardiovascular: Negative for chest pain and palpitations.   Gastrointestinal: Positive for abdominal pain.     Objective:     Vital Signs (Most Recent):  Temp: 97.9 °F (36.6 °C) (05/22/18 1224)  Pulse: 68 (05/22/18 1224)  Resp: 19 (05/22/18 1224)  BP: (!) 132/59 (05/22/18 1224)  SpO2: (!) 92 % (05/22/18 1224) Vital Signs (24h Range):  Temp:  [96 °F (35.6 °C)-97.9 °F (36.6 °C)] 97.9 °F (36.6 °C)  Pulse:  [60-87] 68  Resp:  [18-20] 19  SpO2:  [89 %-94 %] 92 %  BP: (132-158)/(59-70) 132/59     Weight: 68 kg (149 lb 14.6 oz)  Body mass index is 27.41 kg/m².    Intake/Output Summary (Last 24 hours) at 05/22/18 1555  Last data filed at 05/22/18 1300   Gross per 24 hour   Intake              950 ml   Output              525 ml   Net              425 ml      Physical Exam   Constitutional: She is oriented to person, place, and time. She appears well-developed and well-nourished. No distress.   Frail, elderly woman.     HENT:   Head: Normocephalic.   Eyes: Conjunctivae are normal. Pupils are equal, round, and reactive to light.   Neck: Neck supple. No thyromegaly present.   Cardiovascular: Normal rate, regular rhythm, normal heart sounds and intact distal pulses.  Exam reveals no gallop and no friction rub.    No murmur heard.  Pulmonary/Chest: Effort normal.   Decreased breath sounds bilaterally.   Abdominal: Soft. Bowel sounds are normal. She exhibits no distension. There is no tenderness.   Musculoskeletal: Normal range of motion. She exhibits no edema.   Lymphadenopathy:     She  has no cervical adenopathy.   Neurological: She is alert and oriented to person, place, and time.   Skin: Skin is warm and dry. No rash noted.   Psychiatric: She has a normal mood and affect. Her behavior is normal. Thought content normal.       Significant Labs:   BMP:   Recent Labs  Lab 05/22/18  0529 05/22/18  1145   * 138*   * 125*   K 3.3* 4.0   CL 85* 87*   CO2 28 27   BUN 55* 58*   CREATININE 3.6* 3.6*   CALCIUM 7.8* 7.6*   MG 1.7  --      CBC:   Recent Labs  Lab 05/21/18  0532   WBC 6.59   HGB 8.5*   HCT 25.1*   *       Significant Imaging: I have reviewed all pertinent imaging results/findings within the past 24 hours.    Assessment/Plan:      * Heart failure, systolic and diastolic, acute on chronic    - Patient with depressed LV function and associated severe restriction with diastolic failure and pulmonary hypertension, moderate MR/TR.  - On multiple diuretics as outpatient  - Cardiologist following - continuing IV diuretics   - Discussed code status in setting of heart failure with worsening CKD - patient does not desire aggressive resuscitation measures and wants to be made comfortable.          LEIA (acute kidney injury)    - Worsening renal function in setting of poor perfusion due to heart failure  - Seems stable with diuretics  - Nephrology following  - Note patient not interested in HD          Hypertensive heart and kidney disease with HF and with CKD stage IV    - CKD IV followed by Dr. Pruitt  - Multifactorial, with diabetes and hypertension as contributors to heart failure and CKD.          Hyponatremia    - Patient on multiple diuretics  - Tolvaptan given yesterday  - Liberalize fluid restriction.          Essential hypertension    - Continue carvedilol  - Lisinopril held for now in setting of worsening renal disease - would consider resuming this due to severe proteinuria  - Continue IV diuresis          Hypothyroidism    Continue synthroid        Controlled type 2  diabetes mellitus with diabetic nephropathy, without long-term current use of insulin    - A1C 5.6  - Continue low dose SSI        Chronic atrial fibrillation    - Warfarin on hold due to elevated INR  - Continue to follow daily  - Patient has pacemaker and is on beta blocker for rate control.            VTE Risk Mitigation     None              Mariaelena Morse MD  Department of Hospital Medicine   [facility]

## 2018-05-22 NOTE — ASSESSMENT & PLAN NOTE
Most likely due to CHF  Continue fluid restriction and check sodium daily  Consulted Nephrology  Agrees with current regimen of fluid restriction  Appreciate recs

## 2018-05-22 NOTE — PLAN OF CARE
Patient resting in no acute distress. Pt currently complaining of stomach pain, mildly relieved with PRN pain medications throughout this shift. Shepard draining to gravity. Fluid restrictions maintained. Safety measures maintained. Pt instructed to use call light for assistance. Will continue to monitor.

## 2018-05-22 NOTE — ASSESSMENT & PLAN NOTE
- Warfarin on hold due to elevated INR  - Continue to follow daily  - Patient has pacemaker and is on beta blocker for rate control.

## 2018-05-22 NOTE — PHYSICIAN QUERY
PT Name: Deepali Stuart  MR #: [id number]     Physician Query Form - Documentation Clarification      CDS/: Joann Denton                 Contact information:[email or web address]    This form is a permanent document in the medical record.     Query Date: May 22, 2018    By submitting this query, we are merely seeking further clarification of documentation. Please utilize your independent clinical judgment when addressing the question(s) below.    The Medical record reflects the following:    Supporting Clinical Findings Location in Medical Record   Heart Failure, Diastolic, Acute on Chronic    4/17/2018: Echo: Normal left ventricular size and systolic function. LVH. EF 60-65%    Acute on chronic combined heart failure       Cardiology consult 5-21            Nephrology consult note 5-21   CONCLUSIONS :     1 - Concentric hypertrophy.   2 - Mildly to moderately depressed left ventricular systolic function (EF 40-45%).   3 - Restrictive LV filling pattern, indicating markedly elevated LAP (grade 3 diastolic dysfunction).   4 - Pulmonary hypertension. The estimated PA systolic pressure is greater than 61 mmHg.   5 - Moderate mitral regurgitation.   6 - Moderate tricuspid regurgitation.           2D Echo 5-21                                                                            Doctor, Please specify diagnosis or diagnoses associated with above clinical findings.    Provider Use Only      [    ] Acute on chronic diastolic CHF      [  x  ]  Acute on chronic systolic and diastolic CHF      [    ]  Other:please specify                                                                                                                 [  ] Clinically undetermined    See my note from today

## 2018-05-22 NOTE — CONSULTS
[facility]  Cardiology  Consult Note    Patient Name: Deepali Stuart  MRN: [id number]  Admission Date: 5/20/2018  Hospital Length of Stay: 1 days  Code Status: Full Code   Attending Provider: Trang Collado, *   Consulting Provider: Jose Luis Elias MD  Primary Care Physician: Vishal Salazar MD  Principal Problem:Acute congestive heart failure    Patient information was obtained from patient, past medical records and ER records.     Inpatient consult to Cardiology  Consult performed by: JOSE LUIS ELIAS  Consult ordered by: TRANG COLLADO  Reason for consult: Heart failure        Subjective:     Chief Complaint:  Shortness of breath and edema of legs.     HPI: Deepali Stuart is a 86 y.o. female with hypertension and diabetes mellitus type 2. She has chronic atrial fibrillation and a pacemaker. She has HFpEF with an EF in the 65-70% as well as pulmonary hypertension with a SPAP of 46 mmHg. She has advanced CKD stage 4. She is supposed to be on a low sodium diet. She presents short of breath with leg edema. CXR reveals pleural effusions. She is admitted for diuresis.    Past Medical History:   Diagnosis Date    Anxiety     Atrial fibrillation     chronic AF    CHF (congestive heart failure)     CKD (chronic kidney disease), stage IV     Diabetes insipidus     Diabetes mellitus type II     GERD (gastroesophageal reflux disease)     Hypertension     Iritis 12/10/2014    NPDR (nonproliferative diabetic retinopathy) 10/27/2014    PAF (paroxysmal atrial fibrillation)     Palpitations        Past Surgical History:   Procedure Laterality Date    CATARACT EXTRACTION W/  INTRAOCULAR LENS IMPLANT Right n/a    OD over 10 years ago     CATARACT EXTRACTION W/  INTRAOCULAR LENS IMPLANT Left 11/11/14    OS ()    COLON SURGERY      Partial colectomy secondary to suspicious polyp    HYSTERECTOMY      INSERT / REPLACE / REMOVE PACEMAKER      SC PM 2012 [email or web address]     LASER ABLATION      3 years ago OS       Review of patient's allergies indicates:   Allergen Reactions    Plavix [clopidogrel]        No current facility-administered medications on file prior to encounter.      Current Outpatient Prescriptions on File Prior to Encounter   Medication Sig    amlodipine (NORVASC) 5 MG tablet TAKE 1 TABLET BY MOUTH DAILY    atorvastatin (LIPITOR) 10 MG tablet Take 1 tablet (10 mg total) by mouth once daily.    bumetanide (BUMEX) 1 MG tablet Take 1.5 tablets (1.5 mg total) by mouth 2 (two) times daily.    calcium carbonate (OS-JERMAN) 500 mg calcium (1,250 mg) tablet Take 2 tablets (1,000 mg total) by mouth 2 (two) times daily.    carvedilol (COREG) 25 MG tablet Take 1 tablet (25 mg total) by mouth 2 (two) times daily.    cholecalciferol, vitamin D3, 1,000 unit capsule Take 1 capsule (1,000 Units total) by mouth 2 (two) times daily.    furosemide (LASIX) 20 MG tablet     furosemide (LASIX) 20 MG tablet Take 1 tablet (20 mg total) by mouth once daily.    lancets Misc 1 Device by Misc.(Non-Drug; Combo Route) route once daily. (Patient taking differently: 1 Device by Misc.(Non-Drug; Combo Route) route 2 (two) times daily. )    levothyroxine (SYNTHROID) 50 MCG tablet Take 1 tablet (50 mcg total) by mouth once daily.    lisinopril (PRINIVIL,ZESTRIL) 20 MG tablet Take 1 tablet (20 mg total) by mouth once daily.    LORazepam (ATIVAN) 0.5 MG tablet Take 1 tablet (0.5 mg total) by mouth every 8 (eight) hours as needed for Anxiety.    magnesium oxide (MAG-OX) 400 mg tablet Take 1 tablet (400 mg total) by mouth once daily.    meclizine (ANTIVERT) 25 mg tablet TAKE 2 TABLETS BY MOUTH THREE TIMES DAILY AS NEEDED FOR DIZZINESS    metOLazone (ZAROXOLYN) 2.5 MG tablet Take 1 tablet (2.5 mg total) by mouth Every 3 (three) days.    mupirocin (BACTROBAN) 2 % ointment Apply topically 3 (three) times daily.    ondansetron (ZOFRAN-ODT) 4 MG TbDL DISSOLVE ONE TABLET BY MOUTH EVERY 6 HOURS AS  NEEDED FOR NAUSEA AND VOMITING    pantoprazole (PROTONIX) 20 MG tablet     ranitidine (ZANTAC) 300 MG tablet Take 1 tablet (300 mg total) by mouth every evening.    trazodone (DESYREL) 50 MG tablet TAKE 1/2 TO 1 TABLET BY MOUTH EVERY EVENING AS NEEDED FOR INSOMNIA    TRUETRACK TEST Strp TEST TWICE DAILY.    warfarin (COUMADIN) 5 MG tablet TAKE 1 TABLET BY MOUTH EVERY DAY OR AS DIRECTED BY COUMADIN CLINIC (Patient taking differently: TAKE 1/2 TABLET BY MOUTH EVERY DAY IN THE EVENING OR AS DIRECTED BY COUMADIN CLINIC)     Family History     Problem Relation (Age of Onset)    No Known Problems Mother, Father, Sister, Brother, Maternal Aunt, Maternal Uncle, Paternal Aunt, Paternal Uncle, Maternal Grandmother, Maternal Grandfather, Paternal Grandmother, Paternal Grandfather        Social History Main Topics    Smoking status: Never Smoker    Smokeless tobacco: Never Used    Alcohol use No    Drug use: No    Sexual activity: No     Review of Systems   Constitution: Positive for weakness and malaise/fatigue. Negative for chills and fever.   HENT: Negative for nosebleeds.    Eyes: Negative for double vision, vision loss in left eye and vision loss in right eye.   Cardiovascular: Positive for leg swelling, orthopnea and paroxysmal nocturnal dyspnea. Negative for chest pain, claudication, dyspnea on exertion, irregular heartbeat, near-syncope, palpitations and syncope.   Respiratory: Positive for shortness of breath. Negative for cough, hemoptysis and wheezing.    Endocrine: Negative for cold intolerance and heat intolerance.   Hematologic/Lymphatic: Negative for bleeding problem. Does not bruise/bleed easily.   Skin: Negative for color change and rash.   Musculoskeletal: Negative for back pain, falls, muscle weakness and myalgias.   Gastrointestinal: Negative for heartburn, hematemesis, hematochezia, hemorrhoids, jaundice, melena, nausea and vomiting.   Genitourinary: Negative for dysuria and hematuria.    Neurological: Negative for dizziness, focal weakness, headaches, light-headedness, loss of balance, numbness and vertigo.   Psychiatric/Behavioral: Positive for memory loss. Negative for altered mental status and depression. The patient is not nervous/anxious.    Allergic/Immunologic: Negative for hives and persistent infections.     Objective:     Vital Signs (Most Recent):  Temp: 97.8 °F (36.6 °C) (05/21/18 1924)  Pulse: 64 (05/21/18 2000)  Resp: 18 (05/21/18 1924)  BP: (!) 140/66 (05/21/18 1924)  SpO2: (!) 92 % (05/21/18 1924) Vital Signs (24h Range):  Temp:  [97.2 °F (36.2 °C)-97.8 °F (36.6 °C)] 97.8 °F (36.6 °C)  Pulse:  [60-75] 64  Resp:  [18-22] 18  SpO2:  [88 %-97 %] 92 %  BP: (139-157)/(64-70) 140/66     Weight: 62.5 kg (137 lb 12.6 oz)  Body mass index is 25.2 kg/m².    SpO2: (!) 92 %  O2 Device (Oxygen Therapy): nasal cannula      Intake/Output Summary (Last 24 hours) at 05/21/18 2017  Last data filed at 05/21/18 1710   Gross per 24 hour   Intake                0 ml   Output              420 ml   Net             -420 ml       Lines/Drains/Airways     Drain                 Urethral Catheter 05/21/18 1105 Double-lumen 16 Fr. less than 1 day          Peripheral Intravenous Line                 Peripheral IV - Single Lumen 05/20/18 1237 Right Antecubital 1 day                Physical Exam   Constitutional: She is oriented to person, place, and time. She appears well-developed and well-nourished.  Non-toxic appearance. She appears ill. No distress.   HENT:   Head: Normocephalic and atraumatic.   Nose: Nose normal.   Eyes: Right eye exhibits no discharge. Left eye exhibits no discharge. Right conjunctiva is not injected. Left conjunctiva is not injected. Right pupil is round. Left pupil is round. Pupils are equal.   Neck: Neck supple. No JVD present. Carotid bruit is not present. No thyromegaly present.   Cardiovascular: Normal rate, S1 normal and S2 normal.  An irregularly irregular rhythm present.  No  extrasystoles are present. PMI is not displaced.  Exam reveals no gallop.    Murmur heard.  High-pitched blowing holosystolic murmur is present with a grade of 3/6  at the apex  Pulses:       Radial pulses are 2+ on the right side, and 2+ on the left side.        Femoral pulses are 2+ on the right side, and 2+ on the left side.  Pulmonary/Chest: Effort normal. She has decreased breath sounds in the right lower field and the left lower field. She has rales in the right middle field and the left middle field.   Abdominal: Soft. Normal appearance. There is no hepatosplenomegaly. There is no tenderness.   Musculoskeletal:        Right ankle: She exhibits swelling. She exhibits no ecchymosis and no deformity.        Left ankle: She exhibits swelling. She exhibits no ecchymosis and no deformity.   Lymphadenopathy:        Head (right side): No submandibular adenopathy present.        Head (left side): No submandibular adenopathy present.     She has no cervical adenopathy.   Neurological: She is alert and oriented to person, place, and time. She is not disoriented. No cranial nerve deficit or sensory deficit.   Skin: Skin is warm, dry and intact. No rash noted. She is not diaphoretic. Nails show no clubbing.   Psychiatric: She has a normal mood and affect. Her speech is normal. Cognition and memory are impaired.     Current Medications:     amLODIPine  5 mg Oral Daily    atorvastatin  10 mg Oral Daily    calcium carbonate  1,000 mg Oral BID    carvedilol  25 mg Oral BID    famotidine  20 mg Oral BID    [START ON 5/22/2018] furosemide  40 mg Intravenous Daily    levothyroxine  50 mcg Oral Daily    magnesium oxide  400 mg Oral Daily    sodium chloride 0.9%  3 mL Intravenous Q8H    traZODone  50 mg Oral QHS    vitamin D  1,000 Units Oral Daily     Current Laboratory Results:    Recent Results (from the past 24 hour(s))   POCT glucose    Collection Time: 05/20/18  9:11 PM   Result Value Ref Range    POCT Glucose 144  (H) 70 - 110 mg/dL   Hemoglobin A1c    Collection Time: 05/21/18  5:32 AM   Result Value Ref Range    Hemoglobin A1C 5.6 4.0 - 5.6 %    Estimated Avg Glucose 114 68 - 131 mg/dL   CBC auto differential    Collection Time: 05/21/18  5:32 AM   Result Value Ref Range    WBC 6.59 3.90 - 12.70 K/uL    RBC 2.75 (L) 4.00 - 5.40 M/uL    Hemoglobin 8.5 (L) 12.0 - 16.0 g/dL    Hematocrit 25.1 (L) 37.0 - 48.5 %    MCV 91 82 - 98 fL    MCH 30.9 27.0 - 31.0 pg    MCHC 33.9 32.0 - 36.0 g/dL    RDW 13.3 11.5 - 14.5 %    Platelets 128 (L) 150 - 350 K/uL    MPV 9.0 (L) 9.2 - 12.9 fL    Gran # (ANC) 4.8 1.8 - 7.7 K/uL    Lymph # 1.2 1.0 - 4.8 K/uL    Mono # 0.4 0.3 - 1.0 K/uL    Eos # 0.1 0.0 - 0.5 K/uL    Baso # 0.02 0.00 - 0.20 K/uL    Gran% 73.3 (H) 38.0 - 73.0 %    Lymph% 18.7 18.0 - 48.0 %    Mono% 5.9 4.0 - 15.0 %    Eosinophil% 1.5 0.0 - 8.0 %    Basophil% 0.3 0.0 - 1.9 %    Differential Method Automated    Protime-INR - if on Coumadin    Collection Time: 05/21/18  5:32 AM   Result Value Ref Range    Prothrombin Time 36.5 (H) 9.0 - 12.5 sec    INR 3.4 (H) 0.8 - 1.2   Comprehensive metabolic panel - if not done in ED    Collection Time: 05/21/18  5:33 AM   Result Value Ref Range    Sodium 128 (L) 136 - 145 mmol/L    Potassium 3.5 3.5 - 5.1 mmol/L    Chloride 87 (L) 95 - 110 mmol/L    CO2 30 (H) 23 - 29 mmol/L    Glucose 107 70 - 110 mg/dL    BUN, Bld 52 (H) 8 - 23 mg/dL    Creatinine 3.7 (H) 0.5 - 1.4 mg/dL    Calcium 7.7 (L) 8.7 - 10.5 mg/dL    Total Protein 5.5 (L) 6.0 - 8.4 g/dL    Albumin 2.4 (L) 3.5 - 5.2 g/dL    Total Bilirubin 0.6 0.1 - 1.0 mg/dL    Alkaline Phosphatase 49 (L) 55 - 135 U/L    AST 31 10 - 40 U/L    ALT 18 10 - 44 U/L    Anion Gap 11 8 - 16 mmol/L    eGFR if African American 12 (A) >60 mL/min/1.73 m^2    eGFR if non African American 11 (A) >60 mL/min/1.73 m^2   Magnesium - if not done in ED    Collection Time: 05/21/18  5:33 AM   Result Value Ref Range    Magnesium 1.7 1.6 - 2.6 mg/dL   Phosphorus - if not  done in ED    Collection Time: 05/21/18  5:33 AM   Result Value Ref Range    Phosphorus 3.8 2.7 - 4.5 mg/dL   Lipid panel    Collection Time: 05/21/18  5:33 AM   Result Value Ref Range    Cholesterol 136 120 - 199 mg/dL    Triglycerides 82 30 - 150 mg/dL    HDL 46 40 - 75 mg/dL    LDL Cholesterol 73.6 63.0 - 159.0 mg/dL    HDL/Chol Ratio 33.8 20.0 - 50.0 %    Total Cholesterol/HDL Ratio 3.0 2.0 - 5.0    Non-HDL Cholesterol 90 mg/dL   Uric acid    Collection Time: 05/21/18  5:33 AM   Result Value Ref Range    Uric Acid 11.0 (H) 2.4 - 5.7 mg/dL   POCT glucose    Collection Time: 05/21/18  7:33 AM   Result Value Ref Range    POCT Glucose 110 70 - 110 mg/dL   Matos's Stain, Urine Random    Collection Time: 05/21/18 11:08 AM   Result Value Ref Range    Matos's Stain, Ur No eosinophils seen No eosinophils seen   Creatinine, urine, random    Collection Time: 05/21/18 11:08 AM   Result Value Ref Range    Creatinine, Random Ur 41.9 15.0 - 325.0 mg/dL   Sodium, urine, random    Collection Time: 05/21/18 11:08 AM   Result Value Ref Range    Sodium Urine Random 86 20 - 250 mmol/L   Chloride, urine, random    Collection Time: 05/21/18 11:08 AM   Result Value Ref Range    Chloride, Rand Ur 83 25 - 200 mmol/L   Osmolality, urine    Collection Time: 05/21/18 11:08 AM   Result Value Ref Range    Osmolality, Ur 289 50 - 1200 mOsm/kg   POCT glucose    Collection Time: 05/21/18 12:27 PM   Result Value Ref Range    POCT Glucose 166 (H) 70 - 110 mg/dL   Basic metabolic panel    Collection Time: 05/21/18 12:29 PM   Result Value Ref Range    Sodium 126 (L) 136 - 145 mmol/L    Potassium 3.4 (L) 3.5 - 5.1 mmol/L    Chloride 86 (L) 95 - 110 mmol/L    CO2 29 23 - 29 mmol/L    Glucose 147 (H) 70 - 110 mg/dL    BUN, Bld 53 (H) 8 - 23 mg/dL    Creatinine 3.7 (H) 0.5 - 1.4 mg/dL    Calcium 7.9 (L) 8.7 - 10.5 mg/dL    Anion Gap 11 8 - 16 mmol/L    eGFR if African American 12 (A) >60 mL/min/1.73 m^2    eGFR if non  11 (A) >60  mL/min/1.73 m^2   POCT glucose    Collection Time: 05/21/18  5:08 PM   Result Value Ref Range    POCT Glucose 192 (H) 70 - 110 mg/dL     Current Imaging Results:    Imaging Results          X-Ray Chest AP Portable (Final result)  Result time 05/20/18 13:07:02    Final result by Venkat Goldstein MD (05/20/18 13:07:02)                 Impression:      1. Bilateral pleural effusions with associated compressive atelectasis of the lower lobes, superimposed developing left lower lung zone consolidation however is not excluded.  Correlation recommended.      Electronically signed by: Venkat Goldstein MD  Date:    05/20/2018  Time:    13:07             Narrative:    EXAMINATION:  XR CHEST AP PORTABLE    CLINICAL HISTORY:  Shortness of breath    TECHNIQUE:  Single frontal view of the chest was performed.    COMPARISON:  04/07/2018    FINDINGS:  Patient is rotated, limiting evaluation.  The cardiomediastinal silhouette is prominent, similar to the previous exam noting calcification of the aorta..  There are bilateral pleural effusions, left greater than right, worsened..  The trachea is midline.  The lungs are symmetrically expanded bilaterally with patchy increased interstitial and parenchymal attenuation, primarily projected over the bilateral lower lung zones.  Superimposed left lower lung zone consolidation is not excluded..  There is no pneumothorax.  The osseous structures are remarkable for degenerative changes noting dextroscoliotic curvature of the thoracic spine..                                Date of Procedure: 04/17/2018    TEST DESCRIPTION       General: The patient was in an irregularly irregular rhythm throughout the study. A catheter is present in the right-sided cardiac chambers.     Aorta: The aortic root is normal in size, measuring 2.2 cm at sinotubular junction and 2.7 cm at Sinuses of Valsalva. The proximal ascending aorta is normal in size, measuring 2.5 cm across.     Left Atrium: The left atrial  volume index is severely enlarged, measuring 60.70 cc/m2.     Left Ventricle: The left ventricle is normal in size, with an end-diastolic diameter of 3.9 cm, and an end-systolic diameter of 3.1 cm. Posterior wall thickness is mildly increased, with the septum measuring 1.0 cm and the posterior wall measuring 1.2 cm   across. Relative wall thickness was increased at 0.62, and the LV mass index was 92.9 g/m2 consistent with concentric remodeling. The following segments were moderately hypokinetic: mid anteroseptum, apical septum.  Left ventricular systolic function appears normal. Visually estimated ejection fraction is 60-65%. The LV Doppler derived stroke volume equals 47.0 ccs.         Right Atrium: The right atrium is moderately enlarged, measuring 6.5 cm in length and 5.1 cm in width in the apical view.     Right Ventricle: The right ventricle is normal in size measuring 3.1 cm at the base in the apical right ventricle-focused view. Global right ventricular systolic function appears normal. Tricuspid annular plane systolic excursion (TAPSE) is 1.6 cm.   Tissue Doppler-derived tricuspid annular peak systolic velocity (S prime) is 7.9 cm/s. The estimated PA systolic pressure is 46 mmHg.     Aortic Valve:  The aortic valve is moderately sclerotic with mildly restricted leaflet mobility. The peak velocity obtained across the aortic valve is 1.5 m/s, which translates to a peak gradient of 9 mmHg. The mean gradient is 5 mmHg. Using a left   ventricular outflow tract diameter of 1.9 cm, a left ventricular outflow tract velocity time integral of 16 cm, and a peak instantaneous transvalvular velocity time integral of 27 cm, the calculated aortic valve area is 1.76 cm2(AVAi is 1.02 cm2/m2),   consistent with mild aortic stenosis.     Mitral Valve:  The mitral valve is mildly sclerotic. There is at least moderate and probably moderate- severe (2-3+) mitral regurgitation.     Tricuspid Valve:  There is mild to moderate  tricuspid regurgitation. Tricuspid valve is normal in structure with normal leaflet mobility.     Pulmonary Valve:  Pulmonary valve is normal in structure with normal leaflet mobility.     Pericardium: There is no evidence of pericardial effusion.      IVC: IVC is normal in size and collapses > 50% with a sniff, suggesting normal right atrial pressure of 3 mmHg.     Intracavitary: There is no evidence of intracavity mass, thrombi, or vegetation.     Other: There is a pleural effusion present.     CONCLUSIONS     1 - Normal left ventricular systolic function (EF 60-65%).     2 - Concentric remodeling.     3 - Wall motion abnormalities.     4 - Normal right ventricular systolic function .     5 - Biatrial enlargement.     6 - Mild aortic stenosis, XIMENA = 1.76 cm2, AVAi = 1.02 cm2/m2, peak velocity = 1.5 m/s, mean gradient = 5 mmHg.     7 - Moderate to moderate-severe(2-3+) mitral regurgitation.     8 - Mild to moderate tricuspid regurgitation.     9 - Pulmonary hypertension. The estimated PA systolic pressure is 46 mmHg.     This document has been electronically    SIGNED BY: Vijay Perez MD On: 04/17/2018 11:59      Assessment and Plan:     Active Diagnoses:    Diagnosis Date Noted POA    PRINCIPAL PROBLEM:  Acute congestive heart failure [I50.9] 05/20/2018 Yes    Hyponatremia [E87.1] 05/20/2018 Yes    LEIA (acute kidney injury) [N17.9] 08/26/2015 Yes    Pacemaker [Z95.0] 05/22/2013 Yes    Hypertension [I10]  Yes    Hypothyroidism [E03.9] 01/02/2013 Yes    Diabetes type 2, controlled [E11.9] 11/12/2012 Yes    Chronic atrial fibrillation [I48.2] 11/12/2012 Yes      Problems Resolved During this Admission:    Diagnosis Date Noted Date Resolved POA     Assessment and Plan:    1. Heart Failure, Diastolic, Acute on Chronic   4/17/2018: Echo: Normal left ventricular size and systolic function. LVH. EF 60-65%. Anteroseptal WMA. Mild AS - 1.5 m/s. Moderate to moderate severe MR. SPAP 46 mmHg.   5/20/2018: Presents with  fluid overload and heart failure.   Appears major issue is CKD4-5.   Diuresis.    2. Atrial Fibrillation   Chronic atrial fibrillation.    3. Pacemaker    4. Hypertension    5. Diabetes Mellitus, Type 2    6. Chronic Kidney Disease, Stage 5   5/21/2018: BUN/crea 52/3.7. CrCl 12.   Appears to be major issue.   Followed by Renal.       VTE Risk Mitigation     None          Thank you for your consult.    I will follow-up with patient. Please contact us if you have any additional questions.    Jose Luis Garcia MD  Cardiology   [facility]

## 2018-05-22 NOTE — ASSESSMENT & PLAN NOTE
Continue carvedilol  Will hold lisinopril in setting of worsening renal disease  Continue IV diuresis

## 2018-05-22 NOTE — ASSESSMENT & PLAN NOTE
- CKD IV followed by Dr. Pruitt  - Multifactorial, with diabetes and hypertension as contributors to heart failure and CKD.

## 2018-05-22 NOTE — PROGRESS NOTES
[facility] Hospital Medicine  Progress Note    Patient Name: Deepali Stuart  MRN: [id number]  Patient Class: IP- Inpatient   Admission Date: 5/20/2018  Length of Stay: 1 days  Attending Physician: Nahomy Gutiérrez, *  Primary Care Provider: Vishal Salazar MD        Subjective:     Principal Problem:Acute on chronic diastolic congestive heart failure    HPI:  86 y.o. female with a history of CHF, HTN, AFIB, DM, and Hypothyroidism presents to the ED c/o shortness of breath x 1 day.  Daughter reports that patient follows a strict no-low salt diet, but on yesterday she became extremely short of breath and confused.  She also has not been ambulating in the past day.  She also states that she seems to have declined since pacemaker placement. Patient admitss that she went to a [place] party yesterday and ate some high salt foods.  Did require nonrebreather in the ER as oxygen saturaion on room air was in the low 80's. Patient improved in the ER and oxygen requirements were weaned down to 5L on nasal cannula. She denies chest pain, fever, chills, nausea, vomiting, or diarrhea.     Hospital Course:  Patient receiving IV diuresis, with some clinical improvement.  Cardiology and Nephrology consulted.  Hyponatremia unchanged, will continue fluid restriction.      Interval History: Patient feels better this morning, but still very weak.  Family at bedside.  Only complains that she hates having her blood drawn. Discussed plan of care and patient in agreement.     Review of Systems   Constitutional: Positive for fatigue. Negative for chills and fever.   Respiratory: Positive for shortness of breath. Negative for cough.    Cardiovascular: Positive for leg swelling. Negative for chest pain.   Gastrointestinal: Negative for abdominal distention and abdominal pain.   Genitourinary: Negative for dysuria and flank pain.   Musculoskeletal: Negative for arthralgias and back pain.   Neurological:  Positive for weakness.   Psychiatric/Behavioral: Positive for confusion.     Objective:     Vital Signs (Most Recent):  Temp: 97.8 °F (36.6 °C) (05/21/18 1924)  Pulse: 74 (05/21/18 2200)  Resp: 18 (05/21/18 1924)  BP: (!) 140/66 (05/21/18 1924)  SpO2: (!) 92 % (05/21/18 1924) Vital Signs (24h Range):  Temp:  [97.2 °F (36.2 °C)-97.8 °F (36.6 °C)] 97.8 °F (36.6 °C)  Pulse:  [60-75] 74  Resp:  [18-22] 18  SpO2:  [88 %-95 %] 92 %  BP: (139-157)/(64-70) 140/66     Weight: 62.5 kg (137 lb 12.6 oz)  Body mass index is 25.2 kg/m².    Intake/Output Summary (Last 24 hours) at 05/21/18 2243  Last data filed at 05/21/18 1710   Gross per 24 hour   Intake                0 ml   Output              420 ml   Net             -420 ml      Physical Exam   Constitutional: She is oriented to person, place, and time. She appears well-developed and well-nourished. She is cooperative. She appears ill. Nasal cannula in place.   HENT:   Head: Normocephalic and atraumatic.   Eyes: Conjunctivae and EOM are normal.   Neck: Normal range of motion.   Cardiovascular: Normal rate and regular rhythm.    Pulmonary/Chest: Effort normal.   Decreased breath sounds bilaterally   Abdominal: Soft. Bowel sounds are normal.   Neurological: She is alert and oriented to person, place, and time.   Skin: Skin is warm and dry.   Psychiatric: She has a normal mood and affect. Her behavior is normal. Judgment and thought content normal.       Significant Labs:   CBC:   Recent Labs  Lab 05/20/18  1238 05/21/18  0532   WBC 6.04 6.59   HGB 9.3* 8.5*   HCT 26.7* 25.1*   * 128*     CMP:   Recent Labs  Lab 05/20/18  1238 05/21/18  0533 05/21/18  1229 05/21/18  1951   * 128* 126* 123*   K 3.4* 3.5 3.4* 4.2   CL 87* 87* 86* 87*   CO2 28 30* 29 27   * 107 147* 144*   BUN 49* 52* 53* 54*   CREATININE 3.6* 3.7* 3.7* 3.7*   CALCIUM 7.9* 7.7* 7.9* 7.5*   PROT 6.0 5.5*  --   --    ALBUMIN 2.5* 2.4*  --   --    BILITOT 0.8 0.6  --   --    ALKPHOS 59 49*  --    --    AST 36 31  --   --    ALT 20 18  --   --    ANIONGAP 12 11 11 9   EGFRNONAA 11* 11* 11* 11*     POCT Glucose:   Recent Labs  Lab 05/21/18  0733 05/21/18  1227 05/21/18  1708   POCTGLUCOSE 110 166* 192*     All pertinent labs within the past 24 hours have been reviewed.    Significant Imaging: I have reviewed all pertinent imaging results/findings within the past 24 hours.    Assessment/Plan:      * Acute on chronic diastolic congestive heart failure    Etiology appears to be dietary noncompliance  Will continue IV diuresis  Daily weights  Low sodium diet  Accurate Is and Os  Check BMP,Mg and Phos daily, replete accordingly  Consulted Cardiology  Echo 4/17 EF>60%, diastolic dysfunction  Appreciate recs          Hyponatremia    Most likely due to CHF  Continue fluid restriction and check sodium daily  Consulted Nephrology  Agrees with current regimen of fluid restriction  Appreciate recs          LEIA (acute kidney injury)    Etiology may be low perfusion state due to CHF exacerbation  Hopeful for improvement with diuresis  Nephrology on board, appreciate recommendations.           Pacemaker              Hypertension    Continue carvedilol  Will hold lisinopril in setting of worsening renal disease  Continue IV diuresis          Hypothyroidism    Continue synthroid        Diabetes type 2, controlled    A1C 5.6  Continue SSI, blood glucose qachs  Will adjust regimen based on blood sugars        Chronic atrial fibrillation    Warfarin on hold  INR 3.5, check INR daily  Continue carvedilol for rate control          VTE Risk Mitigation     None              Nahomy Gutiérrez MD  Department of Hospital Medicine   [facility]

## 2018-05-22 NOTE — ASSESSMENT & PLAN NOTE
Etiology appears to be dietary noncompliance  Will continue IV diuresis  Daily weights  Low sodium diet  Accurate Is and Os  Check BMP,Mg and Phos daily, replete accordingly  Consulted Cardiology  Echo 4/17 EF>60%, diastolic dysfunction  Appreciate recs

## 2018-05-22 NOTE — PLAN OF CARE
05/22/18 1131   Medicare Message   Important Message from Medicare regarding Discharge Appeal Rights Given to patient/caregiver;Explained to patient/caregiver;Signed/date by patient/caregiver   Date IMM was signed 05/22/18   Time IMM was signed [id number]

## 2018-05-22 NOTE — SUBJECTIVE & OBJECTIVE
Interval History: Patient feels better this morning, but still very weak.  Family at bedside.  Only complains that she hates having her blood drawn. Discussed plan of care and patient in agreement.     Review of Systems   Constitutional: Positive for fatigue. Negative for chills and fever.   Respiratory: Positive for shortness of breath. Negative for cough.    Cardiovascular: Positive for leg swelling. Negative for chest pain.   Gastrointestinal: Negative for abdominal distention and abdominal pain.   Genitourinary: Negative for dysuria and flank pain.   Musculoskeletal: Negative for arthralgias and back pain.   Neurological: Positive for weakness.   Psychiatric/Behavioral: Positive for confusion.     Objective:     Vital Signs (Most Recent):  Temp: 97.8 °F (36.6 °C) (05/21/18 1924)  Pulse: 74 (05/21/18 2200)  Resp: 18 (05/21/18 1924)  BP: (!) 140/66 (05/21/18 1924)  SpO2: (!) 92 % (05/21/18 1924) Vital Signs (24h Range):  Temp:  [97.2 °F (36.2 °C)-97.8 °F (36.6 °C)] 97.8 °F (36.6 °C)  Pulse:  [60-75] 74  Resp:  [18-22] 18  SpO2:  [88 %-95 %] 92 %  BP: (139-157)/(64-70) 140/66     Weight: 62.5 kg (137 lb 12.6 oz)  Body mass index is 25.2 kg/m².    Intake/Output Summary (Last 24 hours) at 05/21/18 2243  Last data filed at 05/21/18 1710   Gross per 24 hour   Intake                0 ml   Output              420 ml   Net             -420 ml      Physical Exam   Constitutional: She is oriented to person, place, and time. She appears well-developed and well-nourished. She is cooperative. She appears ill. Nasal cannula in place.   HENT:   Head: Normocephalic and atraumatic.   Eyes: Conjunctivae and EOM are normal.   Neck: Normal range of motion.   Cardiovascular: Normal rate and regular rhythm.    Pulmonary/Chest: Effort normal.   Decreased breath sounds bilaterally   Abdominal: Soft. Bowel sounds are normal.   Neurological: She is alert and oriented to person, place, and time.   Skin: Skin is warm and dry.   Psychiatric:  She has a normal mood and affect. Her behavior is normal. Judgment and thought content normal.       Significant Labs:   CBC:   Recent Labs  Lab 05/20/18  1238 05/21/18  0532   WBC 6.04 6.59   HGB 9.3* 8.5*   HCT 26.7* 25.1*   * 128*     CMP:   Recent Labs  Lab 05/20/18  1238 05/21/18  0533 05/21/18  1229 05/21/18  1951   * 128* 126* 123*   K 3.4* 3.5 3.4* 4.2   CL 87* 87* 86* 87*   CO2 28 30* 29 27   * 107 147* 144*   BUN 49* 52* 53* 54*   CREATININE 3.6* 3.7* 3.7* 3.7*   CALCIUM 7.9* 7.7* 7.9* 7.5*   PROT 6.0 5.5*  --   --    ALBUMIN 2.5* 2.4*  --   --    BILITOT 0.8 0.6  --   --    ALKPHOS 59 49*  --   --    AST 36 31  --   --    ALT 20 18  --   --    ANIONGAP 12 11 11 9   EGFRNONAA 11* 11* 11* 11*     POCT Glucose:   Recent Labs  Lab 05/21/18  0733 05/21/18  1227 05/21/18  1708   POCTGLUCOSE 110 166* 192*     All pertinent labs within the past 24 hours have been reviewed.    Significant Imaging: I have reviewed all pertinent imaging results/findings within the past 24 hours.

## 2018-05-22 NOTE — ASSESSMENT & PLAN NOTE
- Continue carvedilol  - Lisinopril held for now in setting of worsening renal disease - would consider resuming this due to severe proteinuria  - Continue IV diuresis

## 2018-05-22 NOTE — ASSESSMENT & PLAN NOTE
- Worsening renal function in setting of poor perfusion due to heart failure  - Seems stable with diuretics  - Nephrology following  - Note patient not interested in HD

## 2018-05-22 NOTE — ASSESSMENT & PLAN NOTE
- Patient with depressed LV function and associated severe restriction with diastolic failure and pulmonary hypertension, moderate MR/TR.  - On multiple diuretics as outpatient  - Cardiologist following - continuing IV diuretics   - Discussed code status in setting of heart failure with worsening CKD - patient does not desire aggressive resuscitation measures and wants to be made comfortable.

## 2018-05-22 NOTE — SUBJECTIVE & OBJECTIVE
Interval History:  Patient alert.  Family at bedside report she is not eating or drinking and has not had a BM in at least 3 days.  She reports having severe stomach pain last night for which she was finally given a tramadol with some relief this morning.    Also discussed code status with her, given that she is not interested in HD or other procedures.  She confirms that her desire is to be comfortable and not do anything aggressive like CPR or intubation.  When it is her time she wants to die peacefully. Family at bedside witnessed our conversation and agree to DNR status.    Review of Systems   Constitutional: Negative for chills and fever.   Respiratory: Positive for shortness of breath. Negative for cough.    Cardiovascular: Negative for chest pain and palpitations.   Gastrointestinal: Positive for abdominal pain.     Objective:     Vital Signs (Most Recent):  Temp: 97.9 °F (36.6 °C) (05/22/18 1224)  Pulse: 68 (05/22/18 1224)  Resp: 19 (05/22/18 1224)  BP: (!) 132/59 (05/22/18 1224)  SpO2: (!) 92 % (05/22/18 1224) Vital Signs (24h Range):  Temp:  [96 °F (35.6 °C)-97.9 °F (36.6 °C)] 97.9 °F (36.6 °C)  Pulse:  [60-87] 68  Resp:  [18-20] 19  SpO2:  [89 %-94 %] 92 %  BP: (132-158)/(59-70) 132/59     Weight: 68 kg (149 lb 14.6 oz)  Body mass index is 27.41 kg/m².    Intake/Output Summary (Last 24 hours) at 05/22/18 1555  Last data filed at 05/22/18 1300   Gross per 24 hour   Intake              950 ml   Output              525 ml   Net              425 ml      Physical Exam   Constitutional: She is oriented to person, place, and time. She appears well-developed and well-nourished. No distress.   Frail, elderly woman.     HENT:   Head: Normocephalic.   Eyes: Conjunctivae are normal. Pupils are equal, round, and reactive to light.   Neck: Neck supple. No thyromegaly present.   Cardiovascular: Normal rate, regular rhythm, normal heart sounds and intact distal pulses.  Exam reveals no gallop and no friction rub.    No  murmur heard.  Pulmonary/Chest: Effort normal.   Decreased breath sounds bilaterally.   Abdominal: Soft. Bowel sounds are normal. She exhibits no distension. There is no tenderness.   Musculoskeletal: Normal range of motion. She exhibits no edema.   Lymphadenopathy:     She has no cervical adenopathy.   Neurological: She is alert and oriented to person, place, and time.   Skin: Skin is warm and dry. No rash noted.   Psychiatric: She has a normal mood and affect. Her behavior is normal. Thought content normal.       Significant Labs:   BMP:   Recent Labs  Lab 05/22/18  0529 05/22/18  1145   * 138*   * 125*   K 3.3* 4.0   CL 85* 87*   CO2 28 27   BUN 55* 58*   CREATININE 3.6* 3.6*   CALCIUM 7.8* 7.6*   MG 1.7  --      CBC:   Recent Labs  Lab 05/21/18  0532   WBC 6.59   HGB 8.5*   HCT 25.1*   *       Significant Imaging: I have reviewed all pertinent imaging results/findings within the past 24 hours.

## 2018-05-22 NOTE — PHYSICIAN QUERY
PT Name: Deepali Stuart  MR #: [id number]  Physician Query Form - CKD Clarification     CDS/: Joann Denton                 Contact information:[email or web address]  This form is a permanent document in the medical record.     Query Date: May 22, 2018    By submitting this query, we are merely seeking further clarification of documentation. Please utilize your independent clinical judgment when addressing the question(s) below.    The Medical record contains the following:     Indicators   Supporting Clinical Findings   Location in Medical Record   x CKD or Chronic Kidney (Renal) Failure / Disease Appears major issue is CKD4-5  Chronic Kidney Disease, Stage 5    LEIA on CKD Stage 4      Cardiology consult 5-21          Nephrology note 5-22   x BUN/Creatinine                          GFR BUN=49 to 58  Creatinine=3.6 to 3.7  GFR=12 to 13 Lab 5-20 to 5-22    Dehydration      Nausea / Vomiting      Dialysis / CRRT      Medication      Treatment      Other Chronic Conditions      Other       Provider, please further specify the stage of CKD.      [  ] Chronic Kidney Disease (CKD) (please specify stage* below)       [employer] Definitions  Stage Description  eGFR (mL/min)   [ x ]     IV Severely reduced kidney function 15-29   [  ]     V Kidney failure, requiring transplant or dialysis <15     [  ] Other (please specify): ________________________  [  ] Clinically Undetermined    Please document in your progress notes daily for the duration of treatment until resolved and include in your discharge summary.

## 2018-05-22 NOTE — ASSESSMENT & PLAN NOTE
Etiology may be low perfusion state due to CHF exacerbation  Hopeful for improvement with diuresis  Nephrology on board, appreciate recommendations.

## 2018-05-23 LAB
ANION GAP SERPL CALC-SCNC: 11 MMOL/L
BUN SERPL-MCNC: 57 MG/DL
CALCIUM SERPL-MCNC: 7.7 MG/DL
CHLORIDE SERPL-SCNC: 85 MMOL/L
CO2 SERPL-SCNC: 28 MMOL/L
CREAT SERPL-MCNC: 3.7 MG/DL
EST. GFR  (AFRICAN AMERICAN): 12 ML/MIN/1.73 M^2
EST. GFR  (NON AFRICAN AMERICAN): 11 ML/MIN/1.73 M^2
GLUCOSE SERPL-MCNC: 114 MG/DL
INR PPP: 3.7
POCT GLUCOSE: 120 MG/DL (ref 70–110)
POTASSIUM SERPL-SCNC: 3.8 MMOL/L
PROTHROMBIN TIME: 38.9 SEC
SODIUM SERPL-SCNC: 124 MMOL/L

## 2018-05-23 PROCEDURE — 36415 COLL VENOUS BLD VENIPUNCTURE: CPT

## 2018-05-23 PROCEDURE — 25000003 PHARM REV CODE 250: Performed by: HOSPITALIST

## 2018-05-23 PROCEDURE — 99233 SBSQ HOSP IP/OBS HIGH 50: CPT | Mod: ,,, | Performed by: HOSPITALIST

## 2018-05-23 PROCEDURE — 85610 PROTHROMBIN TIME: CPT

## 2018-05-23 PROCEDURE — 97530 THERAPEUTIC ACTIVITIES: CPT

## 2018-05-23 PROCEDURE — 63600175 PHARM REV CODE 636 W HCPCS: Performed by: NURSE PRACTITIONER

## 2018-05-23 PROCEDURE — 27100171 HC OXYGEN HIGH FLOW UP TO 24 HOURS

## 2018-05-23 PROCEDURE — 63600175 PHARM REV CODE 636 W HCPCS: Performed by: INTERNAL MEDICINE

## 2018-05-23 PROCEDURE — A4216 STERILE WATER/SALINE, 10 ML: HCPCS | Performed by: INTERNAL MEDICINE

## 2018-05-23 PROCEDURE — 80048 BASIC METABOLIC PNL TOTAL CA: CPT

## 2018-05-23 PROCEDURE — 25000003 PHARM REV CODE 250: Performed by: INTERNAL MEDICINE

## 2018-05-23 PROCEDURE — 94761 N-INVAS EAR/PLS OXIMETRY MLT: CPT

## 2018-05-23 PROCEDURE — 99900035 HC TECH TIME PER 15 MIN (STAT)

## 2018-05-23 PROCEDURE — 11000001 HC ACUTE MED/SURG PRIVATE ROOM

## 2018-05-23 PROCEDURE — 97163 PT EVAL HIGH COMPLEX 45 MIN: CPT

## 2018-05-23 PROCEDURE — 27100092 HC HIGH FLOW DELIVERY CANNULA

## 2018-05-23 PROCEDURE — 97165 OT EVAL LOW COMPLEX 30 MIN: CPT

## 2018-05-23 PROCEDURE — 25000003 PHARM REV CODE 250: Performed by: NURSE PRACTITIONER

## 2018-05-23 RX ORDER — TOLVAPTAN 15 MG/1
30 TABLET ORAL ONCE
Status: COMPLETED | OUTPATIENT
Start: 2018-05-23 | End: 2018-05-23

## 2018-05-23 RX ORDER — CALCITRIOL 0.25 UG/1
0.25 CAPSULE ORAL DAILY
Status: DISCONTINUED | OUTPATIENT
Start: 2018-05-23 | End: 2018-05-26 | Stop reason: HOSPADM

## 2018-05-23 RX ADMIN — Medication 1000 MG: at 08:05

## 2018-05-23 RX ADMIN — CARVEDILOL 25 MG: 12.5 TABLET, FILM COATED ORAL at 09:05

## 2018-05-23 RX ADMIN — CARVEDILOL 25 MG: 12.5 TABLET, FILM COATED ORAL at 08:05

## 2018-05-23 RX ADMIN — TRAZODONE HYDROCHLORIDE 50 MG: 50 TABLET ORAL at 09:05

## 2018-05-23 RX ADMIN — LEVOTHYROXINE SODIUM 50 MCG: 50 TABLET ORAL at 08:05

## 2018-05-23 RX ADMIN — SODIUM CHLORIDE, PRESERVATIVE FREE 3 ML: 5 INJECTION INTRAVENOUS at 06:05

## 2018-05-23 RX ADMIN — SODIUM CHLORIDE, PRESERVATIVE FREE 3 ML: 5 INJECTION INTRAVENOUS at 10:05

## 2018-05-23 RX ADMIN — SODIUM CHLORIDE, PRESERVATIVE FREE 3 ML: 5 INJECTION INTRAVENOUS at 02:05

## 2018-05-23 RX ADMIN — FAMOTIDINE 20 MG: 20 TABLET ORAL at 08:05

## 2018-05-23 RX ADMIN — TOLVAPTAN 30 MG: 15 TABLET ORAL at 08:05

## 2018-05-23 RX ADMIN — IRON SUCROSE 100 MG: 20 INJECTION, SOLUTION INTRAVENOUS at 09:05

## 2018-05-23 RX ADMIN — FUROSEMIDE 40 MG: 10 INJECTION, SOLUTION INTRAVENOUS at 08:05

## 2018-05-23 RX ADMIN — POLYETHYLENE GLYCOL 3350 17 G: 17 POWDER, FOR SOLUTION ORAL at 02:05

## 2018-05-23 RX ADMIN — Medication 400 MG: at 08:05

## 2018-05-23 RX ADMIN — ATORVASTATIN CALCIUM 10 MG: 10 TABLET, FILM COATED ORAL at 08:05

## 2018-05-23 RX ADMIN — VITAMIN D, TAB 1000IU (100/BT) 1000 UNITS: 25 TAB at 08:05

## 2018-05-23 RX ADMIN — AMLODIPINE BESYLATE 5 MG: 5 TABLET ORAL at 08:05

## 2018-05-23 RX ADMIN — STANDARDIZED SENNA CONCENTRATE AND DOCUSATE SODIUM 1 TABLET: 8.6; 5 TABLET, FILM COATED ORAL at 08:05

## 2018-05-23 NOTE — ASSESSMENT & PLAN NOTE
- Patient on multiple diuretics  - Tolvaptan given Monday without improvement and repeated today.  - Fluid restriction discontinued.

## 2018-05-23 NOTE — PROGRESS NOTES
[facility] Hospital Medicine  Progress Note    Patient Name: Deepali Stuart  MRN: [id number]  Patient Class: IP- Inpatient   Admission Date: 5/20/2018  Length of Stay: 3 days  Attending Physician: Mariaelena Villagomez MD  Primary Care Provider: Vishal Salazar MD        Subjective:     Principal Problem:Heart failure, systolic and diastolic, acute on chronic    HPI:  Ms. Stuart is an 86 year old woman who presented to the ED with shortness of breath, decreased activity and confusion for one day.  Her daughter reports that patient seems to have declined since her pacemaker placement. Patient normally eats a low salt diet but she went to a [employer] party yesterday and ate some high salt foods.  On presentation her oxygen saturation on room air was in the low 80's, and she was placed on a NRB in the ED before being weaned down to 5 liters NC.  Labs were notable for BUN/creatinine 49/3.6, BNP 2,585, mildly elevated troponin, sodium 127.  CXR showed bilateral effusions and associated atelectasis.  She was admitted for treatment of heart failure in the setting of worsening chronic kidney disease.    Medical history includes CHF with pulmonary hypertension, chronic atrial fibrillation, type II diabetes and hypothyroidism.  Her pacemaker was placed 2012.  She is followed by Dr. 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  She is not interested in HD.    Hospital Course:  Patient was started on IV diuresis, with some clinical improvement in her shortness of breath but she remained hypoxemic.  Cardiology and Nephrology were consulted.  She was started on fluid restriction to treat low sodium, and then received a dose of tolvaptan and her fluids were liberalized.  She did not have a good response to the initial dose and it was increased and repeated on 5/23.    Interval History:   She was more confused this morning, had trouble taking her pills.  High flow oxygen now in place  instead of regular NC.  Seen while participating with PT.  She thought she was at [place] but was aware she was in a hospital.  Family at bedside.  No concerns on their part.  She has been getting tramadol for abdominal pain and does not have pain this morning.  I watched her slide herself to the edge of the bed and stand with PT.    Review of Systems   Constitutional: Negative for chills and fever.   Respiratory: Negative for cough and shortness of breath.    Cardiovascular: Negative for chest pain and palpitations.   Gastrointestinal: Negative for abdominal pain.     Objective:     Vital Signs (Most Recent):  Temp: 97.5 °F (36.4 °C) (05/23/18 1219)  Pulse: 69 (05/23/18 1219)  Resp: 18 (05/23/18 0754)  BP: 123/60 (05/23/18 1219)  SpO2: (!) 94 % (05/23/18 1219) Vital Signs (24h Range):  Temp:  [97.4 °F (36.3 °C)-98.3 °F (36.8 °C)] 97.5 °F (36.4 °C)  Pulse:  [64-75] 69  Resp:  [18-20] 18  SpO2:  [85 %-96 %] 94 %  BP: (123-145)/(59-67) 123/60     Weight: 64.4 kg (141 lb 15.6 oz)  Body mass index is 25.96 kg/m².    Intake/Output Summary (Last 24 hours) at 05/23/18 1419  Last data filed at 05/23/18 1200   Gross per 24 hour   Intake              995 ml   Output              950 ml   Net               45 ml      Physical Exam   Constitutional: She is oriented to person, place, and time. She appears well-developed and well-nourished. No distress.   Frail, elderly woman.     HENT:   Head: Normocephalic.   Eyes: Conjunctivae are normal. Pupils are equal, round, and reactive to light.   Neck: Neck supple. No thyromegaly present.   Cardiovascular: Normal rate, regular rhythm, normal heart sounds and intact distal pulses.  Exam reveals no gallop and no friction rub.    No murmur heard.  Pulmonary/Chest: Effort normal.   Decreased breath sounds bilaterally.   Abdominal: Soft. Bowel sounds are normal. She exhibits no distension. There is no tenderness.   Musculoskeletal: Normal range of motion. She exhibits no edema.    Lymphadenopathy:     She has no cervical adenopathy.   Neurological: She is alert and oriented to person, place, and time.   Skin: Skin is warm and dry. No rash noted.   Psychiatric: She has a normal mood and affect. Her behavior is normal. Thought content normal.       Significant Labs: All pertinent labs within the past 24 hours have been reviewed.    Significant Imaging: I have reviewed all pertinent imaging results/findings within the past 24 hours.    Assessment/Plan:      * Heart failure, systolic and diastolic, acute on chronic    - Patient with depressed LV function and associated severe restriction with diastolic failure and pulmonary hypertension, moderate MR/TR.  - Severe hypoxemia currently requiring high flow oxygen.  - On multiple diuretics as outpatient  - Cardiologist following - continuing IV diuretics for now.  - Discussed code status in setting of heart failure with worsening CKD - patient does not desire aggressive resuscitation measures and wants to be made comfortable.          LEIA (acute kidney injury)    - Worsening renal function in setting of poor perfusion due to heart failure  - Seems stable with diuretics  - Nephrology following  - Note patient not interested in HD and would not likely tolerate it in any case.          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 Pruitt  - Multifactorial, with diabetes and hypertension as contributors to heart failure and CKD.          Hyponatremia    - Patient on multiple diuretics  - Tolvaptan given Monday without improvement and repeated today.  - Fluid restriction discontinued.        Essential hypertension    - Continue carvedilol  - Lisinopril held for now in setting of worsening renal disease - would consider resuming this due to severe proteinuria  - Continue IV diuresis          Hypothyroidism    Continue synthroid        Controlled type 2 diabetes mellitus with diabetic nephropathy, without  long-term current use of insulin    - A1C 5.6  - Continue low dose SSI        Chronic atrial fibrillation    - Warfarin on hold due to elevated INR - still 3.7, will continue to hold.  - Continue to follow daily  - Patient has pacemaker and is on beta blocker for rate control.            VTE Risk Mitigation     None              Marialeena Morse MD  Department of Hospital Medicine   [facility]

## 2018-05-23 NOTE — PLAN OF CARE
Attempted to discuss with pt bedside, pt lethargic.  VM left with daughter, awaiting callback.    Pt may need HH and Home O2 at time of discharge.  Will discuss with MD.       05/23/18 0808   Discharge Reassessment   Assessment Type Discharge Planning Reassessment   Provided patient/caregiver education on the expected discharge date and the discharge plan Yes   Do you have any problems affording any of your prescribed medications? No   Discharge Plan A Home Health   Discharge Plan B Home with family   Can the patient answer the patient profile reliably? No, cognitively impaired   How does the patient rate their overall health at the present time? Fair   Describe the patient's ability to walk at the present time. Walks with the help of equipment   How often would a person be available to care for the patient? Often

## 2018-05-23 NOTE — PLAN OF CARE
Patient resting with daughter at bedside. Safety measures maintained. Shepard draining to gravity. VSS on high-flow nasal cannula. No complaints of pain at this time. Will continue to monitor.

## 2018-05-23 NOTE — ASSESSMENT & PLAN NOTE
- CKD IV/V followed by Dr. Pruitt  - Multifactorial, with diabetes and hypertension as contributors to heart failure and CKD.

## 2018-05-23 NOTE — PLAN OF CARE
Problem: Occupational Therapy Goal  Goal: Occupational Therapy Goal  Goals to be met by: 05/28/18     Patient will increase functional independence with ADLs by performing:    UE Dressing with Minimal Assistance.  Grooming while seated with Stand-by Assistance.  Supine to sit with Supervision.  Upper extremity exercise program x5-8 reps to maintain strength and endurance for mobility and ADL tasks.  Assess transfers to/from recliner with rollator or HHA     Outcome: Ongoing (interventions implemented as appropriate)  Initial OT evaluation completed, with treatment to follow.  Patient fatigues easily, but with good bed mobility, sitting balance.  Will benefit from OT services to maximize patient performance and reduce burden of care.    Comments: VINNIE Cruz, 5/23/2018

## 2018-05-23 NOTE — PLAN OF CARE
Problem: Patient Care Overview  Goal: Plan of Care Review  Outcome: Ongoing (interventions implemented as appropriate)  Still requiring 5 liters O2 for adequate O2 saturation. Placed new humidifier.

## 2018-05-23 NOTE — PLAN OF CARE
Problem: Patient Care Overview  Goal: Plan of Care Review  Outcome: Ongoing (interventions implemented as appropriate)  Plan of care reviewed. VSS. Patient had no c/o pain this shift . Shepard catheter emptied and draining to gravity. Patient on High Flow Oxygen . Patient had a hard time swallowing pills this shift . Patient in bed resting with family at bedside . Call bell within reach. Bed locked and in lowest postion. Instructed to call if needed. Family verbalized understanding. No complaints at this time .Will continue to monitior .

## 2018-05-23 NOTE — PLAN OF CARE
Problem: Physical Therapy Goal  Goal: Physical Therapy Goal  Goals to be met by: 6/3/18    Patient will increase functional independence with mobility by performin. Supine to sit with supervision.  2. Sit to supine with supervision.   3. Sitting at edge of bed >5 minutes with supervision.   4. Assessment of sit<>stand by PT.   5. Assessment of bed<>chair by PT.     Outcome: Ongoing (interventions implemented as appropriate)  PT evaluation completed. Pt requires SBA for bed mobility, however decreased participation with OOB mobility (refused standing or up in chair). Will continue to follow and progress as tolerated. Please see progress note for detailed plan of care and recommendations (home with home health).

## 2018-05-23 NOTE — SUBJECTIVE & OBJECTIVE
Interval History:   She was more confused this morning, had trouble taking her pills.  High flow oxygen now in place instead of regular NC.  Seen while participating with PT.  She thought she was at [facility] but was aware she was in a hospital.  Family at bedside.  No concerns on their part.  She has been getting tramadol for abdominal pain and does not have pain this morning.  I watched her slide herself to the edge of the bed and stand with PT.    Review of Systems   Constitutional: Negative for chills and fever.   Respiratory: Negative for cough and shortness of breath.    Cardiovascular: Negative for chest pain and palpitations.   Gastrointestinal: Negative for abdominal pain.     Objective:     Vital Signs (Most Recent):  Temp: 97.5 °F (36.4 °C) (05/23/18 1219)  Pulse: 69 (05/23/18 1219)  Resp: 18 (05/23/18 0754)  BP: 123/60 (05/23/18 1219)  SpO2: (!) 94 % (05/23/18 1219) Vital Signs (24h Range):  Temp:  [97.4 °F (36.3 °C)-98.3 °F (36.8 °C)] 97.5 °F (36.4 °C)  Pulse:  [64-75] 69  Resp:  [18-20] 18  SpO2:  [85 %-96 %] 94 %  BP: (123-145)/(59-67) 123/60     Weight: 64.4 kg (141 lb 15.6 oz)  Body mass index is 25.96 kg/m².    Intake/Output Summary (Last 24 hours) at 05/23/18 1419  Last data filed at 05/23/18 1200   Gross per 24 hour   Intake              995 ml   Output              950 ml   Net               45 ml      Physical Exam   Constitutional: She is oriented to person, place, and time. She appears well-developed and well-nourished. No distress.   Frail, elderly woman.     HENT:   Head: Normocephalic.   Eyes: Conjunctivae are normal. Pupils are equal, round, and reactive to light.   Neck: Neck supple. No thyromegaly present.   Cardiovascular: Normal rate, regular rhythm, normal heart sounds and intact distal pulses.  Exam reveals no gallop and no friction rub.    No murmur heard.  Pulmonary/Chest: Effort normal.   Decreased breath sounds bilaterally.   Abdominal: Soft. Bowel sounds are normal. She exhibits  no distension. There is no tenderness.   Musculoskeletal: Normal range of motion. She exhibits no edema.   Lymphadenopathy:     She has no cervical adenopathy.   Neurological: She is alert and oriented to person, place, and time.   Skin: Skin is warm and dry. No rash noted.   Psychiatric: She has a normal mood and affect. Her behavior is normal. Thought content normal.       Significant Labs: All pertinent labs within the past 24 hours have been reviewed.    Significant Imaging: I have reviewed all pertinent imaging results/findings within the past 24 hours.

## 2018-05-23 NOTE — ASSESSMENT & PLAN NOTE
- Worsening renal function in setting of poor perfusion due to heart failure  - Seems stable with diuretics  - Nephrology following  - Note patient not interested in HD and would not likely tolerate it in any case.

## 2018-05-23 NOTE — ASSESSMENT & PLAN NOTE
- Warfarin on hold due to elevated INR - still 3.7, will continue to hold.  - Continue to follow daily  - Patient has pacemaker and is on beta blocker for rate control.

## 2018-05-23 NOTE — PT/OT/SLP EVAL
"Occupational Therapy   Evaluation  (overlap with PT, Limited by fatigue)    Name: Deepali Stuart  MRN: [id number]  Admitting Diagnosis:  Heart failure, systolic and diastolic, acute on chronic      Recommendations:     Discharge Recommendations: home with home health, home health PT, home health OT  Discharge Equipment Recommendations:  hospital bed  Barriers to discharge:  None    History:     Occupational Profile:  Living Environment: Patient resides with Vikki beasley, who cares for mother 24/7.  It is a one story home, with 4 steps to enter and (B) HRs.  Previous level of function: Assist for all ADLs tasks, degree varies based on how patient feels at that time  Roles and Routines: Per family, patient has been decreasing activity level, especially within past month.  Eating less, no longer attends adult center and now only ambulates to/from bathroom (approx 20 feet)  Equipment Owned:  3-in-1 commode, rollator, wheelchair, shower chair, other (see comments) (owns, not using, QC, RW)  Assistance upon Discharge: Family    Past Medical History:   Diagnosis Date    Anxiety     Atrial fibrillation     chronic AF    CHF (congestive heart failure)     CKD (chronic kidney disease), stage IV     Diabetes insipidus     Diabetes mellitus type II     GERD (gastroesophageal reflux disease)     Hypertension     Iritis 12/10/2014    NPDR (nonproliferative diabetic retinopathy) 10/27/2014    PAF (paroxysmal atrial fibrillation)     Palpitations        Past Surgical History:   Procedure Laterality Date    CATARACT EXTRACTION W/  INTRAOCULAR LENS IMPLANT Right n/a    OD over 10 years ago     CATARACT EXTRACTION W/  INTRAOCULAR LENS IMPLANT Left 11/11/14    OS ()    COLON SURGERY      Partial colectomy secondary to suspicious polyp    HYSTERECTOMY      INSERT / REPLACE / REMOVE PACEMAKER      [facility] 2012 [email or web address]    LASER ABLATION      3 years ago OS       Subjective     Chief Complaint: "I'm " tired..  Results for orders placed or performed during the hospital encounter of 05/20/18   Brain natriuretic peptide   Result Value Ref Range    BNP 2,585 (H) 0 - 99 pg/mL   Troponin I   Result Value Ref Range    Troponin I 0.201 (H) 0.000 - 0.026 ng/mL   CBC auto differential   Result Value Ref Range    WBC 6.04 3.90 - 12.70 K/uL    RBC 2.95 (L) 4.00 - 5.40 M/uL    Hemoglobin 9.3 (L) 12.0 - 16.0 g/dL    Hematocrit 26.7 (L) 37.0 - 48.5 %    MCV 91 82 - 98 fL    MCH 31.5 (H) 27.0 - 31.0 pg    MCHC 34.8 32.0 - 36.0 g/dL    RDW 13.2 11.5 - 14.5 %    Platelets 129 (L) 150 - 350 K/uL    MPV 8.8 (L) 9.2 - 12.9 fL    Gran # (ANC) 4.5 1.8 - 7.7 K/uL    Lymph # 1.1 1.0 - 4.8 K/uL    Mono # 0.3 0.3 - 1.0 K/uL    Eos # 0.1 0.0 - 0.5 K/uL    Baso # 0.02 0.00 - 0.20 K/uL    Gran% 75.2 (H) 38.0 - 73.0 %    Lymph% 18.5 18.0 - 48.0 %    Mono% 5.0 4.0 - 15.0 %    Eosinophil% 0.8 0.0 - 8.0 %    Basophil% 0.3 0.0 - 1.9 %    Differential Method Automated    Magnesium   Result Value Ref Range    Magnesium 1.6 1.6 - 2.6 mg/dL   Phosphorus   Result Value Ref Range    Phosphorus 3.3 2.7 - 4.5 mg/dL   Comprehensive metabolic panel   Result Value Ref Range    Sodium 127 (L) 136 - 145 mmol/L    Potassium 3.4 (L) 3.5 - 5.1 mmol/L    Chloride 87 (L) 95 - 110 mmol/L    CO2 28 23 - 29 mmol/L    Glucose 155 (H) 70 - 110 mg/dL    BUN, Bld 49 (H) 8 - 23 mg/dL    Creatinine 3.6 (H) 0.5 - 1.4 mg/dL    Calcium 7.9 (L) 8.7 - 10.5 mg/dL    Total Protein 6.0 6.0 - 8.4 g/dL    Albumin 2.5 (L) 3.5 - 5.2 g/dL    Total Bilirubin 0.8 0.1 - 1.0 mg/dL    Alkaline Phosphatase 59 55 - 135 U/L    AST 36 10 - 40 U/L    ALT 20 10 - 44 U/L    Anion Gap 12 8 - 16 mmol/L    eGFR if African American 13 (A) >60 mL/min/1.73 m^2    eGFR if non African American 11 (A) >60 mL/min/1.73 m^2   Protime-INR   Result Value Ref Range    Prothrombin Time 37.7 (H) 9.0 - 12.5 sec    INR 3.5 (H) 0.8 - 1.2   Hemoglobin A1c   Result Value Ref Range    Hemoglobin A1C 5.6 4.0 - 5.6 %     Estimated Avg Glucose 114 68 - 131 mg/dL   CBC auto differential   Result Value Ref Range    WBC 6.59 3.90 - 12.70 K/uL    RBC 2.75 (L) 4.00 - 5.40 M/uL    Hemoglobin 8.5 (L) 12.0 - 16.0 g/dL    Hematocrit 25.1 (L) 37.0 - 48.5 %    MCV 91 82 - 98 fL    MCH 30.9 27.0 - 31.0 pg    MCHC 33.9 32.0 - 36.0 g/dL    RDW 13.3 11.5 - 14.5 %    Platelets 128 (L) 150 - 350 K/uL    MPV 9.0 (L) 9.2 - 12.9 fL    Gran # (ANC) 4.8 1.8 - 7.7 K/uL    Lymph # 1.2 1.0 - 4.8 K/uL    Mono # 0.4 0.3 - 1.0 K/uL    Eos # 0.1 0.0 - 0.5 K/uL    Baso # 0.02 0.00 - 0.20 K/uL    Gran% 73.3 (H) 38.0 - 73.0 %    Lymph% 18.7 18.0 - 48.0 %    Mono% 5.9 4.0 - 15.0 %    Eosinophil% 1.5 0.0 - 8.0 %    Basophil% 0.3 0.0 - 1.9 %    Differential Method Automated    Comprehensive metabolic panel - if not done in ED   Result Value Ref Range    Sodium 128 (L) 136 - 145 mmol/L    Potassium 3.5 3.5 - 5.1 mmol/L    Chloride 87 (L) 95 - 110 mmol/L    CO2 30 (H) 23 - 29 mmol/L    Glucose 107 70 - 110 mg/dL    BUN, Bld 52 (H) 8 - 23 mg/dL    Creatinine 3.7 (H) 0.5 - 1.4 mg/dL    Calcium 7.7 (L) 8.7 - 10.5 mg/dL    Total Protein 5.5 (L) 6.0 - 8.4 g/dL    Albumin 2.4 (L) 3.5 - 5.2 g/dL    Total Bilirubin 0.6 0.1 - 1.0 mg/dL    Alkaline Phosphatase 49 (L) 55 - 135 U/L    AST 31 10 - 40 U/L    ALT 18 10 - 44 U/L    Anion Gap 11 8 - 16 mmol/L    eGFR if African American 12 (A) >60 mL/min/1.73 m^2    eGFR if non African American 11 (A) >60 mL/min/1.73 m^2   Magnesium - if not done in ED   Result Value Ref Range    Magnesium 1.7 1.6 - 2.6 mg/dL   Phosphorus - if not done in ED   Result Value Ref Range    Phosphorus 3.8 2.7 - 4.5 mg/dL   Protime-INR - if on Coumadin   Result Value Ref Range    Prothrombin Time 36.5 (H) 9.0 - 12.5 sec    INR 3.4 (H) 0.8 - 1.2   Lipid panel   Result Value Ref Range    Cholesterol 136 120 - 199 mg/dL    Triglycerides 82 30 - 150 mg/dL    HDL 46 40 - 75 mg/dL    LDL Cholesterol 73.6 63.0 - 159.0 mg/dL    HDL/Chol Ratio 33.8 20.0 - 50.0 %     Total Cholesterol/HDL Ratio 3.0 2.0 - 5.0    Non-HDL Cholesterol 90 mg/dL   Matos's Stain, Urine Random   Result Value Ref Range    Matos's Stain, Ur No eosinophils seen No eosinophils seen   Creatinine, urine, random   Result Value Ref Range    Creatinine, Random Ur 41.9 15.0 - 325.0 mg/dL   Uric acid   Result Value Ref Range    Uric Acid 11.0 (H) 2.4 - 5.7 mg/dL   Sodium, urine, random   Result Value Ref Range    Sodium Urine Random 86 20 - 250 mmol/L   Chloride, urine, random   Result Value Ref Range    Chloride, Rand Ur 83 25 - 200 mmol/L   Osmolality, urine   Result Value Ref Range    Osmolality, Ur 289 50 - 1200 mOsm/kg   Basic metabolic panel   Result Value Ref Range    Sodium 126 (L) 136 - 145 mmol/L    Potassium 3.4 (L) 3.5 - 5.1 mmol/L    Chloride 86 (L) 95 - 110 mmol/L    CO2 29 23 - 29 mmol/L    Glucose 147 (H) 70 - 110 mg/dL    BUN, Bld 53 (H) 8 - 23 mg/dL    Creatinine 3.7 (H) 0.5 - 1.4 mg/dL    Calcium 7.9 (L) 8.7 - 10.5 mg/dL    Anion Gap 11 8 - 16 mmol/L    eGFR if African American 12 (A) >60 mL/min/1.73 m^2    eGFR if non African American 11 (A) >60 mL/min/1.73 m^2   Basic metabolic panel   Result Value Ref Range    Sodium 123 (L) 136 - 145 mmol/L    Potassium 4.2 3.5 - 5.1 mmol/L    Chloride 87 (L) 95 - 110 mmol/L    CO2 27 23 - 29 mmol/L    Glucose 144 (H) 70 - 110 mg/dL    BUN, Bld 54 (H) 8 - 23 mg/dL    Creatinine 3.7 (H) 0.5 - 1.4 mg/dL    Calcium 7.5 (L) 8.7 - 10.5 mg/dL    Anion Gap 9 8 - 16 mmol/L    eGFR if African American 12 (A) >60 mL/min/1.73 m^2    eGFR if non African American 11 (A) >60 mL/min/1.73 m^2   Comprehensive metabolic panel - if not done in ED   Result Value Ref Range    Sodium 124 (L) 136 - 145 mmol/L    Potassium 3.3 (L) 3.5 - 5.1 mmol/L    Chloride 85 (L) 95 - 110 mmol/L    CO2 28 23 - 29 mmol/L    Glucose 133 (H) 70 - 110 mg/dL    BUN, Bld 55 (H) 8 - 23 mg/dL    Creatinine 3.6 (H) 0.5 - 1.4 mg/dL    Calcium 7.8 (L) 8.7 - 10.5 mg/dL    Total Protein 5.6 (L) 6.0 - 8.4  g/dL    Albumin 2.4 (L) 3.5 - 5.2 g/dL    Total Bilirubin 1.0 0.1 - 1.0 mg/dL    Alkaline Phosphatase 52 (L) 55 - 135 U/L    AST 31 10 - 40 U/L    ALT 19 10 - 44 U/L    Anion Gap 11 8 - 16 mmol/L    eGFR if African American 13 (A) >60 mL/min/1.73 m^2    eGFR if non African American 11 (A) >60 mL/min/1.73 m^2   Magnesium - if not done in ED   Result Value Ref Range    Magnesium 1.7 1.6 - 2.6 mg/dL   Phosphorus - if not done in ED   Result Value Ref Range    Phosphorus 3.3 2.7 - 4.5 mg/dL   Protime-INR - if on Coumadin   Result Value Ref Range    Prothrombin Time 43.2 (H) 9.0 - 12.5 sec    INR 4.1 (H) 0.8 - 1.2   Iron and TIBC   Result Value Ref Range    Iron 35 30 - 160 ug/dL    Transferrin 235 200 - 375 mg/dL    TIBC 348 250 - 450 ug/dL    Saturated Iron 10 (L) 20 - 50 %   Ferritin   Result Value Ref Range    Ferritin 96 20.0 - 300.0 ng/mL   Folate   Result Value Ref Range    Folate 14.6 4.0 - 24.0 ng/mL   PTH, intact   Result Value Ref Range    PTH, Intact 409.5 (H) 9.0 - 77.0 pg/mL   Vitamin D   Result Value Ref Range    Vit D, 25-Hydroxy 26 (L) 30 - 96 ng/mL   Vitamin B12   Result Value Ref Range    Vitamin B-12 597 210 - 950 pg/mL   Osmolality   Result Value Ref Range    Osmolality 282 275 - 295 mOsm/kg   Basic metabolic panel   Result Value Ref Range    Sodium 125 (L) 136 - 145 mmol/L    Potassium 4.0 3.5 - 5.1 mmol/L    Chloride 87 (L) 95 - 110 mmol/L    CO2 27 23 - 29 mmol/L    Glucose 138 (H) 70 - 110 mg/dL    BUN, Bld 58 (H) 8 - 23 mg/dL    Creatinine 3.6 (H) 0.5 - 1.4 mg/dL    Calcium 7.6 (L) 8.7 - 10.5 mg/dL    Anion Gap 11 8 - 16 mmol/L    eGFR if African American 13 (A) >60 mL/min/1.73 m^2    eGFR if non African American 11 (A) >60 mL/min/1.73 m^2   Protime-INR   Result Value Ref Range    Prothrombin Time 38.9 (H) 9.0 - 12.5 sec    INR 3.7 (H) 0.8 - 1.2   Basic metabolic panel   Result Value Ref Range    Sodium 124 (L) 136 - 145 mmol/L    Potassium 3.8 3.5 - 5.1 mmol/L    Chloride 85 (L) 95 - 110 mmol/L  "   CO2 28 23 - 29 mmol/L    Glucose 114 (H) 70 - 110 mg/dL    BUN, Bld 57 (H) 8 - 23 mg/dL    Creatinine 3.7 (H) 0.5 - 1.4 mg/dL    Calcium 7.7 (L) 8.7 - 10.5 mg/dL    Anion Gap 11 8 - 16 mmol/L    eGFR if African American 12 (A) >60 mL/min/1.73 m^2    eGFR if non African American 11 (A) >60 mL/min/1.73 m^2   2D echo with color flow doppler   Result Value Ref Range    EF 42 (A) 55 - 65    Mitral Valve Regurgitation MODERATE (A)     Diastolic Dysfunction Yes (A)     Est. PA Systolic Pressure 61.15 (A)     Tricuspid Valve Regurgitation MODERATE (A)    ISTAT PROCEDURE   Result Value Ref Range    POC PH 7.618 (H) 7.35 - 7.45    POC PCO2 33.5 (L) 35 - 45 mmHg    POC PO2 74 (HH) 40 - 60 mmHg    POC HCO3 34.4 (H) 24 - 28 mmol/L    POC BE 13 -2 to 2 mmol/L    POC SATURATED O2 97 95 - 100 %    Rate 17     Sample VENOUS     Site Other     Allens Test N/A     DelSys NRB     Mode SPONT     Flow 15     FiO2 100     Sp02 100    POCT glucose   Result Value Ref Range    POCT Glucose 144 (H) 70 - 110 mg/dL   POCT glucose   Result Value Ref Range    POCT Glucose 110 70 - 110 mg/dL   POCT glucose   Result Value Ref Range    POCT Glucose 166 (H) 70 - 110 mg/dL   POCT glucose   Result Value Ref Range    POCT Glucose 192 (H) 70 - 110 mg/dL   POCT glucose   Result Value Ref Range    POCT Glucose 121 (H) 70 - 110 mg/dL   POCT glucose   Result Value Ref Range    POCT Glucose 120 (H) 70 - 110 mg/dL    is too much"  Patient/Family stated goals: "I guess walk a little more"  Communicated with: RN prior to session.  Pain/Comfort:  · Pain Rating 1: 0/10 (c/o fatigue)  · Pain Rating Post-Intervention 1: 0/10    Patients cultural, spiritual, Lutheran conflicts given the current situation: None specified    Objective:     Patient found with: bed alarm, peripheral IV, jordan catheter, oxygen (80% FiO2, 20L)    General Precautions: Standard, diabetic, fall, other (see comments) (s/p pacer, low sodium diet)   Orthopedic Precautions:N/A   Braces: N/A "     Occupational Performance:    Bed Mobility:    · Patient completed Rolling/Turning to Left with  modified independence  · Patient completed Scooting/Bridging with stand by assistance  · Patient completed Supine to Sit with stand by assistance  · Patient completed Sit to Supine with stand by assistance    Functional Mobility/Transfers:  · NT, patient declined secondary to fatigue  · Functional Mobility: NT, patient declined secondary to fatigue    Activities of Daily Living:  · LB Dressing: minimum assistance (R) sock    Cognitive/Visual Perceptual:  Cognitive/Psychosocial Skills:     -       Oriented to: Person, general place (hospital) and year only   -       Follows Commands/attention:Follows two-step commands  -       Communication: clear/fluent  -       Memory: Unable to formally assess, wanting to end session  -       Safety awareness/insight to disability: impaired   -       Mood/Affect/Coping skills/emotional control: Flat affect and Lethargic  Visual/Perceptual:      -unable to formally assess    Physical Exam:  Postural examination/scapula alignment:    -       Rounded shoulders  -       Forward head  -       Kyphosis  Skin integrity: Thin and Dry  Edema:  Pitting (L) LE (MD and RN notified); Moderate (R) LE   Patient tolerated exam of (R) UE in (L) sidelying, with ROM WFL, but declined (L)    Patient left (L) sidelying, HOB elevated with all lines intact, call button in reach, bed alarm on, RN/MD notified and family present    [facility] 6 Click:  [facility] Total Score: 16    Treatment & Education:  Educated on role of OT, needs reinforcement.  Education:    Assessment:     Deepali Stuart is a 86 y.o. female with a medical diagnosis of Heart failure, systolic and diastolic, acute on chronic.  She presents with the following performance deficits affecting function: weakness, impaired endurance, impaired cardiopulmonary response to activity, impaired functional mobilty, impaired self care skills, decreased  "safety awareness, edema.  Initial OT evaluation completed, with treatment to follow.  Patient fatigued easily, did perform bed mobility, fair sitting balance but terminated session quickly.  Will benefit from OT services to maximize patient performance and reduce burden of care.    Rehab Prognosis:  Fair-; patient would benefit from acute skilled OT services to address these deficits and reach maximum level of function.         Clinical Decision Makin.  OT Low:  "Pt evaluation falls under low complexity for evaluation coding due to performance deficits noted in 1-3 areas as stated above and 0 co-morbities affecting current functional status. Data obtained from problem focused assessments. No modifications or assistance was required for completion of evaluation. Only brief occupational profile and history review completed."     Plan:     Patient to be seen  (2 to 3 x week, alternating with PT) to address the above listed problems via self-care/home management, therapeutic activities, therapeutic exercises  · Plan of Care Expires: 18  · Plan of Care Reviewed with: patient, family    This Plan of care has been discussed with the patient who was involved in its development and understands and is in agreement with the identified goals and treatment plan    GOALS:    Occupational Therapy Goals        Problem: Occupational Therapy Goal    Goal Priority Disciplines Outcome Interventions   Occupational Therapy Goal     OT, PT/OT Ongoing (interventions implemented as appropriate)    Description:  Goals to be met by: 18     Patient will increase functional independence with ADLs by performing:    UE Dressing with Minimal Assistance.  Grooming while seated with Stand-by Assistance.  Supine to sit with Supervision.  Upper extremity exercise program x5-8 reps to maintain strength and endurance for mobility and ADL tasks.  Assess transfers to/from recliner with rollator or HHA                       Time " Tracking:     OT Date of Treatment: 05/23/18  OT Start Time: 0943  OT Stop Time: 0953  OT Total Time (min): 10 min    Billable Minutes:Evaluation 10    VINNIE Cruz  5/23/2018

## 2018-05-23 NOTE — PLAN OF CARE
Problem: Patient Care Overview  Goal: Plan of Care Review  Outcome: Ongoing (interventions implemented as appropriate)  Had to change to hi luke cannula to maintain saturation >88%

## 2018-05-23 NOTE — PT/OT/SLP EVAL
"Physical Therapy Evaluation and Treatment    Patient Name:  Deepali Stuart   MRN:  [id number]    Recommendations:     Discharge Recommendations:  home with home health, home health PT, home health OT (home health aide)   Discharge Equipment Recommendations: oxygen, hospital bed   Barriers to discharge: None    Assessment:     Deepali Stuart is a 86 y.o. female admitted with a medical diagnosis of Heart failure, systolic and diastolic, acute on chronic.  She presents with the following impairments/functional limitations:  impaired endurance, impaired cardiopulmonary response to activity, impaired self care skills, impaired functional mobilty. PT evaluation completed. Pt requires SBA for bed mobility, however decreased participation with OOB mobility (refused standing or up in chair). Will continue to follow and progress as tolerated.    Rehab Prognosis:  Good; patient would benefit from acute skilled PT services to address these deficits and reach maximum level of function.      Recent Surgery: * No surgery found *      Plan:     During this hospitalization, patient to be seen 3 x/week to address the above listed problems via gait training, therapeutic activities, therapeutic exercises, neuromuscular re-education, wheelchair management/training  · Plan of Care Expires:  06/22/18   Plan of Care Reviewed with: patient    Subjective     Communicated with nurse prior to session.  Patient found L side lying in bed with HOB elevated upon PT entry to room, agreeable to evaluation.      Chief Complaint: fatigue  Patient comments/goals: "walk more" however patient later refused OOB and standing activity  Pain/Comfort:  · Pain Rating 1: 0/10  · Pain Rating Post-Intervention 1: 0/10    Patients cultural, spiritual, [employer] conflicts given the current situation:      Living Environment:  Per son Gopal: Pt lives with her daughter in 1 story house with 4 steps to enter with bilateral hand rails. She has option for " walk-in shower.   Prior to admission, patients level of function was CGA for short distance gait with rollator (approximately 20 ft). Assist for seated bathing and dressing. Family provides transportation. Patient has the following equipment: 3-in-1 commode, wheelchair, rollator, shower chair (also owns rolling walker and quad cane).  DME owned (not currently used): rolling walker and quad cane.  Upon discharge, patient will have assistance from daughter and other family members (patient has 12 adult children).    Objective:     Patient found with: oxygen, jordan catheter, peripheral IV (HFNC 100% FiO2 which was decreased by Dr. Villagomez to 80% FiO2 during session. 20L/min)     General Precautions: Standard,  (DNR, fall risk)   Orthopedic Precautions:N/A   Braces: N/A     Exams:  · Cognition: Patient is oriented to Person and partial to place ([facility]) and partial to time (year only, incorrect month) and follows approximately 50% of one step commands.    · Posture:    · -       Rounded shoulders  · -       Forward head  · Sensation: Did not respond to questions regarding sensation or paresthesias.   · Skin Integrity: Visible skin intact  · Edema: Severe bilateral LEs, 4+ pitting dorsum on L foot.   · Coordination: No coordination impairments identified with functional mobility.   · LE ROM/Strength: Refused formal assessment for MMT. Observed WFL for supine<>sit.  Vital signs: SpO2: 93% on high flow nasal cannula at beginning session with 100% FiO2, decreased by Dr. Villagomez to 80% FiO2 and SpO2 with light activity at that level of FiO2 86<>88%    Functional Mobility:  · Bed Mobility:     · Supine to Sit: stand by assistance and HOB elevated  · Sit to Supine: stand by assistance and HOB elevated  · Balance: SBA for static sitting at edge of bed, total time approximately 3 minutes with worsening fatigue and SOB    AM-PAC 6 CLICK MOBILITY  Total Score:14       Therapeutic Activities and Exercises:   Discussed  PT plan of care, safety with OOB mobility, recommendations for positioning and edema management.    Patient left left side lying despite recommendations for repositioning into supine or R sidelying, however pt refused with all lines intact, call button in reach, bed alarm on, nurse notified and family present.    GOALS:    Physical Therapy Goals        Problem: Physical Therapy Goal    Goal Priority Disciplines Outcome Goal Variances Interventions   Physical Therapy Goal     PT/OT, PT Ongoing (interventions implemented as appropriate)     Description:  Goals to be met by: 6/3/18    Patient will increase functional independence with mobility by performin. Supine to sit with supervision.  2. Sit to supine with supervision.   3. Sitting at edge of bed >5 minutes with supervision.   4. Assessment of sit<>stand by PT.   5. Assessment of bed<>chair by PT.                       History:     Past Medical History:   Diagnosis Date    Anxiety     Atrial fibrillation     chronic AF    CHF (congestive heart failure)     CKD (chronic kidney disease), stage IV     Diabetes insipidus     Diabetes mellitus type II     GERD (gastroesophageal reflux disease)     Hypertension     Iritis 12/10/2014    NPDR (nonproliferative diabetic retinopathy) 10/27/2014    PAF (paroxysmal atrial fibrillation)     Palpitations        Past Surgical History:   Procedure Laterality Date    CATARACT EXTRACTION W/  INTRAOCULAR LENS IMPLANT Right n/a    OD over 10 years ago     CATARACT EXTRACTION W/  INTRAOCULAR LENS IMPLANT Left 14    OS ()    COLON SURGERY      Partial colectomy secondary to suspicious polyp    HYSTERECTOMY      INSERT / REPLACE / REMOVE PACEMAKER      [facility]  [email or web address]    LASER ABLATION      3 years ago OS       Clinical Decision Making:     History  Co-morbidities and personal factors that may impact the plan of care Examination  Body Structures and Functions, activity limitations and  participation restrictions that may impact the plan of care Clinical Presentation   Decision Making/ Complexity Score   Co-morbidities:   [] Time since onset of injury / illness / exacerbation  [] Status of current condition  []Patient's cognitive status and safety concerns    [] Multiple Medical Problems (see med hx)  Personal Factors:   [] Patient's age  [] Prior Level of function   [] Patient's home situation (environment and family support)  [] Patient's level of motivation  [] Expected progression of patient      HISTORY:(criteria)    [] 18922 - no personal factors/history    [] 74699 - has 1-2 personal factor/comorbidity     [] 06943 - has >3 personal factor/comorbidity     Body Regions:  [] Objective examination findings  [] Head     []  Neck  [] Trunk   [] Upper Extremity  [] Lower Extremity    Body Systems:  [] For communication ability, affect, cognition, language, and learning style: the assessment of the ability to make needs known, consciousness, orientation (person, place, and time), expected emotional /behavioral responses, and learning preferences (eg, learning barriers, education  needs)  [] For the neuromuscular system: a general assessment of gross coordinated movement (eg, balance, gait, locomotion, transfers, and transitions) and motor function  (motor control and motor learning)  [] For the musculoskeletal system: the assessment of gross symmetry, gross range of motion, gross strength, height, and weight  [] For the integumentary system: the assessment of pliability(texture), presence of scar formation, skin color, and skin integrity  [] For cardiovascular/pulmonary system: the assessment of heart rate, respiratory rate, blood pressure, and edema     Activity limitations:    [] Patient's cognitive status and saf ety concerns          [] Status of current condition      [] Weight bearing restriction  [] Cardiopulmunary Restriction    Participation Restrictions:   [] Goals and goal agreement with  the patient     [] Rehab potential (prognosis) and probable outcome      Examination of Body System: (criteria)    [] 06465 - addressing 1-2 elements    [] 91891 - addressing a total of 3 or more elements     [] 64613 -  Addressing a total of 4 or more elements         Clinical Presentation: (criteria)  Choose one     On examination of body system using standardized tests and measures patient presents with (CHOOSE ONE) elements from any of the following: body structures and functions, activity limitations, and/or participation restrictions.  Leading to a clinical presentation that is considered (CHOOSE ONE)                              Clinical Decision Making  (Eval Complexity):  Choose One     Time Tracking:     PT Received On: 05/23/18  PT Start Time: 0920     PT Stop Time: 0958  PT Total Time (min): 38 min     Billable Minutes: Evaluation 12 and Therapeutic Activity 10   Partial overlap with OT.       Sasha Evans, PT  05/23/2018

## 2018-05-23 NOTE — ASSESSMENT & PLAN NOTE
- Patient with depressed LV function and associated severe restriction with diastolic failure and pulmonary hypertension, moderate MR/TR.  - Severe hypoxemia currently requiring high flow oxygen.  - On multiple diuretics as outpatient  - Cardiologist following - continuing IV diuretics for now.  - Discussed code status in setting of heart failure with worsening CKD - patient does not desire aggressive resuscitation measures and wants to be made comfortable.

## 2018-05-23 NOTE — PROGRESS NOTES
Progress Note  Nephrology    Admit Date: 5/20/2018   LOS: 3 days     SUBJECTIVE:     Follow-up For: Hyponatremia, LEIA on CKD Stage 4    Interval History:   More confused this morning.  Having difficulty taking oral medications.  Discussed with daughter and treatment team at bedside.  No chest pain, no shortness of breath.  Very poor oral intake.    OBJECTIVE:     Vital Signs (Most Recent)  Temp: 98.3 °F (36.8 °C) (05/23/18 0750)  Pulse: 75 (05/23/18 0754)  Resp: 18 (05/23/18 0754)  BP: (!) 143/67 (05/23/18 0750)  SpO2: 95 % (05/23/18 0754)    Vital Signs Range (Last 24H):  Temp:  [96 °F (35.6 °C)-98.3 °F (36.8 °C)]   Pulse:  [64-87]   Resp:  [18-20]   BP: (132-145)/(59-67)   SpO2:  [85 %-96 %]     Review of Systems:   Constitutional: no fever or chills.  Generalized pain.   Respiratory: no cough or shorness of breath   Cardiovascular: no chest pain or palpitations   Gastrointestinal: no nausea or vomiting, no abdominal pain or change in bowel habits   Genitourinary: no hematuria or dysuria   Musculoskeletal: no arthralgias or myalgias   Neurological: no seizures or tremors    Physical Exam:  Gen: frail, contracted  HEENT: mmm, sclera anicteric  CV: RRR, no m/r  Resp: Diminished breath sounds at bases, few upper respiratory rhonchi  GI: soft, ND, NTTP, +BS  Skin: warm, dry  Extr: no edema  Shepard    Laboratory:    Recent Labs  Lab 05/20/18  1238 05/21/18  0533  05/22/18  0529 05/22/18  1145 05/23/18  0514   * 128*  < > 124* 125* 124*   K 3.4* 3.5  < > 3.3* 4.0 3.8   CL 87* 87*  < > 85* 87* 85*   CO2 28 30*  < > 28 27 28   BUN 49* 52*  < > 55* 58* 57*   CREATININE 3.6* 3.7*  < > 3.6* 3.6* 3.7*   CALCIUM 7.9* 7.7*  < > 7.8* 7.6* 7.7*   PHOS 3.3 3.8  --  3.3  --   --    < > = values in this interval not displayed.    Recent Labs  Lab 05/20/18  1238 05/21/18  0532   WBC 6.04 6.59   HGB 9.3* 8.5*   HCT 26.7* 25.1*   * 128*        Diagnostic Results:  Labs: Reviewed  US: Reviewed    ASSESSMENT/PLAN:     1. LEIA  on chronic kidney disease stage IV (N 17.9, N 18.4):   - Followed Dr. Pruitt at [facility], most recently seen 5/3/18.  Her most recent Cr 2.2 due to AKIs, but prior to that her baseline Cr ~ 1.4-1.7.  CKD has been attributed to longstanding DM and HTN.  Nephrotic syndrome serology has been negative.  Pt and daughter have declined a renal bx for now.  - Cr stable at 3.6-3.7 but hopeful with gentle diuresis that will improve.  - Per notes from Dr. Pruitt, pt declines dialysis therapies and continued to decline at present which is very appropriate  - Renally dose meds, avoid nephrotoxins, and monitor I/O's closely.  2. Hyponatremia secondary to diuresis (E 87.1):   poor response to 15 mg of tolvaptan yesterday.  Dose 30 mg now and encourage oral intake with water.    3. Hypokalemia (E87.5): From diuretics.  Replace with po prn  4. 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 AFIB on OAC (I48.91):  Dr. Garcia to manage Coumadin and INR.   INR still elevated.  Defer.  6. Anemia of CKD (D63.1) with Iron deficiency (D50.0):  Normal B12/folate.  started IV Venofer qd x5 days.    7. Dispo:  DNR but may need Hospice soon.       Thank you for allowing me to participate in the care of this patient.    Discussed with Dr. Villagomez.    Javier Villanueva, [facility]  Nephrology

## 2018-05-24 LAB
ANION GAP SERPL CALC-SCNC: 11 MMOL/L
BUN SERPL-MCNC: 60 MG/DL
CALCIUM SERPL-MCNC: 7.5 MG/DL
CHLORIDE SERPL-SCNC: 85 MMOL/L
CO2 SERPL-SCNC: 28 MMOL/L
CREAT SERPL-MCNC: 3.7 MG/DL
EST. GFR  (AFRICAN AMERICAN): 12 ML/MIN/1.73 M^2
EST. GFR  (NON AFRICAN AMERICAN): 11 ML/MIN/1.73 M^2
GLUCOSE SERPL-MCNC: 104 MG/DL
INR PPP: 3
POTASSIUM SERPL-SCNC: 3.7 MMOL/L
PROTHROMBIN TIME: 31.7 SEC
SODIUM SERPL-SCNC: 124 MMOL/L

## 2018-05-24 PROCEDURE — 25000003 PHARM REV CODE 250: Performed by: INTERNAL MEDICINE

## 2018-05-24 PROCEDURE — 25000003 PHARM REV CODE 250: Performed by: NURSE PRACTITIONER

## 2018-05-24 PROCEDURE — 11000001 HC ACUTE MED/SURG PRIVATE ROOM

## 2018-05-24 PROCEDURE — 27100171 HC OXYGEN HIGH FLOW UP TO 24 HOURS

## 2018-05-24 PROCEDURE — 80048 BASIC METABOLIC PNL TOTAL CA: CPT

## 2018-05-24 PROCEDURE — 85610 PROTHROMBIN TIME: CPT

## 2018-05-24 PROCEDURE — 63600175 PHARM REV CODE 636 W HCPCS: Performed by: INTERNAL MEDICINE

## 2018-05-24 PROCEDURE — 99233 SBSQ HOSP IP/OBS HIGH 50: CPT | Mod: ,,, | Performed by: HOSPITALIST

## 2018-05-24 PROCEDURE — 97530 THERAPEUTIC ACTIVITIES: CPT

## 2018-05-24 PROCEDURE — 94761 N-INVAS EAR/PLS OXIMETRY MLT: CPT

## 2018-05-24 PROCEDURE — 25000003 PHARM REV CODE 250: Performed by: HOSPITALIST

## 2018-05-24 PROCEDURE — 36415 COLL VENOUS BLD VENIPUNCTURE: CPT

## 2018-05-24 PROCEDURE — A4216 STERILE WATER/SALINE, 10 ML: HCPCS | Performed by: INTERNAL MEDICINE

## 2018-05-24 PROCEDURE — 27100092 HC HIGH FLOW DELIVERY CANNULA

## 2018-05-24 RX ORDER — TOLVAPTAN 15 MG/1
30 TABLET ORAL ONCE
Status: COMPLETED | OUTPATIENT
Start: 2018-05-24 | End: 2018-05-24

## 2018-05-24 RX ADMIN — AMLODIPINE BESYLATE 5 MG: 5 TABLET ORAL at 09:05

## 2018-05-24 RX ADMIN — TRAMADOL HYDROCHLORIDE 50 MG: 50 TABLET, COATED ORAL at 09:05

## 2018-05-24 RX ADMIN — ATORVASTATIN CALCIUM 10 MG: 10 TABLET, FILM COATED ORAL at 09:05

## 2018-05-24 RX ADMIN — FAMOTIDINE 20 MG: 20 TABLET ORAL at 09:05

## 2018-05-24 RX ADMIN — SODIUM CHLORIDE TAB 1 GM 1 G: 1 TAB at 11:05

## 2018-05-24 RX ADMIN — STANDARDIZED SENNA CONCENTRATE AND DOCUSATE SODIUM 1 TABLET: 8.6; 5 TABLET, FILM COATED ORAL at 10:05

## 2018-05-24 RX ADMIN — SODIUM CHLORIDE, PRESERVATIVE FREE 3 ML: 5 INJECTION INTRAVENOUS at 06:05

## 2018-05-24 RX ADMIN — CARVEDILOL 25 MG: 12.5 TABLET, FILM COATED ORAL at 09:05

## 2018-05-24 RX ADMIN — CARVEDILOL 25 MG: 12.5 TABLET, FILM COATED ORAL at 10:05

## 2018-05-24 RX ADMIN — IRON SUCROSE 100 MG: 20 INJECTION, SOLUTION INTRAVENOUS at 09:05

## 2018-05-24 RX ADMIN — STANDARDIZED SENNA CONCENTRATE AND DOCUSATE SODIUM 1 TABLET: 8.6; 5 TABLET, FILM COATED ORAL at 09:05

## 2018-05-24 RX ADMIN — TOLVAPTAN 30 MG: 15 TABLET ORAL at 09:05

## 2018-05-24 RX ADMIN — SODIUM CHLORIDE, PRESERVATIVE FREE 3 ML: 5 INJECTION INTRAVENOUS at 02:05

## 2018-05-24 RX ADMIN — SODIUM CHLORIDE TAB 1 GM 1 G: 1 TAB at 10:05

## 2018-05-24 RX ADMIN — LEVOTHYROXINE SODIUM 50 MCG: 50 TABLET ORAL at 09:05

## 2018-05-24 RX ADMIN — TRAZODONE HYDROCHLORIDE 50 MG: 50 TABLET ORAL at 10:05

## 2018-05-24 RX ADMIN — VITAMIN D, TAB 1000IU (100/BT) 1000 UNITS: 25 TAB at 09:05

## 2018-05-24 RX ADMIN — CALCITRIOL 0.25 MCG: 0.25 CAPSULE, LIQUID FILLED ORAL at 09:05

## 2018-05-24 NOTE — ASSESSMENT & PLAN NOTE
- Patient with depressed LV function and associated severe restriction with diastolic failure and pulmonary hypertension, moderate MR/TR.  - Severe hypoxemia currently requiring high flow oxygen.  - On multiple diuretics as outpatient - was on IV diuretics for several days, now discontinued  - Discussed code status in setting of heart failure with worsening CKD - patient does not desire aggressive resuscitation measures and wants to be made comfortable.  - Will discuss hospice with family tomorrow.

## 2018-05-24 NOTE — PLAN OF CARE
Problem: Patient Care Overview  Goal: Plan of Care Review  Outcome: Ongoing (interventions implemented as appropriate)  Patient in no apparent distress. Sat's  99 % on 25 lpm and 90%. Decreased to 80%. Will continue to monitor.

## 2018-05-24 NOTE — PROGRESS NOTES
Pt received on 25L 75% HFNC;SPO2 85%. Pt appeared to be in no distress. Increased FIO2 to 80%;SPO2 read 87%. Increased FIO2 to 90%;FIO2 reads 90-94%. Water bottle was changed for humidification. No other changes were made at this time. Will continue to monitor.

## 2018-05-24 NOTE — PT/OT/SLP PROGRESS
Physical Therapy Treatment    Patient Name:  Deepali Stuart   MRN:  [id number]    Recommendations:     Discharge Recommendations:  home with home health, home health PT, home health OT (and HH aide)   Discharge Equipment Recommendations: hospital bed, oxygen   Barriers to discharge: None    Assessment:     Deepali Stuart is a 86 y.o. female admitted with a medical diagnosis of Heart failure, systolic and diastolic, acute on chronic.  She presents with the following impairments/functional limitations:  impaired endurance, impaired self care skills, impaired functional mobilty, pain ; pt with good participation today and increased sitting tolerance at EOB.    Rehab Prognosis:  Fair; patient would benefit from acute skilled PT services to address these deficits and reach maximum level of function.      Recent Surgery: * No surgery found *      Plan:     During this hospitalization, patient to be seen 3 x/week to address the above listed problems via gait training, therapeutic activities, therapeutic exercises, wheelchair management/training, neuromuscular re-education  · Plan of Care Expires:  06/22/18   Plan of Care Reviewed with: patient, daughter, grandchild(sam)    Subjective     Communicated with nurse prior to session.  Patient found supine in bed upon PT entry to room, agreeable to treatment.      Chief Complaint: a little abd pain (per family)  Patient comments/goals: pt agreeable to tx session, following commands well  Pain/Comfort:  · Pain Rating 1:  (pt c/o some abdominal pain , did not rate)    Patients cultural, spiritual, Yarsani conflicts given the current situation: none stated    Objective:     Patient found with: oxygen, peripheral IV, jordan catheter, telemetry (HFNC 25L @90%)     General Precautions: Standard, fall, diabetic (DNR)   Orthopedic Precautions:N/A   Braces: N/A     Functional Mobility:  · Bed Mobility:     · Supine to Sit: minimum assistance  · Sit to Supine: minimum assistance    · Scooting: CGA/min.A for scooting along EOB; SBA for scooting up in bed in supine      AM-PAC 6 CLICK MOBILITY  Turning over in bed (including adjusting bedclothes, sheets and blankets)?: 3  Sitting down on and standing up from a chair with arms (e.g., wheelchair, bedside commode, etc.): 3  Moving from lying on back to sitting on the side of the bed?: 3  Moving to and from a bed to a chair (including a wheelchair)?: 3  Need to walk in hospital room?: 2  Climbing 3-5 steps with a railing?: 1  Total Score: 15       Therapeutic Activities and Exercises:   pt sat up EOB ~15 min. With SBA, perf'd AAROM/AROM ex's with BUE's :shldr flex x 10 ea. ; BLE's :LAQ's x 10 ea.   O2:96% on HFNC  HR:75  BP:125/59 seated    Patient left left sidelying, HOB up, with all lines intact, call button in reach, bed alarm on, nurse notified and multiple family members present, daughters/ granddaughters. present..    GOALS:    Physical Therapy Goals        Problem: Physical Therapy Goal    Goal Priority Disciplines Outcome Goal Variances Interventions   Physical Therapy Goal     PT/OT, PT Ongoing (interventions implemented as appropriate)     Description:  Goals to be met by: 6/3/18    Patient will increase functional independence with mobility by performin. Supine to sit with supervision.  2. Sit to supine with supervision.   3. Sitting at edge of bed >5 minutes with supervision.   4. Assessment of sit<>stand by PT.   5. Assessment of bed<>chair by PT.                       Time Tracking:     PT Received On: 18  PT Start Time: 1136     PT Stop Time: 1200  PT Total Time (min): 24 min     Billable Minutes: Therapeutic Activity 24    Treatment Type: Treatment  PT/PTA: PTA     PTA Visit Number: 1     Ashley Duran PTA  2018

## 2018-05-24 NOTE — PLAN OF CARE
Patient resting with son at bedside. VSS on high-flow nasal cannula.Fluids encouraged. Shepard draining to gravity. Safety measures maintained. Will continue to monitor.

## 2018-05-24 NOTE — PLAN OF CARE
Problem: Physical Therapy Goal  Goal: Physical Therapy Goal  Goals to be met by: 6/3/18    Patient will increase functional independence with mobility by performin. Supine to sit with supervision.  2. Sit to supine with supervision.   3. Sitting at edge of bed >5 minutes with supervision.   4. Assessment of sit<>stand by PT.   5. Assessment of bed<>chair by PT.      Pt progressing towards goals, sup to sit min.A, sat up EOB ~15 min. With SBA, scooting along EOB with min.A, scooting up in bed with SBA. Pt on HFNC oxygen and 96%, HR:75, BP:125/59 seated. HHPT and family supervision/assistance.

## 2018-05-24 NOTE — PROGRESS NOTES
Progress Note  Nephrology    Admit Date: 5/20/2018   LOS: 4 days     SUBJECTIVE:     Follow-up For: Hyponatremia, LEIA on CKD Stage 4    Interval History:   Very weak and withdrawn this morning.  Multiple family members at bedside and discussed poor outlook long-term.  Refusing dialysis.  Patient unable to tolerate pills.    OBJECTIVE:     Vital Signs (Most Recent)  Temp: 98.8 °F (37.1 °C) (05/24/18 0742)  Pulse: 72 (05/24/18 0742)  Resp: 20 (05/24/18 0742)  BP: (!) 141/88 (05/24/18 0742)  SpO2: 96 % (05/24/18 0742)    Vital Signs Range (Last 24H):  Temp:  [97.5 °F (36.4 °C)-98.8 °F (37.1 °C)]   Pulse:  [69-85]   Resp:  [18-20]   BP: (123-146)/(60-88)   SpO2:  [85 %-96 %]     Review of Systems:   Constitutional: no fever or chills.     Respiratory: no cough or shorness of breath   Cardiovascular: no chest pain or palpitations   Gastrointestinal: no nausea or vomiting, no abdominal pain or change in bowel habits   Genitourinary: no hematuria or dysuria   Musculoskeletal: no arthralgias or myalgias   Neurological: no seizures or tremors    Physical Exam:  Gen: frail, contracted  HEENT: mmm, sclera anicteric.  Large protruding tongue  CV: RRR, no m/r  Resp: Diminished breath sounds at bases, few upper respiratory rhonchi  GI: soft, ND, NTTP, +BS  Skin: warm, dry  Extr: no edema  Shepard    Laboratory:    Recent Labs  Lab 05/20/18  1238 05/21/18  0533  05/22/18  0529 05/22/18  1145 05/23/18  0514 05/24/18  0439   * 128*  < > 124* 125* 124* 124*   K 3.4* 3.5  < > 3.3* 4.0 3.8 3.7   CL 87* 87*  < > 85* 87* 85* 85*   CO2 28 30*  < > 28 27 28 28   BUN 49* 52*  < > 55* 58* 57* 60*   CREATININE 3.6* 3.7*  < > 3.6* 3.6* 3.7* 3.7*   CALCIUM 7.9* 7.7*  < > 7.8* 7.6* 7.7* 7.5*   PHOS 3.3 3.8  --  3.3  --   --   --    < > = values in this interval not displayed.    Recent Labs  Lab 05/20/18  1238 05/21/18  0532   WBC 6.04 6.59   HGB 9.3* 8.5*   HCT 26.7* 25.1*   * 128*        Diagnostic Results:  Labs: Reviewed  US:  Reviewed    ASSESSMENT/PLAN:     1. Persistent nonoliguric LEIA on chronic kidney disease stage IV (N 17.9, N 18.4):   - Followed Dr. Pruitt at [facility], most recently seen 5/3/18.  Her most recent Cr 2.2 due to AKIs, but prior to that her baseline Cr ~ 1.4-1.7.  CKD has been attributed to longstanding DM and HTN.  Nephrotic syndrome serology has been negative.  Pt and daughter have declined a renal bx for now.  - Cr stable at 3.6-3.7 and likely her new baseline.  - Per notes from Dr. Pruitt, pt declines dialysis therapies and continued to decline at present which is very appropriate  - Renally dose meds, avoid nephrotoxins, and monitor I/O's closely.  2. Hyponatremia secondary to diuresis (E 87.1):   poor response to 15 mg of tolvaptan yesterday.  Dosed 30 mg and encourage oral intake with water.  Start using salt tablets as unsure if she is swallowing pills adequately.  3. Hypokalemia (E87.5): From diuretics.  Replace with po prn  4. 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 AFIB on OAC (I48.91):  Dr. Garcia to manage Coumadin and INR.   INR still elevated.  Defer.  6. Anemia of CKD (D63.1) with Iron deficiency (D50.0):  Normal B12/folate.  started IV Venofer qd x5 days.    7. Dispo:  DNR but may need Hospice soon.       Thank you for allowing me to participate in the care of this patient.    Discussed with Dr. Villagomez.    Javier Villanueva, [facility]  Nephrology

## 2018-05-24 NOTE — ASSESSMENT & PLAN NOTE
- Patient on multiple diuretics  - Tolvaptan given Monday without improvement and repeated 5/23 and today.  - Fluid restriction discontinued.

## 2018-05-24 NOTE — PLAN OF CARE
"Discharge Planning:  Patient admitted on 5-20-18  LOS- day 4  Chart reviewed  Care plan discussed  Discussed care plan with treatment team  Discussed care plan with the attending Dr Villagomez  Current dispo - family meeting tomorrow at 5p;  [facility] will be here for a "informed visit"  Numerous families members work till 5 most weekdays so, tomorrow (5-25-18), all concerns with hospice care will be addressed.  Case management  to follow  Consults following are: case mgt., nephrology,  cardiology    [facility]  [phone]  Hannah is on call tomorrow  "

## 2018-05-24 NOTE — SUBJECTIVE & OBJECTIVE
Interval History:  Sleeping, difficult to awaken.  Granddaughter at bedside reports she was awake and participating in PT earlier.    Review of Systems   Unable to perform ROS: Patient unresponsive     Objective:     Vital Signs (Most Recent):  Temp: 96.7 °F (35.9 °C) (05/24/18 1639)  Pulse: 68 (05/24/18 1639)  Resp: 20 (05/24/18 0742)  BP: (!) 148/72 (05/24/18 1639)  SpO2: 97 % (05/24/18 1639) Vital Signs (24h Range):  Temp:  [96.7 °F (35.9 °C)-98.8 °F (37.1 °C)] 96.7 °F (35.9 °C)  Pulse:  [68-85] 68  Resp:  [18-20] 20  SpO2:  [85 %-97 %] 97 %  BP: (126-148)/(59-88) 148/72     Weight: 64.5 kg (142 lb 3.2 oz)  Body mass index is 26 kg/m².    Intake/Output Summary (Last 24 hours) at 05/24/18 1744  Last data filed at 05/24/18 0500   Gross per 24 hour   Intake              360 ml   Output              875 ml   Net             -515 ml      Physical Exam   Constitutional: She appears well-developed and well-nourished. No distress.   Frail, elderly woman.     HENT:   Head: Normocephalic.   Neck: Neck supple. No thyromegaly present.   Cardiovascular: Normal rate, regular rhythm, normal heart sounds and intact distal pulses.  Exam reveals no gallop and no friction rub.    No murmur heard.  Pulmonary/Chest: Effort normal.   Decreased breath sounds bilaterally.   Abdominal: Soft. Bowel sounds are normal. She exhibits no distension. There is no tenderness.   Musculoskeletal: Normal range of motion. She exhibits no edema.   Lymphadenopathy:     She has no cervical adenopathy.   Neurological:   Sleeping   Skin: Skin is warm and dry. No rash noted.       Significant Labs:   BMP:   Recent Labs  Lab 05/24/18  0439      *   K 3.7   CL 85*   CO2 28   BUN 60*   CREATININE 3.7*   CALCIUM 7.5*       Significant Imaging: I have reviewed all pertinent imaging results/findings within the past 24 hours.

## 2018-05-24 NOTE — PLAN OF CARE
05/24/18 1510   Discharge Reassessment   Assessment Type Discharge Planning Reassessment   Provided patient/caregiver education on the expected discharge date and the discharge plan Yes   Do you have any problems affording any of your prescribed medications? No   Discharge Plan A Hospice/home   Discharge Plan B Inpatient Hospice   Patient choice form signed by patient/caregiver N/A   Can the patient answer the patient profile reliably? No, cognitively impaired   How does the patient rate their overall health at the present time? Poor   Describe the patient's ability to walk at the present time. Major restrictions/daily assistance from another person   How often would a person be available to care for the patient? Whenever needed   Number of comorbid conditions (as recorded on the chart) Five or more   During the past month, has the patient often been bothered by feeling down, depressed or hopeless? No   During the past month, has the patient often been bothered by little interest or pleasure in doing things? No

## 2018-05-24 NOTE — PLAN OF CARE
Problem: Patient Care Overview  Goal: Plan of Care Review  Outcome: Ongoing (interventions implemented as appropriate)  Patient in no apparent distress. Sat's  90 % on 80% and 25 lpm. Will continue to monitor.

## 2018-05-24 NOTE — ASSESSMENT & PLAN NOTE
- Worsening renal function in setting of poor perfusion due to heart failure  - Stable creatinine but near needing HD if she were not so frail.  - Nephrology following  - Note patient not interested in HD and would not likely tolerate it in any case.

## 2018-05-24 NOTE — PROGRESS NOTES
[facility] Hospital Medicine  Progress Note    Patient Name: Deepali Stuart  MRN: [id number]  Patient Class: IP- Inpatient   Admission Date: 5/20/2018  Length of Stay: 4 days  Attending Physician: Mariaelena Villagomez MD  Primary Care Provider: Vishal Salazar MD        Subjective:     Principal Problem:Heart failure, systolic and diastolic, acute on chronic    HPI:  Ms. Stuart is an 86 year old woman who presented to the ED with shortness of breath, decreased activity and confusion for one day.  Her daughter reports that patient seems to have declined since her pacemaker placement. Patient normally eats a low salt diet but she went to a [employer] party yesterday and ate some high salt foods.  On presentation her oxygen saturation on room air was in the low 80's, and she was placed on a NRB in the ED before being weaned down to 5 liters NC.  Labs were notable for BUN/creatinine 49/3.6, BNP 2,585, mildly elevated troponin, sodium 127.  CXR showed bilateral effusions and associated atelectasis.  She was admitted for treatment of heart failure in the setting of worsening chronic kidney disease.    Medical history includes CHF with pulmonary hypertension, chronic atrial fibrillation, type II diabetes and hypothyroidism.  Her pacemaker was placed 2012.  She is followed by Dr. 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  She is not interested in HD.    Hospital Course:  Patient was started on IV diuresis, with some clinical improvement in her shortness of breath but she remained hypoxemic.  Cardiology and Nephrology were consulted.  She was started on fluid restriction to treat low sodium, and then received a dose of tolvaptan and her fluids were liberalized.  She did not have a good response to the initial dose and it was increased and repeated on 5/23 and then 5/24.  In the meantime she was sleeping most of the time and was intermittently confused.  Her oxygen requirement  increased until she was on high flow with saturation in the low 90's.    Interval History:  Sleeping, difficult to awaken.  Granddaughter at bedside reports she was awake and participating in PT earlier.    Review of Systems   Unable to perform ROS: Patient unresponsive     Objective:     Vital Signs (Most Recent):  Temp: 96.7 °F (35.9 °C) (05/24/18 1639)  Pulse: 68 (05/24/18 1639)  Resp: 20 (05/24/18 0742)  BP: (!) 148/72 (05/24/18 1639)  SpO2: 97 % (05/24/18 1639) Vital Signs (24h Range):  Temp:  [96.7 °F (35.9 °C)-98.8 °F (37.1 °C)] 96.7 °F (35.9 °C)  Pulse:  [68-85] 68  Resp:  [18-20] 20  SpO2:  [85 %-97 %] 97 %  BP: (126-148)/(59-88) 148/72     Weight: 64.5 kg (142 lb 3.2 oz)  Body mass index is 26 kg/m².    Intake/Output Summary (Last 24 hours) at 05/24/18 1744  Last data filed at 05/24/18 0500   Gross per 24 hour   Intake              360 ml   Output              875 ml   Net             -515 ml      Physical Exam   Constitutional: She appears well-developed and well-nourished. No distress.   Frail, elderly woman.     HENT:   Head: Normocephalic.   Neck: Neck supple. No thyromegaly present.   Cardiovascular: Normal rate, regular rhythm, normal heart sounds and intact distal pulses.  Exam reveals no gallop and no friction rub.    No murmur heard.  Pulmonary/Chest: Effort normal.   Decreased breath sounds bilaterally.   Abdominal: Soft. Bowel sounds are normal. She exhibits no distension. There is no tenderness.   Musculoskeletal: Normal range of motion. She exhibits no edema.   Lymphadenopathy:     She has no cervical adenopathy.   Neurological:   Sleeping   Skin: Skin is warm and dry. No rash noted.       Significant Labs:   BMP:   Recent Labs  Lab 05/24/18  3489      *   K 3.7   CL 85*   CO2 28   BUN 60*   CREATININE 3.7*   CALCIUM 7.5*       Significant Imaging: I have reviewed all pertinent imaging results/findings within the past 24 hours.    Assessment/Plan:      * Heart failure, systolic  and diastolic, acute on chronic    - Patient with depressed LV function and associated severe restriction with diastolic failure and pulmonary hypertension, moderate MR/TR.  - Severe hypoxemia currently requiring high flow oxygen.  - On multiple diuretics as outpatient - was on IV diuretics for several days, now discontinued  - Discussed code status in setting of heart failure with worsening CKD - patient does not desire aggressive resuscitation measures and wants to be made comfortable.  - Will discuss hospice with family tomorrow.          LEIA (acute kidney injury)    - Worsening renal function in setting of poor perfusion due to heart failure  - Stable creatinine but near needing HD if she were not so frail.  - Nephrology following  - Note patient not interested in HD and would not likely tolerate it in any case.          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 Pruitt  - Multifactorial, with diabetes and hypertension as contributors to heart failure and CKD.          Hyponatremia    - Patient on multiple diuretics  - Tolvaptan given Monday without improvement and repeated 5/23 and today.  - Fluid restriction discontinued.        Essential hypertension    - Continue carvedilol  - Lisinopril held for now in setting of worsening renal disease - would consider resuming this due to severe proteinuria  - Continue IV diuresis          Hypothyroidism    Continue synthroid        Controlled type 2 diabetes mellitus with diabetic nephropathy, without long-term current use of insulin    - A1C 5.6  - Continue low dose SSI        Chronic atrial fibrillation    - Warfarin on hold due to elevated INR - still 3.7, will continue to hold.  - Continue to follow daily  - Patient has pacemaker and is on beta blocker for rate control.            VTE Risk Mitigation     None              Mariaelena Morse MD  Department of Hospital Medicine   [facility]

## 2018-05-25 PROBLEM — J96.01 ACUTE RESPIRATORY FAILURE WITH HYPOXIA: Status: ACTIVE | Noted: 2018-05-25

## 2018-05-25 PROBLEM — Z51.5 ENCOUNTER FOR PALLIATIVE CARE: Status: ACTIVE | Noted: 2018-05-25

## 2018-05-25 LAB
ANION GAP SERPL CALC-SCNC: 10 MMOL/L
BUN SERPL-MCNC: 64 MG/DL
CALCIUM SERPL-MCNC: 7.8 MG/DL
CHLORIDE SERPL-SCNC: 84 MMOL/L
CO2 SERPL-SCNC: 28 MMOL/L
CREAT SERPL-MCNC: 3.6 MG/DL
EST. GFR  (AFRICAN AMERICAN): 13 ML/MIN/1.73 M^2
EST. GFR  (NON AFRICAN AMERICAN): 11 ML/MIN/1.73 M^2
GLUCOSE SERPL-MCNC: 98 MG/DL
INR PPP: 2.4
POTASSIUM SERPL-SCNC: 4 MMOL/L
PROTHROMBIN TIME: 25.7 SEC
SODIUM SERPL-SCNC: 122 MMOL/L

## 2018-05-25 PROCEDURE — 63600175 PHARM REV CODE 636 W HCPCS: Performed by: INTERNAL MEDICINE

## 2018-05-25 PROCEDURE — 27100171 HC OXYGEN HIGH FLOW UP TO 24 HOURS

## 2018-05-25 PROCEDURE — 25000003 PHARM REV CODE 250: Performed by: INTERNAL MEDICINE

## 2018-05-25 PROCEDURE — 36415 COLL VENOUS BLD VENIPUNCTURE: CPT

## 2018-05-25 PROCEDURE — 80048 BASIC METABOLIC PNL TOTAL CA: CPT

## 2018-05-25 PROCEDURE — 25000003 PHARM REV CODE 250: Performed by: HOSPITALIST

## 2018-05-25 PROCEDURE — 99233 SBSQ HOSP IP/OBS HIGH 50: CPT | Mod: ,,, | Performed by: HOSPITALIST

## 2018-05-25 PROCEDURE — 94761 N-INVAS EAR/PLS OXIMETRY MLT: CPT

## 2018-05-25 PROCEDURE — 25000003 PHARM REV CODE 250: Performed by: NURSE PRACTITIONER

## 2018-05-25 PROCEDURE — A4216 STERILE WATER/SALINE, 10 ML: HCPCS | Performed by: INTERNAL MEDICINE

## 2018-05-25 PROCEDURE — 27100092 HC HIGH FLOW DELIVERY CANNULA

## 2018-05-25 PROCEDURE — 11000001 HC ACUTE MED/SURG PRIVATE ROOM

## 2018-05-25 PROCEDURE — 85610 PROTHROMBIN TIME: CPT

## 2018-05-25 RX ADMIN — TRAZODONE HYDROCHLORIDE 50 MG: 50 TABLET ORAL at 09:05

## 2018-05-25 RX ADMIN — POLYETHYLENE GLYCOL 3350 17 G: 17 POWDER, FOR SOLUTION ORAL at 09:05

## 2018-05-25 RX ADMIN — TRAMADOL HYDROCHLORIDE 50 MG: 50 TABLET, COATED ORAL at 09:05

## 2018-05-25 RX ADMIN — CARVEDILOL 25 MG: 12.5 TABLET, FILM COATED ORAL at 09:05

## 2018-05-25 RX ADMIN — LEVOTHYROXINE SODIUM 50 MCG: 50 TABLET ORAL at 09:05

## 2018-05-25 RX ADMIN — SODIUM CHLORIDE, PRESERVATIVE FREE 3 ML: 5 INJECTION INTRAVENOUS at 02:05

## 2018-05-25 RX ADMIN — SODIUM CHLORIDE, PRESERVATIVE FREE 3 ML: 5 INJECTION INTRAVENOUS at 05:05

## 2018-05-25 RX ADMIN — SODIUM CHLORIDE TAB 1 GM 1 G: 1 TAB at 11:05

## 2018-05-25 RX ADMIN — VITAMIN D, TAB 1000IU (100/BT) 1000 UNITS: 25 TAB at 09:05

## 2018-05-25 RX ADMIN — TRAMADOL HYDROCHLORIDE 50 MG: 50 TABLET, COATED ORAL at 10:05

## 2018-05-25 RX ADMIN — SODIUM CHLORIDE, PRESERVATIVE FREE 3 ML: 5 INJECTION INTRAVENOUS at 12:05

## 2018-05-25 RX ADMIN — ATORVASTATIN CALCIUM 10 MG: 10 TABLET, FILM COATED ORAL at 09:05

## 2018-05-25 RX ADMIN — TRAMADOL HYDROCHLORIDE 50 MG: 50 TABLET, COATED ORAL at 01:05

## 2018-05-25 RX ADMIN — FAMOTIDINE 20 MG: 20 TABLET ORAL at 09:05

## 2018-05-25 RX ADMIN — IRON SUCROSE 100 MG: 20 INJECTION, SOLUTION INTRAVENOUS at 09:05

## 2018-05-25 RX ADMIN — CALCITRIOL 0.25 MCG: 0.25 CAPSULE, LIQUID FILLED ORAL at 09:05

## 2018-05-25 RX ADMIN — STANDARDIZED SENNA CONCENTRATE AND DOCUSATE SODIUM 1 TABLET: 8.6; 5 TABLET, FILM COATED ORAL at 09:05

## 2018-05-25 RX ADMIN — SODIUM CHLORIDE, PRESERVATIVE FREE 3 ML: 5 INJECTION INTRAVENOUS at 10:05

## 2018-05-25 RX ADMIN — AMLODIPINE BESYLATE 5 MG: 5 TABLET ORAL at 09:05

## 2018-05-25 RX ADMIN — SODIUM CHLORIDE TAB 1 GM 1 G: 1 TAB at 09:05

## 2018-05-25 NOTE — PLAN OF CARE
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       Hospice Qualifying Diagnoses:  Hypertensive heart and chronic kidney disease with end stage heart failure and end stage renal disease.       Patient has a life expectancy < 6 months due to these conditions.    Vital Signs: Routine per Hospice Protocol.    Allergies:  Review of patient's allergies indicates:   Allergen Reactions    Plavix [clopidogrel]        Diet:  Mechanical soft with chopped meats, thin liquids    Oxygen:  Supplemental oxygen to keep sats >85% and for patient comfort.    Activities: As tolerated    Nursing: Per Hospice Routine    Future Orders:  Hospice Medical Director may dictate new orders for comfortable care measures & sign death certificate.    Medications:         Comfort Care Medications Only    _________________________________  Mariaelena Morse MD  05/26/2018

## 2018-05-25 NOTE — SUBJECTIVE & OBJECTIVE
Interval History:  More alert today, daughter at bedside feeding her.  Iron infusion is running.  She has received 3 doses of tolvaptan without improvement in her sodium level.  Discussed discharge planning with family, they are in agreement with home hospice.    Review of Systems   Unable to perform ROS: Acuity of condition     Objective:     Vital Signs (Most Recent):  Temp: 97.1 °F (36.2 °C) (05/25/18 0927)  Pulse: 75 (05/25/18 0927)  Resp: 20 (05/25/18 0927)  BP: 138/68 (05/25/18 0927)  SpO2: (!) 91 % (Increased to previous settings due to sat decreased) (05/25/18 1145) Vital Signs (24h Range):  Temp:  [96.7 °F (35.9 °C)-98.7 °F (37.1 °C)] 97.1 °F (36.2 °C)  Pulse:  [66-75] 75  Resp:  [18-20] 20  SpO2:  [91 %-100 %] 91 %  BP: (119-148)/(59-72) 138/68     Weight: 64.8 kg (142 lb 13.7 oz)  Body mass index is 26.12 kg/m².    Intake/Output Summary (Last 24 hours) at 05/25/18 1608  Last data filed at 05/25/18 0950   Gross per 24 hour   Intake              270 ml   Output              750 ml   Net             -480 ml      Physical Exam   Constitutional: She appears well-developed and well-nourished. No distress.   Frail, elderly woman.     HENT:   Head: Normocephalic.   Neck: Neck supple. No thyromegaly present.   Cardiovascular: Normal rate, regular rhythm, normal heart sounds and intact distal pulses.  Exam reveals no gallop and no friction rub.    No murmur heard.  Pulmonary/Chest: Effort normal.   Decreased breath sounds bilaterally.   Abdominal: Soft. Bowel sounds are normal. She exhibits no distension. There is no tenderness.   Musculoskeletal: Normal range of motion. She exhibits no edema.   Lymphadenopathy:     She has no cervical adenopathy.   Neurological: She is alert.   Skin: Skin is warm and dry. No rash noted.       Significant Labs: All pertinent labs within the past 24 hours have been reviewed.    Significant Imaging: I have reviewed all pertinent imaging results/findings within the past 24 hours.

## 2018-05-25 NOTE — PROGRESS NOTES
Renal Progress Note    Admit Date: 5/20/2018   LOS: 5 days     SUBJECTIVE:     Patient remains on high flow O2, discussed with Dr. Villagomez    Scheduled Meds:   amLODIPine  5 mg Oral Daily    atorvastatin  10 mg Oral Daily    calcitRIOL  0.25 mcg Oral Daily    carvedilol  25 mg Oral BID    famotidine  20 mg Oral Daily    iron sucrose (VENOFER) IVPB  100 mg Intravenous Daily    levothyroxine  50 mcg Oral Daily    polyethylene glycol  17 g Oral Q6H WAKE    senna-docusate 8.6-50 mg  1 tablet Oral BID    sodium chloride 0.9%  3 mL Intravenous Q8H    sodium chloride  1 g Oral BID    traZODone  50 mg Oral QHS    vitamin D  1,000 Units Oral Daily       OBJECTIVE:     Vital Signs Range (Last 24H):  Temp:  [96.7 °F (35.9 °C)-98.7 °F (37.1 °C)]   Pulse:  [66-75]   Resp:  [18-20]   BP: (119-148)/(59-72)   SpO2:  [91 %-100 %]     I & O (Last 24H):  Intake/Output Summary (Last 24 hours) at 05/25/18 1308  Last data filed at 05/25/18 0950   Gross per 24 hour   Intake              370 ml   Output              750 ml   Net             -380 ml       Physical Exam:  Gen: frail, contracted  HEENT: mmm, sclera anicteric.  Large protruding tongue  CV: RRR, no m/r  Resp: Diminished breath sounds at bases, No Crackles or Wheezes  GI: soft, ND, NTTP, +BS  Skin: warm, dry  Extr: no edema  Shepard      Laboratory:  CBC:   Recent Labs  Lab 05/21/18  0532   WBC 6.59   RBC 2.75*   HGB 8.5*   HCT 25.1*   *   MCV 91   MCH 30.9   MCHC 33.9     BMP:   Recent Labs  Lab 05/22/18  0529  05/25/18  0723   *  < > 98   *  < > 122*   K 3.3*  < > 4.0   CL 85*  < > 84*   CO2 28  < > 28   BUN 55*  < > 64*   CREATININE 3.6*  < > 3.6*   CALCIUM 7.8*  < > 7.8*   MG 1.7  --   --    < > = values in this interval not displayed.    ASSESSMENT/PLAN:     1. Persistent nonoliguric LEIA on chronic kidney disease stage IV (N 17.9, N 18.4):   - Followed Dr. Pruitt at [facility], most recently seen 5/3/18.  Her most recent Cr 2.2 due to AKIs, but  prior to that her baseline Cr ~ 1.4-1.7.  CKD has been attributed to longstanding DM and HTN.  Nephrotic syndrome serology has been negative.  Pt and daughter have declined a renal bx for now.  - Cr stable at 3.6-3.7 and likely her new baseline.  - Per notes from Dr. Pruitt, pt declines dialysis therapies and continued to decline at present which is very appropriate  - Renally dose meds, avoid nephrotoxins, and monitor I/O's closely.  2. Hyponatremia secondary to diuresis (E 87.1):   poor response to 15 mg of tolvaptan yesterday.  Dosed 30 mg and encourage oral intake with water.  Started using salt tablets as unsure if she is swallowing pills adequately.  3. Hypokalemia (E87.5): From diuretics.  Replace with po prn  4. 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 AFIB on OAC (I48.91):  Dr. Garcia to manage Coumadin and INR.   INR still elevated.  Defer.  6. Anemia of CKD (D63.1) with Iron deficiency (D50.0):  Normal B12/folate.  started IV Venofer qd x5 days.    7. Dispo:  DNR.  Patient's family has confirmed she does not and did not wish for HD in the past.  Discussed at length with family members and Dr. Villagomez.

## 2018-05-25 NOTE — PROGRESS NOTES
[facility] Hospital Medicine  Progress Note    Patient Name: Deepali Stuart  MRN: [id number]  Patient Class: IP- Inpatient   Admission Date: 5/20/2018  Length of Stay: 5 days  Attending Physician: Mariaelena Villagomez MD  Primary Care Provider: Vishal Salazar MD        Subjective:     Principal Problem:Heart failure, systolic and diastolic, acute on chronic    HPI:  Ms. Stuart is an 86 year old woman who presented to the ED with shortness of breath, decreased activity and confusion for one day.  Her daughter reports that patient seems to have declined since her pacemaker placement. Patient normally eats a low salt diet but she went to a [employer] party yesterday and ate some high salt foods.  On presentation her oxygen saturation on room air was in the low 80's, and she was placed on a NRB in the ED before being weaned down to 5 liters NC.  Labs were notable for BUN/creatinine 49/3.6, BNP 2,585, mildly elevated troponin, sodium 127.  CXR showed bilateral effusions and associated atelectasis.  She was admitted for treatment of heart failure in the setting of worsening chronic kidney disease.    Medical history includes CHF with pulmonary hypertension, chronic atrial fibrillation, type II diabetes and hypothyroidism.  Her pacemaker was placed 2012.  She is followed by Dr. 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  She is not interested in HD.    Hospital Course:  Patient was started on IV diuresis, with some clinical improvement in her shortness of breath but she remained hypoxemic.  Cardiology and Nephrology were consulted.  She was started on fluid restriction to treat low sodium, and then received a dose of tolvaptan and her fluids were liberalized.  She did not have a good response to the initial dose and it was increased and repeated on 5/23 and then 5/24.  In the meantime she was sleeping most of the time and was intermittently confused.  Her oxygen requirement  increased until she was on high flow with saturation in the low 90's.    Interval History:  More alert today, daughter at bedside feeding her.  Iron infusion is running.  She has received 3 doses of tolvaptan without improvement in her sodium level.  Discussed discharge planning with family, they are in agreement with home hospice.    Review of Systems   Unable to perform ROS: Acuity of condition     Objective:     Vital Signs (Most Recent):  Temp: 97.1 °F (36.2 °C) (05/25/18 0927)  Pulse: 75 (05/25/18 0927)  Resp: 20 (05/25/18 0927)  BP: 138/68 (05/25/18 0927)  SpO2: (!) 91 % (Increased to previous settings due to sat decreased) (05/25/18 1145) Vital Signs (24h Range):  Temp:  [96.7 °F (35.9 °C)-98.7 °F (37.1 °C)] 97.1 °F (36.2 °C)  Pulse:  [66-75] 75  Resp:  [18-20] 20  SpO2:  [91 %-100 %] 91 %  BP: (119-148)/(59-72) 138/68     Weight: 64.8 kg (142 lb 13.7 oz)  Body mass index is 26.12 kg/m².    Intake/Output Summary (Last 24 hours) at 05/25/18 1608  Last data filed at 05/25/18 0950   Gross per 24 hour   Intake              270 ml   Output              750 ml   Net             -480 ml      Physical Exam   Constitutional: She appears well-developed and well-nourished. No distress.   Frail, elderly woman.     HENT:   Head: Normocephalic.   Neck: Neck supple. No thyromegaly present.   Cardiovascular: Normal rate, regular rhythm, normal heart sounds and intact distal pulses.  Exam reveals no gallop and no friction rub.    No murmur heard.  Pulmonary/Chest: Effort normal.   Decreased breath sounds bilaterally.   Abdominal: Soft. Bowel sounds are normal. She exhibits no distension. There is no tenderness.   Musculoskeletal: Normal range of motion. She exhibits no edema.   Lymphadenopathy:     She has no cervical adenopathy.   Neurological: She is alert.   Skin: Skin is warm and dry. No rash noted.       Significant Labs: All pertinent labs within the past 24 hours have been reviewed.    Significant Imaging: I have  reviewed all pertinent imaging results/findings within the past 24 hours.    Assessment/Plan:      * Heart failure, systolic and diastolic, acute on chronic    - Patient with depressed LV function and associated severe restriction with diastolic failure and pulmonary hypertension, moderate MR/TR.  - Severe hypoxemia currently requiring high flow oxygen.  - On multiple diuretics as outpatient - was on IV diuretics for several days, now discontinued  - Discussed code status in setting of heart failure with worsening CKD - patient does not desire aggressive resuscitation measures and wants to be made comfortable.  - Plan for home hospice.        LEIA (acute kidney injury)    - Worsening renal function in setting of poor perfusion due to heart failure  - Stable creatinine but near needing HD if she were not so frail.  - Nephrology following  - Patient is not interested in HD and would not likely tolerate it in any case.          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 Pruitt  - Multifactorial, with diabetes and hypertension as contributors to heart failure and CKD.          Acute respiratory failure with hypoxia    - As above          Hyponatremia    - Patient on multiple diuretics  - Tolvaptan given Monday without improvement and repeated 5/23 and yesterday  - Fluid restriction discontinued.        Essential hypertension    - Continue carvedilol  - Lisinopril held for now in setting of worsening renal disease         Hypothyroidism    Continue synthroid        Controlled type 2 diabetes mellitus with diabetic nephropathy, without long-term current use of insulin    - A1C 5.6  - Continue low dose SSI        Chronic atrial fibrillation    - Warfarin has been on hold due to elevated INR - discontinue due to new hospice status.  - Patient has pacemaker and is on beta blocker for rate control.            VTE Risk Mitigation     None              Mariaelena Morse,  MD  Department of Hospital Medicine   [facility]

## 2018-05-25 NOTE — PLAN OF CARE
Problem: Patient Care Overview  Goal: Plan of Care Review  Outcome: Ongoing (interventions implemented as appropriate)  Patient sat 100%.  Decreased to 20LPM @ 70%.  Sat 99%.  Will continue to monitor

## 2018-05-25 NOTE — PLAN OF CARE
Problem: Patient Care Overview  Goal: Plan of Care Review  Outcome: Ongoing (interventions implemented as appropriate)  Patient resting in bed at this time, all important items and call light within reach, instructed to call as needed, bed in lowest and locked position, night light on, nonskid footwear, bed alarm as needed, verbalized understanding of plan of care and purposeful rounding, pt free of injuries     Swallow pills whole, tolerated  Pain meds given prn with moderate relief  High flow oxygen, tolerating  Saline locked, tolerating   Shepard catheter with minimal output   Family at bedside, very attentive   patient oriented to self      No further complaints or concerns at this time  Will cont to monitor

## 2018-05-25 NOTE — PLAN OF CARE
05/25/18 1705   Discharge Reassessment   Assessment Type Discharge Planning Reassessment   Provided patient/caregiver education on the expected discharge date and the discharge plan Yes   Do you have any problems affording any of your prescribed medications? No   Discharge Plan A Hospice/home   Discharge Plan B Inpatient Hospice   Patient choice form signed by patient/caregiver N/A   Can the patient answer the patient profile reliably? No, cognitively impaired   How does the patient rate their overall health at the present time? Poor   Describe the patient's ability to walk at the present time. Major restrictions/daily assistance from another person   How often would a person be available to care for the patient? Whenever needed   Number of comorbid conditions (as recorded on the chart) Five or more   During the past month, has the patient often been bothered by feeling down, depressed or hopeless? No   During the past month, has the patient often been bothered by little interest or pleasure in doing things? No

## 2018-05-25 NOTE — PHYSICIAN QUERY
PT Name: Deepali Stuart  MR #: [id number]    Physician Query Form - Respiratory Condition Clarification      CDS/: Joann Denton                 Contact information:[email or web address]     This form is a permanent document in the medical record.    Query Date: May 25, 2018    By submitting this query, we are merely seeking further clarification of documentation. Please utilize your independent clinical judgment when addressing the question(s) below.    The Medical record contains the following   Indicators   Supporting Clinical Findings Location in Medical Record      SOB, MARTINEZ, Wheezing, Productive Cough, Use of Accessory Muscles, etc.     x   Acute/Chronic Illness CHF,HTN,LEIA,CKD HM note 5-24      Radiology Findings     x   Respiratory Distress or Failure (mild respiratory distress).    ED MD   x   Hypoxia or Hypercapnia Severe hypoxemia  HM note 5-24   x   RR         ABGs         O2 sat RR=16 to 22  P0laa=82-54%    ABG PO2=33.5           PO2=74   Vs record 5-20 to 5-21      ABG 5-20      BiPAP/Intubation     x   Supplemental O2 Non rebreather  4 to 5lnc    VSS on high-flow nasal cannula   Vs record 5-20 to -21      Nursing care plan 5-23      Home O2, Oxygen Dependence        Treatment        Other       Provider, please specify diagnosis or diagnoses associated with above clinical findings.    [  x  ] Acute Respiratory Failure with Hypoxia     [    ] Acute Respiratory Distress  [    ] Hypoxia Only  [    ] Other Respiratory Diagnosis (please specify): ________________________________________  [    ] Clinically Undetermined    Please document in your progress notes daily for the duration of treatment until resolved and include in your discharge summary.

## 2018-05-25 NOTE — ASSESSMENT & PLAN NOTE
- Warfarin has been on hold due to elevated INR - discontinue due to new hospice status.  - Patient has pacemaker and is on beta blocker for rate control.

## 2018-05-25 NOTE — ASSESSMENT & PLAN NOTE
- Patient on multiple diuretics  - Tolvaptan given Monday without improvement and repeated 5/23 and yesterday  - Fluid restriction discontinued.

## 2018-05-26 VITALS
WEIGHT: 142.88 LBS | BODY MASS INDEX: 26.29 KG/M2 | DIASTOLIC BLOOD PRESSURE: 78 MMHG | RESPIRATION RATE: 20 BRPM | TEMPERATURE: 96 F | SYSTOLIC BLOOD PRESSURE: 131 MMHG | HEART RATE: 78 BPM | OXYGEN SATURATION: 93 % | HEIGHT: 62 IN

## 2018-05-26 LAB
ANION GAP SERPL CALC-SCNC: 10 MMOL/L
BUN SERPL-MCNC: 68 MG/DL
CALCIUM SERPL-MCNC: 7.6 MG/DL
CHLORIDE SERPL-SCNC: 84 MMOL/L
CO2 SERPL-SCNC: 26 MMOL/L
CREAT SERPL-MCNC: 3.7 MG/DL
EST. GFR  (AFRICAN AMERICAN): 12 ML/MIN/1.73 M^2
EST. GFR  (NON AFRICAN AMERICAN): 11 ML/MIN/1.73 M^2
GLUCOSE SERPL-MCNC: 97 MG/DL
INR PPP: 2.2
POTASSIUM SERPL-SCNC: 4.1 MMOL/L
PROTHROMBIN TIME: 23.9 SEC
SODIUM SERPL-SCNC: 119 MMOL/L
SODIUM SERPL-SCNC: 120 MMOL/L

## 2018-05-26 PROCEDURE — 94761 N-INVAS EAR/PLS OXIMETRY MLT: CPT

## 2018-05-26 PROCEDURE — 25000003 PHARM REV CODE 250: Performed by: INTERNAL MEDICINE

## 2018-05-26 PROCEDURE — 99239 HOSP IP/OBS DSCHRG MGMT >30: CPT | Mod: ,,, | Performed by: HOSPITALIST

## 2018-05-26 PROCEDURE — 80048 BASIC METABOLIC PNL TOTAL CA: CPT

## 2018-05-26 PROCEDURE — 27100171 HC OXYGEN HIGH FLOW UP TO 24 HOURS

## 2018-05-26 PROCEDURE — 85610 PROTHROMBIN TIME: CPT

## 2018-05-26 PROCEDURE — A4216 STERILE WATER/SALINE, 10 ML: HCPCS | Performed by: INTERNAL MEDICINE

## 2018-05-26 PROCEDURE — 84295 ASSAY OF SERUM SODIUM: CPT

## 2018-05-26 PROCEDURE — 36415 COLL VENOUS BLD VENIPUNCTURE: CPT

## 2018-05-26 RX ORDER — 3% SODIUM CHLORIDE 3 G/100ML
30 INJECTION, SOLUTION INTRAVENOUS CONTINUOUS
Status: DISCONTINUED | OUTPATIENT
Start: 2018-05-26 | End: 2018-05-26 | Stop reason: HOSPADM

## 2018-05-26 RX ADMIN — SODIUM CHLORIDE, PRESERVATIVE FREE 3 ML: 5 INJECTION INTRAVENOUS at 06:05

## 2018-05-26 RX ADMIN — TRAMADOL HYDROCHLORIDE 50 MG: 50 TABLET, COATED ORAL at 11:05

## 2018-05-26 NOTE — PLAN OF CARE
Martin from [facility] completed admission consents with family. Report can be called to [phone]. [facility] with [facility]. Pickup scheduled for 2pm. RN Brittni updated. Family denied any further needs      05/26/18 1308   Final Note   Assessment Type Final Discharge Note   Discharge Disposition [facility]   What phone number can be called within the next 1-3 days to see how you are doing after discharge? [phone]   Right Care Referral Info   Facility Name [facility]

## 2018-05-26 NOTE — PROGRESS NOTES
Renal Progress Note    Admit Date: 5/20/2018   LOS: 6 days     SUBJECTIVE:     Patient is without new complaint.    Scheduled Meds:   amLODIPine  5 mg Oral Daily    calcitRIOL  0.25 mcg Oral Daily    carvedilol  25 mg Oral BID    famotidine  20 mg Oral Daily    levothyroxine  50 mcg Oral Daily    sodium chloride 0.9%  3 mL Intravenous Q8H    sodium chloride  1 g Oral BID    traZODone  50 mg Oral QHS    vitamin D  1,000 Units Oral Daily       OBJECTIVE:     Vital Signs Range (Last 24H):  Temp:  [97.1 °F (36.2 °C)-98.3 °F (36.8 °C)]   Pulse:  [55-73]   Resp:  [18-22]   BP: (118-143)/(51-83)   SpO2:  [70 %-97 %]     I & O (Last 24H):  Intake/Output Summary (Last 24 hours) at 05/26/18 1018  Last data filed at 05/26/18 0500   Gross per 24 hour   Intake                0 ml   Output              400 ml   Net             -400 ml       Physical Exam:      Laboratory:  CBC:   Recent Labs  Lab 05/21/18  0532   WBC 6.59   RBC 2.75*   HGB 8.5*   HCT 25.1*   *   MCV 91   MCH 30.9   MCHC 33.9     BMP:   Recent Labs  Lab 05/22/18  0529  05/26/18  0451   *  < > 97   *  < > 120*   K 3.3*  < > 4.1   CL 85*  < > 84*   CO2 28  < > 26   BUN 55*  < > 68*   CREATININE 3.6*  < > 3.7*   CALCIUM 7.8*  < > 7.6*   MG 1.7  --   --    < > = values in this interval not displayed.    ASSESSMENT/PLAN:             1. Persistent nonoliguric LEIA on chronic kidney disease stage IV (N 17.9, N 18.4): Followed Dr. Pruitt at [facility], most recently seen 5/3/18.  Her most recent Cr 2.2 due to AKIs, but prior to that her baseline Cr ~ 1.4-1.7.  CKD has been attributed to longstanding DM and HTN.  Nephrotic syndrome serology has been negative.  Pt and daughter have declined a renal bx and HD. stable at 3.6-3.7 and likely her new baseline.  Renally dose meds, avoid nephrotoxins, and monitor I/O's closely.   2. Hyponatremia secondary to diuresis (E 87.1) and likey SIADH.  Will give a small dose of 3% saline given acuity and give salt  tablets as tolerated.  I don't think we need to be aggressive in reversing this given she is headed to hospice and I certainly don't want to complicate her EOL with CPM.  3. Hypokalemia (E87.5): From diuretics.  Replaced with po prn  4. 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 AFIB on OAC (I48.91):  Dr. Garcia to manage Coumadin and INR.   INR still elevated.  Defer.  6. Anemia of CKD (D63.1) with Iron deficiency (D50.0):  Normal B12/folate.  IV Venofer qd x5 days.    7. Dispo:  DNR.  Patient's family has confirmed she does not and did not wish for HD in the past.  Discussed at length with family members and they want home hospice.

## 2018-05-26 NOTE — PLAN OF CARE
Met with daughter Fang at bedside to discuss discharge disposition. Daughter voiced a preference for inpatient hospice care. Spoke with Sanju from [facility] who will send a nurse out to bedside to evaluate patient for inpatient criteria & speak with family - awaiting visit

## 2018-05-26 NOTE — DISCHARGE SUMMARY
[facility] Hospital Medicine  Discharge Summary      Patient Name: Deepali Stuart  MRN: [id number]  Admission Date: 5/20/2018  Hospital Length of Stay: 6 days  Discharge Date and Time: 5/26/2018  3:33 PM  Attending Physician: No att. providers found   Discharging Provider: Mariaelena Morse MD  Primary Care Provider: Vishal Salazar MD      HPI:   Ms. Stuart is an 86 year old woman who presented to the ED with shortness of breath, decreased activity and confusion for one day.  Her daughter reports that patient seems to have declined since her pacemaker placement. Patient normally eats a low salt diet but she went to a [employer] party yesterday and ate some high salt foods.  On presentation her oxygen saturation on room air was in the low 80's, and she was placed on a NRB in the ED before being weaned down to 5 liters NC.  Labs were notable for BUN/creatinine 49/3.6, BNP 2,585, mildly elevated troponin, sodium 127.  CXR showed bilateral effusions and associated atelectasis.  She was admitted for treatment of heart failure in the setting of worsening chronic kidney disease.    Medical history includes CHF with pulmonary hypertension, chronic atrial fibrillation, type II diabetes and hypothyroidism.  Her pacemaker was placed 2012.  She is followed by Dr. 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  She is not interested in HD.          Hospital Course:   Patient was started on IV diuresis, with some clinical improvement in her shortness of breath but she remained hypoxemic.  Cardiology and Nephrology were consulted.  She was started on fluid restriction to treat low sodium, and then received a dose of tolvaptan and her fluids were liberalized.  She did not have a good response to the initial dose and it was increased and repeated on 5/23 and then 5/24.  In the meantime she was sleeping most of the time and was intermittently confused.  Her oxygen requirement increased until  she was on high flow with saturation in the low 90's.  Eventually we had conversations with the family about her prognosis given the advanced heart failure with hypoxemia and worsening kidney function.  At first they wanted to take her home with hospice, but eventually agreed that her oxygen requirement and frailty made this too difficult.  She was transferred to [facility] in the afternoon 5/26/18.     Consults:   Consults         Status Ordering Provider     Inpatient consult to Cardiology  Once     Provider:  (Not yet assigned)    Completed TRANG COLLADO     Inpatient consult to Nephrology  Once     Provider:  Javier Villanueva NP    Completed TRANG COLLADO     Inpatient consult to Spiritual Care  Once     Provider:  (Not yet assigned)    Acknowledged SHERRON OLIVIA            Final Active Diagnoses:    Diagnosis Date Noted POA    PRINCIPAL PROBLEM:  Heart failure, systolic and diastolic, acute on chronic [I50.43] 05/20/2018 Yes    LEIA (acute kidney injury) [N17.9] 08/26/2015 Yes    Hypertensive heart and kidney disease with HF and with CKD stage IV [I13.0, N18.4] 12/16/2015 Yes    Acute respiratory failure with hypoxia [J96.01] 05/25/2018 Yes    Encounter for palliative care [Z51.5] 05/25/2018 Not Applicable    Hyponatremia [E87.1] 05/20/2018 Yes    Essential hypertension [I10]  Yes    Hypothyroidism [E03.9] 01/02/2013 Yes    Controlled type 2 diabetes mellitus with diabetic nephropathy, without long-term current use of insulin [E11.21] 11/12/2012 Yes    Chronic atrial fibrillation [I48.2] 11/12/2012 Yes      Problems Resolved During this Admission:    Diagnosis Date Noted Date Resolved POA       Discharged Condition:   Terminal condition    Disposition: Hospice/Medical Facility    Follow Up:  Follow-up Information     [facility].    Specialties:  Hospice and Palliative Medicine, Hospice Services  Contact information:  [address]  [address]  [phone]                 Patient Instructions:     Diet Adult Regular     Activity as tolerated         Medications:  Reconciled Home Medications:      Medication List      STOP taking these medications    acetaminophen 650 MG Tbsr  Commonly known as:  TYLENOL     amLODIPine 5 MG tablet  Commonly known as:  NORVASC     atorvastatin 10 MG tablet  Commonly known as:  LIPITOR     bumetanide 1 MG tablet  Commonly known as:  BUMEX     calcium carbonate 500 mg calcium (1,250 mg) tablet  Commonly known as:  OS-JERMAN     carvedilol 25 MG tablet  Commonly known as:  COREG     CHEWABLE MULTI VITAMIN ORAL     cholecalciferol (vitamin D3) 1,000 unit capsule     furosemide 20 MG tablet  Commonly known as:  LASIX     lancets Misc     levothyroxine 50 MCG tablet  Commonly known as:  SYNTHROID     lisinopril 20 MG tablet  Commonly known as:  PRINIVIL,ZESTRIL     LORazepam 0.5 MG tablet  Commonly known as:  ATIVAN     magnesium oxide 400 mg tablet  Commonly known as:  MAG-OX     meclizine 25 mg tablet  Commonly known as:  ANTIVERT     metOLazone 2.5 MG tablet  Commonly known as:  ZAROXOLYN     mupirocin 2 % ointment  Commonly known as:  BACTROBAN     ondansetron 4 MG Tbdl  Commonly known as:  ZOFRAN-ODT     pantoprazole 20 MG tablet  Commonly known as:  PROTONIX     ranitidine 300 MG tablet  Commonly known as:  ZANTAC     traZODone 50 MG tablet  Commonly known as:  DESYREL     TRUETRACK TEST Strp  Generic drug:  blood sugar diagnostic     warfarin 5 MG tablet  Commonly known as:  COUMADIN            Indwelling Lines/Drains at time of discharge:   Lines/Drains/Airways     Drain                 Urethral Catheter 05/21/18 1105 Double-lumen 16 Fr. 5 days                Time spent on the discharge of patient: >30 minutes  Patient was seen and examined on the date of discharge and determined to be suitable for discharge.         Mariaelena Morse MD  Department of Hospital Medicine  [facility]

## 2018-05-26 NOTE — PLAN OF CARE
Problem: Patient Care Overview  Goal: Plan of Care Review  Outcome: Ongoing (interventions implemented as appropriate)  Patient free from fall and injuries. Vitals WNL. Due meds given. Needs attended.O2 delivered via hiflow NC. Generalized weakness noted. Patient alert and conversant. Family members at bedside. Patient resting comfortably. Patient minimal urine output. Seen at intervals.

## 2018-05-26 NOTE — PLAN OF CARE
Problem: Patient Care Overview  Goal: Plan of Care Review  Outcome: Ongoing (interventions implemented as appropriate)  Pt remains on HFNC 20L 70%. Pt was without O2 for a short time and became SOB. Pt now comfortable and sats 96%. Will continue to monitor and wean when possible.

## 2018-05-26 NOTE — NURSING
Called to patient's room by family. Patient was yelling and extremely agitated. Patient was noted to have no nasal cannula on and it was disconnected from the machine. O2 levels were in the low 70's. Placed back on high flow O2 via N/C, called respiratory. As oxygen levels increased patient was able to calm down.

## 2018-05-26 NOTE — PROGRESS NOTES
Physical Therapy Discharge Summary    Name: Deepali Stuart  MRN: [id number]   Principal Problem: Heart failure, systolic and diastolic, acute on chronic     Patient Discharged from acute Physical Therapy on 18.  Please refer to prior PT noted date on 18 for functional status.     Assessment:     Patient appropriate for care in another setting.    Objective:     GOALS:    Physical Therapy Goals        Problem: Physical Therapy Goal    Goal Priority Disciplines Outcome Goal Variances Interventions   Physical Therapy Goal     PT/OT, PT Ongoing (interventions implemented as appropriate)     Description:  Goals to be met by: 6/3/18    Patient will increase functional independence with mobility by performin. Supine to sit with supervision.  2. Sit to supine with supervision.   3. Sitting at edge of bed >5 minutes with supervision.   4. Assessment of sit<>stand by PT.   5. Assessment of bed<>chair by PT.                       Reasons for Discontinuation of Therapy Services  Transfer to alternate level of care.      Plan:     Patient Discharged to: Palliative Care/Hospice.    Hieu Santos, PT  2018

## 2018-05-26 NOTE — PLAN OF CARE
Informed by Dr Villagomez that the family has voiced a preference for [facility]. Spoke with Sanju from [facility] & cancelled referral. Referral forwarded to [facility] via [facility]. Spoke with Sherie at [facility] who reported patient's son Gopal also called her as they used to work together & she was waiting on the referral. Martin from [facility] to visit [facility] shortly & meet with family

## 2018-05-26 NOTE — PLAN OF CARE
Martin Crespo is scheduled to meet with the family at noon at bedside to complete consents they will be ready to accept patient

## 2018-05-27 NOTE — PT/OT/SLP DISCHARGE
Occupational Therapy Discharge Summary    Deepali Stuart  MRN: [id number]   Principal Problem: Heart failure, systolic and diastolic, acute on chronic      Patient Discharged from acute Occupational Therapy on 5/26/18.  Please refer to prior OT note dated 5/23/18 for functional status.    Assessment:      Patient appropriate for care in another setting.    Objective:     GOALS:    Occupational Therapy Goals        Problem: Occupational Therapy Goal    Goal Priority Disciplines Outcome Interventions   Occupational Therapy Goal     OT, PT/OT Ongoing (interventions implemented as appropriate)    Description:  Goals to be met by: 05/28/18     Patient will increase functional independence with ADLs by performing:    UE Dressing with Minimal Assistance.  Grooming while seated with Stand-by Assistance.  Supine to sit with Supervision.  Upper extremity exercise program x5-8 reps to maintain strength and endurance for mobility and ADL tasks.  Assess transfers to/from recliner with rollator or A                       Reasons for Discontinuation of Therapy Services  Transfer to alternate level of care.      Plan:     Patient Discharged to: Palliative Care/Hospice, Home Health PT/OT recommended.    VINNIE Nick  5/27/2018

## 2018-06-03 RX ORDER — ATORVASTATIN CALCIUM 10 MG/1
TABLET, FILM COATED ORAL
Qty: 90 TABLET | Refills: 3 | Status: SHIPPED | OUTPATIENT
Start: 2018-06-03

## 2018-06-06 ENCOUNTER — TELEPHONE (OUTPATIENT)
Dept: INTERNAL MEDICINE | Facility: CLINIC | Age: 83
End: 2018-06-06

## 2018-06-10 RX ORDER — PANTOPRAZOLE SODIUM 20 MG/1
TABLET, DELAYED RELEASE ORAL
Qty: 30 TABLET | Refills: 0 | OUTPATIENT
Start: 2018-06-10

## 2022-10-03 NOTE — ASSESSMENT & PLAN NOTE
- Worsening renal function in setting of poor perfusion due to heart failure  - Stable creatinine but near needing HD if she were not so frail.  - Nephrology following  - Patient is not interested in HD and would not likely tolerate it in any case.     What Is The Reason For Today's Visit?: Full Body Skin Examination What Is The Reason For Today's Visit? (Being Monitored For X): the re-examination of lesions previously examined
